# Patient Record
Sex: FEMALE | Race: WHITE | NOT HISPANIC OR LATINO | Employment: OTHER | URBAN - NONMETROPOLITAN AREA
[De-identification: names, ages, dates, MRNs, and addresses within clinical notes are randomized per-mention and may not be internally consistent; named-entity substitution may affect disease eponyms.]

---

## 2017-01-01 ENCOUNTER — APPOINTMENT (EMERGENCY)
Dept: CT IMAGING | Facility: HOSPITAL | Age: 82
DRG: 690 | End: 2017-01-01
Payer: MEDICARE

## 2017-01-01 ENCOUNTER — ALLSCRIPTS OFFICE VISIT (OUTPATIENT)
Dept: OTHER | Facility: OTHER | Age: 82
End: 2017-01-01

## 2017-01-01 ENCOUNTER — GENERIC CONVERSION - ENCOUNTER (OUTPATIENT)
Dept: OTHER | Facility: OTHER | Age: 82
End: 2017-01-01

## 2017-01-01 ENCOUNTER — APPOINTMENT (INPATIENT)
Dept: PHYSICAL THERAPY | Facility: HOSPITAL | Age: 82
DRG: 543 | End: 2017-01-01
Payer: MEDICARE

## 2017-01-01 ENCOUNTER — HOSPITAL ENCOUNTER (EMERGENCY)
Facility: HOSPITAL | Age: 82
Discharge: HOME | End: 2017-08-31
Admitting: EMERGENCY MEDICINE
Payer: MEDICARE

## 2017-01-01 ENCOUNTER — GENERIC CONVERSION - ENCOUNTER (OUTPATIENT)
Dept: INTERNAL MEDICINE CLINIC | Facility: CLINIC | Age: 82
End: 2017-01-01

## 2017-01-01 ENCOUNTER — HOSPITAL ENCOUNTER (INPATIENT)
Facility: HOSPITAL | Age: 82
LOS: 1 days | Discharge: HOME | DRG: 690 | End: 2017-03-24
Attending: EMERGENCY MEDICINE | Admitting: HOSPITALIST
Payer: MEDICARE

## 2017-01-01 ENCOUNTER — APPOINTMENT (INPATIENT)
Dept: PHYSICAL THERAPY | Facility: HOSPITAL | Age: 82
DRG: 690 | End: 2017-01-01
Payer: MEDICARE

## 2017-01-01 ENCOUNTER — HOSPITAL ENCOUNTER (INPATIENT)
Facility: HOSPITAL | Age: 82
LOS: 39 days | Discharge: NURSING, REHAB OR LONG-TERM CARE | DRG: 092 | End: 2017-10-17
Attending: INTERNAL MEDICINE | Admitting: INTERNAL MEDICINE
Payer: MEDICARE

## 2017-01-01 ENCOUNTER — APPOINTMENT (EMERGENCY)
Dept: RADIOLOGY | Facility: HOSPITAL | Age: 82
End: 2017-01-01
Payer: MEDICARE

## 2017-01-01 ENCOUNTER — APPOINTMENT (INPATIENT)
Dept: OCCUPATIONAL THERAPY | Facility: HOSPITAL | Age: 82
DRG: 543 | End: 2017-01-01
Payer: MEDICARE

## 2017-01-01 ENCOUNTER — APPOINTMENT (INPATIENT)
Dept: OCCUPATIONAL THERAPY | Facility: HOSPITAL | Age: 82
DRG: 690 | End: 2017-01-01
Payer: MEDICARE

## 2017-01-01 ENCOUNTER — APPOINTMENT (EMERGENCY)
Dept: ULTRASOUND IMAGING | Facility: HOSPITAL | Age: 82
End: 2017-01-01
Payer: MEDICARE

## 2017-01-01 ENCOUNTER — APPOINTMENT (EMERGENCY)
Dept: RADIOLOGY | Facility: HOSPITAL | Age: 82
DRG: 543 | End: 2017-01-01
Payer: MEDICARE

## 2017-01-01 ENCOUNTER — HOSPITAL ENCOUNTER (INPATIENT)
Facility: HOSPITAL | Age: 82
LOS: 3 days | Discharge: NURSING, REHAB OR LONG-TERM CARE | DRG: 543 | End: 2017-09-08
Attending: EMERGENCY MEDICINE | Admitting: INTERNAL MEDICINE
Payer: MEDICARE

## 2017-01-01 ENCOUNTER — HOSPITAL ENCOUNTER (EMERGENCY)
Facility: HOSPITAL | Age: 82
Discharge: HOME | End: 2017-06-14
Attending: EMERGENCY MEDICINE
Payer: MEDICARE

## 2017-01-01 VITALS
HEIGHT: 63 IN | OXYGEN SATURATION: 93 % | BODY MASS INDEX: 21.68 KG/M2 | SYSTOLIC BLOOD PRESSURE: 140 MMHG | RESPIRATION RATE: 16 BRPM | HEART RATE: 89 BPM | DIASTOLIC BLOOD PRESSURE: 63 MMHG | WEIGHT: 122.36 LBS | TEMPERATURE: 98.4 F

## 2017-01-01 VITALS
HEIGHT: 60 IN | BODY MASS INDEX: 23.98 KG/M2 | TEMPERATURE: 97.5 F | OXYGEN SATURATION: 96 % | SYSTOLIC BLOOD PRESSURE: 119 MMHG | RESPIRATION RATE: 18 BRPM | WEIGHT: 122.14 LBS | HEART RATE: 63 BPM | DIASTOLIC BLOOD PRESSURE: 67 MMHG

## 2017-01-01 VITALS
WEIGHT: 126.1 LBS | BODY MASS INDEX: 21.53 KG/M2 | HEIGHT: 64 IN | OXYGEN SATURATION: 97 % | TEMPERATURE: 98.3 F | SYSTOLIC BLOOD PRESSURE: 143 MMHG | RESPIRATION RATE: 19 BRPM | DIASTOLIC BLOOD PRESSURE: 65 MMHG | HEART RATE: 62 BPM

## 2017-01-01 VITALS
HEART RATE: 64 BPM | BODY MASS INDEX: 21.26 KG/M2 | WEIGHT: 120 LBS | SYSTOLIC BLOOD PRESSURE: 112 MMHG | DIASTOLIC BLOOD PRESSURE: 56 MMHG | RESPIRATION RATE: 18 BRPM | OXYGEN SATURATION: 94 % | TEMPERATURE: 98.2 F

## 2017-01-01 DIAGNOSIS — M54.16 RADICULOPATHY OF LUMBAR REGION: ICD-10-CM

## 2017-01-01 DIAGNOSIS — R29.6 FREQUENT FALLS: Primary | ICD-10-CM

## 2017-01-01 DIAGNOSIS — R53.1 WEAKNESS: ICD-10-CM

## 2017-01-01 DIAGNOSIS — N39.0 URINARY TRACT INFECTION: Primary | ICD-10-CM

## 2017-01-01 DIAGNOSIS — R53.1 GENERALIZED WEAKNESS: Primary | ICD-10-CM

## 2017-01-01 DIAGNOSIS — S32.000A LUMBAR COMPRESSION FRACTURE (HCC): Chronic | ICD-10-CM

## 2017-01-01 DIAGNOSIS — K80.20 CHOLELITHIASIS: ICD-10-CM

## 2017-01-01 DIAGNOSIS — M54.9 CHRONIC BACK PAIN: Primary | ICD-10-CM

## 2017-01-01 DIAGNOSIS — G89.29 CHRONIC BACK PAIN: Primary | ICD-10-CM

## 2017-01-01 DIAGNOSIS — M79.605 LOWER EXTREMITY PAIN, BILATERAL: Primary | ICD-10-CM

## 2017-01-01 DIAGNOSIS — Z91.81 HISTORY OF FALLING: ICD-10-CM

## 2017-01-01 DIAGNOSIS — M79.604 LOWER EXTREMITY PAIN, BILATERAL: Primary | ICD-10-CM

## 2017-01-01 DIAGNOSIS — R06.00 DYSPNEA: ICD-10-CM

## 2017-01-01 LAB
ALBUMIN SERPL BCP-MCNC: 3 G/DL (ref 3.5–5)
ALBUMIN SERPL BCP-MCNC: 3.4 G/DL (ref 3.5–5)
ALBUMIN SERPL BCP-MCNC: 3.7 G/DL (ref 3.5–5)
ALP SERPL-CCNC: 54 U/L (ref 46–116)
ALP SERPL-CCNC: 56 U/L (ref 46–116)
ALP SERPL-CCNC: 64 U/L (ref 46–116)
ALT SERPL W P-5'-P-CCNC: 11 U/L (ref 12–78)
ALT SERPL W P-5'-P-CCNC: 14 U/L (ref 12–78)
ALT SERPL W P-5'-P-CCNC: 14 U/L (ref 12–78)
ANION GAP SERPL CALCULATED.3IONS-SCNC: 10 MMOL/L (ref 4–13)
ANION GAP SERPL CALCULATED.3IONS-SCNC: 6 MMOL/L (ref 4–13)
ANION GAP SERPL CALCULATED.3IONS-SCNC: 7 MMOL/L (ref 4–13)
ANION GAP SERPL CALCULATED.3IONS-SCNC: 8 MMOL/L (ref 4–13)
ANION GAP SERPL CALCULATED.3IONS-SCNC: 9 MMOL/L (ref 4–13)
APTT PPP: 31 SECONDS (ref 24–36)
AST SERPL W P-5'-P-CCNC: 18 U/L (ref 5–45)
AST SERPL W P-5'-P-CCNC: 19 U/L (ref 5–45)
AST SERPL W P-5'-P-CCNC: 24 U/L (ref 5–45)
ATRIAL RATE: 68 BPM
ATRIAL RATE: 72 BPM
BACTERIA UR CULT: NORMAL
BACTERIA UR CULT: NORMAL
BACTERIA UR QL AUTO: ABNORMAL /HPF
BACTERIA UR QL AUTO: ABNORMAL /HPF
BASOPHILS # BLD AUTO: 0.03 THOUSANDS/ΜL (ref 0–0.1)
BASOPHILS # BLD AUTO: 0.05 THOUSANDS/ΜL (ref 0–0.1)
BASOPHILS # BLD MANUAL: 0 THOUSAND/UL (ref 0–0.1)
BASOPHILS NFR BLD AUTO: 0 % (ref 0–1)
BASOPHILS NFR BLD AUTO: 1 % (ref 0–1)
BASOPHILS NFR MAR MANUAL: 0 % (ref 0–1)
BILIRUB SERPL-MCNC: 0.6 MG/DL (ref 0.2–1)
BILIRUB SERPL-MCNC: 0.8 MG/DL (ref 0.2–1)
BILIRUB SERPL-MCNC: 0.8 MG/DL (ref 0.2–1)
BILIRUB UR QL STRIP: NEGATIVE
BILIRUB UR QL STRIP: NEGATIVE
BUN SERPL-MCNC: 12 MG/DL (ref 5–25)
BUN SERPL-MCNC: 16 MG/DL (ref 5–25)
BUN SERPL-MCNC: 22 MG/DL (ref 5–25)
BUN SERPL-MCNC: 23 MG/DL (ref 5–25)
BUN SERPL-MCNC: 24 MG/DL (ref 5–25)
BUN SERPL-MCNC: 26 MG/DL (ref 5–25)
BUN SERPL-MCNC: 29 MG/DL (ref 5–25)
CALCIUM SERPL-MCNC: 8.5 MG/DL (ref 8.3–10.1)
CALCIUM SERPL-MCNC: 8.8 MG/DL (ref 8.3–10.1)
CALCIUM SERPL-MCNC: 8.9 MG/DL (ref 8.3–10.1)
CALCIUM SERPL-MCNC: 9.1 MG/DL (ref 8.3–10.1)
CALCIUM SERPL-MCNC: 9.2 MG/DL (ref 8.3–10.1)
CALCIUM SERPL-MCNC: 9.2 MG/DL (ref 8.3–10.1)
CALCIUM SERPL-MCNC: 9.6 MG/DL (ref 8.3–10.1)
CHLORIDE SERPL-SCNC: 100 MMOL/L (ref 100–108)
CHLORIDE SERPL-SCNC: 104 MMOL/L (ref 100–108)
CHLORIDE SERPL-SCNC: 105 MMOL/L (ref 100–108)
CHLORIDE SERPL-SCNC: 106 MMOL/L (ref 100–108)
CHLORIDE SERPL-SCNC: 107 MMOL/L (ref 100–108)
CHLORIDE SERPL-SCNC: 107 MMOL/L (ref 100–108)
CHLORIDE SERPL-SCNC: 110 MMOL/L (ref 100–108)
CK SERPL-CCNC: 106 U/L (ref 26–192)
CK SERPL-CCNC: 38 U/L (ref 26–192)
CLARITY UR: ABNORMAL
CLARITY UR: ABNORMAL
CO2 SERPL-SCNC: 26 MMOL/L (ref 21–32)
CO2 SERPL-SCNC: 29 MMOL/L (ref 21–32)
CO2 SERPL-SCNC: 30 MMOL/L (ref 21–32)
CO2 SERPL-SCNC: 30 MMOL/L (ref 21–32)
CO2 SERPL-SCNC: 31 MMOL/L (ref 21–32)
COLOR UR: YELLOW
COLOR UR: YELLOW
CREAT SERPL-MCNC: 0.95 MG/DL (ref 0.6–1.3)
CREAT SERPL-MCNC: 1.09 MG/DL (ref 0.6–1.3)
CREAT SERPL-MCNC: 1.16 MG/DL (ref 0.6–1.3)
CREAT SERPL-MCNC: 1.19 MG/DL (ref 0.6–1.3)
CREAT SERPL-MCNC: 1.19 MG/DL (ref 0.6–1.3)
CREAT SERPL-MCNC: 1.2 MG/DL (ref 0.6–1.3)
CREAT SERPL-MCNC: 1.32 MG/DL (ref 0.6–1.3)
EOSINOPHIL # BLD AUTO: 0.19 THOUSAND/ΜL (ref 0–0.61)
EOSINOPHIL # BLD AUTO: 0.23 THOUSAND/ΜL (ref 0–0.61)
EOSINOPHIL # BLD AUTO: 0.31 THOUSAND/ΜL (ref 0–0.61)
EOSINOPHIL # BLD AUTO: 0.43 THOUSAND/ΜL (ref 0–0.61)
EOSINOPHIL # BLD MANUAL: 0.3 THOUSAND/UL (ref 0–0.4)
EOSINOPHIL NFR BLD AUTO: 3 % (ref 0–6)
EOSINOPHIL NFR BLD AUTO: 3 % (ref 0–6)
EOSINOPHIL NFR BLD AUTO: 5 % (ref 0–6)
EOSINOPHIL NFR BLD AUTO: 5 % (ref 0–6)
EOSINOPHIL NFR BLD MANUAL: 5 % (ref 0–6)
ERYTHROCYTE [DISTWIDTH] IN BLOOD BY AUTOMATED COUNT: 14.2 % (ref 11.6–15.1)
ERYTHROCYTE [DISTWIDTH] IN BLOOD BY AUTOMATED COUNT: 14.4 % (ref 11.6–15.1)
ERYTHROCYTE [DISTWIDTH] IN BLOOD BY AUTOMATED COUNT: 14.5 % (ref 11.6–15.1)
ERYTHROCYTE [DISTWIDTH] IN BLOOD BY AUTOMATED COUNT: 14.6 % (ref 11.6–15.1)
ERYTHROCYTE [DISTWIDTH] IN BLOOD BY AUTOMATED COUNT: 14.7 % (ref 11.6–15.1)
ERYTHROCYTE [DISTWIDTH] IN BLOOD BY AUTOMATED COUNT: 15 % (ref 11.6–15.1)
ERYTHROCYTE [DISTWIDTH] IN BLOOD BY AUTOMATED COUNT: 15.2 % (ref 11.6–15.1)
FOLATE SERPL-MCNC: 12.1 NG/ML (ref 3.1–17.5)
GFR SERPL CREATININE-BSD FRML MDRD: 35 ML/MIN/1.73SQ M
GFR SERPL CREATININE-BSD FRML MDRD: 39 ML/MIN/1.73SQ M
GFR SERPL CREATININE-BSD FRML MDRD: 39 ML/MIN/1.73SQ M
GFR SERPL CREATININE-BSD FRML MDRD: 42.3 ML/MIN/1.73SQ M
GFR SERPL CREATININE-BSD FRML MDRD: 43.6 ML/MIN/1.73SQ M
GFR SERPL CREATININE-BSD FRML MDRD: 46.8 ML/MIN/1.73SQ M
GFR SERPL CREATININE-BSD FRML MDRD: 52 ML/MIN/1.73SQ M
GLUCOSE SERPL-MCNC: 102 MG/DL (ref 65–140)
GLUCOSE SERPL-MCNC: 117 MG/DL (ref 65–140)
GLUCOSE SERPL-MCNC: 122 MG/DL (ref 65–140)
GLUCOSE SERPL-MCNC: 86 MG/DL (ref 65–140)
GLUCOSE SERPL-MCNC: 88 MG/DL (ref 65–140)
GLUCOSE SERPL-MCNC: 92 MG/DL (ref 65–140)
GLUCOSE SERPL-MCNC: 95 MG/DL (ref 65–140)
GLUCOSE UR STRIP-MCNC: NEGATIVE MG/DL
GLUCOSE UR STRIP-MCNC: NEGATIVE MG/DL
HCT VFR BLD AUTO: 32.1 % (ref 34.8–46.1)
HCT VFR BLD AUTO: 32.2 % (ref 34.8–46.1)
HCT VFR BLD AUTO: 33.1 % (ref 34.8–46.1)
HCT VFR BLD AUTO: 34.9 % (ref 34.8–46.1)
HCT VFR BLD AUTO: 35.4 % (ref 34.8–46.1)
HCT VFR BLD AUTO: 36.5 % (ref 34.8–46.1)
HCT VFR BLD AUTO: 38.7 % (ref 34.8–46.1)
HGB BLD-MCNC: 10.4 G/DL (ref 11.5–15.4)
HGB BLD-MCNC: 10.4 G/DL (ref 11.5–15.4)
HGB BLD-MCNC: 10.5 G/DL (ref 11.5–15.4)
HGB BLD-MCNC: 11.2 G/DL (ref 11.5–15.4)
HGB BLD-MCNC: 11.5 G/DL (ref 11.5–15.4)
HGB BLD-MCNC: 11.8 G/DL (ref 11.5–15.4)
HGB BLD-MCNC: 12.5 G/DL (ref 11.5–15.4)
HGB UR QL STRIP.AUTO: ABNORMAL
HGB UR QL STRIP.AUTO: NEGATIVE
HOLD SPECIMEN: YES
INR PPP: 1.08 (ref 0.86–1.16)
INR PPP: 1.1 (ref 0.86–1.16)
KETONES UR STRIP-MCNC: NEGATIVE MG/DL
KETONES UR STRIP-MCNC: NEGATIVE MG/DL
LACTATE SERPL-SCNC: 0.9 MMOL/L (ref 0.5–2)
LEUKOCYTE ESTERASE UR QL STRIP: ABNORMAL
LEUKOCYTE ESTERASE UR QL STRIP: ABNORMAL
LIPASE SERPL-CCNC: 82 U/L (ref 73–393)
LYMPHOCYTES # BLD AUTO: 1.51 THOUSANDS/ΜL (ref 0.6–4.47)
LYMPHOCYTES # BLD AUTO: 1.53 THOUSANDS/ΜL (ref 0.6–4.47)
LYMPHOCYTES # BLD AUTO: 1.63 THOUSANDS/ΜL (ref 0.6–4.47)
LYMPHOCYTES # BLD AUTO: 1.66 THOUSAND/UL (ref 0.6–4.47)
LYMPHOCYTES # BLD AUTO: 1.93 THOUSANDS/ΜL (ref 0.6–4.47)
LYMPHOCYTES # BLD AUTO: 28 % (ref 14–44)
LYMPHOCYTES NFR BLD AUTO: 19 % (ref 14–44)
LYMPHOCYTES NFR BLD AUTO: 22 % (ref 14–44)
LYMPHOCYTES NFR BLD AUTO: 23 % (ref 14–44)
LYMPHOCYTES NFR BLD AUTO: 26 % (ref 14–44)
MAGNESIUM SERPL-MCNC: 2.1 MG/DL (ref 1.6–2.6)
MAGNESIUM SERPL-MCNC: 2.2 MG/DL (ref 1.6–2.6)
MAGNESIUM SERPL-MCNC: 2.2 MG/DL (ref 1.6–2.6)
MCH RBC QN AUTO: 29.8 PG (ref 26.8–34.3)
MCH RBC QN AUTO: 30 PG (ref 26.8–34.3)
MCH RBC QN AUTO: 30.1 PG (ref 26.8–34.3)
MCH RBC QN AUTO: 30.1 PG (ref 26.8–34.3)
MCH RBC QN AUTO: 30.2 PG (ref 26.8–34.3)
MCH RBC QN AUTO: 30.4 PG (ref 26.8–34.3)
MCH RBC QN AUTO: 30.4 PG (ref 26.8–34.3)
MCHC RBC AUTO-ENTMCNC: 31.7 G/DL (ref 31.4–37.4)
MCHC RBC AUTO-ENTMCNC: 32.1 G/DL (ref 31.4–37.4)
MCHC RBC AUTO-ENTMCNC: 32.3 G/DL (ref 31.4–37.4)
MCHC RBC AUTO-ENTMCNC: 32.4 G/DL (ref 31.4–37.4)
MCHC RBC AUTO-ENTMCNC: 32.5 G/DL (ref 31.4–37.4)
MCV RBC AUTO: 92 FL (ref 82–98)
MCV RBC AUTO: 93 FL (ref 82–98)
MCV RBC AUTO: 94 FL (ref 82–98)
MCV RBC AUTO: 95 FL (ref 82–98)
MCV RBC AUTO: 95 FL (ref 82–98)
MONOCYTES # BLD AUTO: 0.41 THOUSAND/ΜL (ref 0.17–1.22)
MONOCYTES # BLD AUTO: 0.53 THOUSAND/UL (ref 0–1.22)
MONOCYTES # BLD AUTO: 0.55 THOUSAND/ΜL (ref 0.17–1.22)
MONOCYTES # BLD AUTO: 0.6 THOUSAND/ΜL (ref 0.17–1.22)
MONOCYTES # BLD AUTO: 0.66 THOUSAND/ΜL (ref 0.17–1.22)
MONOCYTES NFR BLD AUTO: 7 % (ref 4–12)
MONOCYTES NFR BLD AUTO: 7 % (ref 4–12)
MONOCYTES NFR BLD AUTO: 8 % (ref 4–12)
MONOCYTES NFR BLD AUTO: 8 % (ref 4–12)
MONOCYTES NFR BLD: 9 % (ref 4–12)
NEUTROPHILS # BLD AUTO: 3.62 THOUSANDS/ΜL (ref 1.85–7.62)
NEUTROPHILS # BLD AUTO: 4.34 THOUSANDS/ΜL (ref 1.85–7.62)
NEUTROPHILS # BLD AUTO: 5.77 THOUSANDS/ΜL (ref 1.85–7.62)
NEUTROPHILS # BLD AUTO: 5.91 THOUSANDS/ΜL (ref 1.85–7.62)
NEUTROPHILS # BLD MANUAL: 3.44 THOUSAND/UL (ref 1.85–7.62)
NEUTS SEG NFR BLD AUTO: 58 % (ref 43–75)
NEUTS SEG NFR BLD AUTO: 61 % (ref 43–75)
NEUTS SEG NFR BLD AUTO: 64 % (ref 43–75)
NEUTS SEG NFR BLD AUTO: 65 % (ref 43–75)
NEUTS SEG NFR BLD AUTO: 71 % (ref 43–75)
NITRITE UR QL STRIP: POSITIVE
NITRITE UR QL STRIP: POSITIVE
NON-SQ EPI CELLS URNS QL MICRO: ABNORMAL /HPF
NON-SQ EPI CELLS URNS QL MICRO: ABNORMAL /HPF
NT-PROBNP SERPL-MCNC: 1444 PG/ML
P AXIS: 51 DEGREES
P AXIS: 54 DEGREES
PH UR STRIP.AUTO: 5.5 [PH] (ref 4.5–8)
PH UR STRIP.AUTO: 6.5 [PH] (ref 4.5–8)
PHOSPHATE SERPL-MCNC: 3.8 MG/DL (ref 2.3–4.1)
PHOSPHATE SERPL-MCNC: 3.9 MG/DL (ref 2.3–4.1)
PLATELET # BLD AUTO: 198 THOUSANDS/UL (ref 149–390)
PLATELET # BLD AUTO: 218 THOUSANDS/UL (ref 149–390)
PLATELET # BLD AUTO: 223 THOUSANDS/UL (ref 149–390)
PLATELET # BLD AUTO: 226 THOUSANDS/UL (ref 149–390)
PLATELET # BLD AUTO: 227 THOUSANDS/UL (ref 149–390)
PLATELET # BLD AUTO: 241 THOUSANDS/UL (ref 149–390)
PLATELET # BLD AUTO: 262 THOUSANDS/UL (ref 149–390)
PLATELET BLD QL SMEAR: ADEQUATE
PMV BLD AUTO: 8.5 FL (ref 8.9–12.7)
PMV BLD AUTO: 8.7 FL (ref 8.9–12.7)
PMV BLD AUTO: 8.7 FL (ref 8.9–12.7)
PMV BLD AUTO: 8.9 FL (ref 8.9–12.7)
PMV BLD AUTO: 8.9 FL (ref 8.9–12.7)
PMV BLD AUTO: 9.1 FL (ref 8.9–12.7)
PMV BLD AUTO: 9.2 FL (ref 8.9–12.7)
POTASSIUM SERPL-SCNC: 3.7 MMOL/L (ref 3.5–5.3)
POTASSIUM SERPL-SCNC: 4 MMOL/L (ref 3.5–5.3)
POTASSIUM SERPL-SCNC: 4.1 MMOL/L (ref 3.5–5.3)
POTASSIUM SERPL-SCNC: 4.4 MMOL/L (ref 3.5–5.3)
PR INTERVAL: 172 MS
PR INTERVAL: 176 MS
PROT SERPL-MCNC: 6.7 G/DL (ref 6.4–8.2)
PROT SERPL-MCNC: 7.5 G/DL (ref 6.4–8.2)
PROT SERPL-MCNC: 7.7 G/DL (ref 6.4–8.2)
PROT UR STRIP-MCNC: NEGATIVE MG/DL
PROT UR STRIP-MCNC: NEGATIVE MG/DL
PROTHROMBIN TIME: 13.9 SECONDS (ref 12.1–14.4)
PROTHROMBIN TIME: 13.9 SECONDS (ref 12–14.3)
QRS AXIS: -26 DEGREES
QRS AXIS: -27 DEGREES
QRSD INTERVAL: 112 MS
QRSD INTERVAL: 120 MS
QT INTERVAL: 398 MS
QT INTERVAL: 418 MS
QTC INTERVAL: 435 MS
QTC INTERVAL: 444 MS
RBC # BLD AUTO: 3.46 MILLION/UL (ref 3.81–5.12)
RBC # BLD AUTO: 3.46 MILLION/UL (ref 3.81–5.12)
RBC # BLD AUTO: 3.48 MILLION/UL (ref 3.81–5.12)
RBC # BLD AUTO: 3.69 MILLION/UL (ref 3.81–5.12)
RBC # BLD AUTO: 3.86 MILLION/UL (ref 3.81–5.12)
RBC # BLD AUTO: 3.88 MILLION/UL (ref 3.81–5.12)
RBC # BLD AUTO: 4.17 MILLION/UL (ref 3.81–5.12)
RBC #/AREA URNS AUTO: ABNORMAL /HPF
RBC #/AREA URNS AUTO: ABNORMAL /HPF
SODIUM SERPL-SCNC: 139 MMOL/L (ref 136–145)
SODIUM SERPL-SCNC: 140 MMOL/L (ref 136–145)
SODIUM SERPL-SCNC: 142 MMOL/L (ref 136–145)
SODIUM SERPL-SCNC: 143 MMOL/L (ref 136–145)
SODIUM SERPL-SCNC: 145 MMOL/L (ref 136–145)
SP GR UR STRIP.AUTO: 1.01 (ref 1–1.03)
SP GR UR STRIP.AUTO: 1.01 (ref 1–1.03)
T WAVE AXIS: 133 DEGREES
T WAVE AXIS: 138 DEGREES
TOTAL CELLS COUNTED SPEC: 100
TROPONIN I SERPL-MCNC: <0.02 NG/ML
TSH SERPL DL<=0.05 MIU/L-ACNC: 0.82 UIU/ML (ref 0.36–3.74)
TSH SERPL DL<=0.05 MIU/L-ACNC: 1.8 UIU/ML (ref 0.36–3.74)
UROBILINOGEN UR QL STRIP.AUTO: 0.2 E.U./DL
UROBILINOGEN UR QL STRIP.AUTO: 0.2 E.U./DL
VENTRICULAR RATE: 68 BPM
VENTRICULAR RATE: 72 BPM
VIT B12 SERPL-MCNC: 300 PG/ML (ref 100–900)
WBC # BLD AUTO: 5.16 THOUSAND/UL (ref 4.31–10.16)
WBC # BLD AUTO: 5.9 THOUSAND/UL (ref 4.31–10.16)
WBC # BLD AUTO: 5.93 THOUSAND/UL (ref 4.31–10.16)
WBC # BLD AUTO: 6.15 THOUSAND/UL (ref 4.31–10.16)
WBC # BLD AUTO: 6.62 THOUSAND/UL (ref 4.31–10.16)
WBC # BLD AUTO: 8.4 THOUSAND/UL (ref 4.31–10.16)
WBC # BLD AUTO: 8.84 THOUSAND/UL (ref 4.31–10.16)
WBC #/AREA URNS AUTO: ABNORMAL /HPF
WBC #/AREA URNS AUTO: ABNORMAL /HPF

## 2017-01-01 PROCEDURE — 97116 GAIT TRAINING THERAPY: CPT | Performed by: PHYSICAL THERAPIST

## 2017-01-01 PROCEDURE — 96360 HYDRATION IV INFUSION INIT: CPT

## 2017-01-01 PROCEDURE — 97530 THERAPEUTIC ACTIVITIES: CPT

## 2017-01-01 PROCEDURE — 97110 THERAPEUTIC EXERCISES: CPT

## 2017-01-01 PROCEDURE — 99285 EMERGENCY DEPT VISIT HI MDM: CPT

## 2017-01-01 PROCEDURE — G8979 MOBILITY GOAL STATUS: HCPCS | Performed by: PHYSICAL THERAPIST

## 2017-01-01 PROCEDURE — 84443 ASSAY THYROID STIM HORMONE: CPT | Performed by: INTERNAL MEDICINE

## 2017-01-01 PROCEDURE — 97116 GAIT TRAINING THERAPY: CPT

## 2017-01-01 PROCEDURE — 97535 SELF CARE MNGMENT TRAINING: CPT

## 2017-01-01 PROCEDURE — 87186 SC STD MICRODIL/AGAR DIL: CPT | Performed by: PHYSICIAN ASSISTANT

## 2017-01-01 PROCEDURE — 99284 EMERGENCY DEPT VISIT MOD MDM: CPT

## 2017-01-01 PROCEDURE — 97112 NEUROMUSCULAR REEDUCATION: CPT

## 2017-01-01 PROCEDURE — 81001 URINALYSIS AUTO W/SCOPE: CPT | Performed by: EMERGENCY MEDICINE

## 2017-01-01 PROCEDURE — 97542 WHEELCHAIR MNGMENT TRAINING: CPT

## 2017-01-01 PROCEDURE — 82746 ASSAY OF FOLIC ACID SERUM: CPT | Performed by: INTERNAL MEDICINE

## 2017-01-01 PROCEDURE — 84443 ASSAY THYROID STIM HORMONE: CPT | Performed by: EMERGENCY MEDICINE

## 2017-01-01 PROCEDURE — 85730 THROMBOPLASTIN TIME PARTIAL: CPT | Performed by: EMERGENCY MEDICINE

## 2017-01-01 PROCEDURE — 83735 ASSAY OF MAGNESIUM: CPT | Performed by: EMERGENCY MEDICINE

## 2017-01-01 PROCEDURE — G8988 SELF CARE GOAL STATUS: HCPCS

## 2017-01-01 PROCEDURE — 87077 CULTURE AEROBIC IDENTIFY: CPT | Performed by: PHYSICIAN ASSISTANT

## 2017-01-01 PROCEDURE — 09C4XZZ EXTIRPATION OF MATTER FROM LEFT EXTERNAL AUDITORY CANAL, EXTERNAL APPROACH: ICD-10-PCS | Performed by: INTERNAL MEDICINE

## 2017-01-01 PROCEDURE — 85027 COMPLETE CBC AUTOMATED: CPT | Performed by: INTERNAL MEDICINE

## 2017-01-01 PROCEDURE — 93005 ELECTROCARDIOGRAM TRACING: CPT | Performed by: EMERGENCY MEDICINE

## 2017-01-01 PROCEDURE — 97162 PT EVAL MOD COMPLEX 30 MIN: CPT | Performed by: PHYSICAL THERAPIST

## 2017-01-01 PROCEDURE — 97166 OT EVAL MOD COMPLEX 45 MIN: CPT

## 2017-01-01 PROCEDURE — 85025 COMPLETE CBC W/AUTO DIFF WBC: CPT | Performed by: EMERGENCY MEDICINE

## 2017-01-01 PROCEDURE — 80048 BASIC METABOLIC PNL TOTAL CA: CPT | Performed by: EMERGENCY MEDICINE

## 2017-01-01 PROCEDURE — 82607 VITAMIN B-12: CPT | Performed by: INTERNAL MEDICINE

## 2017-01-01 PROCEDURE — 85610 PROTHROMBIN TIME: CPT | Performed by: EMERGENCY MEDICINE

## 2017-01-01 PROCEDURE — 09C3XZZ EXTIRPATION OF MATTER FROM RIGHT EXTERNAL AUDITORY CANAL, EXTERNAL APPROACH: ICD-10-PCS | Performed by: INTERNAL MEDICINE

## 2017-01-01 PROCEDURE — 36415 COLL VENOUS BLD VENIPUNCTURE: CPT | Performed by: EMERGENCY MEDICINE

## 2017-01-01 PROCEDURE — 83735 ASSAY OF MAGNESIUM: CPT | Performed by: INTERNAL MEDICINE

## 2017-01-01 PROCEDURE — 72170 X-RAY EXAM OF PELVIS: CPT

## 2017-01-01 PROCEDURE — 74177 CT ABD & PELVIS W/CONTRAST: CPT

## 2017-01-01 PROCEDURE — 87086 URINE CULTURE/COLONY COUNT: CPT | Performed by: EMERGENCY MEDICINE

## 2017-01-01 PROCEDURE — 83605 ASSAY OF LACTIC ACID: CPT | Performed by: EMERGENCY MEDICINE

## 2017-01-01 PROCEDURE — 93970 EXTREMITY STUDY: CPT

## 2017-01-01 PROCEDURE — 94760 N-INVAS EAR/PLS OXIMETRY 1: CPT

## 2017-01-01 PROCEDURE — G8987 SELF CARE CURRENT STATUS: HCPCS

## 2017-01-01 PROCEDURE — 85007 BL SMEAR W/DIFF WBC COUNT: CPT | Performed by: PHYSICIAN ASSISTANT

## 2017-01-01 PROCEDURE — G8978 MOBILITY CURRENT STATUS: HCPCS | Performed by: PHYSICAL THERAPIST

## 2017-01-01 PROCEDURE — 80048 BASIC METABOLIC PNL TOTAL CA: CPT | Performed by: PHYSICIAN ASSISTANT

## 2017-01-01 PROCEDURE — 97167 OT EVAL HIGH COMPLEX 60 MIN: CPT

## 2017-01-01 PROCEDURE — 85027 COMPLETE CBC AUTOMATED: CPT | Performed by: PHYSICIAN ASSISTANT

## 2017-01-01 PROCEDURE — 80053 COMPREHEN METABOLIC PANEL: CPT | Performed by: EMERGENCY MEDICINE

## 2017-01-01 PROCEDURE — 83690 ASSAY OF LIPASE: CPT | Performed by: EMERGENCY MEDICINE

## 2017-01-01 PROCEDURE — 82550 ASSAY OF CK (CPK): CPT | Performed by: EMERGENCY MEDICINE

## 2017-01-01 PROCEDURE — 99283 EMERGENCY DEPT VISIT LOW MDM: CPT

## 2017-01-01 PROCEDURE — 71275 CT ANGIOGRAPHY CHEST: CPT

## 2017-01-01 PROCEDURE — 80053 COMPREHEN METABOLIC PANEL: CPT | Performed by: INTERNAL MEDICINE

## 2017-01-01 PROCEDURE — 87086 URINE CULTURE/COLONY COUNT: CPT | Performed by: PHYSICIAN ASSISTANT

## 2017-01-01 PROCEDURE — 72100 X-RAY EXAM L-S SPINE 2/3 VWS: CPT

## 2017-01-01 PROCEDURE — 87077 CULTURE AEROBIC IDENTIFY: CPT | Performed by: EMERGENCY MEDICINE

## 2017-01-01 PROCEDURE — 82550 ASSAY OF CK (CPK): CPT | Performed by: INTERNAL MEDICINE

## 2017-01-01 PROCEDURE — 84100 ASSAY OF PHOSPHORUS: CPT | Performed by: INTERNAL MEDICINE

## 2017-01-01 PROCEDURE — 87186 SC STD MICRODIL/AGAR DIL: CPT | Performed by: EMERGENCY MEDICINE

## 2017-01-01 PROCEDURE — 84100 ASSAY OF PHOSPHORUS: CPT | Performed by: EMERGENCY MEDICINE

## 2017-01-01 PROCEDURE — 83880 ASSAY OF NATRIURETIC PEPTIDE: CPT | Performed by: EMERGENCY MEDICINE

## 2017-01-01 PROCEDURE — 94664 DEMO&/EVAL PT USE INHALER: CPT

## 2017-01-01 PROCEDURE — 85610 PROTHROMBIN TIME: CPT | Performed by: INTERNAL MEDICINE

## 2017-01-01 PROCEDURE — 84484 ASSAY OF TROPONIN QUANT: CPT | Performed by: EMERGENCY MEDICINE

## 2017-01-01 PROCEDURE — 97162 PT EVAL MOD COMPLEX 30 MIN: CPT

## 2017-01-01 PROCEDURE — 85025 COMPLETE CBC W/AUTO DIFF WBC: CPT | Performed by: PHYSICIAN ASSISTANT

## 2017-01-01 RX ORDER — ONDANSETRON 4 MG/1
4 TABLET, ORALLY DISINTEGRATING ORAL ONCE
Status: COMPLETED | OUTPATIENT
Start: 2017-01-01 | End: 2017-01-01

## 2017-01-01 RX ORDER — FENTANYL CITRATE 50 UG/ML
50 INJECTION, SOLUTION INTRAMUSCULAR; INTRAVENOUS ONCE
Status: COMPLETED | OUTPATIENT
Start: 2017-01-01 | End: 2017-01-01

## 2017-01-01 RX ORDER — PANTOPRAZOLE SODIUM 40 MG/1
40 TABLET, DELAYED RELEASE ORAL DAILY
Status: DISCONTINUED | OUTPATIENT
Start: 2017-01-01 | End: 2017-01-01 | Stop reason: HOSPADM

## 2017-01-01 RX ORDER — ASPIRIN 81 MG/1
81 TABLET, CHEWABLE ORAL DAILY
Status: DISCONTINUED | OUTPATIENT
Start: 2017-01-01 | End: 2017-01-01 | Stop reason: HOSPADM

## 2017-01-01 RX ORDER — CARVEDILOL 3.12 MG/1
3.12 TABLET ORAL 2 TIMES DAILY WITH MEALS
Status: DISCONTINUED | OUTPATIENT
Start: 2017-01-01 | End: 2017-01-01 | Stop reason: HOSPADM

## 2017-01-01 RX ORDER — GLUC/MSM/COLGN2/HYAL/ANTIARTH3 375-375-20
2 TABLET ORAL DAILY
COMMUNITY
End: 2018-01-01 | Stop reason: SDUPTHER

## 2017-01-01 RX ORDER — ACETAMINOPHEN 325 MG/1
650 TABLET ORAL ONCE
Status: COMPLETED | OUTPATIENT
Start: 2017-01-01 | End: 2017-01-01

## 2017-01-01 RX ORDER — LIDOCAINE 50 MG/G
1 PATCH TOPICAL ONCE
Status: COMPLETED | OUTPATIENT
Start: 2017-01-01 | End: 2017-01-01

## 2017-01-01 RX ORDER — DOCUSATE SODIUM 100 MG/1
100 CAPSULE, LIQUID FILLED ORAL 2 TIMES DAILY
Qty: 10 CAPSULE | Refills: 0 | Status: SHIPPED | OUTPATIENT
Start: 2017-01-01 | End: 2018-01-01

## 2017-01-01 RX ORDER — LEVALBUTEROL 1.25 MG/.5ML
1.25 SOLUTION, CONCENTRATE RESPIRATORY (INHALATION) EVERY 6 HOURS PRN
Status: DISCONTINUED | OUTPATIENT
Start: 2017-01-01 | End: 2017-01-01 | Stop reason: HOSPADM

## 2017-01-01 RX ORDER — ACETAMINOPHEN 325 MG/1
650 TABLET ORAL EVERY 6 HOURS PRN
Status: DISCONTINUED | OUTPATIENT
Start: 2017-01-01 | End: 2017-01-01 | Stop reason: HOSPADM

## 2017-01-01 RX ORDER — TRAMADOL HYDROCHLORIDE 50 MG/1
50 TABLET ORAL 2 TIMES DAILY
Status: DISCONTINUED | OUTPATIENT
Start: 2017-01-01 | End: 2017-01-01 | Stop reason: HOSPADM

## 2017-01-01 RX ORDER — CEPHALEXIN 250 MG/1
250 CAPSULE ORAL EVERY 12 HOURS SCHEDULED
Qty: 8 CAPSULE | Refills: 0 | Status: SHIPPED | OUTPATIENT
Start: 2017-01-01 | End: 2017-01-01

## 2017-01-01 RX ORDER — HEPARIN SODIUM 5000 [USP'U]/ML
5000 INJECTION, SOLUTION INTRAVENOUS; SUBCUTANEOUS EVERY 8 HOURS SCHEDULED
Status: DISCONTINUED | OUTPATIENT
Start: 2017-01-01 | End: 2017-01-01 | Stop reason: HOSPADM

## 2017-01-01 RX ORDER — ONDANSETRON 2 MG/ML
4 INJECTION INTRAMUSCULAR; INTRAVENOUS EVERY 6 HOURS PRN
Status: DISCONTINUED | OUTPATIENT
Start: 2017-01-01 | End: 2017-01-01 | Stop reason: HOSPADM

## 2017-01-01 RX ORDER — LEVALBUTEROL 1.25 MG/.5ML
1.25 SOLUTION, CONCENTRATE RESPIRATORY (INHALATION)
Status: DISCONTINUED | OUTPATIENT
Start: 2017-01-01 | End: 2017-01-01

## 2017-01-01 RX ORDER — ACETAMINOPHEN 325 MG/1
325 TABLET ORAL EVERY 6 HOURS PRN
Status: DISCONTINUED | OUTPATIENT
Start: 2017-01-01 | End: 2017-01-01 | Stop reason: HOSPADM

## 2017-01-01 RX ORDER — TRAMADOL HYDROCHLORIDE 50 MG/1
50 TABLET ORAL 2 TIMES DAILY
COMMUNITY
End: 2018-01-01 | Stop reason: HOSPADM

## 2017-01-01 RX ORDER — PRAVASTATIN SODIUM 20 MG
20 TABLET ORAL
Status: DISCONTINUED | OUTPATIENT
Start: 2017-01-01 | End: 2017-01-01 | Stop reason: HOSPADM

## 2017-01-01 RX ORDER — DOCUSATE SODIUM 100 MG/1
100 CAPSULE, LIQUID FILLED ORAL 2 TIMES DAILY
Status: DISCONTINUED | OUTPATIENT
Start: 2017-01-01 | End: 2017-01-01 | Stop reason: HOSPADM

## 2017-01-01 RX ORDER — SODIUM CHLORIDE FOR INHALATION 0.9 %
3 VIAL, NEBULIZER (ML) INHALATION
Status: DISCONTINUED | OUTPATIENT
Start: 2017-01-01 | End: 2017-01-01

## 2017-01-01 RX ORDER — CLOPIDOGREL BISULFATE 75 MG/1
75 TABLET ORAL DAILY
Status: DISCONTINUED | OUTPATIENT
Start: 2017-01-01 | End: 2017-01-01 | Stop reason: HOSPADM

## 2017-01-01 RX ORDER — DILTIAZEM HYDROCHLORIDE 180 MG/1
180 CAPSULE, COATED, EXTENDED RELEASE ORAL DAILY
Status: DISCONTINUED | OUTPATIENT
Start: 2017-01-01 | End: 2017-01-01 | Stop reason: HOSPADM

## 2017-01-01 RX ORDER — SODIUM CHLORIDE 9 MG/ML
125 INJECTION, SOLUTION INTRAVENOUS ONCE
Status: COMPLETED | OUTPATIENT
Start: 2017-01-01 | End: 2017-01-01

## 2017-01-01 RX ORDER — CEPHALEXIN 500 MG/1
500 CAPSULE ORAL 2 TIMES DAILY
Qty: 8 CAPSULE | Refills: 0 | Status: SHIPPED | OUTPATIENT
Start: 2017-01-01 | End: 2017-01-01

## 2017-01-01 RX ORDER — ACETAMINOPHEN 325 MG/1
650 TABLET ORAL ONCE
Status: DISCONTINUED | OUTPATIENT
Start: 2017-01-01 | End: 2017-01-01 | Stop reason: HOSPADM

## 2017-01-01 RX ORDER — SODIUM CHLORIDE 9 MG/ML
50 INJECTION, SOLUTION INTRAVENOUS CONTINUOUS
Status: DISCONTINUED | OUTPATIENT
Start: 2017-01-01 | End: 2017-01-01

## 2017-01-01 RX ORDER — ACETAMINOPHEN 500 MG
325 TABLET ORAL EVERY 6 HOURS PRN
Status: ON HOLD | COMMUNITY
End: 2018-01-01 | Stop reason: SDUPTHER

## 2017-01-01 RX ORDER — ACETAMINOPHEN 325 MG/1
500 TABLET ORAL EVERY 6 HOURS PRN
Status: DISCONTINUED | OUTPATIENT
Start: 2017-01-01 | End: 2017-01-01 | Stop reason: HOSPADM

## 2017-01-01 RX ORDER — SODIUM CHLORIDE 9 MG/ML
50 INJECTION, SOLUTION INTRAVENOUS CONTINUOUS
Status: DISCONTINUED | OUTPATIENT
Start: 2017-01-01 | End: 2017-01-01 | Stop reason: HOSPADM

## 2017-01-01 RX ADMIN — DILTIAZEM HYDROCHLORIDE 180 MG: 180 CAPSULE, EXTENDED RELEASE ORAL at 08:52

## 2017-01-01 RX ADMIN — ACETAMINOPHEN 488 MG: 325 TABLET, FILM COATED ORAL at 23:18

## 2017-01-01 RX ADMIN — PRAVASTATIN SODIUM 20 MG: 20 TABLET ORAL at 15:48

## 2017-01-01 RX ADMIN — PRAVASTATIN SODIUM 20 MG: 20 TABLET ORAL at 16:30

## 2017-01-01 RX ADMIN — CARVEDILOL 3.12 MG: 3.12 TABLET, FILM COATED ORAL at 08:52

## 2017-01-01 RX ADMIN — CEFTRIAXONE 1000 MG: 1 INJECTION, POWDER, FOR SOLUTION INTRAMUSCULAR; INTRAVENOUS at 15:32

## 2017-01-01 RX ADMIN — IODIXANOL 100 ML: 320 INJECTION, SOLUTION INTRAVASCULAR at 15:26

## 2017-01-01 RX ADMIN — CARVEDILOL 3.12 MG: 3.12 TABLET, FILM COATED ORAL at 09:56

## 2017-01-01 RX ADMIN — CARVEDILOL 3.12 MG: 3.12 TABLET, FILM COATED ORAL at 10:24

## 2017-01-01 RX ADMIN — PRAVASTATIN SODIUM 20 MG: 20 TABLET ORAL at 18:29

## 2017-01-01 RX ADMIN — DOCUSATE SODIUM 100 MG: 100 CAPSULE, LIQUID FILLED ORAL at 19:13

## 2017-01-01 RX ADMIN — ACETAMINOPHEN 325 MG: 325 TABLET, FILM COATED ORAL at 18:05

## 2017-01-01 RX ADMIN — ACETAMINOPHEN 488 MG: 325 TABLET, FILM COATED ORAL at 08:12

## 2017-01-01 RX ADMIN — DILTIAZEM HYDROCHLORIDE 180 MG: 180 CAPSULE, EXTENDED RELEASE ORAL at 09:57

## 2017-01-01 RX ADMIN — CARVEDILOL 3.12 MG: 3.12 TABLET, FILM COATED ORAL at 18:28

## 2017-01-01 RX ADMIN — DOCUSATE SODIUM 100 MG: 100 CAPSULE, LIQUID FILLED ORAL at 18:31

## 2017-01-01 RX ADMIN — ENOXAPARIN SODIUM 30 MG: 30 INJECTION SUBCUTANEOUS at 09:54

## 2017-01-01 RX ADMIN — ASPIRIN 81 MG: 81 TABLET, CHEWABLE ORAL at 10:24

## 2017-01-01 RX ADMIN — HEPARIN SODIUM 5000 UNITS: 5000 INJECTION, SOLUTION INTRAVENOUS; SUBCUTANEOUS at 21:47

## 2017-01-01 RX ADMIN — CEFAZOLIN SODIUM 1000 MG: 1 SOLUTION INTRAVENOUS at 19:21

## 2017-01-01 RX ADMIN — PANTOPRAZOLE SODIUM 40 MG: 40 TABLET, DELAYED RELEASE ORAL at 10:24

## 2017-01-01 RX ADMIN — PANTOPRAZOLE SODIUM 40 MG: 40 TABLET, DELAYED RELEASE ORAL at 08:17

## 2017-01-01 RX ADMIN — CARVEDILOL 3.12 MG: 3.12 TABLET, FILM COATED ORAL at 16:10

## 2017-01-01 RX ADMIN — SODIUM CHLORIDE 125 ML/HR: 0.9 INJECTION, SOLUTION INTRAVENOUS at 01:59

## 2017-01-01 RX ADMIN — ASPIRIN 81 MG 81 MG: 81 TABLET ORAL at 08:53

## 2017-01-01 RX ADMIN — CARVEDILOL 3.12 MG: 3.12 TABLET, FILM COATED ORAL at 15:48

## 2017-01-01 RX ADMIN — ASPIRIN 81 MG 81 MG: 81 TABLET ORAL at 08:19

## 2017-01-01 RX ADMIN — PANTOPRAZOLE SODIUM 40 MG: 40 TABLET, DELAYED RELEASE ORAL at 09:56

## 2017-01-01 RX ADMIN — ENOXAPARIN SODIUM 30 MG: 30 INJECTION SUBCUTANEOUS at 08:49

## 2017-01-01 RX ADMIN — ACETAMINOPHEN 650 MG: 325 TABLET, FILM COATED ORAL at 22:36

## 2017-01-01 RX ADMIN — PRAVASTATIN SODIUM 20 MG: 20 TABLET ORAL at 16:10

## 2017-01-01 RX ADMIN — SODIUM CHLORIDE 50 ML/HR: 0.9 INJECTION, SOLUTION INTRAVENOUS at 15:51

## 2017-01-01 RX ADMIN — SODIUM CHLORIDE 50 ML/HR: 0.9 INJECTION, SOLUTION INTRAVENOUS at 10:45

## 2017-01-01 RX ADMIN — HEPARIN SODIUM 5000 UNITS: 5000 INJECTION, SOLUTION INTRAVENOUS; SUBCUTANEOUS at 06:13

## 2017-01-01 RX ADMIN — CARVEDILOL 3.12 MG: 3.12 TABLET, FILM COATED ORAL at 16:31

## 2017-01-01 RX ADMIN — DOCUSATE SODIUM 100 MG: 100 CAPSULE, LIQUID FILLED ORAL at 09:56

## 2017-01-01 RX ADMIN — FENTANYL CITRATE 50 MCG: 50 INJECTION INTRAMUSCULAR; INTRAVENOUS at 22:35

## 2017-01-01 RX ADMIN — PANTOPRAZOLE SODIUM 40 MG: 40 TABLET, DELAYED RELEASE ORAL at 08:53

## 2017-01-01 RX ADMIN — LIDOCAINE 1 PATCH: 50 PATCH CUTANEOUS at 22:35

## 2017-01-01 RX ADMIN — DILTIAZEM HYDROCHLORIDE 180 MG: 180 CAPSULE, EXTENDED RELEASE ORAL at 08:19

## 2017-01-01 RX ADMIN — CARVEDILOL 3.12 MG: 3.12 TABLET, FILM COATED ORAL at 08:17

## 2017-01-01 RX ADMIN — ACETAMINOPHEN 488 MG: 325 TABLET, FILM COATED ORAL at 16:30

## 2017-01-01 RX ADMIN — ASPIRIN 81 MG 81 MG: 81 TABLET ORAL at 09:56

## 2017-01-01 RX ADMIN — CEFTRIAXONE 1000 MG: 1 INJECTION, POWDER, FOR SOLUTION INTRAMUSCULAR; INTRAVENOUS at 16:30

## 2017-01-01 RX ADMIN — HEPARIN SODIUM 5000 UNITS: 5000 INJECTION, SOLUTION INTRAVENOUS; SUBCUTANEOUS at 15:25

## 2017-01-01 RX ADMIN — ONDANSETRON 4 MG: 4 TABLET, ORALLY DISINTEGRATING ORAL at 21:15

## 2017-01-01 RX ADMIN — SODIUM CHLORIDE 1000 ML: 0.9 INJECTION, SOLUTION INTRAVENOUS at 13:40

## 2017-01-01 RX ADMIN — CEFAZOLIN 500 MG: 1 INJECTION, POWDER, FOR SOLUTION INTRAVENOUS at 10:28

## 2017-01-01 RX ADMIN — DOCUSATE SODIUM 100 MG: 100 CAPSULE, LIQUID FILLED ORAL at 08:51

## 2017-01-01 RX ADMIN — ACETAMINOPHEN 488 MG: 325 TABLET, FILM COATED ORAL at 08:50

## 2017-01-01 RX ADMIN — CLOPIDOGREL BISULFATE 75 MG: 75 TABLET ORAL at 10:24

## 2017-01-01 RX ADMIN — ENOXAPARIN SODIUM 30 MG: 30 INJECTION SUBCUTANEOUS at 08:12

## 2017-01-01 RX ADMIN — ACETAMINOPHEN 650 MG: 325 TABLET, FILM COATED ORAL at 12:22

## 2017-01-01 RX ADMIN — DILTIAZEM HYDROCHLORIDE 180 MG: 180 CAPSULE, EXTENDED RELEASE ORAL at 10:24

## 2017-01-01 RX ADMIN — SODIUM CHLORIDE 50 ML/HR: 0.9 INJECTION, SOLUTION INTRAVENOUS at 18:30

## 2017-03-23 PROBLEM — R53.1 GENERALIZED WEAKNESS: Status: ACTIVE | Noted: 2017-01-01

## 2017-03-23 PROBLEM — I10 ESSENTIAL HYPERTENSION: Status: ACTIVE | Noted: 2017-01-01

## 2017-03-23 PROBLEM — J84.10 PULMONARY FIBROSIS (HCC): Status: ACTIVE | Noted: 2017-01-01

## 2017-03-23 PROBLEM — N39.0 UTI (URINARY TRACT INFECTION): Status: ACTIVE | Noted: 2017-01-01

## 2017-03-23 PROBLEM — E78.5 HYPERLIPIDEMIA: Status: ACTIVE | Noted: 2017-01-01

## 2017-03-23 PROBLEM — K21.9 GASTROESOPHAGEAL REFLUX DISEASE WITHOUT ESOPHAGITIS: Status: ACTIVE | Noted: 2017-01-01

## 2017-03-23 PROBLEM — I25.10 CORONARY ARTERY DISEASE INVOLVING NATIVE CORONARY ARTERY OF NATIVE HEART: Status: ACTIVE | Noted: 2017-01-01

## 2017-03-23 PROBLEM — Z95.2 H/O AORTIC VALVE REPLACEMENT: Status: ACTIVE | Noted: 2017-01-01

## 2017-03-23 PROBLEM — R06.00 DYSPNEA ON EXERTION: Status: ACTIVE | Noted: 2017-01-01

## 2017-09-06 PROBLEM — J84.10 PULMONARY FIBROSIS (HCC): Status: RESOLVED | Noted: 2017-01-01 | Resolved: 2017-01-01

## 2017-09-06 PROBLEM — N30.00 ACUTE CYSTITIS WITHOUT HEMATURIA: Status: ACTIVE | Noted: 2017-01-01

## 2017-09-06 PROBLEM — N39.0 UTI (URINARY TRACT INFECTION): Status: RESOLVED | Noted: 2017-01-01 | Resolved: 2017-01-01

## 2017-09-06 PROBLEM — R06.00 DYSPNEA ON EXERTION: Status: RESOLVED | Noted: 2017-01-01 | Resolved: 2017-01-01

## 2017-09-06 PROBLEM — N17.9 AKI (ACUTE KIDNEY INJURY) (HCC): Status: ACTIVE | Noted: 2017-01-01

## 2017-09-06 PROBLEM — R29.6 FREQUENT FALLS: Status: ACTIVE | Noted: 2017-01-01

## 2017-09-06 PROBLEM — S32.000A CLOSED COMPRESSION FRACTURE OF LUMBAR VERTEBRA (HCC): Chronic | Status: ACTIVE | Noted: 2017-01-01

## 2017-09-06 PROBLEM — M54.9 BACK PAIN: Status: ACTIVE | Noted: 2017-01-01

## 2017-12-09 NOTE — CONSULTS
Plan  Chronic lumbar radiculopathy    · Gabapentin 100 MG Oral Capsule   Rx By: Fatimah Triplett; Dispense: 30 Days ; #:90 Capsule; Refill: 2;Chronic lumbar radiculopathy; CHRIS = N; Sent To: [facility]  Chronic lumbar radiculopathy, Lumbar radiculopathy, chronic    · Gabapentin 100 MG Oral Capsule; TAKE 1 CAPSULE BEDTIME MAY INCREASE TO3 CAPSULES AT BEDTIME AS TOLERATED   Rx By: Fatimah Triplett; Dispense: 30 Days ; #:90 Capsule; Refill: 2;Chronic lumbar radiculopathy, Lumbar radiculopathy, chronic; CHRIS = N; Print Rx  Low back pain    · Procedure Flowsheet; Status:Complete - Retrospective Authorization;   Done:57Zaa2255 09:41AM   Performed: In Office; [phone]; Last Updated By:Barbra Carlson Mt; 12/8/2017 9:41:48 AM;Ordered; For:Low back pain; Ordered By:Fatimah Michel;  Lumbar radiculopathy, chronic    · * MRI LUMBAR SPINE WO CONTRAST; Status:Active; Requested for:74Xrn0317;    Perform:[facility]; [phone]; Last Updated By:Barbra Carlson Mt; 12/8/2017 10:21:39 AM;Ordered;radiculopathy, chronic; Ordered By:Fatimah Michel;   Complete risks and benefits of the procedure were reviewed including bleeding, infection, tissue reaction, allergic reaction and nerve injury with verbal and written consent being obtained  Discussion/Summary    Patient is a 75-year-old female complaining of low back pain and leg pain presents with lumbar radiculopathy with an L3-L4 dermatomal pattern bilateral lower extremities presenting today for initial consultation  we will order an MRI of lumbar spine to assess the pathology of her lumbar radiculopathy and low back painWe will trial gabapentin 100 mg on a titration schedule up to 3 tabs q p m  to treat lumbar radicular symptomswe will follow up patient in 8 weeks  The patient has the current Goals: Increase longevity of standing and increase comfort while lying down, decreases nerve pain   The patent has the current Barriers: Age, lumbar spine pathology  Patient is able to Self-Care  Patient is unable to Self-Care: Patient lives in an assisted living facility  Possible side effects of new medications were reviewed with the patient/guardian today  The treatment plan was reviewed with the patient/guardian  The patient/guardian understands and agrees with the treatment plan   The patient was counseled regarding diagnostic results,-- risk factor reductions,-- prognosis,-- patient and family education,-- impressions-- and-- risks and benefits of treatment options  total time of encounter was 30 minutes-- and-- 30 minutes was spent counseling  Chief Complaint  Chief Complaints    1  Back Pain  Low back and leg pain      History of Present Illness  patient is a 80-year-old female with a history of low back and leg pain presents today for initial consultation  Patient reports her pain is 6/10 constantly with no typical pain pattern  Patient describes her pain as sharp throbbing and pressure-like with radiation into the lower extremities and notable weakness in lower extremities  Pain is increased with lying down, standing, bending, walking and exercise  Pain does not change with prior position, sitting, relaxation, coughing /sneezing, bowel movements patient noted moderate relief with physical therapy, exercise, heat/ ice treatment  Patient denies any urinary or bowel incontinence  Patient notes on episodes of fallen and is currently an assisted living facility  Patient uses a walker with wheels  Patient currently takes tramadol and notes moderate relief however the pain shooting down into her legs is not alleviated  Referring physician is  Rommel Loja  Primary Care physician is  Rommel Loja presents with complaints of constant episodes of moderate bilateral lower back pain, described as sharp and throbbing, radiating to the right buttock, right lower leg and right foot   On a scale of 1 to 10, the patient rates the pain as 6  Associated symptoms include spasm,-- stiffness,-- leg numbness-- and-- leg weakness, but-- no fever,-- no night sweats,-- no abdominal pain,-- no general malaise,-- no weight loss,-- no arm numbness,-- no arm weakness,-- no urinary incontinence,-- no fecal incontinence,-- no urinary retention-- and-- no rash localized to the area of pain  Review of Systems   Constitutional: no fever,-- no recent weight gain-- and-- no recent weight loss  Eyes: no double vision-- and-- no blurry vision  Cardiovascular: no chest pain,-- no palpitations-- and-- no lower extremity edema  Respiratory: shortness of breath, but-- no wheezing  Musculoskeletal: difficulty walking,-- muscle weakness,-- joint stiffness,-- pain in extremity -- and-- decreased range of motion, but-- no joint swelling-- and-- no limb swelling  Neurological: no dizziness,-- no difficulty swallowing,-- no memory loss,-- no loss of consciousness-- and-- no seizures  Gastrointestinal: no nausea,-- no vomiting,-- no constipation-- and-- no diarrhea  Genitourinary: no difficulty initiating urine stream,-- no genital pain-- and-- no frequent urination  Integumentary: no complaints of skin rash  Psychiatric: depression  Endocrine: no excessive thirst,-- no adrenal disease,-- no hypothyroidism-- and-- no hyperthyroidism  Hematologic/Lymphatic: no tendency for easy bruising-- and-- no tendency for easy bleeding  ROS reviewed  Active Problems  Problems    1  Advance directive discussed with patient (V65 49) (Z71 89)   2  Angina pectoris (413 9) (I20 9)   3  Aortic sclerosis   4  Arteriosclerosis of coronary artery (414 00) (I25 10)   5  Esophageal reflux (530 81) (K21 9)   6  H/O aortic valve replacement (V43 3) (Z95 2)   7  Hyperlipidemia (272 4) (E78 5)   8  Hypertension (401 9) (I10)   9  Low back pain (724 2) (M54 5)   10  Need for influenza vaccination (V04 81) (Z23)   11  Osteoarthritis (715 90) (M19 90)   12   Post-menopausal osteoporosis (733 01) (M81 0)   13  Screening for depression (V79 0) (Z13 89)    Past Medical History    The active problems and past medical history were reviewed and updated today  Surgical History  Problems    1  History of Aortic Valve Replacement   2  History of Appendectomy   3  History of Cataract Surgery    The surgical history was reviewed and updated today  Family History  Sister    1  Family history of Colon Cancer (V16 0)  Family History    2  Denied: Family history of Drug abuse    The family history was reviewed and updated today  Social History  Problems    · Assistive Devices: Walker   · Dental care, regularly   · Good dental hygiene   · Never a smoker   · Never Drank Alcohol   · No caffeine use   · Occupation: Retired   · Patient has living will (V49 89) (Z78 9)   · Uses Safety Equipment - Seatbelts   ·   The social history was reviewed and updated today  Current Meds   1  Aspirin 81 MG Oral Tablet Chewable; CHEW AND SWALLOW 1 TABLET DAILY; Therapy: 05OIG7415 to (Evaluate:2018)  Requested for: 2017; Last MU:32CHV5334 Ordered   2  Bisacodyl 10 MG Rectal Suppository; USE ONE SUPPOSITORY RECTALLY ON DAY 4 OF NO BOWEL MOVEMENT; Therapy: 48ZTJ9227 to (Last [id number])  Requested for: 2017 Ordered   3  Calcium 600 MG Oral Tablet; Therapy: (Recorded:01Boo9498) to Recorded   4  Cartia  MG Oral Capsule Extended Release 24 Hour; take 1 capsule by mouth once daily; Therapy: 76IJC5019 to (Last DS:33HAB3185)  Requested for: 2017 Ordered   5  Carvedilol 6 25 MG Oral Tablet; TAKE 1 TABLET DAILY IN THE MORNING; Therapy: 82GYJ3317 to (Last W87IQC8464)  Requested for: 2017 Ordered   6  DocQLace 100 MG Oral Capsule; TAKE 1 CAPSULE TWICE DAILY AS NEEDED; Therapy: 56MYS8276 to (Evaluate:2018)  Requested for: 2017; Last Rx:2017 Ordered   7  Ensure Clear Oral Liquid; use 1 can daily;  Therapy: 63FYP1992 to (Last TQ:50VXR3730)  Requested for: 23Oct2017 Ordered   8  Mapap 325 MG Oral Tablet; take 1 tablet every 6 hours as needed; Therapy: 73NRV3125 to (Last Rx:71Qjj9379)  Requested for: 83PRV3310 Ordered   9  Multi Vitamin Daily Oral Tablet; TAKE 1 TABLET DAILY; Therapy: 83VCX1094 to (Evaluate:21Apr2018)  Requested for: 23Oct2017; Last QE:01JZG5073 Ordered   10  Pantoprazole Sodium 40 MG Oral Tablet Delayed Release; Take 1 tablet daily; Therapy: 50IQP5199 to (Evaluate:06Sep2018)  Requested for: 15Bsw7423; Last  Rx:01Plj5130 Ordered   11  Refresh Tears SOLN;  Therapy: (Recorded:82Ttl8625) to Recorded   12  Risamine 0 44-20 625 % External Ointment; APPLY TO AFFECTED AREA(S) ON RIGHT  BUTTOCKS TWICE DAILY (SKIN PROTECTANT); Therapy: 93XUU4846 to (Last 784 9974)  Requested for: [phone] Ordered   13  Simvastatin 10 MG Oral Tablet; Take 1 daily; Therapy: 94MLE2726 to (Evaluate:21Apr2018)  Requested for: 23Oct2017; Last  Rx:23Oct2017 Ordered   15  Sorbitol 70 % Solution; TAKE 2 TABLESPOONFULS BY MOUTH DAILY AS NEEDED, GIVE  ON DAY 3 OF NO BOWEL MOVEMENT; Therapy: 84OGI0617 to (Last BZ:41XBM8229)  Requested for: 23Oct2017 Ordered   15  TraMADol HCl - 50 MG Oral Tablet; TAAKE 1 TABLET TWICE DAILY; Therapy: 66Exo1422 to (Last Rx:23Oct2017) Ordered   16  Vitamin D (Ergocalciferol) 95049 UNIT Oral Capsule; TAKE 1 CAPSULE BY MOUTH ON  THE 13TH; Therapy: 21KLT9848 to (Last Vero Moll)  Requested for: 23Oct2017 Ordered    The medication list was reviewed and updated today  Allergies  Medication    1  Penicillins    Vitals  Vital Signs    Recorded: 34ZTR3383 09:35AM   Temperature 61 2 F   Systolic 96   Diastolic 54   Pain Scale 6       Physical Exam   Constitutional  General appearance: Well developed, well nourished, alert, in no distress, non-toxic and no overt pain behavior  Eyes  Sclera: anicteric  HEENT  Hearing grossly intact  Neck  Neck: Supple, symmetric, trachea midline, no masses  Pulmonary  Respiratory effort: Even and unlabored  Cardiovascular  Examination of extremities: No edema or pitting edema present  Skin  Skin and subcutaneous tissue: Normal without rashes or lesions, well hydrated  Psychiatric  Mood and affect: Mood and affect appropriate  Neurologic  Cranial nerves: Cranial nerves II-XII grossly intact  Musculoskeletal  Gait and station: Abnormal  -- Patient has antalgic walk and uses a 4 wheel walker for assistance  Lumbar/Sacral Spine examination demonstrates Lumbosacral Spine:  Appearance: Normal   Tenderness: None at lumbar spine,-- right paraspinal-- and-- left paraspinal  Palpatory Findings include bilateral muscle spasms  Lumbosacral Spine Sensory: intact to light touch and pinprick in the lower extremities  ROM Lumbosacral Spine: Full  ROM Hips: Full  Foot and ankle strength was normal bilaterally  Knee strength was normal bilaterally  Hip strength was normal bilaterally  Evaluation of Muscle Stretch Reflexes on the right side demonstrates muscle stretch reflexes equal and symmetric right lower limbs-- and-- negative right ankle clonus  Evaluation of Muscle Stretch Reflexes on the left side demonstrates negative left ankle clonus, but-- muscle stretch reflexes equal and symmetric right lower limbs  Special Tests: Negative except as noted positive Straight Leg Raise on right-- and-- positive Straight Leg Raise on left  Results/Data  Procedure Flowsheet 41AMQ1490 09:41AM Fatimah Michel     Test Name Result Flag Reference   Oswestry Score 46           Results    I personally reviewed the films/images in the office today  Radiology:  dated 9/5/17 impression: no new fracture or subluxation, stable mild compression deformities of the L1, L2 and L4 vertebral bodies, mild grade 1 anterolisthesis of L3 on L4 and L4 on L5, stable  Future Appointments    Date/Time Provider Specialty Site   02/02/2018 03:00 PM Fatimah Michel DO Pain Management [facility] SPINE       Signatures   Fatimah Michel  MD [facility] [email or web address] [email or web address]  Electronically signed by : Froilan Hyde DO; Dec  8 2017 10:49AM EST                       (Author)

## 2018-01-01 ENCOUNTER — TRANSITIONAL CARE MANAGEMENT (OUTPATIENT)
Dept: INTERNAL MEDICINE CLINIC | Facility: CLINIC | Age: 83
End: 2018-01-01

## 2018-01-01 ENCOUNTER — APPOINTMENT (INPATIENT)
Dept: ULTRASOUND IMAGING | Facility: HOSPITAL | Age: 83
DRG: 291 | End: 2018-01-01
Payer: MEDICARE

## 2018-01-01 ENCOUNTER — APPOINTMENT (INPATIENT)
Dept: CT IMAGING | Facility: HOSPITAL | Age: 83
DRG: 640 | End: 2018-01-01
Payer: MEDICARE

## 2018-01-01 ENCOUNTER — APPOINTMENT (EMERGENCY)
Dept: RADIOLOGY | Facility: HOSPITAL | Age: 83
DRG: 189 | End: 2018-01-01
Payer: MEDICARE

## 2018-01-01 ENCOUNTER — APPOINTMENT (INPATIENT)
Dept: NON INVASIVE DIAGNOSTICS | Facility: HOSPITAL | Age: 83
DRG: 291 | End: 2018-01-01
Payer: MEDICARE

## 2018-01-01 ENCOUNTER — TELEPHONE (OUTPATIENT)
Dept: INTERNAL MEDICINE CLINIC | Facility: CLINIC | Age: 83
End: 2018-01-01

## 2018-01-01 ENCOUNTER — ALLSCRIPTS OFFICE VISIT (OUTPATIENT)
Dept: OTHER | Facility: OTHER | Age: 83
End: 2018-01-01

## 2018-01-01 ENCOUNTER — APPOINTMENT (OUTPATIENT)
Dept: RADIOLOGY | Facility: HOSPITAL | Age: 83
End: 2018-01-01
Payer: MEDICARE

## 2018-01-01 ENCOUNTER — HOSPITAL ENCOUNTER (INPATIENT)
Facility: HOSPITAL | Age: 83
LOS: 17 days | Discharge: NURSING, REHAB OR LONG-TERM CARE | DRG: 640 | End: 2018-03-08
Attending: EMERGENCY MEDICINE | Admitting: INTERNAL MEDICINE
Payer: MEDICARE

## 2018-01-01 ENCOUNTER — APPOINTMENT (INPATIENT)
Dept: CT IMAGING | Facility: HOSPITAL | Age: 83
DRG: 189 | End: 2018-01-01
Payer: MEDICARE

## 2018-01-01 ENCOUNTER — APPOINTMENT (INPATIENT)
Dept: RADIOLOGY | Facility: HOSPITAL | Age: 83
DRG: 640 | End: 2018-01-01
Payer: MEDICARE

## 2018-01-01 ENCOUNTER — APPOINTMENT (INPATIENT)
Dept: ULTRASOUND IMAGING | Facility: HOSPITAL | Age: 83
DRG: 640 | End: 2018-01-01
Payer: MEDICARE

## 2018-01-01 ENCOUNTER — GENERIC CONVERSION - ENCOUNTER (OUTPATIENT)
Dept: OTHER | Facility: OTHER | Age: 83
End: 2018-01-01

## 2018-01-01 ENCOUNTER — APPOINTMENT (INPATIENT)
Dept: RADIOLOGY | Facility: HOSPITAL | Age: 83
DRG: 291 | End: 2018-01-01
Payer: MEDICARE

## 2018-01-01 ENCOUNTER — APPOINTMENT (EMERGENCY)
Dept: CT IMAGING | Facility: HOSPITAL | Age: 83
DRG: 640 | End: 2018-01-01
Payer: MEDICARE

## 2018-01-01 ENCOUNTER — APPOINTMENT (EMERGENCY)
Dept: RADIOLOGY | Facility: HOSPITAL | Age: 83
DRG: 291 | End: 2018-01-01
Payer: MEDICARE

## 2018-01-01 ENCOUNTER — GENERIC CONVERSION - ENCOUNTER (OUTPATIENT)
Dept: INTERNAL MEDICINE CLINIC | Facility: CLINIC | Age: 83
End: 2018-01-01

## 2018-01-01 ENCOUNTER — HOSPITAL ENCOUNTER (INPATIENT)
Facility: HOSPITAL | Age: 83
LOS: 7 days | DRG: 091 | End: 2018-03-15
Attending: INTERNAL MEDICINE | Admitting: INTERNAL MEDICINE
Payer: MEDICARE

## 2018-01-01 ENCOUNTER — APPOINTMENT (EMERGENCY)
Dept: RADIOLOGY | Facility: HOSPITAL | Age: 83
DRG: 640 | End: 2018-01-01
Payer: MEDICARE

## 2018-01-01 ENCOUNTER — HOSPITAL ENCOUNTER (INPATIENT)
Facility: HOSPITAL | Age: 83
LOS: 4 days | Discharge: HOME HEALTH | DRG: 291 | End: 2018-01-08
Attending: EMERGENCY MEDICINE | Admitting: FAMILY MEDICINE
Payer: MEDICARE

## 2018-01-01 ENCOUNTER — HOSPITAL ENCOUNTER (OUTPATIENT)
Dept: MRI IMAGING | Facility: HOSPITAL | Age: 83
Discharge: HOME | End: 2018-01-29
Attending: ANESTHESIOLOGY

## 2018-01-01 ENCOUNTER — HOSPITAL ENCOUNTER (INPATIENT)
Facility: HOSPITAL | Age: 83
LOS: 3 days | Discharge: HOME HEALTH | DRG: 189 | End: 2018-02-14
Attending: EMERGENCY MEDICINE | Admitting: FAMILY MEDICINE
Payer: MEDICARE

## 2018-01-01 VITALS
HEART RATE: 48 BPM | TEMPERATURE: 98 F | RESPIRATION RATE: 18 BRPM | HEIGHT: 63 IN | DIASTOLIC BLOOD PRESSURE: 59 MMHG | SYSTOLIC BLOOD PRESSURE: 130 MMHG | WEIGHT: 108.91 LBS | BODY MASS INDEX: 19.3 KG/M2 | OXYGEN SATURATION: 94 %

## 2018-01-01 VITALS — TEMPERATURE: 98.1 F | SYSTOLIC BLOOD PRESSURE: 96 MMHG | DIASTOLIC BLOOD PRESSURE: 54 MMHG

## 2018-01-01 VITALS
HEIGHT: 62 IN | SYSTOLIC BLOOD PRESSURE: 116 MMHG | RESPIRATION RATE: 32 BRPM | DIASTOLIC BLOOD PRESSURE: 68 MMHG | TEMPERATURE: 97.7 F | WEIGHT: 98.77 LBS | HEART RATE: 101 BPM | OXYGEN SATURATION: 98 % | BODY MASS INDEX: 18.18 KG/M2

## 2018-01-01 VITALS
BODY MASS INDEX: 21.51 KG/M2 | RESPIRATION RATE: 18 BRPM | SYSTOLIC BLOOD PRESSURE: 125 MMHG | OXYGEN SATURATION: 97 % | HEART RATE: 68 BPM | WEIGHT: 109.57 LBS | HEIGHT: 60 IN | TEMPERATURE: 98.2 F | DIASTOLIC BLOOD PRESSURE: 59 MMHG

## 2018-01-01 VITALS
DIASTOLIC BLOOD PRESSURE: 78 MMHG | BODY MASS INDEX: 25.48 KG/M2 | SYSTOLIC BLOOD PRESSURE: 118 MMHG | TEMPERATURE: 97.2 F | HEART RATE: 115 BPM | OXYGEN SATURATION: 88 % | HEIGHT: 58 IN | WEIGHT: 121.38 LBS

## 2018-01-01 VITALS
HEIGHT: 58 IN | OXYGEN SATURATION: 92 % | DIASTOLIC BLOOD PRESSURE: 76 MMHG | HEART RATE: 100 BPM | TEMPERATURE: 97 F | SYSTOLIC BLOOD PRESSURE: 124 MMHG | WEIGHT: 121.13 LBS | BODY MASS INDEX: 25.42 KG/M2

## 2018-01-01 VITALS
HEART RATE: 87 BPM | WEIGHT: 111.06 LBS | OXYGEN SATURATION: 92 % | TEMPERATURE: 96.8 F | BODY MASS INDEX: 23.31 KG/M2 | HEIGHT: 58 IN

## 2018-01-01 VITALS — SYSTOLIC BLOOD PRESSURE: 126 MMHG | DIASTOLIC BLOOD PRESSURE: 72 MMHG

## 2018-01-01 DIAGNOSIS — S32.010A CLOSED COMPRESSION FRACTURE OF L1 LUMBAR VERTEBRA: ICD-10-CM

## 2018-01-01 DIAGNOSIS — R50.9 TEMPERATURE INCREASE: ICD-10-CM

## 2018-01-01 DIAGNOSIS — E87.6 LOW BLOOD POTASSIUM: Primary | ICD-10-CM

## 2018-01-01 DIAGNOSIS — R06.00 DYSPNEA ON EXERTION: Primary | ICD-10-CM

## 2018-01-01 DIAGNOSIS — R09.02 HYPOXIA: ICD-10-CM

## 2018-01-01 DIAGNOSIS — R60.9 EDEMA, UNSPECIFIED TYPE: Primary | ICD-10-CM

## 2018-01-01 DIAGNOSIS — I50.9 CONGESTIVE HEART FAILURE, UNSPECIFIED CONGESTIVE HEART FAILURE CHRONICITY, UNSPECIFIED CONGESTIVE HEART FAILURE TYPE: Primary | ICD-10-CM

## 2018-01-01 DIAGNOSIS — R53.1 GENERALIZED WEAKNESS: ICD-10-CM

## 2018-01-01 DIAGNOSIS — I10 ESSENTIAL (PRIMARY) HYPERTENSION: ICD-10-CM

## 2018-01-01 DIAGNOSIS — I50.33 ACUTE ON CHRONIC DIASTOLIC CONGESTIVE HEART FAILURE (HCC): ICD-10-CM

## 2018-01-01 DIAGNOSIS — I50.9 NEW ONSET OF CONGESTIVE HEART FAILURE (HCC): ICD-10-CM

## 2018-01-01 DIAGNOSIS — R05.3 COUGH, PERSISTENT: Primary | ICD-10-CM

## 2018-01-01 DIAGNOSIS — S32.020A: ICD-10-CM

## 2018-01-01 DIAGNOSIS — E86.0 DEHYDRATION: ICD-10-CM

## 2018-01-01 DIAGNOSIS — R11.2 NAUSEA AND VOMITING, INTRACTABILITY OF VOMITING NOT SPECIFIED, UNSPECIFIED VOMITING TYPE: ICD-10-CM

## 2018-01-01 DIAGNOSIS — E87.0 ACUTE HYPERNATREMIA: Primary | ICD-10-CM

## 2018-01-01 DIAGNOSIS — N18.30 STAGE 3 CHRONIC KIDNEY DISEASE (HCC): ICD-10-CM

## 2018-01-01 DIAGNOSIS — L98.429 SACRAL ULCER (HCC): ICD-10-CM

## 2018-01-01 DIAGNOSIS — I50.33 ACUTE ON CHRONIC DIASTOLIC CHF (CONGESTIVE HEART FAILURE) (HCC): Primary | ICD-10-CM

## 2018-01-01 DIAGNOSIS — D50.8 OTHER IRON DEFICIENCY ANEMIA: ICD-10-CM

## 2018-01-01 DIAGNOSIS — R09.89 BIBASILAR CRACKLES: ICD-10-CM

## 2018-01-01 DIAGNOSIS — I25.10 CORONARY ARTERY DISEASE INVOLVING NATIVE CORONARY ARTERY OF NATIVE HEART: ICD-10-CM

## 2018-01-01 DIAGNOSIS — M54.16 RADICULOPATHY OF LUMBAR REGION: ICD-10-CM

## 2018-01-01 DIAGNOSIS — J96.01 ACUTE RESPIRATORY FAILURE WITH HYPOXIA (HCC): ICD-10-CM

## 2018-01-01 DIAGNOSIS — K12.30 MUCOSITIS ORAL: ICD-10-CM

## 2018-01-01 DIAGNOSIS — E87.0 HYPERNATREMIA: ICD-10-CM

## 2018-01-01 DIAGNOSIS — Z95.2 H/O AORTIC VALVE REPLACEMENT: ICD-10-CM

## 2018-01-01 DIAGNOSIS — E43 SEVERE PROTEIN-CALORIE MALNUTRITION (HCC): ICD-10-CM

## 2018-01-01 DIAGNOSIS — E83.39 HYPOPHOSPHATEMIA: ICD-10-CM

## 2018-01-01 DIAGNOSIS — R06.02 SHORTNESS OF BREATH: ICD-10-CM

## 2018-01-01 DIAGNOSIS — E87.2 LACTIC ACIDOSIS: ICD-10-CM

## 2018-01-01 DIAGNOSIS — E83.42 HYPOMAGNESEMIA: Primary | ICD-10-CM

## 2018-01-01 DIAGNOSIS — I50.32 CHRONIC DIASTOLIC HEART FAILURE (HCC): ICD-10-CM

## 2018-01-01 DIAGNOSIS — E88.09 HYPOALBUMINEMIA: ICD-10-CM

## 2018-01-01 DIAGNOSIS — I50.32 CHRONIC DIASTOLIC CHF (CONGESTIVE HEART FAILURE) (HCC): ICD-10-CM

## 2018-01-01 DIAGNOSIS — R82.90 ABNORMAL FINDING IN URINE: ICD-10-CM

## 2018-01-01 DIAGNOSIS — I25.10 ATHEROSCLEROTIC HEART DISEASE OF NATIVE CORONARY ARTERY WITHOUT ANGINA PECTORIS: ICD-10-CM

## 2018-01-01 LAB
25(OH)D3 SERPL-MCNC: 35.8 NG/ML (ref 30–100)
ALBUMIN SERPL BCP-MCNC: 1.8 G/DL (ref 3.5–5)
ALBUMIN SERPL BCP-MCNC: 1.8 G/DL (ref 3.5–5)
ALBUMIN SERPL BCP-MCNC: 2 G/DL (ref 3.5–5)
ALBUMIN SERPL BCP-MCNC: 2.1 G/DL (ref 3.5–5)
ALBUMIN SERPL BCP-MCNC: 2.2 G/DL (ref 3.5–5)
ALBUMIN SERPL BCP-MCNC: 2.3 G/DL (ref 3.5–5)
ALBUMIN SERPL BCP-MCNC: 2.3 G/DL (ref 3.5–5)
ALBUMIN SERPL BCP-MCNC: 2.4 G/DL (ref 3.5–5)
ALBUMIN SERPL BCP-MCNC: 2.5 G/DL (ref 3.5–5)
ALBUMIN SERPL BCP-MCNC: 2.5 G/DL (ref 3.5–5)
ALBUMIN SERPL BCP-MCNC: 2.8 G/DL (ref 3.5–5)
ALBUMIN SERPL BCP-MCNC: 2.8 G/DL (ref 3.5–5)
ALP SERPL-CCNC: 34 U/L (ref 46–116)
ALP SERPL-CCNC: 35 U/L (ref 46–116)
ALP SERPL-CCNC: 36 U/L (ref 46–116)
ALP SERPL-CCNC: 37 U/L (ref 46–116)
ALP SERPL-CCNC: 37 U/L (ref 46–116)
ALP SERPL-CCNC: 40 U/L (ref 46–116)
ALP SERPL-CCNC: 42 U/L (ref 46–116)
ALP SERPL-CCNC: 44 U/L (ref 46–116)
ALP SERPL-CCNC: 48 U/L (ref 46–116)
ALP SERPL-CCNC: 50 U/L (ref 46–116)
ALP SERPL-CCNC: 56 U/L (ref 46–116)
ALP SERPL-CCNC: 62 U/L (ref 46–116)
ALP SERPL-CCNC: 64 U/L (ref 46–116)
ALP SERPL-CCNC: 65 U/L (ref 46–116)
ALP SERPL-CCNC: 67 U/L (ref 46–116)
ALT SERPL W P-5'-P-CCNC: 11 U/L (ref 12–78)
ALT SERPL W P-5'-P-CCNC: 12 U/L (ref 12–78)
ALT SERPL W P-5'-P-CCNC: 13 U/L (ref 12–78)
ALT SERPL W P-5'-P-CCNC: 15 U/L (ref 12–78)
ALT SERPL W P-5'-P-CCNC: 16 U/L (ref 12–78)
ALT SERPL W P-5'-P-CCNC: 16 U/L (ref 12–78)
ALT SERPL W P-5'-P-CCNC: 17 U/L (ref 12–78)
ALT SERPL W P-5'-P-CCNC: 9 U/L (ref 12–78)
AMMONIA PLAS-SCNC: 10 UMOL/L (ref 11–35)
ANION GAP SERPL CALCULATED.3IONS-SCNC: 10 MMOL/L (ref 4–13)
ANION GAP SERPL CALCULATED.3IONS-SCNC: 10 MMOL/L (ref 4–13)
ANION GAP SERPL CALCULATED.3IONS-SCNC: 12 MMOL/L (ref 4–13)
ANION GAP SERPL CALCULATED.3IONS-SCNC: 3 MMOL/L (ref 4–13)
ANION GAP SERPL CALCULATED.3IONS-SCNC: 5 MMOL/L (ref 4–13)
ANION GAP SERPL CALCULATED.3IONS-SCNC: 5 MMOL/L (ref 4–13)
ANION GAP SERPL CALCULATED.3IONS-SCNC: 6 MMOL/L (ref 4–13)
ANION GAP SERPL CALCULATED.3IONS-SCNC: 7 MMOL/L (ref 4–13)
ANION GAP SERPL CALCULATED.3IONS-SCNC: 8 MMOL/L (ref 4–13)
ANION GAP SERPL CALCULATED.3IONS-SCNC: 9 MMOL/L (ref 4–13)
ANION GAP SERPL CALCULATED.3IONS-SCNC: 9 MMOL/L (ref 4–13)
ANISOCYTOSIS BLD QL SMEAR: PRESENT
APTT PPP: 31 SECONDS (ref 23–35)
AST SERPL W P-5'-P-CCNC: 21 U/L (ref 5–45)
AST SERPL W P-5'-P-CCNC: 22 U/L (ref 5–45)
AST SERPL W P-5'-P-CCNC: 23 U/L (ref 5–45)
AST SERPL W P-5'-P-CCNC: 24 U/L (ref 5–45)
AST SERPL W P-5'-P-CCNC: 24 U/L (ref 5–45)
AST SERPL W P-5'-P-CCNC: 27 U/L (ref 5–45)
AST SERPL W P-5'-P-CCNC: 31 U/L (ref 5–45)
AST SERPL W P-5'-P-CCNC: 31 U/L (ref 5–45)
AST SERPL W P-5'-P-CCNC: 32 U/L (ref 5–45)
AST SERPL W P-5'-P-CCNC: 32 U/L (ref 5–45)
AST SERPL W P-5'-P-CCNC: 35 U/L (ref 5–45)
AST SERPL W P-5'-P-CCNC: 35 U/L (ref 5–45)
AST SERPL W P-5'-P-CCNC: 51 U/L (ref 5–45)
ATRIAL RATE: 85 BPM
ATRIAL RATE: 88 BPM
ATRIAL RATE: 99 BPM
BACTERIA BLD CULT: NORMAL
BACTERIA UR CULT: NORMAL
BACTERIA UR QL AUTO: ABNORMAL /HPF
BASO STIPL BLD QL SMEAR: PRESENT
BASOPHILS # BLD AUTO: 0.02 THOUSANDS/ΜL (ref 0–0.1)
BASOPHILS # BLD AUTO: 0.03 THOUSANDS/ΜL (ref 0–0.1)
BASOPHILS # BLD AUTO: 0.04 THOUSANDS/ΜL (ref 0–0.1)
BASOPHILS # BLD AUTO: 0.07 THOUSANDS/ΜL (ref 0–0.1)
BASOPHILS # BLD MANUAL: 0 THOUSAND/UL (ref 0–0.1)
BASOPHILS NFR BLD AUTO: 0 % (ref 0–1)
BASOPHILS NFR BLD AUTO: 1 % (ref 0–1)
BASOPHILS NFR MAR MANUAL: 0 % (ref 0–1)
BILIRUB SERPL-MCNC: 0.3 MG/DL (ref 0.2–1)
BILIRUB SERPL-MCNC: 0.4 MG/DL (ref 0.2–1)
BILIRUB SERPL-MCNC: 0.5 MG/DL (ref 0.2–1)
BILIRUB SERPL-MCNC: 0.6 MG/DL (ref 0.2–1)
BILIRUB SERPL-MCNC: 0.7 MG/DL (ref 0.2–1)
BILIRUB SERPL-MCNC: 0.7 MG/DL (ref 0.2–1)
BILIRUB SERPL-MCNC: 0.8 MG/DL (ref 0.2–1)
BILIRUB UR QL STRIP: ABNORMAL
BUN SERPL-MCNC: 10 MG/DL (ref 5–25)
BUN SERPL-MCNC: 11 MG/DL (ref 5–25)
BUN SERPL-MCNC: 14 MG/DL (ref 5–25)
BUN SERPL-MCNC: 16 MG/DL (ref 5–25)
BUN SERPL-MCNC: 16 MG/DL (ref 5–25)
BUN SERPL-MCNC: 17 MG/DL (ref 5–25)
BUN SERPL-MCNC: 18 MG/DL (ref 5–25)
BUN SERPL-MCNC: 18 MG/DL (ref 5–25)
BUN SERPL-MCNC: 19 MG/DL (ref 5–25)
BUN SERPL-MCNC: 23 MG/DL (ref 5–25)
BUN SERPL-MCNC: 23 MG/DL (ref 5–25)
BUN SERPL-MCNC: 24 MG/DL (ref 5–25)
BUN SERPL-MCNC: 25 MG/DL (ref 5–25)
BUN SERPL-MCNC: 26 MG/DL (ref 5–25)
BUN SERPL-MCNC: 30 MG/DL (ref 5–25)
BUN SERPL-MCNC: 31 MG/DL (ref 5–25)
BUN SERPL-MCNC: 31 MG/DL (ref 5–25)
BUN SERPL-MCNC: 38 MG/DL (ref 5–25)
BUN SERPL-MCNC: 8 MG/DL (ref 5–25)
BUN SERPL-MCNC: 9 MG/DL (ref 5–25)
BUN SERPL-MCNC: 9 MG/DL (ref 5–25)
CA-I BLD-SCNC: 1.19 MMOL/L (ref 1.12–1.32)
CA-I BLD-SCNC: 1.21 MMOL/L (ref 1.12–1.32)
CALCIUM SERPL-MCNC: 7.8 MG/DL (ref 8.3–10.1)
CALCIUM SERPL-MCNC: 8.2 MG/DL (ref 8.3–10.1)
CALCIUM SERPL-MCNC: 8.2 MG/DL (ref 8.3–10.1)
CALCIUM SERPL-MCNC: 8.3 MG/DL (ref 8.3–10.1)
CALCIUM SERPL-MCNC: 8.4 MG/DL (ref 8.3–10.1)
CALCIUM SERPL-MCNC: 8.5 MG/DL (ref 8.3–10.1)
CALCIUM SERPL-MCNC: 8.6 MG/DL (ref 8.3–10.1)
CALCIUM SERPL-MCNC: 8.7 MG/DL (ref 8.3–10.1)
CALCIUM SERPL-MCNC: 8.7 MG/DL (ref 8.3–10.1)
CALCIUM SERPL-MCNC: 8.8 MG/DL (ref 8.3–10.1)
CALCIUM SERPL-MCNC: 8.8 MG/DL (ref 8.3–10.1)
CALCIUM SERPL-MCNC: 8.9 MG/DL (ref 8.3–10.1)
CALCIUM SERPL-MCNC: 8.9 MG/DL (ref 8.3–10.1)
CALCIUM SERPL-MCNC: 9 MG/DL (ref 8.3–10.1)
CALCIUM SERPL-MCNC: 9 MG/DL (ref 8.3–10.1)
CALCIUM SERPL-MCNC: 9.1 MG/DL (ref 8.3–10.1)
CALCIUM SERPL-MCNC: 9.2 MG/DL (ref 8.3–10.1)
CHLORIDE SERPL-SCNC: 100 MMOL/L (ref 100–108)
CHLORIDE SERPL-SCNC: 101 MMOL/L (ref 100–108)
CHLORIDE SERPL-SCNC: 101 MMOL/L (ref 100–108)
CHLORIDE SERPL-SCNC: 102 MMOL/L (ref 100–108)
CHLORIDE SERPL-SCNC: 103 MMOL/L (ref 100–108)
CHLORIDE SERPL-SCNC: 103 MMOL/L (ref 100–108)
CHLORIDE SERPL-SCNC: 104 MMOL/L (ref 100–108)
CHLORIDE SERPL-SCNC: 105 MMOL/L (ref 100–108)
CHLORIDE SERPL-SCNC: 106 MMOL/L (ref 100–108)
CHLORIDE SERPL-SCNC: 107 MMOL/L (ref 100–108)
CHLORIDE SERPL-SCNC: 107 MMOL/L (ref 100–108)
CHLORIDE SERPL-SCNC: 108 MMOL/L (ref 100–108)
CHLORIDE SERPL-SCNC: 108 MMOL/L (ref 100–108)
CHLORIDE SERPL-SCNC: 109 MMOL/L (ref 100–108)
CHLORIDE SERPL-SCNC: 109 MMOL/L (ref 100–108)
CHLORIDE SERPL-SCNC: 110 MMOL/L (ref 100–108)
CHLORIDE SERPL-SCNC: 111 MMOL/L (ref 100–108)
CHLORIDE SERPL-SCNC: 112 MMOL/L (ref 100–108)
CHLORIDE SERPL-SCNC: 114 MMOL/L (ref 100–108)
CHLORIDE SERPL-SCNC: 116 MMOL/L (ref 100–108)
CHLORIDE SERPL-SCNC: 99 MMOL/L (ref 100–108)
CK SERPL-CCNC: 66 U/L (ref 26–192)
CLARITY UR: ABNORMAL
CO2 SERPL-SCNC: 27 MMOL/L (ref 21–32)
CO2 SERPL-SCNC: 28 MMOL/L (ref 21–32)
CO2 SERPL-SCNC: 29 MMOL/L (ref 21–32)
CO2 SERPL-SCNC: 29 MMOL/L (ref 21–32)
CO2 SERPL-SCNC: 30 MMOL/L (ref 21–32)
CO2 SERPL-SCNC: 31 MMOL/L (ref 21–32)
CO2 SERPL-SCNC: 34 MMOL/L (ref 21–32)
COARSE GRAN CASTS URNS QL MICRO: ABNORMAL /LPF
COLOR UR: YELLOW
CREAT SERPL-MCNC: 0.67 MG/DL (ref 0.6–1.3)
CREAT SERPL-MCNC: 0.68 MG/DL (ref 0.6–1.3)
CREAT SERPL-MCNC: 0.69 MG/DL (ref 0.6–1.3)
CREAT SERPL-MCNC: 0.7 MG/DL (ref 0.6–1.3)
CREAT SERPL-MCNC: 0.7 MG/DL (ref 0.6–1.3)
CREAT SERPL-MCNC: 0.71 MG/DL (ref 0.6–1.3)
CREAT SERPL-MCNC: 0.72 MG/DL (ref 0.6–1.3)
CREAT SERPL-MCNC: 0.75 MG/DL (ref 0.6–1.3)
CREAT SERPL-MCNC: 0.76 MG/DL (ref 0.6–1.3)
CREAT SERPL-MCNC: 0.79 MG/DL (ref 0.6–1.3)
CREAT SERPL-MCNC: 0.8 MG/DL (ref 0.6–1.3)
CREAT SERPL-MCNC: 0.81 MG/DL (ref 0.6–1.3)
CREAT SERPL-MCNC: 0.81 MG/DL (ref 0.6–1.3)
CREAT SERPL-MCNC: 0.83 MG/DL (ref 0.6–1.3)
CREAT SERPL-MCNC: 0.84 MG/DL (ref 0.6–1.3)
CREAT SERPL-MCNC: 1.05 MG/DL (ref 0.6–1.3)
CREAT SERPL-MCNC: 1.07 MG/DL (ref 0.6–1.3)
CREAT SERPL-MCNC: 1.09 MG/DL (ref 0.6–1.3)
CREAT SERPL-MCNC: 1.1 MG/DL (ref 0.6–1.3)
CREAT SERPL-MCNC: 1.15 MG/DL (ref 0.6–1.3)
CREAT SERPL-MCNC: 1.17 MG/DL (ref 0.6–1.3)
EOSINOPHIL # BLD AUTO: 0.28 THOUSAND/ΜL (ref 0–0.61)
EOSINOPHIL # BLD AUTO: 0.3 THOUSAND/ΜL (ref 0–0.61)
EOSINOPHIL # BLD AUTO: 0.31 THOUSAND/ΜL (ref 0–0.61)
EOSINOPHIL # BLD AUTO: 0.35 THOUSAND/ΜL (ref 0–0.61)
EOSINOPHIL # BLD AUTO: 0.35 THOUSAND/ΜL (ref 0–0.61)
EOSINOPHIL # BLD AUTO: 0.4 THOUSAND/ΜL (ref 0–0.61)
EOSINOPHIL # BLD AUTO: 0.42 THOUSAND/ΜL (ref 0–0.61)
EOSINOPHIL # BLD AUTO: 0.44 THOUSAND/ΜL (ref 0–0.61)
EOSINOPHIL # BLD AUTO: 0.44 THOUSAND/ΜL (ref 0–0.61)
EOSINOPHIL # BLD AUTO: 0.65 THOUSAND/ΜL (ref 0–0.61)
EOSINOPHIL # BLD AUTO: 0.72 THOUSAND/ΜL (ref 0–0.61)
EOSINOPHIL # BLD AUTO: 0.76 THOUSAND/ΜL (ref 0–0.61)
EOSINOPHIL # BLD AUTO: 0.78 THOUSAND/ΜL (ref 0–0.61)
EOSINOPHIL # BLD MANUAL: 0 THOUSAND/UL (ref 0–0.4)
EOSINOPHIL # BLD MANUAL: 0.06 THOUSAND/UL (ref 0–0.4)
EOSINOPHIL # BLD MANUAL: 0.07 THOUSAND/UL (ref 0–0.4)
EOSINOPHIL # BLD MANUAL: 0.09 THOUSAND/UL (ref 0–0.4)
EOSINOPHIL # BLD MANUAL: 0.26 THOUSAND/UL (ref 0–0.4)
EOSINOPHIL # BLD MANUAL: 0.34 THOUSAND/UL (ref 0–0.4)
EOSINOPHIL # BLD MANUAL: 0.34 THOUSAND/UL (ref 0–0.4)
EOSINOPHIL # BLD MANUAL: 0.36 THOUSAND/UL (ref 0–0.4)
EOSINOPHIL NFR BLD AUTO: 10 % (ref 0–6)
EOSINOPHIL NFR BLD AUTO: 10 % (ref 0–6)
EOSINOPHIL NFR BLD AUTO: 3 % (ref 0–6)
EOSINOPHIL NFR BLD AUTO: 4 % (ref 0–6)
EOSINOPHIL NFR BLD AUTO: 5 % (ref 0–6)
EOSINOPHIL NFR BLD AUTO: 5 % (ref 0–6)
EOSINOPHIL NFR BLD AUTO: 6 % (ref 0–6)
EOSINOPHIL NFR BLD AUTO: 7 % (ref 0–6)
EOSINOPHIL NFR BLD AUTO: 9 % (ref 0–6)
EOSINOPHIL NFR BLD AUTO: 9 % (ref 0–6)
EOSINOPHIL NFR BLD MANUAL: 0 % (ref 0–6)
EOSINOPHIL NFR BLD MANUAL: 1 % (ref 0–6)
EOSINOPHIL NFR BLD MANUAL: 3 % (ref 0–6)
EOSINOPHIL NFR BLD MANUAL: 4 % (ref 0–6)
EOSINOPHIL NFR BLD MANUAL: 5 % (ref 0–6)
EOSINOPHIL NFR BLD MANUAL: 5 % (ref 0–6)
ERYTHROCYTE [DISTWIDTH] IN BLOOD BY AUTOMATED COUNT: 13.4 % (ref 11.6–15.1)
ERYTHROCYTE [DISTWIDTH] IN BLOOD BY AUTOMATED COUNT: 13.5 % (ref 11.6–15.1)
ERYTHROCYTE [DISTWIDTH] IN BLOOD BY AUTOMATED COUNT: 13.6 % (ref 11.6–15.1)
ERYTHROCYTE [DISTWIDTH] IN BLOOD BY AUTOMATED COUNT: 14 % (ref 11.6–15.1)
ERYTHROCYTE [DISTWIDTH] IN BLOOD BY AUTOMATED COUNT: 14.2 % (ref 11.6–15.1)
ERYTHROCYTE [DISTWIDTH] IN BLOOD BY AUTOMATED COUNT: 14.2 % (ref 11.6–15.1)
ERYTHROCYTE [DISTWIDTH] IN BLOOD BY AUTOMATED COUNT: 14.3 % (ref 11.6–15.1)
ERYTHROCYTE [DISTWIDTH] IN BLOOD BY AUTOMATED COUNT: 14.5 % (ref 11.6–15.1)
ERYTHROCYTE [DISTWIDTH] IN BLOOD BY AUTOMATED COUNT: 14.5 % (ref 11.6–15.1)
ERYTHROCYTE [DISTWIDTH] IN BLOOD BY AUTOMATED COUNT: 14.6 % (ref 11.6–15.1)
ERYTHROCYTE [DISTWIDTH] IN BLOOD BY AUTOMATED COUNT: 14.7 % (ref 11.6–15.1)
ERYTHROCYTE [DISTWIDTH] IN BLOOD BY AUTOMATED COUNT: 14.9 % (ref 11.6–15.1)
ERYTHROCYTE [DISTWIDTH] IN BLOOD BY AUTOMATED COUNT: 14.9 % (ref 11.6–15.1)
ERYTHROCYTE [DISTWIDTH] IN BLOOD BY AUTOMATED COUNT: 15 % (ref 11.6–15.1)
ERYTHROCYTE [DISTWIDTH] IN BLOOD BY AUTOMATED COUNT: 15 % (ref 11.6–15.1)
ERYTHROCYTE [DISTWIDTH] IN BLOOD BY AUTOMATED COUNT: 15.2 % (ref 11.6–15.1)
ERYTHROCYTE [DISTWIDTH] IN BLOOD BY AUTOMATED COUNT: 15.4 % (ref 11.6–15.1)
EST. AVERAGE GLUCOSE BLD GHB EST-MCNC: 114 MG/DL
FERRITIN SERPL-MCNC: 279 NG/ML (ref 8–388)
FLUAV AG SPEC QL: ABNORMAL
FLUAV AG SPEC QL: NORMAL
FLUBV AG SPEC QL: DETECTED
FLUBV AG SPEC QL: NORMAL
FOLATE SERPL-MCNC: 19.6 NG/ML (ref 3.1–17.5)
GFR SERPL CREATININE-BSD FRML MDRD: 40 ML/MIN/1.73SQ M
GFR SERPL CREATININE-BSD FRML MDRD: 41 ML/MIN/1.73SQ M
GFR SERPL CREATININE-BSD FRML MDRD: 43 ML/MIN/1.73SQ M
GFR SERPL CREATININE-BSD FRML MDRD: 44 ML/MIN/1.73SQ M
GFR SERPL CREATININE-BSD FRML MDRD: 45 ML/MIN/1.73SQ M
GFR SERPL CREATININE-BSD FRML MDRD: 46 ML/MIN/1.73SQ M
GFR SERPL CREATININE-BSD FRML MDRD: 60 ML/MIN/1.73SQ M
GFR SERPL CREATININE-BSD FRML MDRD: 61 ML/MIN/1.73SQ M
GFR SERPL CREATININE-BSD FRML MDRD: 62 ML/MIN/1.73SQ M
GFR SERPL CREATININE-BSD FRML MDRD: 62 ML/MIN/1.73SQ M
GFR SERPL CREATININE-BSD FRML MDRD: 63 ML/MIN/1.73SQ M
GFR SERPL CREATININE-BSD FRML MDRD: 64 ML/MIN/1.73SQ M
GFR SERPL CREATININE-BSD FRML MDRD: 67 ML/MIN/1.73SQ M
GFR SERPL CREATININE-BSD FRML MDRD: 68 ML/MIN/1.73SQ M
GFR SERPL CREATININE-BSD FRML MDRD: 72 ML/MIN/1.73SQ M
GFR SERPL CREATININE-BSD FRML MDRD: 73 ML/MIN/1.73SQ M
GFR SERPL CREATININE-BSD FRML MDRD: 74 ML/MIN/1.73SQ M
GFR SERPL CREATININE-BSD FRML MDRD: 74 ML/MIN/1.73SQ M
GFR SERPL CREATININE-BSD FRML MDRD: 75 ML/MIN/1.73SQ M
GLUCOSE SERPL-MCNC: 100 MG/DL (ref 65–140)
GLUCOSE SERPL-MCNC: 101 MG/DL (ref 65–140)
GLUCOSE SERPL-MCNC: 102 MG/DL (ref 65–140)
GLUCOSE SERPL-MCNC: 102 MG/DL (ref 65–140)
GLUCOSE SERPL-MCNC: 104 MG/DL (ref 65–140)
GLUCOSE SERPL-MCNC: 104 MG/DL (ref 65–140)
GLUCOSE SERPL-MCNC: 106 MG/DL (ref 65–140)
GLUCOSE SERPL-MCNC: 107 MG/DL (ref 65–140)
GLUCOSE SERPL-MCNC: 108 MG/DL (ref 65–140)
GLUCOSE SERPL-MCNC: 109 MG/DL (ref 65–140)
GLUCOSE SERPL-MCNC: 110 MG/DL (ref 65–140)
GLUCOSE SERPL-MCNC: 111 MG/DL (ref 65–140)
GLUCOSE SERPL-MCNC: 112 MG/DL (ref 65–140)
GLUCOSE SERPL-MCNC: 112 MG/DL (ref 65–140)
GLUCOSE SERPL-MCNC: 113 MG/DL (ref 65–140)
GLUCOSE SERPL-MCNC: 114 MG/DL (ref 65–140)
GLUCOSE SERPL-MCNC: 115 MG/DL (ref 65–140)
GLUCOSE SERPL-MCNC: 115 MG/DL (ref 65–140)
GLUCOSE SERPL-MCNC: 116 MG/DL (ref 65–140)
GLUCOSE SERPL-MCNC: 117 MG/DL (ref 65–140)
GLUCOSE SERPL-MCNC: 118 MG/DL (ref 65–140)
GLUCOSE SERPL-MCNC: 119 MG/DL (ref 65–140)
GLUCOSE SERPL-MCNC: 120 MG/DL (ref 65–140)
GLUCOSE SERPL-MCNC: 121 MG/DL (ref 65–140)
GLUCOSE SERPL-MCNC: 122 MG/DL (ref 65–140)
GLUCOSE SERPL-MCNC: 122 MG/DL (ref 65–140)
GLUCOSE SERPL-MCNC: 123 MG/DL (ref 65–140)
GLUCOSE SERPL-MCNC: 123 MG/DL (ref 65–140)
GLUCOSE SERPL-MCNC: 125 MG/DL (ref 65–140)
GLUCOSE SERPL-MCNC: 126 MG/DL (ref 65–140)
GLUCOSE SERPL-MCNC: 127 MG/DL (ref 65–140)
GLUCOSE SERPL-MCNC: 127 MG/DL (ref 65–140)
GLUCOSE SERPL-MCNC: 128 MG/DL (ref 65–140)
GLUCOSE SERPL-MCNC: 129 MG/DL (ref 65–140)
GLUCOSE SERPL-MCNC: 130 MG/DL (ref 65–140)
GLUCOSE SERPL-MCNC: 131 MG/DL (ref 65–140)
GLUCOSE SERPL-MCNC: 131 MG/DL (ref 65–140)
GLUCOSE SERPL-MCNC: 132 MG/DL (ref 65–140)
GLUCOSE SERPL-MCNC: 136 MG/DL (ref 65–140)
GLUCOSE SERPL-MCNC: 136 MG/DL (ref 65–140)
GLUCOSE SERPL-MCNC: 138 MG/DL (ref 65–140)
GLUCOSE SERPL-MCNC: 142 MG/DL (ref 65–140)
GLUCOSE SERPL-MCNC: 144 MG/DL (ref 65–140)
GLUCOSE SERPL-MCNC: 147 MG/DL (ref 65–140)
GLUCOSE SERPL-MCNC: 163 MG/DL (ref 65–140)
GLUCOSE SERPL-MCNC: 175 MG/DL (ref 65–140)
GLUCOSE SERPL-MCNC: 178 MG/DL (ref 65–140)
GLUCOSE SERPL-MCNC: 191 MG/DL (ref 65–140)
GLUCOSE SERPL-MCNC: 198 MG/DL (ref 65–140)
GLUCOSE SERPL-MCNC: 213 MG/DL (ref 65–140)
GLUCOSE SERPL-MCNC: 232 MG/DL (ref 65–140)
GLUCOSE SERPL-MCNC: 300 MG/DL (ref 65–140)
GLUCOSE SERPL-MCNC: 62 MG/DL (ref 65–140)
GLUCOSE SERPL-MCNC: 63 MG/DL (ref 65–140)
GLUCOSE SERPL-MCNC: 69 MG/DL (ref 65–140)
GLUCOSE SERPL-MCNC: 69 MG/DL (ref 65–140)
GLUCOSE SERPL-MCNC: 70 MG/DL (ref 65–140)
GLUCOSE SERPL-MCNC: 70 MG/DL (ref 65–140)
GLUCOSE SERPL-MCNC: 75 MG/DL (ref 65–140)
GLUCOSE SERPL-MCNC: 76 MG/DL (ref 65–140)
GLUCOSE SERPL-MCNC: 77 MG/DL (ref 65–140)
GLUCOSE SERPL-MCNC: 79 MG/DL (ref 65–140)
GLUCOSE SERPL-MCNC: 79 MG/DL (ref 65–140)
GLUCOSE SERPL-MCNC: 81 MG/DL (ref 65–140)
GLUCOSE SERPL-MCNC: 82 MG/DL (ref 65–140)
GLUCOSE SERPL-MCNC: 85 MG/DL (ref 65–140)
GLUCOSE SERPL-MCNC: 85 MG/DL (ref 65–140)
GLUCOSE SERPL-MCNC: 86 MG/DL (ref 65–140)
GLUCOSE SERPL-MCNC: 87 MG/DL (ref 65–140)
GLUCOSE SERPL-MCNC: 89 MG/DL (ref 65–140)
GLUCOSE SERPL-MCNC: 90 MG/DL (ref 65–140)
GLUCOSE SERPL-MCNC: 91 MG/DL (ref 65–140)
GLUCOSE SERPL-MCNC: 91 MG/DL (ref 65–140)
GLUCOSE SERPL-MCNC: 92 MG/DL (ref 65–140)
GLUCOSE SERPL-MCNC: 94 MG/DL (ref 65–140)
GLUCOSE SERPL-MCNC: 94 MG/DL (ref 65–140)
GLUCOSE SERPL-MCNC: 95 MG/DL (ref 65–140)
GLUCOSE SERPL-MCNC: 96 MG/DL (ref 65–140)
GLUCOSE SERPL-MCNC: 99 MG/DL (ref 65–140)
GLUCOSE UR STRIP-MCNC: NEGATIVE MG/DL
HAV IGM SER QL: NORMAL
HBA1C MFR BLD: 5.6 % (ref 4.2–6.3)
HBV CORE IGM SER QL: NORMAL
HBV SURFACE AG SER QL: NORMAL
HCT VFR BLD AUTO: 27.4 % (ref 34.8–46.1)
HCT VFR BLD AUTO: 29.2 % (ref 34.8–46.1)
HCT VFR BLD AUTO: 29.7 % (ref 34.8–46.1)
HCT VFR BLD AUTO: 30.8 % (ref 34.8–46.1)
HCT VFR BLD AUTO: 30.9 % (ref 34.8–46.1)
HCT VFR BLD AUTO: 31.2 % (ref 34.8–46.1)
HCT VFR BLD AUTO: 31.4 % (ref 34.8–46.1)
HCT VFR BLD AUTO: 31.5 % (ref 34.8–46.1)
HCT VFR BLD AUTO: 31.7 % (ref 34.8–46.1)
HCT VFR BLD AUTO: 32.2 % (ref 34.8–46.1)
HCT VFR BLD AUTO: 32.3 % (ref 34.8–46.1)
HCT VFR BLD AUTO: 32.4 % (ref 34.8–46.1)
HCT VFR BLD AUTO: 32.9 % (ref 34.8–46.1)
HCT VFR BLD AUTO: 33 % (ref 34.8–46.1)
HCT VFR BLD AUTO: 33.3 % (ref 34.8–46.1)
HCT VFR BLD AUTO: 33.4 % (ref 34.8–46.1)
HCT VFR BLD AUTO: 33.5 % (ref 34.8–46.1)
HCT VFR BLD AUTO: 33.6 % (ref 34.8–46.1)
HCT VFR BLD AUTO: 33.8 % (ref 34.8–46.1)
HCT VFR BLD AUTO: 34 % (ref 34.8–46.1)
HCT VFR BLD AUTO: 34.1 % (ref 34.8–46.1)
HCT VFR BLD AUTO: 34.4 % (ref 34.8–46.1)
HCT VFR BLD AUTO: 34.8 % (ref 34.8–46.1)
HCT VFR BLD AUTO: 35.5 % (ref 34.8–46.1)
HCV AB SER QL: NORMAL
HGB BLD-MCNC: 10 G/DL (ref 11.5–15.4)
HGB BLD-MCNC: 10.1 G/DL (ref 11.5–15.4)
HGB BLD-MCNC: 10.4 G/DL (ref 11.5–15.4)
HGB BLD-MCNC: 10.4 G/DL (ref 11.5–15.4)
HGB BLD-MCNC: 10.6 G/DL (ref 11.5–15.4)
HGB BLD-MCNC: 10.7 G/DL (ref 11.5–15.4)
HGB BLD-MCNC: 10.8 G/DL (ref 11.5–15.4)
HGB BLD-MCNC: 10.9 G/DL (ref 11.5–15.4)
HGB BLD-MCNC: 11.2 G/DL (ref 11.5–15.4)
HGB BLD-MCNC: 11.2 G/DL (ref 11.5–15.4)
HGB BLD-MCNC: 8.4 G/DL (ref 11.5–15.4)
HGB BLD-MCNC: 9.5 G/DL (ref 11.5–15.4)
HGB BLD-MCNC: 9.6 G/DL (ref 11.5–15.4)
HGB BLD-MCNC: 9.8 G/DL (ref 11.5–15.4)
HGB BLD-MCNC: 9.8 G/DL (ref 11.5–15.4)
HGB BLD-MCNC: 9.9 G/DL (ref 11.5–15.4)
HGB BLD-MCNC: 9.9 G/DL (ref 11.5–15.4)
HGB UR QL STRIP.AUTO: ABNORMAL
HYALINE CASTS #/AREA URNS LPF: ABNORMAL /LPF
INR PPP: 1 (ref 0.86–1.16)
INR PPP: 1.12 (ref 0.86–1.16)
INR PPP: 1.28 (ref 0.86–1.16)
INR PPP: 1.32 (ref 0.86–1.16)
INR PPP: 1.38 (ref 0.86–1.16)
IRON SATN MFR SERPL: 34 %
IRON SERPL-MCNC: 40 UG/DL (ref 50–170)
KETONES UR STRIP-MCNC: ABNORMAL MG/DL
L PNEUMO1 AG UR QL IA.RAPID: NEGATIVE
LACTATE SERPL-SCNC: 1.1 MMOL/L (ref 0.5–2)
LACTATE SERPL-SCNC: 1.5 MMOL/L (ref 0.5–2)
LACTATE SERPL-SCNC: 1.5 MMOL/L (ref 0.5–2)
LACTATE SERPL-SCNC: 2.3 MMOL/L (ref 0.5–2)
LEUKOCYTE ESTERASE UR QL STRIP: NEGATIVE
LG PLATELETS BLD QL SMEAR: PRESENT
LIPASE SERPL-CCNC: 75 U/L (ref 73–393)
LYMPHOCYTES # BLD AUTO: 0.88 THOUSAND/UL (ref 0.6–4.47)
LYMPHOCYTES # BLD AUTO: 0.94 THOUSAND/UL (ref 0.6–4.47)
LYMPHOCYTES # BLD AUTO: 1.01 THOUSAND/UL (ref 0.6–4.47)
LYMPHOCYTES # BLD AUTO: 1.14 THOUSAND/UL (ref 0.6–4.47)
LYMPHOCYTES # BLD AUTO: 1.34 THOUSANDS/ΜL (ref 0.6–4.47)
LYMPHOCYTES # BLD AUTO: 1.37 THOUSAND/UL (ref 0.6–4.47)
LYMPHOCYTES # BLD AUTO: 1.39 THOUSAND/UL (ref 0.6–4.47)
LYMPHOCYTES # BLD AUTO: 1.42 THOUSAND/UL (ref 0.6–4.47)
LYMPHOCYTES # BLD AUTO: 1.53 THOUSANDS/ΜL (ref 0.6–4.47)
LYMPHOCYTES # BLD AUTO: 1.54 THOUSAND/UL (ref 0.6–4.47)
LYMPHOCYTES # BLD AUTO: 1.56 THOUSANDS/ΜL (ref 0.6–4.47)
LYMPHOCYTES # BLD AUTO: 1.6 THOUSANDS/ΜL (ref 0.6–4.47)
LYMPHOCYTES # BLD AUTO: 1.7 THOUSANDS/ΜL (ref 0.6–4.47)
LYMPHOCYTES # BLD AUTO: 1.73 THOUSAND/UL (ref 0.6–4.47)
LYMPHOCYTES # BLD AUTO: 1.73 THOUSANDS/ΜL (ref 0.6–4.47)
LYMPHOCYTES # BLD AUTO: 1.79 THOUSANDS/ΜL (ref 0.6–4.47)
LYMPHOCYTES # BLD AUTO: 1.84 THOUSANDS/ΜL (ref 0.6–4.47)
LYMPHOCYTES # BLD AUTO: 1.85 THOUSANDS/ΜL (ref 0.6–4.47)
LYMPHOCYTES # BLD AUTO: 1.95 THOUSANDS/ΜL (ref 0.6–4.47)
LYMPHOCYTES # BLD AUTO: 12 % (ref 14–44)
LYMPHOCYTES # BLD AUTO: 13 % (ref 14–44)
LYMPHOCYTES # BLD AUTO: 14 % (ref 14–44)
LYMPHOCYTES # BLD AUTO: 16 % (ref 14–44)
LYMPHOCYTES # BLD AUTO: 17 % (ref 14–44)
LYMPHOCYTES # BLD AUTO: 2.35 THOUSANDS/ΜL (ref 0.6–4.47)
LYMPHOCYTES # BLD AUTO: 2.38 THOUSAND/UL (ref 0.6–4.47)
LYMPHOCYTES # BLD AUTO: 2.5 THOUSANDS/ΜL (ref 0.6–4.47)
LYMPHOCYTES # BLD AUTO: 2.51 THOUSANDS/ΜL (ref 0.6–4.47)
LYMPHOCYTES # BLD AUTO: 20 % (ref 14–44)
LYMPHOCYTES # BLD AUTO: 20 % (ref 14–44)
LYMPHOCYTES # BLD AUTO: 25 % (ref 14–44)
LYMPHOCYTES # BLD AUTO: 27 % (ref 14–44)
LYMPHOCYTES # BLD AUTO: 27 % (ref 14–44)
LYMPHOCYTES NFR BLD AUTO: 18 % (ref 14–44)
LYMPHOCYTES NFR BLD AUTO: 19 % (ref 14–44)
LYMPHOCYTES NFR BLD AUTO: 19 % (ref 14–44)
LYMPHOCYTES NFR BLD AUTO: 21 % (ref 14–44)
LYMPHOCYTES NFR BLD AUTO: 22 % (ref 14–44)
LYMPHOCYTES NFR BLD AUTO: 22 % (ref 14–44)
LYMPHOCYTES NFR BLD AUTO: 28 % (ref 14–44)
LYMPHOCYTES NFR BLD AUTO: 28 % (ref 14–44)
LYMPHOCYTES NFR BLD AUTO: 30 % (ref 14–44)
LYMPHOCYTES NFR BLD AUTO: 30 % (ref 14–44)
LYMPHOCYTES NFR BLD AUTO: 32 % (ref 14–44)
LYMPHOCYTES NFR BLD AUTO: 33 % (ref 14–44)
LYMPHOCYTES NFR BLD AUTO: 33 % (ref 14–44)
MACROCYTES BLD QL AUTO: PRESENT
MAGNESIUM SERPL-MCNC: 1.7 MG/DL (ref 1.6–2.6)
MAGNESIUM SERPL-MCNC: 1.7 MG/DL (ref 1.6–2.6)
MAGNESIUM SERPL-MCNC: 1.8 MG/DL (ref 1.6–2.6)
MAGNESIUM SERPL-MCNC: 1.9 MG/DL (ref 1.6–2.6)
MAGNESIUM SERPL-MCNC: 2 MG/DL (ref 1.6–2.6)
MAGNESIUM SERPL-MCNC: 2.1 MG/DL (ref 1.6–2.6)
MAGNESIUM SERPL-MCNC: 2.2 MG/DL (ref 1.6–2.6)
MAGNESIUM SERPL-MCNC: 2.3 MG/DL (ref 1.6–2.6)
MAGNESIUM SERPL-MCNC: 2.4 MG/DL (ref 1.6–2.6)
MAGNESIUM SERPL-MCNC: 2.4 MG/DL (ref 1.6–2.6)
MAGNESIUM SERPL-MCNC: 2.5 MG/DL (ref 1.6–2.6)
MCH RBC QN AUTO: 29.7 PG (ref 26.8–34.3)
MCH RBC QN AUTO: 29.8 PG (ref 26.8–34.3)
MCH RBC QN AUTO: 29.9 PG (ref 26.8–34.3)
MCH RBC QN AUTO: 30 PG (ref 26.8–34.3)
MCH RBC QN AUTO: 30.1 PG (ref 26.8–34.3)
MCH RBC QN AUTO: 30.2 PG (ref 26.8–34.3)
MCH RBC QN AUTO: 30.2 PG (ref 26.8–34.3)
MCH RBC QN AUTO: 30.4 PG (ref 26.8–34.3)
MCH RBC QN AUTO: 30.5 PG (ref 26.8–34.3)
MCH RBC QN AUTO: 30.6 PG (ref 26.8–34.3)
MCH RBC QN AUTO: 30.6 PG (ref 26.8–34.3)
MCH RBC QN AUTO: 30.7 PG (ref 26.8–34.3)
MCH RBC QN AUTO: 30.8 PG (ref 26.8–34.3)
MCH RBC QN AUTO: 30.9 PG (ref 26.8–34.3)
MCH RBC QN AUTO: 31 PG (ref 26.8–34.3)
MCHC RBC AUTO-ENTMCNC: 30.7 G/DL (ref 31.4–37.4)
MCHC RBC AUTO-ENTMCNC: 30.7 G/DL (ref 31.4–37.4)
MCHC RBC AUTO-ENTMCNC: 31 G/DL (ref 31.4–37.4)
MCHC RBC AUTO-ENTMCNC: 31.1 G/DL (ref 31.4–37.4)
MCHC RBC AUTO-ENTMCNC: 31.2 G/DL (ref 31.4–37.4)
MCHC RBC AUTO-ENTMCNC: 31.2 G/DL (ref 31.4–37.4)
MCHC RBC AUTO-ENTMCNC: 31.4 G/DL (ref 31.4–37.4)
MCHC RBC AUTO-ENTMCNC: 31.5 G/DL (ref 31.4–37.4)
MCHC RBC AUTO-ENTMCNC: 31.5 G/DL (ref 31.4–37.4)
MCHC RBC AUTO-ENTMCNC: 31.6 G/DL (ref 31.4–37.4)
MCHC RBC AUTO-ENTMCNC: 31.6 G/DL (ref 31.4–37.4)
MCHC RBC AUTO-ENTMCNC: 31.7 G/DL (ref 31.4–37.4)
MCHC RBC AUTO-ENTMCNC: 31.8 G/DL (ref 31.4–37.4)
MCHC RBC AUTO-ENTMCNC: 32.1 G/DL (ref 31.4–37.4)
MCHC RBC AUTO-ENTMCNC: 32.3 G/DL (ref 31.4–37.4)
MCHC RBC AUTO-ENTMCNC: 32.4 G/DL (ref 31.4–37.4)
MCHC RBC AUTO-ENTMCNC: 32.4 G/DL (ref 31.4–37.4)
MCHC RBC AUTO-ENTMCNC: 32.5 G/DL (ref 31.4–37.4)
MCHC RBC AUTO-ENTMCNC: 32.6 G/DL (ref 31.4–37.4)
MCHC RBC AUTO-ENTMCNC: 32.6 G/DL (ref 31.4–37.4)
MCV RBC AUTO: 93 FL (ref 82–98)
MCV RBC AUTO: 93 FL (ref 82–98)
MCV RBC AUTO: 94 FL (ref 82–98)
MCV RBC AUTO: 95 FL (ref 82–98)
MCV RBC AUTO: 96 FL (ref 82–98)
MCV RBC AUTO: 97 FL (ref 82–98)
MCV RBC AUTO: 98 FL (ref 82–98)
METAMYELOCYTES NFR BLD MANUAL: 2 % (ref 0–1)
MONOCYTES # BLD AUTO: 0.14 THOUSAND/UL (ref 0–1.22)
MONOCYTES # BLD AUTO: 0.16 THOUSAND/UL (ref 0–1.22)
MONOCYTES # BLD AUTO: 0.16 THOUSAND/UL (ref 0–1.22)
MONOCYTES # BLD AUTO: 0.18 THOUSAND/UL (ref 0–1.22)
MONOCYTES # BLD AUTO: 0.18 THOUSAND/UL (ref 0–1.22)
MONOCYTES # BLD AUTO: 0.26 THOUSAND/UL (ref 0–1.22)
MONOCYTES # BLD AUTO: 0.34 THOUSAND/UL (ref 0–1.22)
MONOCYTES # BLD AUTO: 0.43 THOUSAND/ΜL (ref 0.17–1.22)
MONOCYTES # BLD AUTO: 0.44 THOUSAND/UL (ref 0–1.22)
MONOCYTES # BLD AUTO: 0.51 THOUSAND/UL (ref 0–1.22)
MONOCYTES # BLD AUTO: 0.55 THOUSAND/ΜL (ref 0.17–1.22)
MONOCYTES # BLD AUTO: 0.58 THOUSAND/ΜL (ref 0.17–1.22)
MONOCYTES # BLD AUTO: 0.61 THOUSAND/ΜL (ref 0.17–1.22)
MONOCYTES # BLD AUTO: 0.62 THOUSAND/ΜL (ref 0.17–1.22)
MONOCYTES # BLD AUTO: 0.64 THOUSAND/ΜL (ref 0.17–1.22)
MONOCYTES # BLD AUTO: 0.65 THOUSAND/ΜL (ref 0.17–1.22)
MONOCYTES # BLD AUTO: 0.65 THOUSAND/ΜL (ref 0.17–1.22)
MONOCYTES # BLD AUTO: 0.66 THOUSAND/ΜL (ref 0.17–1.22)
MONOCYTES # BLD AUTO: 0.69 THOUSAND/ΜL (ref 0.17–1.22)
MONOCYTES # BLD AUTO: 0.72 THOUSAND/ΜL (ref 0.17–1.22)
MONOCYTES # BLD AUTO: 0.76 THOUSAND/UL (ref 0–1.22)
MONOCYTES # BLD AUTO: 0.77 THOUSAND/ΜL (ref 0.17–1.22)
MONOCYTES # BLD AUTO: 0.81 THOUSAND/ΜL (ref 0.17–1.22)
MONOCYTES NFR BLD AUTO: 10 % (ref 4–12)
MONOCYTES NFR BLD AUTO: 10 % (ref 4–12)
MONOCYTES NFR BLD AUTO: 11 % (ref 4–12)
MONOCYTES NFR BLD AUTO: 12 % (ref 4–12)
MONOCYTES NFR BLD AUTO: 12 % (ref 4–12)
MONOCYTES NFR BLD AUTO: 6 % (ref 4–12)
MONOCYTES NFR BLD AUTO: 7 % (ref 4–12)
MONOCYTES NFR BLD AUTO: 8 % (ref 4–12)
MONOCYTES NFR BLD AUTO: 9 % (ref 4–12)
MONOCYTES NFR BLD AUTO: 9 % (ref 4–12)
MONOCYTES NFR BLD: 2 % (ref 4–12)
MONOCYTES NFR BLD: 3 % (ref 4–12)
MONOCYTES NFR BLD: 3 % (ref 4–12)
MONOCYTES NFR BLD: 4 % (ref 4–12)
MONOCYTES NFR BLD: 5 % (ref 4–12)
MONOCYTES NFR BLD: 5 % (ref 4–12)
MONOCYTES NFR BLD: 7 % (ref 4–12)
MONOCYTES NFR BLD: 9 % (ref 4–12)
MRSA NOSE QL CULT: NORMAL
MUCOUS THREADS UR QL AUTO: ABNORMAL
NEUTROPHILS # BLD AUTO: 2.87 THOUSANDS/ΜL (ref 1.85–7.62)
NEUTROPHILS # BLD AUTO: 2.93 THOUSANDS/ΜL (ref 1.85–7.62)
NEUTROPHILS # BLD AUTO: 3.42 THOUSANDS/ΜL (ref 1.85–7.62)
NEUTROPHILS # BLD AUTO: 3.46 THOUSANDS/ΜL (ref 1.85–7.62)
NEUTROPHILS # BLD AUTO: 3.68 THOUSANDS/ΜL (ref 1.85–7.62)
NEUTROPHILS # BLD AUTO: 3.94 THOUSANDS/ΜL (ref 1.85–7.62)
NEUTROPHILS # BLD AUTO: 4.43 THOUSANDS/ΜL (ref 1.85–7.62)
NEUTROPHILS # BLD AUTO: 4.91 THOUSANDS/ΜL (ref 1.85–7.62)
NEUTROPHILS # BLD AUTO: 5.08 THOUSANDS/ΜL (ref 1.85–7.62)
NEUTROPHILS # BLD AUTO: 5.09 THOUSANDS/ΜL (ref 1.85–7.62)
NEUTROPHILS # BLD AUTO: 5.11 THOUSANDS/ΜL (ref 1.85–7.62)
NEUTROPHILS # BLD AUTO: 5.57 THOUSANDS/ΜL (ref 1.85–7.62)
NEUTROPHILS # BLD AUTO: 6.26 THOUSANDS/ΜL (ref 1.85–7.62)
NEUTROPHILS # BLD MANUAL: 3.84 THOUSAND/UL (ref 1.85–7.62)
NEUTROPHILS # BLD MANUAL: 3.96 THOUSAND/UL (ref 1.85–7.62)
NEUTROPHILS # BLD MANUAL: 4.06 THOUSAND/UL (ref 1.85–7.62)
NEUTROPHILS # BLD MANUAL: 4.51 THOUSAND/UL (ref 1.85–7.62)
NEUTROPHILS # BLD MANUAL: 5.05 THOUSAND/UL (ref 1.85–7.62)
NEUTROPHILS # BLD MANUAL: 5.29 THOUSAND/UL (ref 1.85–7.62)
NEUTROPHILS # BLD MANUAL: 5.64 THOUSAND/UL (ref 1.85–7.62)
NEUTROPHILS # BLD MANUAL: 6 THOUSAND/UL (ref 1.85–7.62)
NEUTROPHILS # BLD MANUAL: 6.59 THOUSAND/UL (ref 1.85–7.62)
NEUTROPHILS # BLD MANUAL: 7.18 THOUSAND/UL (ref 1.85–7.62)
NEUTS BAND NFR BLD MANUAL: 1 % (ref 0–8)
NEUTS BAND NFR BLD MANUAL: 10 % (ref 0–8)
NEUTS BAND NFR BLD MANUAL: 2 % (ref 0–8)
NEUTS BAND NFR BLD MANUAL: 2 % (ref 0–8)
NEUTS BAND NFR BLD MANUAL: 7 % (ref 0–8)
NEUTS SEG NFR BLD AUTO: 47 % (ref 43–75)
NEUTS SEG NFR BLD AUTO: 48 % (ref 43–75)
NEUTS SEG NFR BLD AUTO: 49 % (ref 43–75)
NEUTS SEG NFR BLD AUTO: 49 % (ref 43–75)
NEUTS SEG NFR BLD AUTO: 54 % (ref 43–75)
NEUTS SEG NFR BLD AUTO: 54 % (ref 43–75)
NEUTS SEG NFR BLD AUTO: 55 % (ref 43–75)
NEUTS SEG NFR BLD AUTO: 61 % (ref 43–75)
NEUTS SEG NFR BLD AUTO: 62 % (ref 43–75)
NEUTS SEG NFR BLD AUTO: 63 % (ref 43–75)
NEUTS SEG NFR BLD AUTO: 66 % (ref 43–75)
NEUTS SEG NFR BLD AUTO: 68 % (ref 43–75)
NEUTS SEG NFR BLD AUTO: 69 % (ref 43–75)
NEUTS SEG NFR BLD AUTO: 70 % (ref 43–75)
NEUTS SEG NFR BLD AUTO: 72 % (ref 43–75)
NEUTS SEG NFR BLD AUTO: 79 % (ref 43–75)
NEUTS SEG NFR BLD AUTO: 80 % (ref 43–75)
NITRITE UR QL STRIP: NEGATIVE
NON-SQ EPI CELLS URNS QL MICRO: ABNORMAL /HPF
NT-PROBNP SERPL-MCNC: 1560 PG/ML
NT-PROBNP SERPL-MCNC: 2818 PG/ML
P AXIS: 31 DEGREES
P AXIS: 39 DEGREES
P AXIS: 39 DEGREES
PH UR STRIP.AUTO: 5.5 [PH] (ref 4.5–8)
PHOSPHATE SERPL-MCNC: 2.3 MG/DL (ref 2.3–4.1)
PHOSPHATE SERPL-MCNC: 2.5 MG/DL (ref 2.3–4.1)
PHOSPHATE SERPL-MCNC: 2.5 MG/DL (ref 2.3–4.1)
PHOSPHATE SERPL-MCNC: 2.7 MG/DL (ref 2.3–4.1)
PHOSPHATE SERPL-MCNC: 2.8 MG/DL (ref 2.3–4.1)
PHOSPHATE SERPL-MCNC: 3.2 MG/DL (ref 2.3–4.1)
PHOSPHATE SERPL-MCNC: 3.3 MG/DL (ref 2.3–4.1)
PHOSPHATE SERPL-MCNC: 3.7 MG/DL (ref 2.3–4.1)
PHOSPHATE SERPL-MCNC: 3.7 MG/DL (ref 2.3–4.1)
PLATELET # BLD AUTO: 165 THOUSANDS/UL (ref 149–390)
PLATELET # BLD AUTO: 175 THOUSANDS/UL (ref 149–390)
PLATELET # BLD AUTO: 190 THOUSANDS/UL (ref 149–390)
PLATELET # BLD AUTO: 197 THOUSANDS/UL (ref 149–390)
PLATELET # BLD AUTO: 220 THOUSANDS/UL (ref 149–390)
PLATELET # BLD AUTO: 246 THOUSANDS/UL (ref 149–390)
PLATELET # BLD AUTO: 272 THOUSANDS/UL (ref 149–390)
PLATELET # BLD AUTO: 273 THOUSANDS/UL (ref 149–390)
PLATELET # BLD AUTO: 281 THOUSANDS/UL (ref 149–390)
PLATELET # BLD AUTO: 296 THOUSANDS/UL (ref 149–390)
PLATELET # BLD AUTO: 297 THOUSANDS/UL (ref 149–390)
PLATELET # BLD AUTO: 311 THOUSANDS/UL (ref 149–390)
PLATELET # BLD AUTO: 312 THOUSANDS/UL (ref 149–390)
PLATELET # BLD AUTO: 314 THOUSANDS/UL (ref 149–390)
PLATELET # BLD AUTO: 317 THOUSANDS/UL (ref 149–390)
PLATELET # BLD AUTO: 322 THOUSANDS/UL (ref 149–390)
PLATELET # BLD AUTO: 328 THOUSANDS/UL (ref 149–390)
PLATELET # BLD AUTO: 329 THOUSANDS/UL (ref 149–390)
PLATELET # BLD AUTO: 332 THOUSANDS/UL (ref 149–390)
PLATELET # BLD AUTO: 344 THOUSANDS/UL (ref 149–390)
PLATELET # BLD AUTO: 389 THOUSANDS/UL (ref 149–390)
PLATELET # BLD AUTO: 392 THOUSANDS/UL (ref 149–390)
PLATELET # BLD AUTO: 436 THOUSANDS/UL (ref 149–390)
PLATELET # BLD AUTO: 459 THOUSANDS/UL (ref 149–390)
PLATELET # BLD AUTO: 477 THOUSANDS/UL (ref 149–390)
PLATELET # BLD AUTO: 500 THOUSANDS/UL (ref 149–390)
PLATELET BLD QL SMEAR: ABNORMAL
PLATELET BLD QL SMEAR: ABNORMAL
PLATELET BLD QL SMEAR: ADEQUATE
PMV BLD AUTO: 10 FL (ref 8.9–12.7)
PMV BLD AUTO: 10.1 FL (ref 8.9–12.7)
PMV BLD AUTO: 10.2 FL (ref 8.9–12.7)
PMV BLD AUTO: 10.5 FL (ref 8.9–12.7)
PMV BLD AUTO: 8.2 FL (ref 8.9–12.7)
PMV BLD AUTO: 8.6 FL (ref 8.9–12.7)
PMV BLD AUTO: 8.9 FL (ref 8.9–12.7)
PMV BLD AUTO: 8.9 FL (ref 8.9–12.7)
PMV BLD AUTO: 9 FL (ref 8.9–12.7)
PMV BLD AUTO: 9.1 FL (ref 8.9–12.7)
PMV BLD AUTO: 9.2 FL (ref 8.9–12.7)
PMV BLD AUTO: 9.3 FL (ref 8.9–12.7)
PMV BLD AUTO: 9.4 FL (ref 8.9–12.7)
PMV BLD AUTO: 9.4 FL (ref 8.9–12.7)
PMV BLD AUTO: 9.5 FL (ref 8.9–12.7)
PMV BLD AUTO: 9.7 FL (ref 8.9–12.7)
PMV BLD AUTO: 9.8 FL (ref 8.9–12.7)
PMV BLD AUTO: 9.9 FL (ref 8.9–12.7)
POIKILOCYTOSIS BLD QL SMEAR: PRESENT
POLYCHROMASIA BLD QL SMEAR: PRESENT
POTASSIUM SERPL-SCNC: 3.1 MMOL/L (ref 3.5–5.3)
POTASSIUM SERPL-SCNC: 3.3 MMOL/L (ref 3.5–5.3)
POTASSIUM SERPL-SCNC: 3.3 MMOL/L (ref 3.5–5.3)
POTASSIUM SERPL-SCNC: 3.4 MMOL/L (ref 3.5–5.3)
POTASSIUM SERPL-SCNC: 3.5 MMOL/L (ref 3.5–5.3)
POTASSIUM SERPL-SCNC: 3.5 MMOL/L (ref 3.5–5.3)
POTASSIUM SERPL-SCNC: 3.6 MMOL/L (ref 3.5–5.3)
POTASSIUM SERPL-SCNC: 3.8 MMOL/L (ref 3.5–5.3)
POTASSIUM SERPL-SCNC: 3.8 MMOL/L (ref 3.5–5.3)
POTASSIUM SERPL-SCNC: 3.9 MMOL/L (ref 3.5–5.3)
POTASSIUM SERPL-SCNC: 4.1 MMOL/L (ref 3.5–5.3)
POTASSIUM SERPL-SCNC: 4.3 MMOL/L (ref 3.5–5.3)
POTASSIUM SERPL-SCNC: 4.3 MMOL/L (ref 3.5–5.3)
POTASSIUM SERPL-SCNC: 4.4 MMOL/L (ref 3.5–5.3)
POTASSIUM SERPL-SCNC: 4.5 MMOL/L (ref 3.5–5.3)
POTASSIUM SERPL-SCNC: 4.6 MMOL/L (ref 3.5–5.3)
PR INTERVAL: 146 MS
PR INTERVAL: 154 MS
PR INTERVAL: 164 MS
PREALB SERPL-MCNC: 10.6 MG/DL (ref 18–40)
PREALB SERPL-MCNC: 12.6 MG/DL (ref 18–40)
PREALB SERPL-MCNC: 13.1 MG/DL (ref 18–40)
PROT SERPL-MCNC: 6.2 G/DL (ref 6.4–8.2)
PROT SERPL-MCNC: 6.3 G/DL (ref 6.4–8.2)
PROT SERPL-MCNC: 6.4 G/DL (ref 6.4–8.2)
PROT SERPL-MCNC: 6.5 G/DL (ref 6.4–8.2)
PROT SERPL-MCNC: 6.5 G/DL (ref 6.4–8.2)
PROT SERPL-MCNC: 6.6 G/DL (ref 6.4–8.2)
PROT SERPL-MCNC: 6.6 G/DL (ref 6.4–8.2)
PROT SERPL-MCNC: 6.7 G/DL (ref 6.4–8.2)
PROT SERPL-MCNC: 6.8 G/DL (ref 6.4–8.2)
PROT SERPL-MCNC: 7.2 G/DL (ref 6.4–8.2)
PROT SERPL-MCNC: 7.2 G/DL (ref 6.4–8.2)
PROT SERPL-MCNC: 7.3 G/DL (ref 6.4–8.2)
PROT SERPL-MCNC: 7.4 G/DL (ref 6.4–8.2)
PROT SERPL-MCNC: 7.5 G/DL (ref 6.4–8.2)
PROT SERPL-MCNC: 7.5 G/DL (ref 6.4–8.2)
PROT SERPL-MCNC: 7.6 G/DL (ref 6.4–8.2)
PROT SERPL-MCNC: 7.8 G/DL (ref 6.4–8.2)
PROT UR STRIP-MCNC: ABNORMAL MG/DL
PROTHROMBIN TIME: 13.1 SECONDS (ref 12.1–14.4)
PROTHROMBIN TIME: 14.3 SECONDS (ref 12.1–14.4)
PROTHROMBIN TIME: 15.9 SECONDS (ref 12.1–14.4)
PROTHROMBIN TIME: 16.3 SECONDS (ref 12.1–14.4)
PROTHROMBIN TIME: 16.9 SECONDS (ref 12.1–14.4)
QRS AXIS: -30 DEGREES
QRS AXIS: -33 DEGREES
QRS AXIS: -33 DEGREES
QRSD INTERVAL: 102 MS
QRSD INTERVAL: 108 MS
QRSD INTERVAL: 112 MS
QT INTERVAL: 364 MS
QT INTERVAL: 380 MS
QT INTERVAL: 382 MS
QTC INTERVAL: 454 MS
QTC INTERVAL: 459 MS
QTC INTERVAL: 467 MS
RBC # BLD AUTO: 2.82 MILLION/UL (ref 3.81–5.12)
RBC # BLD AUTO: 3.12 MILLION/UL (ref 3.81–5.12)
RBC # BLD AUTO: 3.19 MILLION/UL (ref 3.81–5.12)
RBC # BLD AUTO: 3.26 MILLION/UL (ref 3.81–5.12)
RBC # BLD AUTO: 3.27 MILLION/UL (ref 3.81–5.12)
RBC # BLD AUTO: 3.28 MILLION/UL (ref 3.81–5.12)
RBC # BLD AUTO: 3.28 MILLION/UL (ref 3.81–5.12)
RBC # BLD AUTO: 3.31 MILLION/UL (ref 3.81–5.12)
RBC # BLD AUTO: 3.35 MILLION/UL (ref 3.81–5.12)
RBC # BLD AUTO: 3.35 MILLION/UL (ref 3.81–5.12)
RBC # BLD AUTO: 3.37 MILLION/UL (ref 3.81–5.12)
RBC # BLD AUTO: 3.37 MILLION/UL (ref 3.81–5.12)
RBC # BLD AUTO: 3.44 MILLION/UL (ref 3.81–5.12)
RBC # BLD AUTO: 3.45 MILLION/UL (ref 3.81–5.12)
RBC # BLD AUTO: 3.48 MILLION/UL (ref 3.81–5.12)
RBC # BLD AUTO: 3.51 MILLION/UL (ref 3.81–5.12)
RBC # BLD AUTO: 3.52 MILLION/UL (ref 3.81–5.12)
RBC # BLD AUTO: 3.52 MILLION/UL (ref 3.81–5.12)
RBC # BLD AUTO: 3.53 MILLION/UL (ref 3.81–5.12)
RBC # BLD AUTO: 3.55 MILLION/UL (ref 3.81–5.12)
RBC # BLD AUTO: 3.56 MILLION/UL (ref 3.81–5.12)
RBC # BLD AUTO: 3.58 MILLION/UL (ref 3.81–5.12)
RBC # BLD AUTO: 3.61 MILLION/UL (ref 3.81–5.12)
RBC # BLD AUTO: 3.74 MILLION/UL (ref 3.81–5.12)
RBC #/AREA URNS AUTO: ABNORMAL /HPF
RSV B RNA SPEC QL NAA+PROBE: ABNORMAL
RSV B RNA SPEC QL NAA+PROBE: NORMAL
S PNEUM AG UR QL: NEGATIVE
SODIUM SERPL-SCNC: 136 MMOL/L (ref 136–145)
SODIUM SERPL-SCNC: 137 MMOL/L (ref 136–145)
SODIUM SERPL-SCNC: 138 MMOL/L (ref 136–145)
SODIUM SERPL-SCNC: 139 MMOL/L (ref 136–145)
SODIUM SERPL-SCNC: 140 MMOL/L (ref 136–145)
SODIUM SERPL-SCNC: 141 MMOL/L (ref 136–145)
SODIUM SERPL-SCNC: 142 MMOL/L (ref 136–145)
SODIUM SERPL-SCNC: 143 MMOL/L (ref 136–145)
SODIUM SERPL-SCNC: 143 MMOL/L (ref 136–145)
SODIUM SERPL-SCNC: 144 MMOL/L (ref 136–145)
SODIUM SERPL-SCNC: 145 MMOL/L (ref 136–145)
SODIUM SERPL-SCNC: 146 MMOL/L (ref 136–145)
SODIUM SERPL-SCNC: 153 MMOL/L (ref 136–145)
SODIUM SERPL-SCNC: 154 MMOL/L (ref 136–145)
SP GR UR STRIP.AUTO: >=1.03 (ref 1–1.03)
STOMATOCYTES BLD QL SMEAR: PRESENT
T WAVE AXIS: 128 DEGREES
T WAVE AXIS: 166 DEGREES
T WAVE AXIS: 96 DEGREES
TARGETS BLD QL SMEAR: PRESENT
TARGETS BLD QL SMEAR: PRESENT
TIBC SERPL-MCNC: 119 UG/DL (ref 250–450)
TOTAL CELLS COUNTED SPEC: 100
TROPONIN I SERPL-MCNC: <0.02 NG/ML
TSH SERPL DL<=0.05 MIU/L-ACNC: 0.64 UIU/ML (ref 0.36–3.74)
TSH SERPL DL<=0.05 MIU/L-ACNC: 0.74 UIU/ML (ref 0.36–3.74)
TSH SERPL DL<=0.05 MIU/L-ACNC: 0.86 UIU/ML (ref 0.36–3.74)
UROBILINOGEN UR QL STRIP.AUTO: 0.2 E.U./DL
VARIANT LYMPHS # BLD AUTO: 1 %
VARIANT LYMPHS # BLD AUTO: 1 %
VARIANT LYMPHS # BLD AUTO: 2 %
VARIANT LYMPHS # BLD AUTO: 3 %
VARIANT LYMPHS # BLD AUTO: 4 %
VARIANT LYMPHS # BLD AUTO: 5 %
VARIANT LYMPHS # BLD AUTO: 6 %
VENTRICULAR RATE: 85 BPM
VENTRICULAR RATE: 88 BPM
VENTRICULAR RATE: 99 BPM
VIT B12 SERPL-MCNC: 454 PG/ML (ref 100–900)
VIT B12 SERPL-MCNC: 587 PG/ML (ref 100–900)
WBC # BLD AUTO: 5.19 THOUSAND/UL (ref 4.31–10.16)
WBC # BLD AUTO: 5.76 THOUSAND/UL (ref 4.31–10.16)
WBC # BLD AUTO: 5.98 THOUSAND/UL (ref 4.31–10.16)
WBC # BLD AUTO: 6.15 THOUSAND/UL (ref 4.31–10.16)
WBC # BLD AUTO: 6.24 THOUSAND/UL (ref 4.31–10.16)
WBC # BLD AUTO: 6.39 THOUSAND/UL (ref 4.31–10.16)
WBC # BLD AUTO: 6.39 THOUSAND/UL (ref 4.31–10.16)
WBC # BLD AUTO: 6.83 THOUSAND/UL (ref 4.31–10.16)
WBC # BLD AUTO: 6.96 THOUSAND/UL (ref 4.31–10.16)
WBC # BLD AUTO: 7.03 THOUSAND/UL (ref 4.31–10.16)
WBC # BLD AUTO: 7.06 THOUSAND/UL (ref 4.31–10.16)
WBC # BLD AUTO: 7.22 THOUSAND/UL (ref 4.31–10.16)
WBC # BLD AUTO: 7.43 THOUSAND/UL (ref 4.31–10.16)
WBC # BLD AUTO: 7.59 THOUSAND/UL (ref 4.31–10.16)
WBC # BLD AUTO: 7.63 THOUSAND/UL (ref 4.31–10.16)
WBC # BLD AUTO: 7.7 THOUSAND/UL (ref 4.31–10.16)
WBC # BLD AUTO: 7.9 THOUSAND/UL (ref 4.31–10.16)
WBC # BLD AUTO: 8.13 THOUSAND/UL (ref 4.31–10.16)
WBC # BLD AUTO: 8.17 THOUSAND/UL (ref 4.31–10.16)
WBC # BLD AUTO: 8.45 THOUSAND/UL (ref 4.31–10.16)
WBC # BLD AUTO: 8.81 THOUSAND/UL (ref 4.31–10.16)
WBC # BLD AUTO: 8.86 THOUSAND/UL (ref 4.31–10.16)
WBC # BLD AUTO: 9.06 THOUSAND/UL (ref 4.31–10.16)
WBC # BLD AUTO: 9.12 THOUSAND/UL (ref 4.31–10.16)
WBC #/AREA URNS AUTO: ABNORMAL /HPF

## 2018-01-01 PROCEDURE — 85007 BL SMEAR W/DIFF WBC COUNT: CPT | Performed by: FAMILY MEDICINE

## 2018-01-01 PROCEDURE — 94664 DEMO&/EVAL PT USE INHALER: CPT

## 2018-01-01 PROCEDURE — 76705 ECHO EXAM OF ABDOMEN: CPT

## 2018-01-01 PROCEDURE — 85025 COMPLETE CBC W/AUTO DIFF WBC: CPT | Performed by: INTERNAL MEDICINE

## 2018-01-01 PROCEDURE — 82330 ASSAY OF CALCIUM: CPT | Performed by: INTERNAL MEDICINE

## 2018-01-01 PROCEDURE — 85025 COMPLETE CBC W/AUTO DIFF WBC: CPT | Performed by: PHYSICIAN ASSISTANT

## 2018-01-01 PROCEDURE — 92610 EVALUATE SWALLOWING FUNCTION: CPT | Performed by: SPEECH-LANGUAGE PATHOLOGIST

## 2018-01-01 PROCEDURE — G8987 SELF CARE CURRENT STATUS: HCPCS

## 2018-01-01 PROCEDURE — 92507 TX SP LANG VOICE COMM INDIV: CPT

## 2018-01-01 PROCEDURE — 96374 THER/PROPH/DIAG INJ IV PUSH: CPT

## 2018-01-01 PROCEDURE — 82948 REAGENT STRIP/BLOOD GLUCOSE: CPT

## 2018-01-01 PROCEDURE — G8998 SWALLOW D/C STATUS: HCPCS | Performed by: SPEECH-LANGUAGE PATHOLOGIST

## 2018-01-01 PROCEDURE — 83735 ASSAY OF MAGNESIUM: CPT | Performed by: INTERNAL MEDICINE

## 2018-01-01 PROCEDURE — 97116 GAIT TRAINING THERAPY: CPT

## 2018-01-01 PROCEDURE — 99231 SBSQ HOSP IP/OBS SF/LOW 25: CPT | Performed by: INTERNAL MEDICINE

## 2018-01-01 PROCEDURE — 94760 N-INVAS EAR/PLS OXIMETRY 1: CPT

## 2018-01-01 PROCEDURE — 80053 COMPREHEN METABOLIC PANEL: CPT | Performed by: INTERNAL MEDICINE

## 2018-01-01 PROCEDURE — 87798 DETECT AGENT NOS DNA AMP: CPT | Performed by: FAMILY MEDICINE

## 2018-01-01 PROCEDURE — G8996 SWALLOW CURRENT STATUS: HCPCS | Performed by: SPEECH-LANGUAGE PATHOLOGIST

## 2018-01-01 PROCEDURE — 85610 PROTHROMBIN TIME: CPT | Performed by: EMERGENCY MEDICINE

## 2018-01-01 PROCEDURE — 84134 ASSAY OF PREALBUMIN: CPT | Performed by: INTERNAL MEDICINE

## 2018-01-01 PROCEDURE — 85610 PROTHROMBIN TIME: CPT | Performed by: INTERNAL MEDICINE

## 2018-01-01 PROCEDURE — 84484 ASSAY OF TROPONIN QUANT: CPT | Performed by: INTERNAL MEDICINE

## 2018-01-01 PROCEDURE — 97530 THERAPEUTIC ACTIVITIES: CPT

## 2018-01-01 PROCEDURE — 87081 CULTURE SCREEN ONLY: CPT | Performed by: FAMILY MEDICINE

## 2018-01-01 PROCEDURE — 83605 ASSAY OF LACTIC ACID: CPT | Performed by: INTERNAL MEDICINE

## 2018-01-01 PROCEDURE — 97110 THERAPEUTIC EXERCISES: CPT

## 2018-01-01 PROCEDURE — 92526 ORAL FUNCTION THERAPY: CPT

## 2018-01-01 PROCEDURE — 93010 ELECTROCARDIOGRAM REPORT: CPT | Performed by: INTERNAL MEDICINE

## 2018-01-01 PROCEDURE — 99232 SBSQ HOSP IP/OBS MODERATE 35: CPT | Performed by: INTERNAL MEDICINE

## 2018-01-01 PROCEDURE — 97535 SELF CARE MNGMENT TRAINING: CPT

## 2018-01-01 PROCEDURE — 99232 SBSQ HOSP IP/OBS MODERATE 35: CPT | Performed by: FAMILY MEDICINE

## 2018-01-01 PROCEDURE — 93970 EXTREMITY STUDY: CPT

## 2018-01-01 PROCEDURE — 92526 ORAL FUNCTION THERAPY: CPT | Performed by: NURSE PRACTITIONER

## 2018-01-01 PROCEDURE — 71046 X-RAY EXAM CHEST 2 VIEWS: CPT

## 2018-01-01 PROCEDURE — G8997 SWALLOW GOAL STATUS: HCPCS | Performed by: SPEECH-LANGUAGE PATHOLOGIST

## 2018-01-01 PROCEDURE — 80048 BASIC METABOLIC PNL TOTAL CA: CPT | Performed by: FAMILY MEDICINE

## 2018-01-01 PROCEDURE — 82746 ASSAY OF FOLIC ACID SERUM: CPT | Performed by: INTERNAL MEDICINE

## 2018-01-01 PROCEDURE — G8979 MOBILITY GOAL STATUS: HCPCS | Performed by: PHYSICAL THERAPIST

## 2018-01-01 PROCEDURE — 87449 NOS EACH ORGANISM AG IA: CPT | Performed by: FAMILY MEDICINE

## 2018-01-01 PROCEDURE — 85007 BL SMEAR W/DIFF WBC COUNT: CPT | Performed by: INTERNAL MEDICINE

## 2018-01-01 PROCEDURE — 99285 EMERGENCY DEPT VISIT HI MDM: CPT

## 2018-01-01 PROCEDURE — 80048 BASIC METABOLIC PNL TOTAL CA: CPT | Performed by: INTERNAL MEDICINE

## 2018-01-01 PROCEDURE — 84100 ASSAY OF PHOSPHORUS: CPT | Performed by: INTERNAL MEDICINE

## 2018-01-01 PROCEDURE — 83880 ASSAY OF NATRIURETIC PEPTIDE: CPT | Performed by: EMERGENCY MEDICINE

## 2018-01-01 PROCEDURE — 71250 CT THORAX DX C-: CPT

## 2018-01-01 PROCEDURE — 83036 HEMOGLOBIN GLYCOSYLATED A1C: CPT | Performed by: INTERNAL MEDICINE

## 2018-01-01 PROCEDURE — 36415 COLL VENOUS BLD VENIPUNCTURE: CPT | Performed by: EMERGENCY MEDICINE

## 2018-01-01 PROCEDURE — 85027 COMPLETE CBC AUTOMATED: CPT | Performed by: FAMILY MEDICINE

## 2018-01-01 PROCEDURE — 97530 THERAPEUTIC ACTIVITIES: CPT | Performed by: PHYSICAL THERAPIST

## 2018-01-01 PROCEDURE — 85027 COMPLETE CBC AUTOMATED: CPT | Performed by: INTERNAL MEDICINE

## 2018-01-01 PROCEDURE — 92526 ORAL FUNCTION THERAPY: CPT | Performed by: SPEECH-LANGUAGE PATHOLOGIST

## 2018-01-01 PROCEDURE — 80074 ACUTE HEPATITIS PANEL: CPT | Performed by: INTERNAL MEDICINE

## 2018-01-01 PROCEDURE — 81001 URINALYSIS AUTO W/SCOPE: CPT | Performed by: EMERGENCY MEDICINE

## 2018-01-01 PROCEDURE — 85730 THROMBOPLASTIN TIME PARTIAL: CPT | Performed by: EMERGENCY MEDICINE

## 2018-01-01 PROCEDURE — G8978 MOBILITY CURRENT STATUS: HCPCS | Performed by: PHYSICAL THERAPIST

## 2018-01-01 PROCEDURE — 82306 VITAMIN D 25 HYDROXY: CPT | Performed by: INTERNAL MEDICINE

## 2018-01-01 PROCEDURE — 87081 CULTURE SCREEN ONLY: CPT | Performed by: PHYSICIAN ASSISTANT

## 2018-01-01 PROCEDURE — 84484 ASSAY OF TROPONIN QUANT: CPT | Performed by: EMERGENCY MEDICINE

## 2018-01-01 PROCEDURE — 71045 X-RAY EXAM CHEST 1 VIEW: CPT

## 2018-01-01 PROCEDURE — G8988 SELF CARE GOAL STATUS: HCPCS

## 2018-01-01 PROCEDURE — 97167 OT EVAL HIGH COMPLEX 60 MIN: CPT

## 2018-01-01 PROCEDURE — 80048 BASIC METABOLIC PNL TOTAL CA: CPT | Performed by: PHYSICIAN ASSISTANT

## 2018-01-01 PROCEDURE — 99223 1ST HOSP IP/OBS HIGH 75: CPT | Performed by: INTERNAL MEDICINE

## 2018-01-01 PROCEDURE — 94761 N-INVAS EAR/PLS OXIMETRY MLT: CPT

## 2018-01-01 PROCEDURE — 99239 HOSP IP/OBS DSCHRG MGMT >30: CPT | Performed by: FAMILY MEDICINE

## 2018-01-01 PROCEDURE — 97116 GAIT TRAINING THERAPY: CPT | Performed by: PHYSICAL THERAPIST

## 2018-01-01 PROCEDURE — 85007 BL SMEAR W/DIFF WBC COUNT: CPT | Performed by: EMERGENCY MEDICINE

## 2018-01-01 PROCEDURE — 94640 AIRWAY INHALATION TREATMENT: CPT

## 2018-01-01 PROCEDURE — 84484 ASSAY OF TROPONIN QUANT: CPT | Performed by: PHYSICIAN ASSISTANT

## 2018-01-01 PROCEDURE — 83690 ASSAY OF LIPASE: CPT | Performed by: EMERGENCY MEDICINE

## 2018-01-01 PROCEDURE — 87081 CULTURE SCREEN ONLY: CPT | Performed by: INTERNAL MEDICINE

## 2018-01-01 PROCEDURE — 83735 ASSAY OF MAGNESIUM: CPT | Performed by: PHYSICIAN ASSISTANT

## 2018-01-01 PROCEDURE — 82728 ASSAY OF FERRITIN: CPT | Performed by: INTERNAL MEDICINE

## 2018-01-01 PROCEDURE — 93306 TTE W/DOPPLER COMPLETE: CPT

## 2018-01-01 PROCEDURE — 97163 PT EVAL HIGH COMPLEX 45 MIN: CPT | Performed by: PHYSICAL THERAPIST

## 2018-01-01 PROCEDURE — 82607 VITAMIN B-12: CPT | Performed by: INTERNAL MEDICINE

## 2018-01-01 PROCEDURE — 94762 N-INVAS EAR/PLS OXIMTRY CONT: CPT

## 2018-01-01 PROCEDURE — 80053 COMPREHEN METABOLIC PANEL: CPT | Performed by: EMERGENCY MEDICINE

## 2018-01-01 PROCEDURE — 80053 COMPREHEN METABOLIC PANEL: CPT | Performed by: PHYSICIAN ASSISTANT

## 2018-01-01 PROCEDURE — 87086 URINE CULTURE/COLONY COUNT: CPT | Performed by: INTERNAL MEDICINE

## 2018-01-01 PROCEDURE — 96361 HYDRATE IV INFUSION ADD-ON: CPT

## 2018-01-01 PROCEDURE — 87798 DETECT AGENT NOS DNA AMP: CPT | Performed by: EMERGENCY MEDICINE

## 2018-01-01 PROCEDURE — 74176 CT ABD & PELVIS W/O CONTRAST: CPT

## 2018-01-01 PROCEDURE — 97162 PT EVAL MOD COMPLEX 30 MIN: CPT

## 2018-01-01 PROCEDURE — G8979 MOBILITY GOAL STATUS: HCPCS

## 2018-01-01 PROCEDURE — 36415 COLL VENOUS BLD VENIPUNCTURE: CPT | Performed by: FAMILY MEDICINE

## 2018-01-01 PROCEDURE — 92523 SPEECH SOUND LANG COMPREHEN: CPT

## 2018-01-01 PROCEDURE — 74018 RADEX ABDOMEN 1 VIEW: CPT

## 2018-01-01 PROCEDURE — G8978 MOBILITY CURRENT STATUS: HCPCS

## 2018-01-01 PROCEDURE — 87040 BLOOD CULTURE FOR BACTERIA: CPT | Performed by: FAMILY MEDICINE

## 2018-01-01 PROCEDURE — 92610 EVALUATE SWALLOWING FUNCTION: CPT

## 2018-01-01 PROCEDURE — 87040 BLOOD CULTURE FOR BACTERIA: CPT | Performed by: EMERGENCY MEDICINE

## 2018-01-01 PROCEDURE — 84443 ASSAY THYROID STIM HORMONE: CPT | Performed by: FAMILY MEDICINE

## 2018-01-01 PROCEDURE — 85049 AUTOMATED PLATELET COUNT: CPT | Performed by: INTERNAL MEDICINE

## 2018-01-01 PROCEDURE — 93005 ELECTROCARDIOGRAM TRACING: CPT | Performed by: EMERGENCY MEDICINE

## 2018-01-01 PROCEDURE — 85027 COMPLETE CBC AUTOMATED: CPT | Performed by: EMERGENCY MEDICINE

## 2018-01-01 PROCEDURE — 70450 CT HEAD/BRAIN W/O DYE: CPT

## 2018-01-01 PROCEDURE — 99239 HOSP IP/OBS DSCHRG MGMT >30: CPT | Performed by: INTERNAL MEDICINE

## 2018-01-01 PROCEDURE — 84443 ASSAY THYROID STIM HORMONE: CPT | Performed by: EMERGENCY MEDICINE

## 2018-01-01 PROCEDURE — 97166 OT EVAL MOD COMPLEX 45 MIN: CPT

## 2018-01-01 PROCEDURE — 82140 ASSAY OF AMMONIA: CPT | Performed by: FAMILY MEDICINE

## 2018-01-01 PROCEDURE — 83735 ASSAY OF MAGNESIUM: CPT | Performed by: EMERGENCY MEDICINE

## 2018-01-01 PROCEDURE — 82550 ASSAY OF CK (CPK): CPT | Performed by: INTERNAL MEDICINE

## 2018-01-01 PROCEDURE — 83550 IRON BINDING TEST: CPT | Performed by: INTERNAL MEDICINE

## 2018-01-01 PROCEDURE — 99223 1ST HOSP IP/OBS HIGH 75: CPT | Performed by: FAMILY MEDICINE

## 2018-01-01 PROCEDURE — 83540 ASSAY OF IRON: CPT | Performed by: INTERNAL MEDICINE

## 2018-01-01 PROCEDURE — 82607 VITAMIN B-12: CPT | Performed by: FAMILY MEDICINE

## 2018-01-01 PROCEDURE — 99233 SBSQ HOSP IP/OBS HIGH 50: CPT | Performed by: INTERNAL MEDICINE

## 2018-01-01 PROCEDURE — 97163 PT EVAL HIGH COMPLEX 45 MIN: CPT

## 2018-01-01 PROCEDURE — 85025 COMPLETE CBC W/AUTO DIFF WBC: CPT | Performed by: EMERGENCY MEDICINE

## 2018-01-01 PROCEDURE — 93005 ELECTROCARDIOGRAM TRACING: CPT

## 2018-01-01 PROCEDURE — 85049 AUTOMATED PLATELET COUNT: CPT | Performed by: FAMILY MEDICINE

## 2018-01-01 PROCEDURE — 80048 BASIC METABOLIC PNL TOTAL CA: CPT | Performed by: EMERGENCY MEDICINE

## 2018-01-01 PROCEDURE — 84443 ASSAY THYROID STIM HORMONE: CPT | Performed by: INTERNAL MEDICINE

## 2018-01-01 RX ORDER — DRONABINOL 2.5 MG/1
2.5 CAPSULE ORAL
Status: DISCONTINUED | OUTPATIENT
Start: 2018-01-01 | End: 2018-01-01

## 2018-01-01 RX ORDER — DILTIAZEM HYDROCHLORIDE 120 MG/1
120 CAPSULE, COATED, EXTENDED RELEASE ORAL DAILY
Status: DISCONTINUED | OUTPATIENT
Start: 2018-01-01 | End: 2018-01-01

## 2018-01-01 RX ORDER — FUROSEMIDE 10 MG/ML
20 INJECTION INTRAMUSCULAR; INTRAVENOUS
Status: DISCONTINUED | OUTPATIENT
Start: 2018-01-01 | End: 2018-01-01

## 2018-01-01 RX ORDER — GABAPENTIN 100 MG/1
300 CAPSULE ORAL
Refills: 0
Start: 2018-01-01

## 2018-01-01 RX ORDER — PANTOPRAZOLE SODIUM 40 MG/1
40 TABLET, DELAYED RELEASE ORAL
Status: DISCONTINUED | OUTPATIENT
Start: 2018-01-01 | End: 2018-01-01 | Stop reason: HOSPADM

## 2018-01-01 RX ORDER — ACETAMINOPHEN 325 MG/1
650 TABLET ORAL EVERY 6 HOURS SCHEDULED
Status: DISCONTINUED | OUTPATIENT
Start: 2018-01-01 | End: 2018-01-01 | Stop reason: ALTCHOICE

## 2018-01-01 RX ORDER — MENTHOL AND ZINC OXIDE .44; 20.625 G/100G; G/100G
1 OINTMENT TOPICAL 2 TIMES DAILY
COMMUNITY
End: 2018-01-01 | Stop reason: HOSPADM

## 2018-01-01 RX ORDER — OSELTAMIVIR PHOSPHATE 30 MG/1
30 CAPSULE ORAL EVERY 24 HOURS
Qty: 3 CAPSULE | Refills: 0 | Status: SHIPPED | OUTPATIENT
Start: 2018-01-01 | End: 2018-01-01

## 2018-01-01 RX ORDER — FUROSEMIDE 10 MG/ML
20 INJECTION INTRAMUSCULAR; INTRAVENOUS ONCE
Status: COMPLETED | OUTPATIENT
Start: 2018-01-01 | End: 2018-01-01

## 2018-01-01 RX ORDER — ALBUTEROL SULFATE 2.5 MG/3ML
2.5 SOLUTION RESPIRATORY (INHALATION) EVERY 6 HOURS PRN
Status: DISCONTINUED | OUTPATIENT
Start: 2018-01-01 | End: 2018-01-01

## 2018-01-01 RX ORDER — POTASSIUM CHLORIDE 14.9 MG/ML
20 INJECTION INTRAVENOUS
Status: COMPLETED | OUTPATIENT
Start: 2018-01-01 | End: 2018-01-01

## 2018-01-01 RX ORDER — ASPIRIN 81 MG/1
81 TABLET, CHEWABLE ORAL DAILY
Status: DISCONTINUED | OUTPATIENT
Start: 2018-01-01 | End: 2018-01-01

## 2018-01-01 RX ORDER — ATORVASTATIN CALCIUM 10 MG/1
10 TABLET, FILM COATED ORAL
Status: DISCONTINUED | OUTPATIENT
Start: 2018-01-01 | End: 2018-01-01

## 2018-01-01 RX ORDER — MIRTAZAPINE 15 MG/1
15 TABLET, FILM COATED ORAL
Status: DISCONTINUED | OUTPATIENT
Start: 2018-01-01 | End: 2018-01-01 | Stop reason: HOSPADM

## 2018-01-01 RX ORDER — TRAMADOL HYDROCHLORIDE 50 MG/1
TABLET ORAL
COMMUNITY
Start: 2017-01-01 | End: 2018-01-01 | Stop reason: SDUPTHER

## 2018-01-01 RX ORDER — POTASSIUM CHLORIDE 20 MEQ/1
20 TABLET, EXTENDED RELEASE ORAL DAILY
Status: DISCONTINUED | OUTPATIENT
Start: 2018-01-01 | End: 2018-01-01 | Stop reason: HOSPADM

## 2018-01-01 RX ORDER — POLYVINYL ALCOHOL 14 MG/ML
1 SOLUTION/ DROPS OPHTHALMIC
Status: DISCONTINUED | OUTPATIENT
Start: 2018-01-01 | End: 2018-01-01 | Stop reason: HOSPADM

## 2018-01-01 RX ORDER — DEXTROSE AND SODIUM CHLORIDE 5; .9 G/100ML; G/100ML
75 INJECTION, SOLUTION INTRAVENOUS ONCE
Status: COMPLETED | OUTPATIENT
Start: 2018-01-01 | End: 2018-01-01

## 2018-01-01 RX ORDER — ASPIRIN 81 MG/1
81 TABLET, CHEWABLE ORAL DAILY
Status: DISCONTINUED | OUTPATIENT
Start: 2018-01-01 | End: 2018-01-01 | Stop reason: HOSPADM

## 2018-01-01 RX ORDER — ERGOCALCIFEROL 1.25 MG/1
50000 CAPSULE ORAL
COMMUNITY
Start: 2017-01-01 | End: 2018-01-01 | Stop reason: HOSPADM

## 2018-01-01 RX ORDER — DOCUSATE SODIUM 100 MG/1
100 CAPSULE, LIQUID FILLED ORAL DAILY
Status: DISCONTINUED | OUTPATIENT
Start: 2018-01-01 | End: 2018-01-01 | Stop reason: HOSPADM

## 2018-01-01 RX ORDER — DOCUSATE SODIUM 100 MG/1
100 CAPSULE, LIQUID FILLED ORAL 2 TIMES DAILY PRN
Qty: 10 CAPSULE | Refills: 0
Start: 2018-01-01

## 2018-01-01 RX ORDER — GABAPENTIN 100 MG/1
1 CAPSULE ORAL
COMMUNITY
Start: 2017-01-01 | End: 2018-01-01 | Stop reason: SDUPTHER

## 2018-01-01 RX ORDER — MULTIVITAMIN
1 CAPSULE ORAL DAILY
COMMUNITY
End: 2018-01-01 | Stop reason: SDUPTHER

## 2018-01-01 RX ORDER — ACETAMINOPHEN 325 MG/1
650 TABLET ORAL EVERY 6 HOURS PRN
Status: DISCONTINUED | OUTPATIENT
Start: 2018-01-01 | End: 2018-01-01

## 2018-01-01 RX ORDER — LACTOSE-REDUCED FOOD
1 LIQUID (ML) ORAL 3 TIMES DAILY
Status: DISCONTINUED | OUTPATIENT
Start: 2018-01-01 | End: 2018-01-01

## 2018-01-01 RX ORDER — DEXTROSE MONOHYDRATE 50 MG/ML
50 INJECTION, SOLUTION INTRAVENOUS CONTINUOUS
Status: DISCONTINUED | OUTPATIENT
Start: 2018-01-01 | End: 2018-01-01

## 2018-01-01 RX ORDER — CALCIUM CARBONATE 500(1250)
1 TABLET ORAL 2 TIMES DAILY WITH MEALS
Status: DISCONTINUED | OUTPATIENT
Start: 2018-01-01 | End: 2018-01-01

## 2018-01-01 RX ORDER — SODIUM CHLORIDE 9 MG/ML
100 INJECTION, SOLUTION INTRAVENOUS CONTINUOUS
Status: DISCONTINUED | OUTPATIENT
Start: 2018-01-01 | End: 2018-01-01

## 2018-01-01 RX ORDER — DOCUSATE SODIUM 100 MG/1
100 CAPSULE, LIQUID FILLED ORAL 2 TIMES DAILY PRN
Status: DISCONTINUED | OUTPATIENT
Start: 2018-01-01 | End: 2018-01-01

## 2018-01-01 RX ORDER — TRAMADOL HYDROCHLORIDE 50 MG/1
50 TABLET ORAL 2 TIMES DAILY
Status: DISCONTINUED | OUTPATIENT
Start: 2018-01-01 | End: 2018-01-01 | Stop reason: HOSPADM

## 2018-01-01 RX ORDER — BENZONATATE 100 MG/1
100 CAPSULE ORAL 3 TIMES DAILY PRN
COMMUNITY
End: 2018-01-01 | Stop reason: HOSPADM

## 2018-01-01 RX ORDER — B-COMPLEX WITH VITAMIN C
1 TABLET ORAL 2 TIMES DAILY WITH MEALS
Status: DISCONTINUED | OUTPATIENT
Start: 2018-01-01 | End: 2018-01-01

## 2018-01-01 RX ORDER — FUROSEMIDE 20 MG/1
20 TABLET ORAL EVERY OTHER DAY
Status: DISCONTINUED | OUTPATIENT
Start: 2018-01-01 | End: 2018-01-01 | Stop reason: HOSPADM

## 2018-01-01 RX ORDER — LEVALBUTEROL 1.25 MG/.5ML
1.25 SOLUTION, CONCENTRATE RESPIRATORY (INHALATION)
Status: DISCONTINUED | OUTPATIENT
Start: 2018-01-01 | End: 2018-01-01 | Stop reason: HOSPADM

## 2018-01-01 RX ORDER — CLOPIDOGREL BISULFATE 75 MG/1
1 TABLET ORAL DAILY
Status: ON HOLD | COMMUNITY
Start: 2016-01-25 | End: 2018-01-01 | Stop reason: CLARIF

## 2018-01-01 RX ORDER — B-COMPLEX WITH VITAMIN C
1 TABLET ORAL
Status: DISCONTINUED | OUTPATIENT
Start: 2018-01-01 | End: 2018-01-01 | Stop reason: HOSPADM

## 2018-01-01 RX ORDER — SODIUM CHLORIDE 9 MG/ML
250 INJECTION, SOLUTION INTRAVENOUS CONTINUOUS
Status: DISCONTINUED | OUTPATIENT
Start: 2018-01-01 | End: 2018-01-01

## 2018-01-01 RX ORDER — PRAVASTATIN SODIUM 20 MG
20 TABLET ORAL
Status: DISCONTINUED | OUTPATIENT
Start: 2018-01-01 | End: 2018-01-01 | Stop reason: HOSPADM

## 2018-01-01 RX ORDER — CEFUROXIME AXETIL 500 MG/1
500 TABLET ORAL EVERY 12 HOURS SCHEDULED
Qty: 8 TABLET | Refills: 0 | Status: SHIPPED | OUTPATIENT
Start: 2018-01-01 | End: 2018-01-01

## 2018-01-01 RX ORDER — POTASSIUM CHLORIDE 20 MEQ/1
20 TABLET, EXTENDED RELEASE ORAL
Status: ACTIVE | OUTPATIENT
Start: 2018-01-01 | End: 2018-01-01

## 2018-01-01 RX ORDER — GABAPENTIN 300 MG/1
300 CAPSULE ORAL
Status: DISCONTINUED | OUTPATIENT
Start: 2018-01-01 | End: 2018-01-01 | Stop reason: HOSPADM

## 2018-01-01 RX ORDER — OSELTAMIVIR PHOSPHATE 30 MG/1
30 CAPSULE ORAL EVERY 12 HOURS SCHEDULED
Status: DISCONTINUED | OUTPATIENT
Start: 2018-01-01 | End: 2018-01-01

## 2018-01-01 RX ORDER — CARVEDILOL 6.25 MG/1
1 TABLET ORAL DAILY
Status: ON HOLD | COMMUNITY
Start: 2017-01-01 | End: 2018-01-01 | Stop reason: CLARIF

## 2018-01-01 RX ORDER — SORBITOL SOLUTION 70 %
SOLUTION, ORAL MISCELLANEOUS
COMMUNITY
Start: 2017-01-01 | End: 2018-01-01 | Stop reason: SDUPTHER

## 2018-01-01 RX ORDER — DILTIAZEM HYDROCHLORIDE 120 MG/1
120 CAPSULE, COATED, EXTENDED RELEASE ORAL DAILY
Qty: 90 CAPSULE | Refills: 3 | Status: SHIPPED | OUTPATIENT
Start: 2018-01-01 | End: 2018-01-01 | Stop reason: HOSPADM

## 2018-01-01 RX ORDER — POTASSIUM CHLORIDE 14.9 MG/ML
20 INJECTION INTRAVENOUS ONCE
Status: COMPLETED | OUTPATIENT
Start: 2018-01-01 | End: 2018-01-01

## 2018-01-01 RX ORDER — POTASSIUM CHLORIDE 14.9 MG/ML
20 INJECTION INTRAVENOUS ONCE
Status: DISCONTINUED | OUTPATIENT
Start: 2018-01-01 | End: 2018-01-01

## 2018-01-01 RX ORDER — LACTULOSE 20 G/30ML
10 SOLUTION ORAL DAILY PRN
Status: DISCONTINUED | OUTPATIENT
Start: 2018-01-01 | End: 2018-01-01 | Stop reason: HOSPADM

## 2018-01-01 RX ORDER — BISACODYL 10 MG
SUPPOSITORY, RECTAL RECTAL
Status: ON HOLD | COMMUNITY
Start: 2017-01-01 | End: 2018-01-01 | Stop reason: CLARIF

## 2018-01-01 RX ORDER — DILTIAZEM HYDROCHLORIDE 180 MG/1
180 CAPSULE, COATED, EXTENDED RELEASE ORAL DAILY
Status: DISCONTINUED | OUTPATIENT
Start: 2018-01-01 | End: 2018-01-01

## 2018-01-01 RX ORDER — PANTOPRAZOLE SODIUM 40 MG/1
40 TABLET, DELAYED RELEASE ORAL DAILY
Status: DISCONTINUED | OUTPATIENT
Start: 2018-01-01 | End: 2018-01-01 | Stop reason: HOSPADM

## 2018-01-01 RX ORDER — OSELTAMIVIR PHOSPHATE 30 MG/1
30 CAPSULE ORAL EVERY 24 HOURS
Status: DISCONTINUED | OUTPATIENT
Start: 2018-01-01 | End: 2018-01-01 | Stop reason: HOSPADM

## 2018-01-01 RX ORDER — MAGNESIUM SULFATE HEPTAHYDRATE 40 MG/ML
2 INJECTION, SOLUTION INTRAVENOUS ONCE
Status: COMPLETED | OUTPATIENT
Start: 2018-01-01 | End: 2018-01-01

## 2018-01-01 RX ORDER — SIMVASTATIN 10 MG
TABLET ORAL DAILY
COMMUNITY
Start: 2016-06-14 | End: 2018-01-01 | Stop reason: HOSPADM

## 2018-01-01 RX ORDER — HEPARIN SODIUM 5000 [USP'U]/ML
5000 INJECTION, SOLUTION INTRAVENOUS; SUBCUTANEOUS EVERY 12 HOURS SCHEDULED
Status: DISCONTINUED | OUTPATIENT
Start: 2018-01-01 | End: 2018-01-01 | Stop reason: HOSPADM

## 2018-01-01 RX ORDER — DEXTROSE MONOHYDRATE 25 G/50ML
25 INJECTION, SOLUTION INTRAVENOUS AS NEEDED
Status: DISCONTINUED | OUTPATIENT
Start: 2018-01-01 | End: 2018-01-01 | Stop reason: HOSPADM

## 2018-01-01 RX ORDER — SODIUM CHLORIDE FOR INHALATION 0.9 %
3 VIAL, NEBULIZER (ML) INHALATION EVERY 6 HOURS PRN
Status: DISCONTINUED | OUTPATIENT
Start: 2018-01-01 | End: 2018-01-01 | Stop reason: HOSPADM

## 2018-01-01 RX ORDER — CARVEDILOL 3.12 MG/1
3.12 TABLET ORAL 2 TIMES DAILY WITH MEALS
Status: DISCONTINUED | OUTPATIENT
Start: 2018-01-01 | End: 2018-01-01

## 2018-01-01 RX ORDER — PANTOPRAZOLE SODIUM 40 MG/1
1 TABLET, DELAYED RELEASE ORAL DAILY
COMMUNITY
Start: 2016-01-29 | End: 2018-01-01 | Stop reason: HOSPADM

## 2018-01-01 RX ORDER — DOCUSATE SODIUM 100 MG/1
100 CAPSULE, LIQUID FILLED ORAL 2 TIMES DAILY
Status: DISCONTINUED | OUTPATIENT
Start: 2018-01-01 | End: 2018-01-01 | Stop reason: HOSPADM

## 2018-01-01 RX ORDER — FUROSEMIDE 20 MG/1
20 TABLET ORAL EVERY OTHER DAY
Qty: 15 TABLET | Refills: 5 | Status: SHIPPED | OUTPATIENT
Start: 2018-01-01 | End: 2018-01-01 | Stop reason: HOSPADM

## 2018-01-01 RX ORDER — ONDANSETRON 2 MG/ML
4 INJECTION INTRAMUSCULAR; INTRAVENOUS ONCE
Status: COMPLETED | OUTPATIENT
Start: 2018-01-01 | End: 2018-01-01

## 2018-01-01 RX ORDER — POTASSIUM CHLORIDE 20 MEQ/1
20 TABLET, EXTENDED RELEASE ORAL DAILY
Qty: 30 TABLET | Refills: 5 | Status: SHIPPED | OUTPATIENT
Start: 2018-01-01 | End: 2018-01-01 | Stop reason: HOSPADM

## 2018-01-01 RX ORDER — ACYCLOVIR 200 MG/1
400 CAPSULE ORAL ONCE
Status: DISCONTINUED | OUTPATIENT
Start: 2018-01-01 | End: 2018-01-01

## 2018-01-01 RX ORDER — ONDANSETRON 2 MG/ML
4 INJECTION INTRAMUSCULAR; INTRAVENOUS EVERY 6 HOURS PRN
Status: DISCONTINUED | OUTPATIENT
Start: 2018-01-01 | End: 2018-01-01 | Stop reason: HOSPADM

## 2018-01-01 RX ORDER — FUROSEMIDE 20 MG/1
20 TABLET ORAL EVERY OTHER DAY
Qty: 15 TABLET | Refills: 0 | Status: SHIPPED | OUTPATIENT
Start: 2018-01-01 | End: 2018-01-01 | Stop reason: SDUPTHER

## 2018-01-01 RX ORDER — ATORVASTATIN CALCIUM 20 MG/1
10 TABLET, FILM COATED ORAL
Refills: 0
Start: 2018-01-01

## 2018-01-01 RX ORDER — PHENOL 1.4 %
2 AEROSOL, SPRAY (ML) MUCOUS MEMBRANE DAILY
Status: ON HOLD | COMMUNITY
End: 2018-01-01

## 2018-01-01 RX ORDER — POTASSIUM CHLORIDE 20MEQ/15ML
40 LIQUID (ML) ORAL ONCE
Status: DISCONTINUED | OUTPATIENT
Start: 2018-01-01 | End: 2018-01-01

## 2018-01-01 RX ORDER — PANTOPRAZOLE SODIUM 40 MG/1
40 TABLET, DELAYED RELEASE ORAL DAILY
Status: DISCONTINUED | OUTPATIENT
Start: 2018-01-01 | End: 2018-01-01

## 2018-01-01 RX ORDER — PHENOL 1.4 %
600 AEROSOL, SPRAY (ML) MUCOUS MEMBRANE 2 TIMES DAILY WITH MEALS
Refills: 0
Start: 2018-01-01

## 2018-01-01 RX ORDER — DILTIAZEM HYDROCHLORIDE 120 MG/1
120 CAPSULE, COATED, EXTENDED RELEASE ORAL DAILY
Status: DISCONTINUED | OUTPATIENT
Start: 2018-01-01 | End: 2018-01-01 | Stop reason: HOSPADM

## 2018-01-01 RX ORDER — DILTIAZEM HYDROCHLORIDE 120 MG/1
120 CAPSULE, COATED, EXTENDED RELEASE ORAL DAILY
Qty: 30 CAPSULE | Refills: 0 | Status: SHIPPED | OUTPATIENT
Start: 2018-01-01 | End: 2018-01-01 | Stop reason: SDUPTHER

## 2018-01-01 RX ORDER — LEVALBUTEROL 1.25 MG/.5ML
1.25 SOLUTION, CONCENTRATE RESPIRATORY (INHALATION) EVERY 6 HOURS PRN
Status: DISCONTINUED | OUTPATIENT
Start: 2018-01-01 | End: 2018-01-01 | Stop reason: HOSPADM

## 2018-01-01 RX ORDER — SODIUM CHLORIDE FOR INHALATION 0.9 %
3 VIAL, NEBULIZER (ML) INHALATION
Status: DISCONTINUED | OUTPATIENT
Start: 2018-01-01 | End: 2018-01-01 | Stop reason: HOSPADM

## 2018-01-01 RX ORDER — ACETAMINOPHEN 325 MG/1
325 TABLET ORAL EVERY 6 HOURS PRN
Status: DISCONTINUED | OUTPATIENT
Start: 2018-01-01 | End: 2018-01-01 | Stop reason: HOSPADM

## 2018-01-01 RX ORDER — SODIUM CHLORIDE 450 MG/100ML
100 INJECTION, SOLUTION INTRAVENOUS CONTINUOUS
Status: DISCONTINUED | OUTPATIENT
Start: 2018-01-01 | End: 2018-01-01

## 2018-01-01 RX ORDER — ACETAMINOPHEN 325 MG/1
650 TABLET ORAL EVERY 6 HOURS PRN
Refills: 0
Start: 2018-01-01

## 2018-01-01 RX ORDER — DRONABINOL 2.5 MG/1
5 CAPSULE ORAL
Status: DISCONTINUED | OUTPATIENT
Start: 2018-01-01 | End: 2018-01-01

## 2018-01-01 RX ORDER — CARBOXYMETHYLCELLULOSE SODIUM 5 MG/ML
1 SOLUTION/ DROPS OPHTHALMIC DAILY
COMMUNITY

## 2018-01-01 RX ORDER — GABAPENTIN 100 MG/1
300 CAPSULE ORAL
Status: ON HOLD | COMMUNITY
End: 2018-01-01

## 2018-01-01 RX ORDER — CARVEDILOL 3.12 MG/1
6.25 TABLET ORAL DAILY
Status: DISCONTINUED | OUTPATIENT
Start: 2018-01-01 | End: 2018-01-01 | Stop reason: HOSPADM

## 2018-01-01 RX ORDER — CLOPIDOGREL BISULFATE 75 MG/1
75 TABLET ORAL DAILY
Status: DISCONTINUED | OUTPATIENT
Start: 2018-01-01 | End: 2018-01-01 | Stop reason: HOSPADM

## 2018-01-01 RX ORDER — LACTOSE-REDUCED FOOD
LIQUID (ML) ORAL
COMMUNITY
Start: 2017-01-01

## 2018-01-01 RX ORDER — BENZONATATE 100 MG/1
100 CAPSULE ORAL 3 TIMES DAILY PRN
Status: DISCONTINUED | OUTPATIENT
Start: 2018-01-01 | End: 2018-01-01

## 2018-01-01 RX ORDER — POLYVINYL ALCOHOL 14 MG/ML
1 SOLUTION/ DROPS OPHTHALMIC AS NEEDED
Status: DISCONTINUED | OUTPATIENT
Start: 2018-01-01 | End: 2018-01-01 | Stop reason: HOSPADM

## 2018-01-01 RX ORDER — DILTIAZEM HYDROCHLORIDE 120 MG/1
1 CAPSULE, COATED, EXTENDED RELEASE ORAL DAILY
COMMUNITY
Start: 2012-01-21 | End: 2018-01-01 | Stop reason: SDUPTHER

## 2018-01-01 RX ORDER — ACETAMINOPHEN 325 MG/1
1 TABLET ORAL EVERY 6 HOURS PRN
Status: ON HOLD | COMMUNITY
Start: 2017-01-01 | End: 2018-01-01

## 2018-01-01 RX ORDER — SORBITOL SOLUTION 70 %
30 SOLUTION, ORAL MISCELLANEOUS DAILY PRN
COMMUNITY
End: 2018-01-01 | Stop reason: HOSPADM

## 2018-01-01 RX ORDER — DEXTROSE AND SODIUM CHLORIDE 5; .2 G/100ML; G/100ML
75 INJECTION, SOLUTION INTRAVENOUS CONTINUOUS
Status: DISCONTINUED | OUTPATIENT
Start: 2018-01-01 | End: 2018-01-01

## 2018-01-01 RX ORDER — MENTHOL AND ZINC OXIDE .44; 20.625 G/100G; G/100G
OINTMENT TOPICAL
COMMUNITY
Start: 2017-01-01 | End: 2018-01-01 | Stop reason: SDUPTHER

## 2018-01-01 RX ORDER — POTASSIUM CHLORIDE 20 MEQ/1
20 TABLET, EXTENDED RELEASE ORAL DAILY
Qty: 30 TABLET | Refills: 0 | Status: SHIPPED | OUTPATIENT
Start: 2018-01-01 | End: 2018-01-01 | Stop reason: SDUPTHER

## 2018-01-01 RX ORDER — DEXTROSE, SODIUM CHLORIDE, AND POTASSIUM CHLORIDE 5; .45; .15 G/100ML; G/100ML; G/100ML
75 INJECTION INTRAVENOUS CONTINUOUS
Status: DISCONTINUED | OUTPATIENT
Start: 2018-01-01 | End: 2018-01-01

## 2018-01-01 RX ORDER — ACETAMINOPHEN 160 MG/5ML
650 SUSPENSION, ORAL (FINAL DOSE FORM) ORAL EVERY 6 HOURS
Status: DISCONTINUED | OUTPATIENT
Start: 2018-01-01 | End: 2018-01-01 | Stop reason: HOSPADM

## 2018-01-01 RX ORDER — DILTIAZEM HYDROCHLORIDE 180 MG/1
180 CAPSULE, COATED, EXTENDED RELEASE ORAL DAILY
COMMUNITY
End: 2018-01-01 | Stop reason: HOSPADM

## 2018-01-01 RX ORDER — BISACODYL 10 MG
10 SUPPOSITORY, RECTAL RECTAL DAILY
Status: DISCONTINUED | OUTPATIENT
Start: 2018-01-01 | End: 2018-01-01 | Stop reason: HOSPADM

## 2018-01-01 RX ADMIN — GABAPENTIN 300 MG: 300 CAPSULE ORAL at 22:07

## 2018-01-01 RX ADMIN — LIDOCAINE HYDROCHLORIDE 10 ML: 20 SOLUTION ORAL; TOPICAL at 16:51

## 2018-01-01 RX ADMIN — ANORECTAL OINTMENT: 15.7; .44; 24; 20.6 OINTMENT TOPICAL at 17:04

## 2018-01-01 RX ADMIN — ACETAMINOPHEN 650 MG: 160 SUSPENSION ORAL at 17:06

## 2018-01-01 RX ADMIN — LEVALBUTEROL 1.25 MG: 1.25 SOLUTION, CONCENTRATE RESPIRATORY (INHALATION) at 20:27

## 2018-01-01 RX ADMIN — ACETAMINOPHEN 650 MG: 325 TABLET, FILM COATED ORAL at 15:07

## 2018-01-01 RX ADMIN — HEPARIN SODIUM 5000 UNITS: 5000 INJECTION, SOLUTION INTRAVENOUS; SUBCUTANEOUS at 21:44

## 2018-01-01 RX ADMIN — ACETAMINOPHEN 650 MG: 160 SUSPENSION ORAL at 23:36

## 2018-01-01 RX ADMIN — DRONABINOL 2.5 MG: 2.5 CAPSULE ORAL at 15:17

## 2018-01-01 RX ADMIN — LEVALBUTEROL 1.25 MG: 1.25 SOLUTION, CONCENTRATE RESPIRATORY (INHALATION) at 07:52

## 2018-01-01 RX ADMIN — ANORECTAL OINTMENT 1 APPLICATION: 15.7; .44; 24; 20.6 OINTMENT TOPICAL at 17:07

## 2018-01-01 RX ADMIN — CEFEPIME HYDROCHLORIDE 1000 MG: 1 INJECTION, SOLUTION INTRAVENOUS at 21:42

## 2018-01-01 RX ADMIN — DEXTROSE AND SODIUM CHLORIDE 75 ML/HR: 5; .9 INJECTION, SOLUTION INTRAVENOUS at 16:48

## 2018-01-01 RX ADMIN — ENOXAPARIN SODIUM 30 MG: 30 INJECTION SUBCUTANEOUS at 16:34

## 2018-01-01 RX ADMIN — Medication 1 BOTTLE: at 09:14

## 2018-01-01 RX ADMIN — ANORECTAL OINTMENT: 15.7; .44; 24; 20.6 OINTMENT TOPICAL at 19:00

## 2018-01-01 RX ADMIN — HEPARIN SODIUM 5000 UNITS: 5000 INJECTION, SOLUTION INTRAVENOUS; SUBCUTANEOUS at 08:57

## 2018-01-01 RX ADMIN — ANORECTAL OINTMENT: 15.7; .44; 24; 20.6 OINTMENT TOPICAL at 17:29

## 2018-01-01 RX ADMIN — MIRTAZAPINE 15 MG: 15 TABLET, FILM COATED ORAL at 21:06

## 2018-01-01 RX ADMIN — HEPARIN SODIUM 5000 UNITS: 5000 INJECTION, SOLUTION INTRAVENOUS; SUBCUTANEOUS at 22:05

## 2018-01-01 RX ADMIN — ASPIRIN 81 MG CHEWABLE TABLET 81 MG: 81 TABLET CHEWABLE at 16:34

## 2018-01-01 RX ADMIN — ASPIRIN 81 MG CHEWABLE TABLET 81 MG: 81 TABLET CHEWABLE at 08:11

## 2018-01-01 RX ADMIN — HEPARIN SODIUM 5000 UNITS: 5000 INJECTION, SOLUTION INTRAVENOUS; SUBCUTANEOUS at 21:46

## 2018-01-01 RX ADMIN — SODIUM CHLORIDE 100 ML/HR: 0.9 INJECTION, SOLUTION INTRAVENOUS at 09:16

## 2018-01-01 RX ADMIN — HEPARIN SODIUM 5000 UNITS: 5000 INJECTION, SOLUTION INTRAVENOUS; SUBCUTANEOUS at 08:56

## 2018-01-01 RX ADMIN — ANORECTAL OINTMENT: 15.7; .44; 24; 20.6 OINTMENT TOPICAL at 08:00

## 2018-01-01 RX ADMIN — DILTIAZEM HYDROCHLORIDE 120 MG: 120 CAPSULE, COATED, EXTENDED RELEASE ORAL at 19:57

## 2018-01-01 RX ADMIN — ENOXAPARIN SODIUM 30 MG: 30 INJECTION SUBCUTANEOUS at 09:34

## 2018-01-01 RX ADMIN — LIDOCAINE HYDROCHLORIDE 10 ML: 20 SOLUTION ORAL; TOPICAL at 12:23

## 2018-01-01 RX ADMIN — ACETAMINOPHEN 650 MG: 160 SUSPENSION ORAL at 11:14

## 2018-01-01 RX ADMIN — HEPARIN SODIUM 5000 UNITS: 5000 INJECTION, SOLUTION INTRAVENOUS; SUBCUTANEOUS at 10:25

## 2018-01-01 RX ADMIN — DEXTROSE AND SODIUM CHLORIDE 75 ML/HR: 5; .2 INJECTION, SOLUTION INTRAVENOUS at 14:30

## 2018-01-01 RX ADMIN — DOCUSATE SODIUM 100 MG: 100 CAPSULE, LIQUID FILLED ORAL at 19:58

## 2018-01-01 RX ADMIN — LIDOCAINE HYDROCHLORIDE 10 ML: 20 SOLUTION ORAL; TOPICAL at 18:20

## 2018-01-01 RX ADMIN — LEVALBUTEROL 1.25 MG: 1.25 SOLUTION, CONCENTRATE RESPIRATORY (INHALATION) at 20:04

## 2018-01-01 RX ADMIN — ENOXAPARIN SODIUM 30 MG: 30 INJECTION SUBCUTANEOUS at 09:02

## 2018-01-01 RX ADMIN — CALCIUM CARBONATE-VITAMIN D TAB 500 MG-200 UNIT 1 TABLET: 500-200 TAB at 08:45

## 2018-01-01 RX ADMIN — PANTOPRAZOLE SODIUM 40 MG: 40 TABLET, DELAYED RELEASE ORAL at 12:22

## 2018-01-01 RX ADMIN — HEPARIN SODIUM 5000 UNITS: 5000 INJECTION, SOLUTION INTRAVENOUS; SUBCUTANEOUS at 08:54

## 2018-01-01 RX ADMIN — ANORECTAL OINTMENT: 15.7; .44; 24; 20.6 OINTMENT TOPICAL at 18:21

## 2018-01-01 RX ADMIN — HEPARIN SODIUM 5000 UNITS: 5000 INJECTION, SOLUTION INTRAVENOUS; SUBCUTANEOUS at 20:58

## 2018-01-01 RX ADMIN — DILTIAZEM HYDROCHLORIDE 180 MG: 180 CAPSULE, EXTENDED RELEASE ORAL at 08:39

## 2018-01-01 RX ADMIN — ACETAMINOPHEN 650 MG: 160 SUSPENSION ORAL at 23:40

## 2018-01-01 RX ADMIN — TRAMADOL HYDROCHLORIDE 50 MG: 50 TABLET, FILM COATED ORAL at 18:23

## 2018-01-01 RX ADMIN — ANORECTAL OINTMENT: 15.7; .44; 24; 20.6 OINTMENT TOPICAL at 09:55

## 2018-01-01 RX ADMIN — PRAVASTATIN SODIUM 20 MG: 20 TABLET ORAL at 19:58

## 2018-01-01 RX ADMIN — ANORECTAL OINTMENT: 15.7; .44; 24; 20.6 OINTMENT TOPICAL at 08:53

## 2018-01-01 RX ADMIN — PRAVASTATIN SODIUM 20 MG: 20 TABLET ORAL at 16:40

## 2018-01-01 RX ADMIN — ANORECTAL OINTMENT: 15.7; .44; 24; 20.6 OINTMENT TOPICAL at 18:40

## 2018-01-01 RX ADMIN — POTASSIUM CHLORIDE 20 MEQ: 1500 TABLET, EXTENDED RELEASE ORAL at 16:34

## 2018-01-01 RX ADMIN — PANTOPRAZOLE SODIUM 40 MG: 40 TABLET, DELAYED RELEASE ORAL at 05:07

## 2018-01-01 RX ADMIN — CARVEDILOL 6.25 MG: 3.12 TABLET, FILM COATED ORAL at 09:11

## 2018-01-01 RX ADMIN — CALCIUM CARBONATE-VITAMIN D TAB 500 MG-200 UNIT 1 TABLET: 500-200 TAB at 08:51

## 2018-01-01 RX ADMIN — TRAMADOL HYDROCHLORIDE 50 MG: 50 TABLET, FILM COATED ORAL at 08:38

## 2018-01-01 RX ADMIN — HEPARIN SODIUM 5000 UNITS: 5000 INJECTION, SOLUTION INTRAVENOUS; SUBCUTANEOUS at 21:02

## 2018-01-01 RX ADMIN — PANTOPRAZOLE SODIUM 40 MG: 40 TABLET, DELAYED RELEASE ORAL at 05:27

## 2018-01-01 RX ADMIN — ISODIUM CHLORIDE 3 ML: 0.03 SOLUTION RESPIRATORY (INHALATION) at 07:35

## 2018-01-01 RX ADMIN — ACETAMINOPHEN 650 MG: 160 SUSPENSION ORAL at 23:48

## 2018-01-01 RX ADMIN — ANORECTAL OINTMENT 1 APPLICATION: 15.7; .44; 24; 20.6 OINTMENT TOPICAL at 09:01

## 2018-01-01 RX ADMIN — DEXTROSE 50 ML/HR: 5 SOLUTION INTRAVENOUS at 09:17

## 2018-01-01 RX ADMIN — ANORECTAL OINTMENT: 15.7; .44; 24; 20.6 OINTMENT TOPICAL at 19:16

## 2018-01-01 RX ADMIN — HEPARIN SODIUM 5000 UNITS: 5000 INJECTION, SOLUTION INTRAVENOUS; SUBCUTANEOUS at 08:00

## 2018-01-01 RX ADMIN — HEPARIN SODIUM 5000 UNITS: 5000 INJECTION, SOLUTION INTRAVENOUS; SUBCUTANEOUS at 09:16

## 2018-01-01 RX ADMIN — ANORECTAL OINTMENT: 15.7; .44; 24; 20.6 OINTMENT TOPICAL at 17:28

## 2018-01-01 RX ADMIN — CEFEPIME HYDROCHLORIDE 1000 MG: 1 INJECTION, SOLUTION INTRAVENOUS at 22:18

## 2018-01-01 RX ADMIN — ACETAMINOPHEN 650 MG: 160 SUSPENSION ORAL at 01:08

## 2018-01-01 RX ADMIN — HEPARIN SODIUM 5000 UNITS: 5000 INJECTION, SOLUTION INTRAVENOUS; SUBCUTANEOUS at 08:31

## 2018-01-01 RX ADMIN — PRAVASTATIN SODIUM 20 MG: 20 TABLET ORAL at 16:34

## 2018-01-01 RX ADMIN — ACETAMINOPHEN 650 MG: 160 SUSPENSION ORAL at 23:09

## 2018-01-01 RX ADMIN — BISACODYL 10 MG: 10 SUPPOSITORY RECTAL at 09:00

## 2018-01-01 RX ADMIN — ACETAMINOPHEN 650 MG: 160 SUSPENSION ORAL at 18:19

## 2018-01-01 RX ADMIN — ANORECTAL OINTMENT: 15.7; .44; 24; 20.6 OINTMENT TOPICAL at 08:59

## 2018-01-01 RX ADMIN — Medication 1 BOTTLE: at 20:15

## 2018-01-01 RX ADMIN — TRAMADOL HYDROCHLORIDE 50 MG: 50 TABLET, FILM COATED ORAL at 08:12

## 2018-01-01 RX ADMIN — ASPIRIN 81 MG CHEWABLE TABLET 81 MG: 81 TABLET CHEWABLE at 09:11

## 2018-01-01 RX ADMIN — POTASSIUM CHLORIDE 20 MEQ: 20 TABLET, EXTENDED RELEASE ORAL at 09:33

## 2018-01-01 RX ADMIN — LEVALBUTEROL 1.25 MG: 1.25 SOLUTION, CONCENTRATE RESPIRATORY (INHALATION) at 20:19

## 2018-01-01 RX ADMIN — POTASSIUM CHLORIDE 20 MEQ: 200 INJECTION, SOLUTION INTRAVENOUS at 11:43

## 2018-01-01 RX ADMIN — ASPIRIN 81 MG CHEWABLE TABLET 81 MG: 81 TABLET CHEWABLE at 08:38

## 2018-01-01 RX ADMIN — FUROSEMIDE 20 MG: 10 INJECTION, SOLUTION INTRAMUSCULAR; INTRAVENOUS at 13:44

## 2018-01-01 RX ADMIN — METRONIDAZOLE 500 MG: 500 INJECTION, SOLUTION INTRAVENOUS at 20:14

## 2018-01-01 RX ADMIN — CARVEDILOL 3.12 MG: 3.12 TABLET, FILM COATED ORAL at 16:34

## 2018-01-01 RX ADMIN — CALCIUM CARBONATE-VITAMIN D TAB 500 MG-200 UNIT 1 TABLET: 500-200 TAB at 09:25

## 2018-01-01 RX ADMIN — DOCUSATE SODIUM 100 MG: 100 CAPSULE, LIQUID FILLED ORAL at 09:13

## 2018-01-01 RX ADMIN — POTASSIUM CHLORIDE 20 MEQ: 200 INJECTION, SOLUTION INTRAVENOUS at 14:30

## 2018-01-01 RX ADMIN — CARVEDILOL 6.25 MG: 3.12 TABLET, FILM COATED ORAL at 08:59

## 2018-01-01 RX ADMIN — ENOXAPARIN SODIUM 30 MG: 30 INJECTION SUBCUTANEOUS at 08:12

## 2018-01-01 RX ADMIN — LIDOCAINE HYDROCHLORIDE 10 ML: 20 SOLUTION ORAL; TOPICAL at 17:43

## 2018-01-01 RX ADMIN — TRAMADOL HYDROCHLORIDE 50 MG: 50 TABLET, FILM COATED ORAL at 09:25

## 2018-01-01 RX ADMIN — FUROSEMIDE 20 MG: 10 INJECTION, SOLUTION INTRAMUSCULAR; INTRAVENOUS at 13:07

## 2018-01-01 RX ADMIN — ENOXAPARIN SODIUM 30 MG: 30 INJECTION SUBCUTANEOUS at 09:25

## 2018-01-01 RX ADMIN — PANTOPRAZOLE SODIUM 40 MG: 40 TABLET, DELAYED RELEASE ORAL at 19:57

## 2018-01-01 RX ADMIN — HEPARIN SODIUM 5000 UNITS: 5000 INJECTION, SOLUTION INTRAVENOUS; SUBCUTANEOUS at 20:19

## 2018-01-01 RX ADMIN — POTASSIUM CHLORIDE 20 MEQ: 200 INJECTION, SOLUTION INTRAVENOUS at 14:17

## 2018-01-01 RX ADMIN — PRAVASTATIN SODIUM 20 MG: 20 TABLET ORAL at 16:50

## 2018-01-01 RX ADMIN — DOCUSATE SODIUM 100 MG: 100 CAPSULE, LIQUID FILLED ORAL at 08:12

## 2018-01-01 RX ADMIN — HEPARIN SODIUM 5000 UNITS: 5000 INJECTION, SOLUTION INTRAVENOUS; SUBCUTANEOUS at 09:54

## 2018-01-01 RX ADMIN — CLOPIDOGREL BISULFATE 75 MG: 75 TABLET ORAL at 09:33

## 2018-01-01 RX ADMIN — HEPARIN SODIUM 5000 UNITS: 5000 INJECTION, SOLUTION INTRAVENOUS; SUBCUTANEOUS at 08:07

## 2018-01-01 RX ADMIN — ANORECTAL OINTMENT: 15.7; .44; 24; 20.6 OINTMENT TOPICAL at 09:04

## 2018-01-01 RX ADMIN — LIDOCAINE HYDROCHLORIDE 10 ML: 20 SOLUTION ORAL; TOPICAL at 16:57

## 2018-01-01 RX ADMIN — GABAPENTIN 300 MG: 300 CAPSULE ORAL at 23:36

## 2018-01-01 RX ADMIN — PRAVASTATIN SODIUM 20 MG: 20 TABLET ORAL at 16:54

## 2018-01-01 RX ADMIN — ACETAMINOPHEN 650 MG: 325 TABLET, FILM COATED ORAL at 05:52

## 2018-01-01 RX ADMIN — TRAMADOL HYDROCHLORIDE 50 MG: 50 TABLET, FILM COATED ORAL at 17:20

## 2018-01-01 RX ADMIN — HEPARIN SODIUM 5000 UNITS: 5000 INJECTION, SOLUTION INTRAVENOUS; SUBCUTANEOUS at 21:24

## 2018-01-01 RX ADMIN — SODIUM CHLORIDE 100 ML/HR: 0.9 INJECTION, SOLUTION INTRAVENOUS at 19:39

## 2018-01-01 RX ADMIN — ACETAMINOPHEN 650 MG: 160 SUSPENSION ORAL at 23:59

## 2018-01-01 RX ADMIN — HEPARIN SODIUM 5000 UNITS: 5000 INJECTION, SOLUTION INTRAVENOUS; SUBCUTANEOUS at 20:20

## 2018-01-01 RX ADMIN — ENOXAPARIN SODIUM 30 MG: 30 INJECTION SUBCUTANEOUS at 09:13

## 2018-01-01 RX ADMIN — ACETAMINOPHEN 650 MG: 160 SUSPENSION ORAL at 17:10

## 2018-01-01 RX ADMIN — CLOPIDOGREL BISULFATE 75 MG: 75 TABLET ORAL at 09:13

## 2018-01-01 RX ADMIN — ACETAMINOPHEN 650 MG: 325 TABLET, FILM COATED ORAL at 11:56

## 2018-01-01 RX ADMIN — HEPARIN SODIUM 5000 UNITS: 5000 INJECTION, SOLUTION INTRAVENOUS; SUBCUTANEOUS at 21:52

## 2018-01-01 RX ADMIN — ASPIRIN 81 MG CHEWABLE TABLET 81 MG: 81 TABLET CHEWABLE at 08:59

## 2018-01-01 RX ADMIN — ACETAMINOPHEN 650 MG: 160 SUSPENSION ORAL at 23:39

## 2018-01-01 RX ADMIN — MIRTAZAPINE 15 MG: 15 TABLET, FILM COATED ORAL at 21:00

## 2018-01-01 RX ADMIN — ACETAMINOPHEN 650 MG: 160 SUSPENSION ORAL at 17:05

## 2018-01-01 RX ADMIN — PANTOPRAZOLE SODIUM 40 MG: 40 TABLET, DELAYED RELEASE ORAL at 09:39

## 2018-01-01 RX ADMIN — HEPARIN SODIUM 5000 UNITS: 5000 INJECTION, SOLUTION INTRAVENOUS; SUBCUTANEOUS at 21:38

## 2018-01-01 RX ADMIN — LIDOCAINE HYDROCHLORIDE 10 ML: 20 SOLUTION ORAL; TOPICAL at 12:48

## 2018-01-01 RX ADMIN — ANORECTAL OINTMENT: 15.7; .44; 24; 20.6 OINTMENT TOPICAL at 17:35

## 2018-01-01 RX ADMIN — HEPARIN SODIUM 5000 UNITS: 5000 INJECTION, SOLUTION INTRAVENOUS; SUBCUTANEOUS at 21:06

## 2018-01-01 RX ADMIN — METRONIDAZOLE 500 MG: 500 INJECTION, SOLUTION INTRAVENOUS at 04:08

## 2018-01-01 RX ADMIN — POTASSIUM CHLORIDE 20 MEQ: 1500 TABLET, EXTENDED RELEASE ORAL at 08:12

## 2018-01-01 RX ADMIN — GABAPENTIN 300 MG: 300 CAPSULE ORAL at 21:07

## 2018-01-01 RX ADMIN — DOCUSATE SODIUM 100 MG: 100 CAPSULE, LIQUID FILLED ORAL at 17:21

## 2018-01-01 RX ADMIN — HEPARIN SODIUM 5000 UNITS: 5000 INJECTION, SOLUTION INTRAVENOUS; SUBCUTANEOUS at 08:34

## 2018-01-01 RX ADMIN — LIDOCAINE HYDROCHLORIDE 10 ML: 20 SOLUTION ORAL; TOPICAL at 10:00

## 2018-01-01 RX ADMIN — ANORECTAL OINTMENT: 15.7; .44; 24; 20.6 OINTMENT TOPICAL at 17:50

## 2018-01-01 RX ADMIN — TRAMADOL HYDROCHLORIDE 50 MG: 50 TABLET, FILM COATED ORAL at 09:01

## 2018-01-01 RX ADMIN — HEPARIN SODIUM 5000 UNITS: 5000 INJECTION, SOLUTION INTRAVENOUS; SUBCUTANEOUS at 08:49

## 2018-01-01 RX ADMIN — ANORECTAL OINTMENT 1 APPLICATION: 15.7; .44; 24; 20.6 OINTMENT TOPICAL at 08:34

## 2018-01-01 RX ADMIN — DEXTROSE MONOHYDRATE 25 ML: 500 INJECTION PARENTERAL at 06:07

## 2018-01-01 RX ADMIN — ONDANSETRON 4 MG: 2 INJECTION INTRAMUSCULAR; INTRAVENOUS at 14:03

## 2018-01-01 RX ADMIN — ACETAMINOPHEN 650 MG: 160 SUSPENSION ORAL at 12:10

## 2018-01-01 RX ADMIN — CLOPIDOGREL BISULFATE 75 MG: 75 TABLET ORAL at 09:00

## 2018-01-01 RX ADMIN — METRONIDAZOLE 500 MG: 500 INJECTION, SOLUTION INTRAVENOUS at 11:50

## 2018-01-01 RX ADMIN — ANORECTAL OINTMENT 1 APPLICATION: 15.7; .44; 24; 20.6 OINTMENT TOPICAL at 19:05

## 2018-01-01 RX ADMIN — CALCIUM CARBONATE-VITAMIN D TAB 500 MG-200 UNIT 1 TABLET: 500-200 TAB at 17:18

## 2018-01-01 RX ADMIN — LIDOCAINE HYDROCHLORIDE 10 ML: 20 SOLUTION ORAL; TOPICAL at 12:51

## 2018-01-01 RX ADMIN — DEXTROSE 50 ML/HR: 5 SOLUTION INTRAVENOUS at 04:52

## 2018-01-01 RX ADMIN — MAGNESIUM SULFATE HEPTAHYDRATE 2 G: 40 INJECTION, SOLUTION INTRAVENOUS at 09:58

## 2018-01-01 RX ADMIN — HEPARIN SODIUM 5000 UNITS: 5000 INJECTION, SOLUTION INTRAVENOUS; SUBCUTANEOUS at 08:06

## 2018-01-01 RX ADMIN — METRONIDAZOLE 500 MG: 500 INJECTION, SOLUTION INTRAVENOUS at 12:15

## 2018-01-01 RX ADMIN — MIRTAZAPINE 15 MG: 15 TABLET, FILM COATED ORAL at 22:11

## 2018-01-01 RX ADMIN — ISODIUM CHLORIDE 3 ML: 0.03 SOLUTION RESPIRATORY (INHALATION) at 20:29

## 2018-01-01 RX ADMIN — ANORECTAL OINTMENT: 15.7; .44; 24; 20.6 OINTMENT TOPICAL at 18:22

## 2018-01-01 RX ADMIN — CARVEDILOL 6.25 MG: 3.12 TABLET, FILM COATED ORAL at 19:57

## 2018-01-01 RX ADMIN — POTASSIUM CHLORIDE 20 MEQ: 200 INJECTION, SOLUTION INTRAVENOUS at 08:56

## 2018-01-01 RX ADMIN — DILTIAZEM HYDROCHLORIDE 120 MG: 120 CAPSULE, COATED, EXTENDED RELEASE ORAL at 09:34

## 2018-01-01 RX ADMIN — PANTOPRAZOLE SODIUM 40 MG: 40 TABLET, DELAYED RELEASE ORAL at 09:14

## 2018-01-01 RX ADMIN — FUROSEMIDE 20 MG: 20 TABLET ORAL at 08:12

## 2018-01-01 RX ADMIN — DOCUSATE SODIUM 100 MG: 100 CAPSULE, LIQUID FILLED ORAL at 18:23

## 2018-01-01 RX ADMIN — ACETAMINOPHEN 650 MG: 160 SUSPENSION ORAL at 06:01

## 2018-01-01 RX ADMIN — POTASSIUM CHLORIDE 20 MEQ: 1500 TABLET, EXTENDED RELEASE ORAL at 09:04

## 2018-01-01 RX ADMIN — DOCUSATE SODIUM 100 MG: 100 CAPSULE, LIQUID FILLED ORAL at 08:39

## 2018-01-01 RX ADMIN — LIDOCAINE HYDROCHLORIDE 10 ML: 20 SOLUTION ORAL; TOPICAL at 08:06

## 2018-01-01 RX ADMIN — ACETAMINOPHEN 650 MG: 160 SUSPENSION ORAL at 12:48

## 2018-01-01 RX ADMIN — FUROSEMIDE 20 MG: 10 INJECTION, SOLUTION INTRAMUSCULAR; INTRAVENOUS at 16:35

## 2018-01-01 RX ADMIN — LEVALBUTEROL 1.25 MG: 1.25 SOLUTION, CONCENTRATE RESPIRATORY (INHALATION) at 21:22

## 2018-01-01 RX ADMIN — CALCIUM CARBONATE-VITAMIN D TAB 500 MG-200 UNIT 1 TABLET: 500-200 TAB at 15:17

## 2018-01-01 RX ADMIN — ISODIUM CHLORIDE 3 ML: 0.03 SOLUTION RESPIRATORY (INHALATION) at 10:25

## 2018-01-01 RX ADMIN — METRONIDAZOLE 500 MG: 500 INJECTION, SOLUTION INTRAVENOUS at 20:06

## 2018-01-01 RX ADMIN — HEPARIN SODIUM 5000 UNITS: 5000 INJECTION, SOLUTION INTRAVENOUS; SUBCUTANEOUS at 20:42

## 2018-01-01 RX ADMIN — ASPIRIN 81 MG CHEWABLE TABLET 81 MG: 81 TABLET CHEWABLE at 09:34

## 2018-01-01 RX ADMIN — PRAVASTATIN SODIUM 20 MG: 20 TABLET ORAL at 17:06

## 2018-01-01 RX ADMIN — ISODIUM CHLORIDE 3 ML: 0.03 SOLUTION RESPIRATORY (INHALATION) at 07:51

## 2018-01-01 RX ADMIN — MIRTAZAPINE 15 MG: 15 TABLET, FILM COATED ORAL at 21:57

## 2018-01-01 RX ADMIN — FUROSEMIDE 20 MG: 20 TABLET ORAL at 08:38

## 2018-01-01 RX ADMIN — HEPARIN SODIUM 5000 UNITS: 5000 INJECTION, SOLUTION INTRAVENOUS; SUBCUTANEOUS at 08:46

## 2018-01-01 RX ADMIN — DOCUSATE SODIUM 100 MG: 100 CAPSULE, LIQUID FILLED ORAL at 17:33

## 2018-01-01 RX ADMIN — ANORECTAL OINTMENT: 15.7; .44; 24; 20.6 OINTMENT TOPICAL at 08:32

## 2018-01-01 RX ADMIN — TRAMADOL HYDROCHLORIDE 50 MG: 50 TABLET, FILM COATED ORAL at 17:00

## 2018-01-01 RX ADMIN — ASPIRIN 81 MG CHEWABLE TABLET 81 MG: 81 TABLET CHEWABLE at 09:25

## 2018-01-01 RX ADMIN — DEXTROSE AND SODIUM CHLORIDE 75 ML/HR: 5; .2 INJECTION, SOLUTION INTRAVENOUS at 04:22

## 2018-01-01 RX ADMIN — LIDOCAINE HYDROCHLORIDE 10 ML: 20 SOLUTION ORAL; TOPICAL at 08:04

## 2018-01-01 RX ADMIN — LIDOCAINE HYDROCHLORIDE 10 ML: 20 SOLUTION ORAL; TOPICAL at 17:05

## 2018-01-01 RX ADMIN — ANORECTAL OINTMENT: 15.7; .44; 24; 20.6 OINTMENT TOPICAL at 19:37

## 2018-01-01 RX ADMIN — CEFEPIME HYDROCHLORIDE 1000 MG: 1 INJECTION, SOLUTION INTRAVENOUS at 21:30

## 2018-01-01 RX ADMIN — MIRTAZAPINE 15 MG: 15 TABLET, FILM COATED ORAL at 21:52

## 2018-01-01 RX ADMIN — CARVEDILOL 3.12 MG: 3.12 TABLET, FILM COATED ORAL at 16:38

## 2018-01-01 RX ADMIN — HEPARIN SODIUM 5000 UNITS: 5000 INJECTION, SOLUTION INTRAVENOUS; SUBCUTANEOUS at 10:44

## 2018-01-01 RX ADMIN — ANORECTAL OINTMENT: 15.7; .44; 24; 20.6 OINTMENT TOPICAL at 08:02

## 2018-01-01 RX ADMIN — ANORECTAL OINTMENT: 15.7; .44; 24; 20.6 OINTMENT TOPICAL at 10:25

## 2018-01-01 RX ADMIN — SODIUM CHLORIDE 100 ML/HR: 0.45 INJECTION, SOLUTION INTRAVENOUS at 08:59

## 2018-01-01 RX ADMIN — ACETAMINOPHEN 650 MG: 160 SUSPENSION ORAL at 05:17

## 2018-01-01 RX ADMIN — LIDOCAINE HYDROCHLORIDE 10 ML: 20 SOLUTION ORAL; TOPICAL at 11:56

## 2018-01-01 RX ADMIN — DILTIAZEM HYDROCHLORIDE 120 MG: 120 CAPSULE, COATED, EXTENDED RELEASE ORAL at 08:59

## 2018-01-01 RX ADMIN — DRONABINOL 5 MG: 2.5 CAPSULE ORAL at 12:22

## 2018-01-01 RX ADMIN — ACETAMINOPHEN 650 MG: 160 SUSPENSION ORAL at 17:07

## 2018-01-01 RX ADMIN — ANORECTAL OINTMENT: 15.7; .44; 24; 20.6 OINTMENT TOPICAL at 08:06

## 2018-01-01 RX ADMIN — DOCUSATE SODIUM 100 MG: 100 CAPSULE, LIQUID FILLED ORAL at 17:00

## 2018-01-01 RX ADMIN — SODIUM CHLORIDE 100 ML/HR: 0.45 INJECTION, SOLUTION INTRAVENOUS at 22:16

## 2018-01-01 RX ADMIN — DEXTROSE MONOHYDRATE 25 ML: 500 INJECTION PARENTERAL at 23:50

## 2018-01-01 RX ADMIN — ANORECTAL OINTMENT: 15.7; .44; 24; 20.6 OINTMENT TOPICAL at 17:11

## 2018-01-01 RX ADMIN — ANORECTAL OINTMENT: 15.7; .44; 24; 20.6 OINTMENT TOPICAL at 11:59

## 2018-01-01 RX ADMIN — ACETAMINOPHEN 650 MG: 160 SUSPENSION ORAL at 12:06

## 2018-01-01 RX ADMIN — METRONIDAZOLE 500 MG: 500 INJECTION, SOLUTION INTRAVENOUS at 12:14

## 2018-01-01 RX ADMIN — BISACODYL 10 MG: 10 SUPPOSITORY RECTAL at 19:57

## 2018-01-01 RX ADMIN — DEXTROSE, SODIUM CHLORIDE, AND POTASSIUM CHLORIDE 75 ML/HR: 5; .45; .15 INJECTION INTRAVENOUS at 11:36

## 2018-01-01 RX ADMIN — ASPIRIN 81 MG CHEWABLE TABLET 81 MG: 81 TABLET CHEWABLE at 09:01

## 2018-01-01 RX ADMIN — ISODIUM CHLORIDE 3 ML: 0.03 SOLUTION RESPIRATORY (INHALATION) at 20:04

## 2018-01-01 RX ADMIN — LEVALBUTEROL 1.25 MG: 1.25 SOLUTION, CONCENTRATE RESPIRATORY (INHALATION) at 10:25

## 2018-01-01 RX ADMIN — OSELTAMIVIR PHOSPHATE 30 MG: 30 CAPSULE ORAL at 16:54

## 2018-01-01 RX ADMIN — ISODIUM CHLORIDE 3 ML: 0.03 SOLUTION RESPIRATORY (INHALATION) at 20:19

## 2018-01-01 RX ADMIN — DOCUSATE SODIUM 100 MG: 100 CAPSULE, LIQUID FILLED ORAL at 09:25

## 2018-01-01 RX ADMIN — DEXTROSE AND SODIUM CHLORIDE 75 ML/HR: 5; .9 INJECTION, SOLUTION INTRAVENOUS at 23:26

## 2018-01-01 RX ADMIN — PANTOPRAZOLE SODIUM 40 MG: 40 TABLET, DELAYED RELEASE ORAL at 09:00

## 2018-01-01 RX ADMIN — LEVALBUTEROL 1.25 MG: 1.25 SOLUTION, CONCENTRATE RESPIRATORY (INHALATION) at 07:35

## 2018-01-01 RX ADMIN — LEVALBUTEROL 1.25 MG: 1.25 SOLUTION, CONCENTRATE RESPIRATORY (INHALATION) at 07:32

## 2018-01-01 RX ADMIN — SODIUM CHLORIDE 250 ML/HR: 0.9 INJECTION, SOLUTION INTRAVENOUS at 14:03

## 2018-01-01 RX ADMIN — GABAPENTIN 300 MG: 300 CAPSULE ORAL at 22:48

## 2018-01-01 RX ADMIN — ACETAMINOPHEN 650 MG: 160 SUSPENSION ORAL at 05:58

## 2018-01-01 RX ADMIN — ANORECTAL OINTMENT: 15.7; .44; 24; 20.6 OINTMENT TOPICAL at 08:44

## 2018-01-01 RX ADMIN — CARVEDILOL 3.12 MG: 3.12 TABLET, FILM COATED ORAL at 08:38

## 2018-01-01 RX ADMIN — DOCUSATE SODIUM 100 MG: 100 CAPSULE, LIQUID FILLED ORAL at 09:34

## 2018-01-01 RX ADMIN — ANORECTAL OINTMENT: 15.7; .44; 24; 20.6 OINTMENT TOPICAL at 10:49

## 2018-01-01 RX ADMIN — ASPIRIN 81 MG CHEWABLE TABLET 81 MG: 81 TABLET CHEWABLE at 19:57

## 2018-01-01 RX ADMIN — MAGNESIUM SULFATE HEPTAHYDRATE 2 G: 40 INJECTION, SOLUTION INTRAVENOUS at 15:22

## 2018-01-01 RX ADMIN — HEPARIN SODIUM 5000 UNITS: 5000 INJECTION, SOLUTION INTRAVENOUS; SUBCUTANEOUS at 23:01

## 2018-01-01 RX ADMIN — ENOXAPARIN SODIUM 30 MG: 30 INJECTION SUBCUTANEOUS at 19:57

## 2018-01-01 RX ADMIN — CALCIUM CARBONATE-VITAMIN D TAB 500 MG-200 UNIT 1 TABLET: 500-200 TAB at 09:03

## 2018-01-01 RX ADMIN — ACETAMINOPHEN 650 MG: 160 SUSPENSION ORAL at 05:34

## 2018-01-01 RX ADMIN — MIRTAZAPINE 15 MG: 15 TABLET, FILM COATED ORAL at 21:44

## 2018-01-01 RX ADMIN — ENOXAPARIN SODIUM 30 MG: 30 INJECTION SUBCUTANEOUS at 09:00

## 2018-01-01 RX ADMIN — ONDANSETRON 4 MG: 2 INJECTION INTRAMUSCULAR; INTRAVENOUS at 12:47

## 2018-01-01 RX ADMIN — ACETAMINOPHEN 650 MG: 160 SUSPENSION ORAL at 12:23

## 2018-01-01 RX ADMIN — BISACODYL 10 MG: 10 SUPPOSITORY RECTAL at 09:34

## 2018-01-01 RX ADMIN — ONDANSETRON 4 MG: 2 INJECTION INTRAMUSCULAR; INTRAVENOUS at 11:53

## 2018-01-01 RX ADMIN — ACETAMINOPHEN 650 MG: 160 SUSPENSION ORAL at 17:28

## 2018-01-01 RX ADMIN — METRONIDAZOLE 500 MG: 500 INJECTION, SOLUTION INTRAVENOUS at 04:16

## 2018-01-01 RX ADMIN — ISODIUM CHLORIDE 3 ML: 0.03 SOLUTION RESPIRATORY (INHALATION) at 21:21

## 2018-01-01 RX ADMIN — MIRTAZAPINE 15 MG: 15 TABLET, FILM COATED ORAL at 21:38

## 2018-01-01 RX ADMIN — POTASSIUM CHLORIDE 20 MEQ: 20 TABLET, EXTENDED RELEASE ORAL at 08:59

## 2018-01-01 RX ADMIN — DEXTROSE AND SODIUM CHLORIDE 75 ML/HR: 5; .9 INJECTION, SOLUTION INTRAVENOUS at 06:49

## 2018-01-01 RX ADMIN — PANTOPRAZOLE SODIUM 40 MG: 40 TABLET, DELAYED RELEASE ORAL at 05:45

## 2018-01-01 RX ADMIN — CALCIUM CARBONATE-VITAMIN D TAB 500 MG-200 UNIT 1 TABLET: 500-200 TAB at 08:12

## 2018-01-01 RX ADMIN — CARVEDILOL 6.25 MG: 3.12 TABLET, FILM COATED ORAL at 09:33

## 2018-01-01 RX ADMIN — ANORECTAL OINTMENT: 15.7; .44; 24; 20.6 OINTMENT TOPICAL at 08:56

## 2018-01-01 RX ADMIN — PRAVASTATIN SODIUM 20 MG: 20 TABLET ORAL at 17:20

## 2018-01-01 RX ADMIN — HEPARIN SODIUM 5000 UNITS: 5000 INJECTION, SOLUTION INTRAVENOUS; SUBCUTANEOUS at 10:24

## 2018-01-01 RX ADMIN — POTASSIUM CHLORIDE 20 MEQ: 200 INJECTION, SOLUTION INTRAVENOUS at 06:27

## 2018-01-01 RX ADMIN — ACETAMINOPHEN 650 MG: 160 SUSPENSION ORAL at 05:23

## 2018-01-01 RX ADMIN — PANTOPRAZOLE SODIUM 40 MG: 40 TABLET, DELAYED RELEASE ORAL at 05:19

## 2018-01-01 RX ADMIN — MIRTAZAPINE 15 MG: 15 TABLET, FILM COATED ORAL at 22:01

## 2018-01-01 RX ADMIN — POTASSIUM CHLORIDE 20 MEQ: 20 TABLET, EXTENDED RELEASE ORAL at 09:14

## 2018-01-01 RX ADMIN — HEPARIN SODIUM 5000 UNITS: 5000 INJECTION, SOLUTION INTRAVENOUS; SUBCUTANEOUS at 21:57

## 2018-01-01 RX ADMIN — HEPARIN SODIUM 5000 UNITS: 5000 INJECTION, SOLUTION INTRAVENOUS; SUBCUTANEOUS at 08:53

## 2018-01-01 RX ADMIN — VANCOMYCIN HYDROCHLORIDE 750 MG: 750 INJECTION, POWDER, LYOPHILIZED, FOR SOLUTION INTRAVENOUS at 20:19

## 2018-01-01 RX ADMIN — ONDANSETRON 4 MG: 2 INJECTION INTRAMUSCULAR; INTRAVENOUS at 10:44

## 2018-01-01 RX ADMIN — ACETAMINOPHEN 650 MG: 160 SUSPENSION ORAL at 06:59

## 2018-01-01 RX ADMIN — ACETAMINOPHEN 650 MG: 325 TABLET, FILM COATED ORAL at 17:04

## 2018-01-01 RX ADMIN — Medication 1 BOTTLE: at 20:16

## 2018-01-01 RX ADMIN — ANORECTAL OINTMENT 1 APPLICATION: 15.7; .44; 24; 20.6 OINTMENT TOPICAL at 09:27

## 2018-01-01 RX ADMIN — LIDOCAINE HYDROCHLORIDE 10 ML: 20 SOLUTION ORAL; TOPICAL at 17:06

## 2018-01-01 RX ADMIN — CALCIUM CARBONATE-VITAMIN D TAB 500 MG-200 UNIT 1 TABLET: 500-200 TAB at 08:38

## 2018-01-01 RX ADMIN — OSELTAMIVIR PHOSPHATE 30 MG: 30 CAPSULE ORAL at 17:06

## 2018-01-01 RX ADMIN — ACETAMINOPHEN 650 MG: 160 SUSPENSION ORAL at 05:29

## 2018-01-01 RX ADMIN — METRONIDAZOLE 500 MG: 500 INJECTION, SOLUTION INTRAVENOUS at 19:46

## 2018-01-01 RX ADMIN — TRAMADOL HYDROCHLORIDE 50 MG: 50 TABLET, FILM COATED ORAL at 17:33

## 2018-01-01 RX ADMIN — LIDOCAINE HYDROCHLORIDE 10 ML: 20 SOLUTION ORAL; TOPICAL at 12:06

## 2018-01-01 RX ADMIN — ANORECTAL OINTMENT: 15.7; .44; 24; 20.6 OINTMENT TOPICAL at 08:55

## 2018-01-01 RX ADMIN — ENOXAPARIN SODIUM 30 MG: 30 INJECTION SUBCUTANEOUS at 08:38

## 2018-01-01 RX ADMIN — HEPARIN SODIUM 5000 UNITS: 5000 INJECTION, SOLUTION INTRAVENOUS; SUBCUTANEOUS at 21:25

## 2018-01-01 RX ADMIN — DRONABINOL 2.5 MG: 2.5 CAPSULE ORAL at 16:37

## 2018-01-01 RX ADMIN — DEXTROSE MONOHYDRATE 25 ML: 500 INJECTION PARENTERAL at 21:51

## 2018-01-01 RX ADMIN — LIDOCAINE HYDROCHLORIDE 10 ML: 20 SOLUTION ORAL; TOPICAL at 12:36

## 2018-01-01 RX ADMIN — ACETAMINOPHEN 650 MG: 160 SUSPENSION ORAL at 17:04

## 2018-01-01 RX ADMIN — ANORECTAL OINTMENT 1 APPLICATION: 15.7; .44; 24; 20.6 OINTMENT TOPICAL at 17:24

## 2018-01-01 RX ADMIN — POTASSIUM CHLORIDE 20 MEQ: 20 TABLET, EXTENDED RELEASE ORAL at 19:58

## 2018-01-01 RX ADMIN — DRONABINOL 5 MG: 2.5 CAPSULE ORAL at 17:17

## 2018-01-01 RX ADMIN — DEXTROSE 50 ML/HR: 5 SOLUTION INTRAVENOUS at 11:00

## 2018-01-01 RX ADMIN — ANORECTAL OINTMENT: 15.7; .44; 24; 20.6 OINTMENT TOPICAL at 17:03

## 2018-01-01 RX ADMIN — POTASSIUM CHLORIDE 20 MEQ: 1500 TABLET, EXTENDED RELEASE ORAL at 08:39

## 2018-01-01 RX ADMIN — POTASSIUM CHLORIDE 20 MEQ: 1500 TABLET, EXTENDED RELEASE ORAL at 09:25

## 2018-01-01 RX ADMIN — SODIUM CHLORIDE 1000 ML: 0.9 INJECTION, SOLUTION INTRAVENOUS at 08:32

## 2018-01-01 RX ADMIN — DILTIAZEM HYDROCHLORIDE 180 MG: 180 CAPSULE, EXTENDED RELEASE ORAL at 16:34

## 2018-01-01 RX ADMIN — SODIUM CHLORIDE 100 ML/HR: 0.45 INJECTION, SOLUTION INTRAVENOUS at 17:59

## 2018-01-01 RX ADMIN — DOCUSATE SODIUM 100 MG: 100 CAPSULE, LIQUID FILLED ORAL at 09:03

## 2018-01-01 RX ADMIN — PANTOPRAZOLE SODIUM 40 MG: 40 TABLET, DELAYED RELEASE ORAL at 05:11

## 2018-01-01 RX ADMIN — DOCUSATE SODIUM 100 MG: 100 CAPSULE, LIQUID FILLED ORAL at 09:00

## 2018-01-01 RX ADMIN — ACETAMINOPHEN 650 MG: 325 TABLET, FILM COATED ORAL at 23:53

## 2018-01-01 RX ADMIN — CLOPIDOGREL BISULFATE 75 MG: 75 TABLET ORAL at 19:57

## 2018-01-01 RX ADMIN — HEPARIN SODIUM 5000 UNITS: 5000 INJECTION, SOLUTION INTRAVENOUS; SUBCUTANEOUS at 22:00

## 2018-01-01 RX ADMIN — ANORECTAL OINTMENT: 15.7; .44; 24; 20.6 OINTMENT TOPICAL at 10:27

## 2018-01-01 RX ADMIN — SODIUM CHLORIDE 1000 ML: 0.9 INJECTION, SOLUTION INTRAVENOUS at 13:00

## 2018-01-01 RX ADMIN — LIDOCAINE HYDROCHLORIDE 10 ML: 20 SOLUTION ORAL; TOPICAL at 12:10

## 2018-01-01 RX ADMIN — LIDOCAINE HYDROCHLORIDE 10 ML: 20 SOLUTION ORAL; TOPICAL at 17:28

## 2018-01-01 RX ADMIN — DEXTROSE MONOHYDRATE 25 ML: 500 INJECTION PARENTERAL at 18:21

## 2018-01-01 RX ADMIN — ACETAMINOPHEN 650 MG: 160 SUSPENSION ORAL at 13:10

## 2018-01-01 RX ADMIN — Medication 1 BOTTLE: at 18:10

## 2018-01-01 RX ADMIN — ISODIUM CHLORIDE 3 ML: 0.03 SOLUTION RESPIRATORY (INHALATION) at 07:32

## 2018-01-01 RX ADMIN — POTASSIUM CHLORIDE 20 MEQ: 200 INJECTION, SOLUTION INTRAVENOUS at 09:16

## 2018-01-01 RX ADMIN — LIDOCAINE HYDROCHLORIDE 10 ML: 20 SOLUTION ORAL; TOPICAL at 11:14

## 2018-01-01 RX ADMIN — METRONIDAZOLE 500 MG: 500 INJECTION, SOLUTION INTRAVENOUS at 03:58

## 2018-01-04 PROBLEM — M54.9 BACK PAIN: Status: RESOLVED | Noted: 2017-01-01 | Resolved: 2018-01-01

## 2018-01-04 PROBLEM — S32.000A CLOSED COMPRESSION FRACTURE OF LUMBAR VERTEBRA (HCC): Chronic | Status: RESOLVED | Noted: 2017-01-01 | Resolved: 2018-01-01

## 2018-01-04 PROBLEM — I50.9 ACUTE ON CHRONIC CONGESTIVE HEART FAILURE (HCC): Status: ACTIVE | Noted: 2018-01-01

## 2018-01-04 PROBLEM — N30.00 ACUTE CYSTITIS WITHOUT HEMATURIA: Status: RESOLVED | Noted: 2017-01-01 | Resolved: 2018-01-01

## 2018-01-04 PROBLEM — N17.9 AKI (ACUTE KIDNEY INJURY) (HCC): Status: RESOLVED | Noted: 2017-01-01 | Resolved: 2018-01-01

## 2018-01-04 NOTE — RESPIRATORY THERAPY NOTE
RT Protocol Note  Richard Edmond 80 y o  female MRN: [id number]  Unit/Bed#: 423-01 Encounter: [id number]    Assessment    Principal Problem:    Acute on chronic congestive heart failure (HCC)  Active Problems:    Generalized weakness    Essential hypertension    Gastroesophageal reflux disease without esophagitis    Coronary artery disease involving native coronary artery of native heart    H/O aortic valve replacement    Hyperlipidemia    Frequent falls      Home Pulmonary Medications:  None    Past Medical History:   Diagnosis Date    Angina pectoris (HCC)     Arthritis     CAD (coronary artery disease)     GERD (gastroesophageal reflux disease)     Hypertension     Lyme disease     Osteoporosis     Vertigo      Social History     Social History    Marital status:      Spouse name: N/A    Number of children: N/A    Years of education: N/A     Social History Main Topics    Smoking status: Never Smoker    Smokeless tobacco: Never Used    Alcohol use No    Drug use: No    Sexual activity: No     Other Topics Concern    None     Social History Narrative    None       Subjective         Objective    Physical Exam:   Assessment Type: Assess only  General Appearance: Alert, Awake  Respiratory Pattern: Normal  Chest Assessment: Chest expansion symmetrical  Bilateral Breath Sounds: Rales (posterior bases)  Cough: None  O2 Device: nasal cannula 2 l/m    Vitals:  Blood pressure 119/68, pulse 55, temperature 99 1 °F (37 3 °C), temperature source Temporal, resp  rate 16, height 5' 3" (1 6 m), weight 49 9 kg (110 lb 0 2 oz), SpO2 97 %  Imaging and other studies: I have personally reviewed pertinent reports  O2 Device: nasal cannula 2 l/m     Plan    Respiratory Plan:  (Bronchial Hygiene/Pneumonia)      Xopenex 1 25 mg  With 0 9% Sodium Chloride BID  Xopenex 1 25 mg   With 0 9% Sodium Chloride Q6PRN for shortness of breath and wheeze

## 2018-01-04 NOTE — ED PROCEDURE NOTE
PROCEDURE  ECG 12 Lead Documentation  Date/Time: 1/4/2018 10:36 AM  Performed by: Latoya Owen  Authorized by: Latoya Owen     Indications / Diagnosis:  Dyspnea  ECG reviewed by me, the ED Provider: yes    Patient location:  ED  Interpretation:     Interpretation: non-specific    Quality:     Tracing quality:  Limited by artifact  Rate:     ECG rate:  85    ECG rate assessment: normal    Rhythm:     Rhythm: sinus rhythm    Ectopy:     Ectopy: PVCs      PVCs:  Frequent  Conduction:     Conduction: normal    ST segments:     ST segments:  Non-specific  T waves:     T waves: non-specific

## 2018-01-04 NOTE — H&P
History and Physical - [facility]    Patient Information: Elizabeth Ring 80 y o  female MRN: [id number]  Unit/Bed#: 423-01 Encounter: [id number]  Admitting Physician: Miryam Chávez PA-C  PCP: Rik Wilson DO  Date of Admission:  01/04/18    Assessment/Plan:  Hospital Problem List:   Principal Problem:    Acute on chronic congestive heart failure ([address] )  Active Problems:    Generalized weakness    Essential hypertension    Gastroesophageal reflux disease without esophagitis    Coronary artery disease involving native coronary artery of native heart    H/O aortic valve replacement    Hyperlipidemia    Frequent falls      Plan for the Primary Problem(s):  · Acute on chronic congestive heart failure, likely systolic  · Will obtain echocardiogram, consult cardiology, continue IV lasix at 20mg BID, monitor daily weights, intake and output  Continue respiratory protocol, supplemental oxygen as needed  Patient does not appear to have signs of infection  May have component of interstitial lung disease  Consider addition of steroids if no improvement on diuretics alone  Plan for Additional Problems:   · Generalized weakness / frequent falls - consult PT/OT   · CAD with history of aortic valve replacement - will continue beta blocker, aspirin  · Hyperlipidemia - continue statin therapy  · Hypertension - continue beta blocker and CCB  · R calf pain - order venous doppler of the BLE to r/o DVT  Avoid SCDs for now due to pain  · Code status: discussed with patient and she would like to be full code status at this time  VTE Prophylaxis: Enoxaparin (Lovenox)  / sequential compression device   Code Status: Full code    Anticipated Length of Stay:  Patient will be admitted on an Inpatient basis with an anticipated length of stay of  Greater than 2 midnights  Justification for Hospital Stay: Need for IV lasix, cardiology consultation, telemetry monitoring      Chief Complaint:   Shortness of breath    History of Present Illness:  Angelina Christianson is a 80 y o  female who presents with is a resident of [facility] [place] [facility] living [facility] who notes shortness of breath for the past several weeks  Today she was feeling short of breath and noted to be hypoxic on evaluation by staff, with a pulse oximetry of 85% on room air  She also has noticed an increasing dry cough over the past few weeks, without fever or chills  No congestion  Review of Systems:  Review of Systems   All other systems reviewed and are negative  Past Medical and Surgical History:   Past Medical History:   Diagnosis Date    Angina pectoris ([address] )     Arthritis     CAD (coronary artery disease)     GERD (gastroesophageal reflux disease)     Hypertension     Lyme disease     Osteoporosis     Vertigo        Past Surgical History:   Procedure Laterality Date    AORTIC VALVE REPLACEMENT      APPENDECTOMY      EYE SURGERY      cataract bilateral       Meds/Allergies:  Prior to Admission medications    Medication Sig Start Date End Date Taking? Authorizing Provider   acetaminophen (TYLENOL) 500 mg tablet Take 325 mg by mouth every 6 (six) hours as needed for mild pain     Yes Historical Provider, MD   aspirin 81 MG tablet Take 81 mg by mouth daily  Yes Historical Provider, MD   bisacodyl (bisacodyl) 5 mg EC tablet Take 10 mg by mouth daily as needed for constipation   Yes Historical Provider, MD   Calcium Carbonate-Vitamin D 600-200 MG-UNIT CAPS Take 2 tablets by mouth daily   Yes Historical Provider, MD   carvedilol (COREG) 3 125 mg tablet Take 6 25 mg by mouth every morning     Yes Historical Provider, MD   diltiazem (CARDIZEM CD) 180 mg 24 hr capsule Take 180 mg by mouth daily   Yes Historical Provider, MD   diltiazem (DILACOR XR) 180 MG 24 hr capsule Take 180 mg by mouth daily     Yes Historical Provider, MD   docusate sodium (COLACE) 100 mg capsule Take 1 capsule by mouth 2 (two) times a day 9/8/17  Yes Sunshine Chapman DO Naomi   Ergocalciferol (VITAMIN D2 PO) Take 50,000 Units by mouth every 30 (thirty) days   Yes Historical Provider, MD   gabapentin (NEURONTIN) 100 mg capsule Take 300 mg by mouth daily at bedtime   Yes Historical Provider, MD   menthol-zinc oxide (RISAMINE) 9 92-54 666 % Apply 1 application topically 2 (two) times a day   Yes Historical Provider, MD   Multiple Vitamin (MULTIVITAMIN) capsule Take 1 capsule by mouth daily   Yes Historical Provider, MD   Nutritional Supplements (ENSURE ACTIVE PO) Take by mouth   Yes Historical Provider, MD   pantoprazole (PROTONIX) 40 mg tablet Take 40 mg by mouth daily  Yes Historical Provider, MD   polyvinyl alcohol-povidone (REFRESH) 1 4-0 6 % ophthalmic solution 1 drop 3 (three) times a day   Yes Historical Provider, MD   simvastatin (ZOCOR) 10 mg tablet Take 10 mg by mouth daily  Yes Historical Provider, MD   sorbitol 70 % solution Take 30 mL by mouth daily as needed   Yes Historical Provider, MD   traMADol (ULTRAM) 50 mg tablet Take 50 mg by mouth 2 (two) times a day   Yes Historical Provider, MD     I have reviewed home medications with patient personally  Allergies: Allergies   Allergen Reactions    Penicillins        Social History:  Marital Status:    Substance Use History:   History   Alcohol Use No     History   Smoking Status    Never Smoker   Smokeless Tobacco    Never Used     History   Drug Use No       Family History:  non-contributory    Physical Exam:   Vitals:   Blood Pressure: 119/68 (01/04/18 1556)  Pulse: 80 (01/04/18 1556)  Temperature: 99 1 °F (37 3 °C) (01/04/18 1556)  Temp Source: Temporal (01/04/18 1556)  Respirations: 18 (01/04/18 1556)  Height: 5' 3" (160 cm) (01/04/18 1328)  Weight - Scale: 49 9 kg (110 lb 0 2 oz) (01/04/18 1328)  SpO2: 96 % (01/04/18 1556)    Physical Exam   Constitutional: She is oriented to person, place, and time  She appears well-developed and well-nourished  No distress     HENT:   Head: Normocephalic and atraumatic  Mouth/Throat: Oropharynx is clear and moist    Eyes: Pupils are equal, round, and reactive to light  No scleral icterus  Neck: Normal range of motion  Neck supple  No JVD present  Cardiovascular: Normal rate, regular rhythm and normal heart sounds  No murmur heard  Pulmonary/Chest: Effort normal  No respiratory distress  She has wheezes (diffuse expiratory wheezing with inspiratory fine rales)  She has rales  Abdominal: Soft  Bowel sounds are normal  She exhibits no distension  Musculoskeletal: She exhibits tenderness (tenderness to palpation of right calf  )  Neurological: She is alert and oriented to person, place, and time  Skin: Skin is warm and dry  She is not diaphoretic  Psychiatric: She has a normal mood and affect  Her behavior is normal    Nursing note and vitals reviewed  Additional Data:   Lab Results: I have personally reviewed pertinent reports  Results from last 7 days  Lab Units 01/04/18  1052   WBC Thousand/uL 7 63   HEMOGLOBIN g/dL 11 2*   HEMATOCRIT % 34 4*   PLATELETS Thousands/uL 344   NEUTROS PCT % 63   LYMPHS PCT % 21   MONOS PCT % 6   EOS PCT % 9*       Results from last 7 days  Lab Units 01/04/18  1052   SODIUM mmol/L 143   POTASSIUM mmol/L 3 9   CHLORIDE mmol/L 106   CO2 mmol/L 29   BUN mg/dL 18   CREATININE mg/dL 1 17   CALCIUM mg/dL 8 8   GLUCOSE RANDOM mg/dL 198*           Imaging: I have personally reviewed pertinent reports  Xr Chest 2 Views    Result Date: 1/4/2018  Narrative: CHEST INDICATION:  Shortness of breath COMPARISON:  CT chest abdomen pelvis March 22, 2017 and chest radiographs December 23, 2005  VIEWS:  Frontal and lateral projections IMAGES:  2 FINDINGS:     The heart is enlarged  Atherosclerotic changes in the aorta  Lung volumes diminished  Elevation of right hemidiaphragm  Chronic interstitial changes throughout the lungs bilaterally     Diffuse interstitial alveolar infiltrates throughout the lungs bilaterally with a more focal area of consolidation in the retrocardiac region of the left lower lobe  These appear to have progressed from the prior study  Visualized osseous structures appear within normal limits for the patient's age  Impression: 1  Chronic interstitial lung disease  2   Diffuse interstitial alveolar infiltrates within the lungs bilaterally with more focal area of consolidation in the left lower lobe  Although the findings may be cardiogenic in nature, superimposed pneumonia not excluded  Follow-up recommended  A verbal report will be called by the reading room liaison following this dictation  Workstation performed: [id number]         [facility] Records Reviewed: Yes     ** Please Note: This note has been constructed using a voice recognition system   **

## 2018-01-05 NOTE — OCCUPATIONAL THERAPY NOTE
Occupational Therapy Evaluation     Patient Name: Mini MONTEJO Date: 1/5/2018  Problem List  Patient Active Problem List   Diagnosis    Generalized weakness    Essential hypertension    Gastroesophageal reflux disease without esophagitis    Coronary artery disease involving native coronary artery of native heart    H/O aortic valve replacement    Hyperlipidemia    Frequent falls    Acute on chronic congestive heart failure ([facility] [address] )     Past Medical History  Past Medical History:   Diagnosis Date    Angina pectoris ([facility] [address] )     Arthritis     CAD (coronary artery disease)     GERD (gastroesophageal reflux disease)     Hypertension     Lyme disease     Osteoporosis     Vertigo      Past Surgical History  Past Surgical History:   Procedure Laterality Date    AORTIC VALVE REPLACEMENT      APPENDECTOMY      EYE SURGERY      cataract bilateral             01/05/18 1041   Note Type   Note type Eval/Treat   Restrictions/Precautions   Weight Bearing Precautions Per Order No   Other Precautions Chair Alarm;Multiple lines;Telemetry;O2;Fall Risk   Home Living   Type of Home Assisted living  [facility])   Prior Function   Comments see PT eval for details    Psychosocial   Psychosocial (WDL) WDL   ADL   Where Assessed (bathroom)   Grooming Assistance 5  Supervision/Setup   LB Dressing Assistance 2  Maximal Assistance   LB Dressing Deficit Thread RLE into underwear; Thread LLE into underwear;Pull up over hips; Fasteners   Toileting Assistance  3  Moderate Assistance   Toileting Deficit Verbal cueing;Supervison/safety; Increased time to complete;Grab bar use;Clothing management up;Clothing management down   Additional Comments pt required verbal cues throughout toileting tasks and completed perineal hygeiene at (I) level    Bed Mobility   Additional Comments see PT note    Transfers   Sit to Stand 5  Supervision   Additional items Verbal cues  (RW)   Stand to Sit 5  Supervision   Additional items Verbal cues  (RW)   Toilet transfer 5  Supervision   Additional items Standard toilet   Additional Comments pt utilized grab bar in bathroom   Functional Mobility   Functional Mobility 5  Supervision   Additional Comments pt performed FM in hallway with RW and required w/c to be brought behind and wheeled back to room due to incontence of urine while performing FM   Additional items Rolling walker   Balance   Static Sitting Good   Dynamic Sitting Good   Static Standing Fair   Dynamic Standing Fair   Ambulatory Fair   Activity Tolerance   Activity Tolerance Patient limited by fatigue   RUE Assessment   RUE Assessment X  (3+/5 grossly WFL AROM )   LUE Assessment   LUE Assessment X  (3+/5 grossly; WFL AROM )   Hand Function   Gross Motor Coordination Functional   Fine Motor Coordination Functional   Sensation   Light Touch No apparent deficits   Sharp/Dull No apparent deficits   Cognition   Overall Cognitive Status WFL   Arousal/Participation Alert   Attention Within functional limits   Orientation Level Oriented X4   Memory Within functional limits   Following Commands Follows all commands and directions without difficulty   Assessment   Limitation Decreased ADL status; Decreased UE strength;Decreased endurance;Decreased self-care trans;Decreased high-level ADLs   Assessment pt performed at an overall (S) level with use of RW during FM; pt presents with decreased ability to perform ADLs and limited endurance deficit at this time; pt will benefit from continued OT intervention and recommend return to [facility]   Goals   Short Term Goal  pt will perform UE strengthening exercises    Long Term Goal #1 pt will perform UB/LB bathing and grooming tasks at (S) level    Long Term Goal #2 pt will perform FM at mod (I) level with no rest breaks    Long Term Goal pt will perform toileting transfers and tasks at (S) level    Plan   Treatment Interventions ADL retraining;Functional transfer training;UE strengthening/ROM; Endurance training;Patient/family training; Activityengagement   Goal Expiration Date 01/19/18   OT Frequency 3-5x/wk   Recommendation   OT Discharge Recommendation ((A) living facility)   OT - OK to Discharge No   Barthel Index   Feeding 5   Bathing 0   Grooming Score 0   Dressing Score 5   Bladder Score 10   Bowels Score 10   Toilet Use Score 5   Transfers (Bed/Chair) Score 10   Mobility (Level Surface) Score 10   Stairs Score 0   Barthel Index Score 55     Pt will benefit from continued OT services in order to maximize (I) c ADL performance, FM c RW, and improve overall endurance/strength required to complete functional tasks in preparation for d/c  Pt left seated in chair at end of session; all needs within reach; all lines intact; scds connected and turned on

## 2018-01-05 NOTE — PHYSICAL THERAPY NOTE
PT Evaluation     01/05/18 1107   Note Type   Note type Eval/Treat   Pain Assessment   Pain Score 7   Pain Location Leg   Pain Orientation Left   Home Living   Type of Home Assisted living  ([place])   Home Equipment Walker  (4 wheeled RW)   Prior Function   Level of [place] Needs assistance with ADLs and functional mobility  ((I) with ambulation with RW)   Receives Help From Personal care attendant  (staff at [facility])   ADL Assistance Needs assistance  (assist for bathing and dressing)   IADLs ((I) with feeding)   General   Family/Caregiver Present No   Cognition   Arousal/Participation Alert   Orientation Level Oriented X4   Following Commands Follows all commands and directions without difficulty   RLE Assessment   RLE Assessment WFL  (4+/5)   LLE Assessment   LLE Assessment WFL  (4+/5)   Coordination   Sensation WFL   Light Touch   RLE Light Touch Grossly intact   LLE Light Touch Grossly intact   Bed Mobility   Supine to Sit 5  Supervision   Additional items Verbal cues; Bedrails   Sit to Supine (seated in chair at bedside, bell in reach, alarm on)   Additional Comments no difficulty with bed mobility  pt is on 2L's O2 at rest with SpO2 96% HR 83  attempted ambulation on room air with drop in spO2 86-87% therefore O2 reapplied at 2L's  Transfers   Sit to Stand 5  Supervision  (with RW)   Stand to Sit 5  Supervision  (with RW)   Stand pivot 5  Supervision  (with RW)   Additional Comments Pt able to perform transfers and ambulation with RW at a Supervision level however after 60ft became SOB with drop in Spo2 to 86%  Pt transported back to room via w/c also requiring use of restroom  See OT note for toileting  Ambulation/Elevation   Gait pattern Forward Flexion   Gait Assistance 5  Supervision   Assistive Device Rolling walker   Distance 60ft with RW Supervision with SOB and drop in SpO2 limiting ambulation  No LOB or unsteadiness prior to episode of SOB   Pt is requiring O2 for exertion at this time    Balance   Static Sitting Good   Dynamic Sitting Good   Static Standing Fair +  (with RW)   Dynamic Standing Fair  (with RW)   Ambulatory Fair  (with RW)   Endurance Deficit   Endurance Deficit Yes   Endurance Deficit Description limited ambulation tolerance due to SOB fatigue    Activity Tolerance   Activity Tolerance Patient limited by fatigue   Assessment   Prognosis Good   Problem List Decreased strength;Decreased endurance; Impaired balance;Decreased mobility;Pain   Assessment Pt is a 80year old female presenting with fair to good strength balance safety awareness and functional mobility however demonstrates decreased endurance and drop in spO2 on room air with ambulation with SOB  Pt is in need of continued activity in PT to improve impairments and functional deficits  pt will be safe to return to [facility] when medically stable for discharge and would benefit from home health care services  Goals   Patient Goals To feel better and [facility] Expiration Date 01/19/18   Long Term Goal #1 (I) with all bed mobility and Independent with all transfers with RW   Long Term Goal #2 Pt will ambulate 200ft with RW Supervision no LOB or drop in spO2 below 90%   Plan   Treatment/Interventions Functional transfer training;LE strengthening/ROM; Therapeutic exercise; Endurance training;Patient/family training;Bed mobility;Gait training   PT Frequency (3-5x/wk)   Recommendation   Recommendation Home PT  (return to [facility] with Home PT)   PT - OK to Discharge No  (when medically stable for discharge)   Pt with SCD on R leg when PT entered  SCD's reapplied to both legs and turned on with pt seated in chair at bedside with call bell in reach and alarm on

## 2018-01-05 NOTE — PLAN OF CARE
Problem: DISCHARGE PLANNING - CARE MANAGEMENT  Goal: Discharge to post-acute care or home with appropriate resources  INTERVENTIONS:  - Conduct assessment to determine patient/family and health care team treatment goals, and need for post-acute services based on payer coverage, community resources, and patient preferences, and barriers to discharge  - Address psychosocial, clinical, and financial barriers to discharge as identified in assessment in conjunction with the patient/family and health care team  - Arrange appropriate level of post-acute services according to patient's   needs and preference and payer coverage in collaboration with the physician and health care team  - Communicate with and update the patient/family, physician, and health care team regarding progress on the discharge plan  - Arrange appropriate transportation to post-acute venues  Outcome: Progressing  -return to [facility] assisted living  -will need wc transport back to [place]

## 2018-01-05 NOTE — SOCIAL WORK
Cm met with the patient and spoke with Josh Cee at [facility] assisted living to evaluate the patients prior function and living situation and any barriers to d/c and form a safe d/c plan  Cm also evaluated the patient for any services in the home or needs for services  Pt was doing well at [facility] and only required minimal assistance with her adls  Re; DME used her walker, no other DME  PCP is Dr [address] and pharmacy is [facility]  Pt uses [facility] for appointments  Will need wc transport arranged on dc back to [facility] as family lives out of the area  CM will continue to follow

## 2018-01-05 NOTE — PROGRESS NOTES
[address] Internal Medicine Progress Note  Patient: Kimberley Parson 80 y o  female   MRN: [id number]  PCP: Pervis Primrose, DO  Unit/Bed#: 901-33 Encounter: [id number]  Date Of Visit: 18    Assessment:    Principal Problem:    Acute on chronic congestive heart failure ([facility] [address] )  Active Problems:    Generalized weakness    Essential hypertension    Gastroesophageal reflux disease without esophagitis    Coronary artery disease involving native coronary artery of native heart    H/O aortic valve replacement    Hyperlipidemia    Frequent falls      Plan:    · Acute on chronic congestive heart failure, likely systolic: echocardiogram pending  Cardiology consulted and changed Lasix to PO  Daily weights  I&O's  Likely discharge tomorrow  · Generalized weakness/Frequent falls: continue PT/OT  · CAD with history of aortic valve replacement: continue beta blocker and aspirin  · Hyperplidemia: continue statin therapy  · Hypertension: continue Coreg and Cardizem CD  · Left leg pain: Venous doppler bilateral lower extremities are negative for DVT  VTE Pharmacologic Prophylaxis:   Pharmacologic: Enoxaparin (Lovenox)  Mechanical VTE Prophylaxis in Place: Yes      Discussions with Specialists or Other Care Team Provider: Nursing, CM, and attending      Time Spent for Care: 30 minutes  More than 50% of total time spent on counseling and coordination of care as described above  Current Length of Stay: 1 day(s)    Current Patient Status: Inpatient   Certification Statement: The patient will continue to require additional inpatient hospital stay due to continued workup and treatment of CHF    Discharge Plan / Estimated Discharge Date: likely in the next 24-48 hours    Code Status: Level 1 - Full Code      Subjective: The patient was seen and examined  The patient feels her breathing has improved  She denies any pain  No acute events overnight      Objective:     Vitals:   Temp (24hrs), Av 6 °F (37 °C), Min:98 3 °F (36 8 °C), Max:99 1 °F (37 3 °C)    HR:  [55-80] 75  Resp:  [16-18] 18  BP: (100-178)/(55-73) 100/55  SpO2:  [95 %-98 %] 98 %  Body mass index is 20 78 kg/m²  Input and Output Summary (last 24 hours): Intake/Output Summary (Last 24 hours) at 01/05/18 1228  Last data filed at 01/05/18 1100   Gross per 24 hour   Intake              240 ml   Output              900 ml   Net             -660 ml       Physical Exam:     Physical Exam   Constitutional: She is oriented to person, place, and time  Vital signs are normal  She appears well-developed and well-nourished  She is cooperative  Cardiovascular: Normal rate and regular rhythm  Murmur heard  Pulmonary/Chest: Effort normal  She has no wheezes  She has no rhonchi  She has rales in the right lower field and the left lower field  Abdominal: Soft  Normal appearance and bowel sounds are normal    Neurological: She is alert and oriented to person, place, and time  No cranial nerve deficit  Skin: Skin is warm, dry and intact  Nursing note and vitals reviewed  Additional Data:     Labs:      Results from last 7 days  Lab Units 01/04/18  1052   WBC Thousand/uL 7 63   HEMOGLOBIN g/dL 11 2*   HEMATOCRIT % 34 4*   PLATELETS Thousands/uL 344   NEUTROS PCT % 63   LYMPHS PCT % 21   MONOS PCT % 6   EOS PCT % 9*       Results from last 7 days  Lab Units 01/04/18  1052   SODIUM mmol/L 143   POTASSIUM mmol/L 3 9   CHLORIDE mmol/L 106   CO2 mmol/L 29   BUN mg/dL 18   CREATININE mg/dL 1 17   CALCIUM mg/dL 8 8   GLUCOSE RANDOM mg/dL 198*           * I Have Reviewed All Lab Data Listed Above  * Additional Pertinent Lab Tests Reviewed:  All Labs Within Last 24 Hours Reviewed    Imaging:    Imaging Reports Reviewed Today Include: Venous duplex B/L lower extremities  Imaging Personally Reviewed by Myself Includes:  none    Recent Cultures (last 7 days):           Last 24 Hours Medication List:     aspirin 81 mg Oral Daily   calcium carbonate-vitamin D 1 tablet Oral Daily With Breakfast   carvedilol 3 125 mg Oral BID With Meals   diltiazem 180 mg Oral Daily   docusate sodium 100 mg Oral BID   enoxaparin 30 mg Subcutaneous Daily   [START ON 1/6/2018] furosemide 20 mg Oral Every Other Day   gabapentin 300 mg Oral HS   levalbuterol 1 25 mg Nebulization BID   pantoprazole 40 mg Oral Early Morning   potassium chloride 20 mEq Oral Daily   pravastatin 20 mg Oral Daily With Dinner   sodium chloride 3 mL Nebulization BID   traMADol 50 mg Oral BID        Today, Patient Was Seen By: Jesus Garcia PA-C    ** Please Note: This note has been constructed using a voice recognition system   **

## 2018-01-05 NOTE — PHYSICIAN ADVISOR
Current patient class: Inpatient  The patient is currently on Hospital Day: 2      The patient was admitted to the hospital at 01 [phone] [id number] on 1/4/18 for the following diagnosis:  Dyspnea on exertion [R06 09]  Hypoxia [R09 02]  Shortness of breath [R06 02]  New onset of congestive heart failure (HCC) [I50 9]       There is documentation in the medical record of an expected length of stay of at least 2 midnights  The patient is therefore expected to satisfy the 2 midnight benchmark and given the 2 midnight presumption is appropriate for INPATIENT ADMISSION  Given this expectation of a satisfying stay, CMS instructs us that the patient is most often appropriate for inpatient admission under part A provided medical necessity is documented in the chart  After review of the relevant documentation, labs, vital signs and test results, the patient is appropriate for INPATIENT ADMISSION  Admission to the hospital as an inpatient is a complex decision making process which requires the practitioner to consider the patients presenting complaint, history and physical examination and all relevant testing  With this in mind, in this case, the patient was deemed appropriate for INPATIENT ADMISSION  After review of the documentation and testing available at the time of the admission I concur with this clinical determination of medical necessity  Rationale is as follows: The patient is a 80 yrs old Female who presented to the ED at 1/4/2018 10:19 AM with a chief complaint of Shortness of Breath (Pt from [facility] assisted living, states she has been SOB x3 weeks  Staff states today pulse ox dropped to 81% on room air  )     Patient admitted with a diagnosis of dyspnea on exertion and hypoxia  She was noted to have very low oxygen saturations  It was determined that her hypoxia could be due to her chronic interstitial lungs disease and now pulmonary edema  She did have an elevated BNP at 2,818    The patient required IV Lasix and a consult for cardiology was entered  She did have an echocardiagram today and the results are not as yet known  This is an elderly with significant shortness of breath  And a two night admission status would be considered appropriate for her  The patients vitals on arrival were ED Triage Vitals   Temperature Pulse Respirations Blood Pressure SpO2   01/04/18 1024 01/04/18 1024 01/04/18 1024 01/04/18 1024 01/04/18 1023   98 1 °F (36 7 °C) 85 20 138/63 (!) 85 %      Temp Source Heart Rate Source Patient Position - Orthostatic VS BP Location FiO2 (%)   01/04/18 1024 01/04/18 1024 01/04/18 1024 01/04/18 1024 --   Temporal Monitor Lying Left arm       Pain Score       01/04/18 1024       No Pain           Past Medical History:   Diagnosis Date    Angina pectoris ([address] )     Arthritis     CAD (coronary artery disease)     GERD (gastroesophageal reflux disease)     Hypertension     Lyme disease     Osteoporosis     Vertigo      Past Surgical History:   Procedure Laterality Date    AORTIC VALVE REPLACEMENT      APPENDECTOMY      EYE SURGERY      cataract bilateral           Consults have been placed to:   IP CONSULT TO CARDIOLOGY    Vitals:    01/04/18 2356 01/05/18 0546 01/05/18 0736 01/05/18 0752   BP: 104/59  100/55    Pulse: 67  75    Resp: 18  18    Temp: 98 5 °F (36 9 °C)  98 4 °F (36 9 °C)    TempSrc: Temporal  Temporal    SpO2: 95%  98% 98%   Weight:  53 2 kg (117 lb 4 6 oz)     Height:           Most recent labs:    Recent Labs      01/04/18   1052   01/04/18   2208   WBC  7 63   --    --    HGB  11 2*   --    --    HCT  34 4*   --    --    PLT  344   --    --    K  3 9   --    --    NA  143   --    --    CALCIUM  8 8   --    --    BUN  18   --    --    CREATININE  1 17   --    --    TROPONINI  <0 02   < >  <0 02    < > = values in this interval not displayed         Scheduled Meds:  aspirin 81 mg Oral Daily   calcium carbonate-vitamin D 1 tablet Oral Daily With Breakfast   carvedilol 3 125 mg Oral BID With Meals   diltiazem 180 mg Oral Daily   docusate sodium 100 mg Oral BID   enoxaparin 30 mg Subcutaneous Daily   [START ON 1/6/2018] furosemide 20 mg Oral Every Other Day   gabapentin 300 mg Oral HS   levalbuterol 1 25 mg Nebulization BID   pantoprazole 40 mg Oral Early Morning   potassium chloride 20 mEq Oral Daily   pravastatin 20 mg Oral Daily With Dinner   sodium chloride 3 mL Nebulization BID   traMADol 50 mg Oral BID     Continuous Infusions:   PRN Meds:   acetaminophen    bisacodyl    levalbuterol    polyvinyl alcohol    sodium chloride    Surgical procedures (if appropriate):

## 2018-01-05 NOTE — CASE MANAGEMENT
Initial Clinical Review    Admission: Date/Time/Statement: 1/4/18 @ 1151     Orders Placed This Encounter   Procedures    Inpatient Admission (expected length of stay for this patient is greater than two midnights)     Standing Status:   Standing     Number of Occurrences:   1     Order Specific Question:   Admitting Physician     Answer:   Alex Luis     Order Specific Question:   Level of Care     Answer:   Med Surg [16]     Order Specific Question:   Estimated length of stay     Answer:   More than 2 Midnights     Order Specific Question:   Certification     Answer:   I certify that inpatient services are medically necessary for this patient for a duration of greater than two midnights  See H&P and MD Progress Notes for additional information about the patient's course of treatment  ED: Date/Time/Mode of Arrival:   ED Arrival Information     Expected Arrival Acuity Means of Arrival Escorted By Service Admission Type    - 1/4/2018 10:19 [facility] Medicine Emergency    Arrival Complaint    SOB      Chief Complaint:   Chief Complaint   Patient presents with    Shortness of Breath     Pt from [facility] assisted living, states she has been SOB x3 weeks  Staff states today pulse ox dropped to 81% on room air  History of Illness:   Patient presents via EMS from [facility] personal care facility for complaint of dyspnea on exertion over the past 1-2 weeks  She does not use home oxygen and has no prior diagnosis of COPD or Congestive heart failure  She denies any recent illnesses  She did fall about a week ago after losing her balance while turning and struck her left shoulder and head  She was medically evaluated that time without injury  Amie Jaime noted a pulse ox of 85% but did not have oxygen to give the patient  EMS confirmed similar pulse oxygenation and applied 4 liters/minute nasal cannula oxygen with resolution of dyspnea and improved oxygenation  Recheck on room air upon ED arrival demonstrated 81% pulse ox with again improvement into the mid 90s on low-flow oxygen  She denies concomitant chest pain, lightheadedness/dizziness, nausea/vomiting or diaphoresis  She is a lifelong nonsmoker  No recent travel or sick contacts  Denies f/c, HA, abdominal pain, diarrhea or dysuria  12 system ROS o/w negative  During my examination the patient was able to speak full sentences for prolonged period without difficulty or change in pulse oxygenation while on supplemental oxygen  ED Vital Signs:   ED Triage Vitals   Temperature Pulse Respirations Blood Pressure SpO2   01/04/18 1024 01/04/18 1024 01/04/18 1024 01/04/18 1024 01/04/18 1023   98 1 °F (36 7 °C) 85 20 138/63 (!) 85 %      Temp Source Heart Rate Source Patient Position - Orthostatic VS BP Location FiO2 (%)   01/04/18 1024 01/04/18 1024 01/04/18 1024 01/04/18 1024 --   Temporal Monitor Lying Left arm       Pain Score       01/04/18 1024       No Pain        Wt Readings from Last 1 Encounters:   01/05/18 53 2 kg (117 lb 4 6 oz)   Vital Signs (abnormal):   T 99 1  Abnormal Labs/Diagnostic Test Results:   HGB 11 2 GLUC 198 GFR 40 BNP 2818  TROP NEG  CXR=1  Chronic interstitial lung disease  2   Diffuse interstitial alveolar infiltrates within the lungs bilaterally with more focal area of consolidation in the left lower lobe  Although the findings may be cardiogenic in nature, superimposed pneumonia not excluded  Follow-up recommended  BLE VENOUS DUPLEX= RIGHT LOWER LIMB:  No evidence of acute or chronic deep vein thrombosis  No evidence of superficial thrombophlebitis noted  Doppler evaluation shows a normal response to augmentation maneuvers  Popliteal, posterior tibial and anterior tibial arterial Doppler waveforms are  biphasic  LEFT LOWER LIMB:  No evidence of acute or chronic deep vein thrombosis  No evidence of superficial thrombophlebitis noted    Doppler evaluation shows a normal response to augmentation maneuvers  Popliteal, posterior tibial and anterior tibial arterial Doppler waveforms are  Biphasic  EKG=Interpretation: non-specific    Quality:     Tracing quality:  Limited by artifact  Rate:     ECG rate:  85    ECG rate assessment: normal    Rhythm:     Rhythm: sinus rhythm    Ectopy:     Ectopy: PVCs      PVCs:  Frequent  Conduction:     Conduction: normal    ST segments:     ST segments:  Non-specific  T waves:     T waves: non-specific    ED Treatment:   Medication Administration from 01/04/2018 0950 to 01/04/2018 1322       Date/Time Order Dose Route Action Action by Comments     01/04/2018 1307 furosemide (LASIX) injection 20 mg 20 mg Intravenous Given Satny Reynolds RN       Past Medical/Surgical History:    Active Ambulatory Problems     Diagnosis Date Noted    Generalized weakness 03/23/2017    Essential hypertension 03/23/2017    Gastroesophageal reflux disease without esophagitis 03/23/2017    Coronary artery disease involving native coronary artery of native heart 03/23/2017    H/O aortic valve replacement 03/23/2017    Hyperlipidemia 03/23/2017    Frequent falls 09/06/2017     Resolved Ambulatory Problems     Diagnosis Date Noted    UTI (urinary tract infection) 03/23/2017    Pulmonary fibrosis ([address] ) 03/23/2017    Dyspnea on exertion 03/23/2017    MENDOZA (acute kidney injury) ([address] ) 09/06/2017    Back pain 09/06/2017    Closed compression fracture of lumbar vertebra ([address] ) 09/06/2017    Acute cystitis without hematuria 09/06/2017     Past Medical History:   Diagnosis Date    Angina pectoris (HCC)     Arthritis     CAD (coronary artery disease)     GERD (gastroesophageal reflux disease)     Hypertension     Lyme disease     Osteoporosis     Vertigo    Admitting Diagnosis: Dyspnea on exertion [R06 09]  Hypoxia [R09 02]  Shortness of breath [R06 02]  New onset of congestive heart failure ([address] ) [I50 9]  Age/Sex: 80 y o  female  Assessment/Plan:   Principal Problem: Acute on chronic congestive heart failure (HCC)  Active Problems:    Generalized weakness    Essential hypertension    Gastroesophageal reflux disease without esophagitis    Coronary artery disease involving native coronary artery of native heart    H/O aortic valve replacement    Hyperlipidemia    Frequent falls  Plan for the Primary Problem(s):  · Acute on chronic congestive heart failure, likely systolic  ? Will obtain echocardiogram, consult cardiology, continue IV lasix at 20mg BID, monitor daily weights, intake and output  Continue respiratory protocol, supplemental oxygen as needed  Patient does not appear to have signs of infection  May have component of interstitial lung disease  Consider addition of steroids if no improvement on diuretics alone  Plan for Additional Problems:   · Generalized weakness / frequent falls - consult PT/OT   · CAD with history of aortic valve replacement - will continue beta blocker, aspirin  · Hyperlipidemia - continue statin therapy  · Hypertension - continue beta blocker and CCB  · R calf pain - order venous doppler of the BLE to r/o DVT  Avoid SCDs for now due to pain  · Code status: discussed with patient and she would like to be full code status at this time     Anticipated Length of Stay:  Patient will be admitted on an Inpatient basis with an anticipated length of stay of  Greater than 2 midnights  Justification for Hospital Stay: Need for IV lasix, cardiology consultation, telemetry monitoring      Admission Orders:  TELEMETRY  O2 TO KEEP SATS>92%  PT/OT EVAL & TX  CONSULT CARDIO  CHF EDUCATION  Scheduled Meds:   aspirin 81 mg Oral Daily   calcium carbonate-vitamin D 1 tablet Oral Daily With Breakfast   carvedilol 3 125 mg Oral BID With Meals   diltiazem 180 mg Oral Daily   docusate sodium 100 mg Oral BID   enoxaparin 30 mg Subcutaneous Daily   furosemide 20 mg Intravenous BID (diuretic)   gabapentin 300 mg Oral HS   levalbuterol 1 25 mg Nebulization BID pantoprazole 40 mg Oral Early Morning   potassium chloride 20 mEq Oral Daily   pravastatin 20 mg Oral Daily With Dinner   sodium chloride 3 mL Nebulization BID   traMADol 50 mg Oral BID     Continuous Infusions:    PRN Meds:   acetaminophen    bisacodyl    levalbuterol    polyvinyl alcohol    sodium chloride

## 2018-01-05 NOTE — CONSULTS
Consultation - Cardiology   Sam Stovall 80 y o  female MRN: [id number]  Unit/Bed#: 423-01 Encounter: [id number]  01/05/18  10:22 AM      Assessment:  Principal Problem:    Acute on chronic congestive heart failure ([address] )  Active Problems:    Generalized weakness    Essential hypertension    Gastroesophageal reflux disease without esophagitis    Coronary artery disease involving native coronary artery of native heart    H/O aortic valve replacement    Hyperlipidemia    Frequent falls    Chief Complaint   Patient presents with    Shortness of Breath     Pt from [facility] assisted living, states she has been SOB x3 weeks  Staff states today pulse ox dropped to 81% on room air  Admitting diagnosis:  Dyspnea on exertion [R06 09]  Hypoxia [R09 02]  Shortness of breath [R06 02]  New onset of congestive heart failure (HCC) [I50 9]      Impression/plan:    Cough:  Does have a history of chronic congestive heart failure  Considering that her symptoms improved with Lasix, we could consider this to be an acute exacerbation of the patient feels back to baseline at the time of my exam   Also I do not see any evidence of volume overload on exam, she has dry crackles from interstitial lung disease  She could probably go home on Lasix 20 mg every other day  She has undergone an echocardiogram which I will review  Reported history of aortic valve replacement:  No sternal incision, no sternal wires on x-ray, possibly TAVR, will review gradients on echo    Hypoxia:  Multifactorial from chronic interstitial lung disease as well as mild pulmonary edema   She does have some bilateral leg tenderness, no edema, consider ruling out DVT with venous Dopplers    History of Present Illness   Physician Requesting Consult: Russell Hernandez MD   Reason for Consult / Principal Problem:  Shortness of breath, cough  HPI: Sam Stovall is a 80y o  year old female who is a resident of [facility], With a history of coronary artery disease,'aortic valve replacement'although patient is not sure of the details  She does not have any sternal wires the sternal incision  She might have a TAVR based on chest x-ray  She was admitted with cough, mild shortness of breath and mild hypoxia with saturations in the high 80s  She does have chronic interstitial lung disease and I am not sure if she is on oxygen at baseline  She denies any lower extremity edema or abdominal distention  She is not a very good historian  Presuming CHF, she has received a couple doses of IV Lasix  She does feel that her cough has improved  X-ray does have chronic changes although these are new compared to her prior chest x-ray from 2015  I do not see any changes suggestive of classic pulmonary edema  ProBNP was mildly elevated at 3000    Consults    Review of Systems:  fatigued and generally weak  Review of Systems   Constitutional: Negative  HENT: Negative  Eyes: Negative  Respiratory: Positive for cough and shortness of breath  Negative for apnea, choking, chest tightness, wheezing and stridor  Cardiovascular: Negative  Gastrointestinal: Negative  Endocrine: Negative  Genitourinary: Negative  Musculoskeletal: Negative  Allergic/Immunologic: Negative  Neurological: Negative  Hematological: Negative  Psychiatric/Behavioral: Negative  Historical Information   Past Medical History:   Diagnosis Date    Angina pectoris ([address] )     Arthritis     CAD (coronary artery disease)     GERD (gastroesophageal reflux disease)     Hypertension     Lyme disease     Osteoporosis     Vertigo      Past Surgical History:   Procedure Laterality Date    AORTIC VALVE REPLACEMENT      APPENDECTOMY      EYE SURGERY      cataract bilateral     History   Alcohol Use No     History   Drug Use No     History   Smoking Status    Never Smoker   Smokeless Tobacco    Never Used     Family History: History reviewed   No pertinent family history    No family history of premature CAD or Sudden Cardiac Death    Meds/Allergies     Current Facility-Administered Medications:     acetaminophen (TYLENOL) tablet 325 mg, 325 mg, Oral, Q6H PRN, Alejandrina Marie PA-C    aspirin chewable tablet 81 mg, 81 mg, Oral, Daily, Yue Billy PA-C, 81 mg at 01/05/18 0901    bisacodyl (DULCOLAX) EC tablet 10 mg, 10 mg, Oral, Daily PRN, Alejandrina Marie PA-C    calcium carbonate-vitamin D (OSCAL-D) 500 mg-200 units per tablet 1 tablet, 1 tablet, Oral, Daily With Breakfast, Alejandrina Marie PA-C, 1 tablet at 01/05/18 0903    carvedilol (COREG) tablet 3 125 mg, 3 125 mg, Oral, BID With Meals, Alejandrina Marie PA-C, 3 125 mg at 01/04/18 1634    diltiazem (CARDIZEM CD) 24 hr capsule 180 mg, 180 mg, Oral, Daily, Yue Billy PA-C, 180 mg at 01/04/18 1634    docusate sodium (COLACE) capsule 100 mg, 100 mg, Oral, BID, Yue Billy PA-C, 100 mg at 01/05/18 0903    enoxaparin (LOVENOX) subcutaneous injection 30 mg, 30 mg, Subcutaneous, Daily, Yue Billy PA-C, 30 mg at 01/05/18 0902    furosemide (LASIX) injection 20 mg, 20 mg, Intravenous, BID (diuretic), Yue Billy PA-C, 20 mg at 01/04/18 1635    gabapentin (NEURONTIN) capsule 300 mg, 300 mg, Oral, HS, Yue Billy PA-C, 300 mg at 01/04/18 2248    levalbuterol (XOPENEX) inhalation solution 1 25 mg, 1 25 mg, Nebulization, BID, Guzman Mcwilliams MD, 1 25 mg at 01/05/18 0752    levalbuterol (XOPENEX) inhalation solution 1 25 mg, 1 25 mg, Nebulization, Q6H PRN, Claudio Vasquez MD    pantoprazole (PROTONIX) EC tablet 40 mg, 40 mg, Oral, Early Morning, Yue Billy PA-C, 40 mg at 01/05/18 0519    polyvinyl alcohol (LIQUIFILM TEARS) 1 4 % ophthalmic solution 1 drop, 1 drop, Both Eyes, Q3H PRN, Alejandrina Marie PA-C    potassium chloride (K-DUR,KLOR-CON) CR tablet 20 mEq, 20 mEq, Oral, Daily, Yue Billy PA-C, 20 mEq at 01/05/18 0904    pravastatin (PRAVACHOL) tablet 20 mg, 20 mg, Oral, Daily With Dinner, Alejandrina Marie, PA-C, 20 mg at 01/04/18 1634    sodium chloride 0 9 % inhalation solution 3 mL, 3 mL, Nebulization, BID, Guzman Mcwilliams MD, 3 mL at 01/05/18 0751    sodium chloride 0 9 % inhalation solution 3 mL, 3 mL, Nebulization, Q6H PRN, Joseph Fernandez MD    traMADol (ULTRAM) tablet 50 mg, 50 mg, Oral, BID, Yue Billy, PA-C, 50 mg at 01/05/18 0901  Allergies   Allergen Reactions    Penicillins        Objective   Vitals: Blood pressure 100/55, pulse 75, temperature 98 4 °F (36 9 °C), temperature source Temporal, resp  rate 18, height 5' 3" (1 6 m), weight 53 2 kg (117 lb 4 6 oz), SpO2 98 %  , Body mass index is 20 78 kg/m² , Orthostatic Blood Pressures    Flowsheet Row Most Recent Value   Blood Pressure  100/55 filed at 01/05/2018 0736   Patient Position - Orthostatic VS  Lying filed at 01/05/2018 0736            Intake/Output Summary (Last 24 hours) at 01/05/18 1022  Last data filed at 01/05/18 0847   Gross per 24 hour   Intake              240 ml   Output              600 ml   Net             -360 ml       Weight (last 2 days)     Date/Time   Weight    01/05/18 0546  53 2 (117 29)    01/04/18 1328  49 9 (110 01)    01/04/18 1024  53 3 (117 6)              Invasive Devices     Peripheral Intravenous Line            Peripheral IV 01/04/18 Right Antecubital 1 day    Peripheral IV 01/04/18 Left Antecubital less than 1 day                    Physical Exam:  GEN: Karron Doctor appears well, alert and oriented x 3, pleasant and cooperative , frail, cachectic  HEENT: pupils equal, round, and reactive to light; extraocular muscles intact  NECK: supple, no carotid bruits or JVD  HEART/Cardiovascular: regular rhythm, normal S1 and S2, no murmurs, clicks, gallops or rubs   LUNGS/Respiratory: clear to auscultation bilaterally; no wheezes, or rhonchi , dry crackles from interstitial lung disease  ABDOMEN/GI: normal bowel sounds, soft, no tenderness, no distention  EXTREMITIES/Musculoskeletal: peripheral pulses normal; no clubbing, cyanosis, or edema  NEURO: no focal findings   SKIN: normal without suspicious lesions on exposed skin    Lab Results:   Admission on 01/04/2018   Component Date Value    WBC 01/04/2018 7 63     RBC 01/04/2018 3 61*    Hemoglobin 01/04/2018 11 2*    Hematocrit 01/04/2018 34 4*    MCV 01/04/2018 95     MCH 01/04/2018 31 0     MCHC 01/04/2018 32 6     RDW 01/04/2018 14 9     MPV 01/04/2018 8 2*    Platelets 11/06/3353 344     Neutrophils Relative 01/04/2018 63     Lymphocytes Relative 01/04/2018 21     Monocytes Relative 01/04/2018 6     Eosinophils Relative 01/04/2018 9*    Basophils Relative 01/04/2018 1     Neutrophils Absolute 01/04/2018 4 91     Lymphocytes Absolute 01/04/2018 1 60     Monocytes Absolute 01/04/2018 0 43     Eosinophils Absolute 01/04/2018 0 65*    Basophils Absolute 01/04/2018 0 04     Sodium 01/04/2018 143     Potassium 01/04/2018 3 9     Chloride 01/04/2018 106     CO2 01/04/2018 29     Anion Gap 01/04/2018 8     BUN 01/04/2018 18     Creatinine 01/04/2018 1 17     Glucose 01/04/2018 198*    Calcium 01/04/2018 8 8     eGFR 01/04/2018 40     NT-proBNP 01/04/2018 2818*    Troponin I 01/04/2018 <0 02     Ventricular Rate 01/04/2018 85     Atrial Rate 01/04/2018 85     IA Interval 01/04/2018 164     QRSD Interval 01/04/2018 108     QT Interval 01/04/2018 382     QTC Interval 01/04/2018 454     P Axis 01/04/2018 39     QRS Axis 01/04/2018 -30     T Wave [place] 01/04/2018 128     Troponin I 01/04/2018 <0 02     Troponin I 01/04/2018 <0 02          Imaging: I have personally reviewed pertinent reports  EKG:  Normal sinus rhythm, nonspecific interventricular conduction delay    Counseling / Coordination of Care  Total floor / unit time spent today 45 minutes  Greater than 50% of total time was spent with the patient and / or family counseling and / or coordination of care  We will continue to follow with the patient throughout their hospitalization  Thank you for allowing us to participate in their care     Fernanda Panda MD

## 2018-01-05 NOTE — PLAN OF CARE
Problem: Potential for Falls  Goal: Patient will remain free of falls  INTERVENTIONS:  - Assess patient frequently for physical needs  -  Identify cognitive and physical deficits and behaviors that affect risk of falls    -  [place] fall precautions as indicated by assessment   - Educate patient/family on patient safety including physical limitations  - Instruct patient to call for assistance with activity based on assessment  - Modify environment to reduce risk of injury  - Consider OT/PT consult to assist with strengthening/mobility   Outcome: Progressing      Problem: Prexisting or High Potential for Compromised Skin Integrity  Goal: Skin integrity is maintained or improved  INTERVENTIONS:  - Identify patients at risk for skin breakdown  - Assess and monitor skin integrity  - Assess and monitor nutrition and hydration status  - Monitor labs (i e  albumin)  - Assess for incontinence   - Turn and reposition patient  - Assist with mobility/ambulation  - Relieve pressure over bony prominences  - Avoid friction and shearing  - Provide appropriate hygiene as needed including keeping skin clean and dry  - Evaluate need for skin moisturizer/barrier cream  - Collaborate with interdisciplinary team (i e  Nutrition, Rehabilitation, etc )   - Patient/family teaching   Outcome: Progressing      Problem: PAIN - ADULT  Goal: Verbalizes/displays adequate comfort level or baseline comfort level  Interventions:  - Encourage patient to monitor pain and request assistance  - Assess pain using appropriate pain scale  - Administer analgesics based on type and severity of pain and evaluate response  - Implement non-pharmacological measures as appropriate and evaluate response  - Consider cultural and social influences on pain and pain management  - Notify physician/advanced practitioner if interventions unsuccessful or patient reports new pain   Outcome: Progressing      Problem: INFECTION - ADULT  Goal: Absence or prevention of progression during hospitalization  INTERVENTIONS:  - Assess and monitor for signs and symptoms of infection  - Monitor lab/diagnostic results  - Monitor all insertion sites, i e  indwelling lines, tubes, and drains  - Monitor endotracheal (as able) and nasal secretions for changes in amount and color  - [place] appropriate cooling/warming therapies per order  - Administer medications as ordered  - Instruct and encourage patient and family to use good hand hygiene technique  - Identify and instruct in appropriate isolation precautions for identified infection/condition   Outcome: Progressing    Goal: Absence of fever/infection during neutropenic period  INTERVENTIONS:  - Monitor WBC  - Implement neutropenic guidelines   Outcome: Progressing      Problem: SAFETY ADULT  Goal: Maintain or return to baseline ADL function  INTERVENTIONS:  -  Assess patient's ability to carry out ADLs; assess patient's baseline for ADL function and identify physical deficits which impact ability to perform ADLs (bathing, care of mouth/teeth, toileting, grooming, dressing, etc )  - Assess/evaluate cause of self-care deficits   - Assess range of motion  - Assess patient's mobility; develop plan if impaired  - Assess patient's need for assistive devices and provide as appropriate  - Encourage maximum independence but intervene and supervise when necessary  ¯ Involve family in performance of ADLs  ¯ Assess for home care needs following discharge   ¯ Request OT consult to assist with ADL evaluation and planning for discharge  ¯ Provide patient education as appropriate   Outcome: Progressing    Goal: Maintain or return mobility status to optimal level  INTERVENTIONS:  - Assess patient's baseline mobility status (ambulation, transfers, stairs, etc )    - Identify cognitive and physical deficits and behaviors that affect mobility  - Identify mobility aids required to assist with transfers and/or ambulation (gait belt, sit-to-stand, lift, walker, cane, etc )  - [place] fall precautions as indicated by assessment  - Record patient progress and toleration of activity level on Mobility SBAR; progress patient to next Phase/Stage  - Instruct patient to call for assistance with activity based on assessment  - Request Rehabilitation consult to assist with strengthening/weightbearing, etc    Outcome: Progressing      Problem: DISCHARGE PLANNING  Goal: Discharge to home or other facility with appropriate resources  INTERVENTIONS:  - Identify barriers to discharge w/patient and caregiver  - Arrange for needed discharge resources and transportation as appropriate  - Identify discharge learning needs (meds, wound care, etc )  - Arrange for interpretive services to assist at discharge as needed  - Refer to Case Management Department for coordinating discharge planning if the patient needs post-hospital services based on physician/advanced practitioner order or complex needs related to functional status, cognitive ability, or social support system   Outcome: Progressing      Problem: Knowledge Deficit  Goal: Patient/family/caregiver demonstrates understanding of disease process, treatment plan, medications, and discharge instructions  Complete learning assessment and assess knowledge base    Interventions:  - Provide teaching at level of understanding  - Provide teaching via preferred learning methods   Outcome: Progressing

## 2018-01-06 PROBLEM — I50.33 ACUTE ON CHRONIC DIASTOLIC CONGESTIVE HEART FAILURE (HCC): Status: ACTIVE | Noted: 2018-01-01

## 2018-01-06 PROBLEM — N18.30 STAGE 3 CHRONIC KIDNEY DISEASE (HCC): Status: ACTIVE | Noted: 2018-01-01

## 2018-01-06 NOTE — PROGRESS NOTES
Patient reports dry non-productive cough; Dr Felisa Harper aware:cough drops given  Patient denies pain and SOB  Patient OOB to chair; Call bell in reach

## 2018-01-06 NOTE — RESPIRATORY THERAPY NOTE
Home Oxygen Qualifying    Patient name: Juan Armendariz        : 1923   Date of Test:  2018  Diagnosis:      Home Oxygen Test:    **Medicare Guidelines require item(s) 1-5 on all ambulatory patients or 1 and 2 on on-ambulatory patients  1   Baseline SPO2 on Room Air at rest 89 %  If = or < 88% on room air add O2 via NC and titrate patient  Patient must be ambulated with O2 and titrated to > 88% with exertion  2   SPO2 on Oxygen at rest 95 %  3   SPO2 during exercise on Room Air 83 %  4   SPO2 during exercise on Oxygen  89% at a liter flow of 2 lpm     5   Exercise performed:    [] Walking     [] Stairs     [x] Duration 10 (min )     [x] Distance n/a (ft )       [x]  Supplemental Home Oxygen is indicated  []  Client does not qualify for home oxygen        Aline Frey, RT

## 2018-01-06 NOTE — PROGRESS NOTES
[facility] Internal Medicine Progress Note  Patient: Elroy Chowdhury 80 y o  female   MRN: [id number]  PCP: Carly Mccabe DO  Unit/Bed#: 382-08 Encounter: [id number]  Date Of Visit: 18    Assessment:    Principal Problem:    Acute on chronic diastolic congestive heart failure (HCC)  Active Problems:    Generalized weakness    Essential hypertension    Gastroesophageal reflux disease without esophagitis    Coronary artery disease involving native coronary artery of native heart    H/O aortic valve replacement    Hyperlipidemia    Frequent falls    Stage 3 chronic kidney disease      Plan:    · Acute on chronic diastolic CHF: Continue PO lasix  Repeat chest x-ray  Check ambulatory pulse oximetry  · Generalized weakness/Frequent falls: continue PT/OT  · CAD with history of aortic valve replacement: continue beta blocker and aspirin  · Hyperlipidemia: continue statin therapy  · Hypertension: Continue Coreg and Cardizem CD  · Left leg pain: improving  Venous duplex negative for DVT  VTE Pharmacologic Prophylaxis:   Pharmacologic: Enoxaparin (Lovenox)  Mechanical VTE Prophylaxis in Place: Yes      Discussions with Specialists or Other Care Team Provider: Nursing, CM, and attending  Time Spent for Care: 30 minutes  More than 50% of total time spent on counseling and coordination of care as described above  Current Length of Stay: 2 day(s)    Current Patient Status: Inpatient     Discharge Plan / Estimated Discharge Date: likely tomorrow 18    Code Status: Level 1 - Full Code      Subjective: The patient was seen and examained  The patient states her breathing has not improved  No acute events overnight  She states her left leg pain is improving  Objective:     Vitals:   Temp (24hrs), Av 2 °F (36 8 °C), Min:98 1 °F (36 7 °C), Max:98 2 °F (36 8 °C)    HR:  [83-94] 83  Resp:  [18] 18  BP: (104-121)/(60-66) 121/66  SpO2:  [92 %-98 %] 96 %  Body mass index is 20 66 kg/m²  Input and Output Summary (last 24 hours): Intake/Output Summary (Last 24 hours) at 01/06/18 1537  Last data filed at 01/06/18 0942   Gross per 24 hour   Intake              480 ml   Output              500 ml   Net              -20 ml       Physical Exam:     Physical Exam   Constitutional: She is oriented to person, place, and time  Vital signs are normal  She appears well-developed and well-nourished  She is active and cooperative  Cardiovascular: Normal rate, regular rhythm and normal heart sounds  Pulmonary/Chest: Effort normal and breath sounds normal  She has no wheezes  She has no rhonchi  She has no rales  Abdominal: Soft  Normal appearance and bowel sounds are normal  There is no tenderness  Neurological: She is alert and oriented to person, place, and time  No cranial nerve deficit  Skin: Skin is warm, dry and intact  Nursing note and vitals reviewed  Additional Data:     Labs:      Results from last 7 days  Lab Units 01/06/18  0535   WBC Thousand/uL 9 12   HEMOGLOBIN g/dL 10 6*   HEMATOCRIT % 33 3*   PLATELETS Thousands/uL 329   NEUTROS PCT % 55   LYMPHS PCT % 28   MONOS PCT % 8   EOS PCT % 9*       Results from last 7 days  Lab Units 01/06/18  0535   SODIUM mmol/L 137   POTASSIUM mmol/L 4 4   CHLORIDE mmol/L 101   CO2 mmol/L 30   BUN mg/dL 23   CREATININE mg/dL 1 09   CALCIUM mg/dL 8 7   TOTAL PROTEIN g/dL 7 6   BILIRUBIN TOTAL mg/dL 0 60   ALK PHOS U/L 65   ALT U/L 16   AST U/L 21   GLUCOSE RANDOM mg/dL 96           * I Have Reviewed All Lab Data Listed Above  * Additional Pertinent Lab Tests Reviewed:  All Labs Within Last 24 Hours Reviewed    Imaging:    Imaging Reports Reviewed Today Include: Chest x-ray  Imaging Personally Reviewed by Myself Includes:  none    Recent Cultures (last 7 days):           Last 24 Hours Medication List:     aspirin 81 mg Oral Daily   calcium carbonate-vitamin D 1 tablet Oral Daily With Breakfast   carvedilol 3 125 mg Oral BID With Meals diltiazem 180 mg Oral Daily   docusate sodium 100 mg Oral BID   enoxaparin 30 mg Subcutaneous Daily   furosemide 20 mg Oral Every Other Day   gabapentin 300 mg Oral HS   levalbuterol 1 25 mg Nebulization BID   pantoprazole 40 mg Oral Early Morning   potassium chloride 20 mEq Oral Daily   pravastatin 20 mg Oral Daily With Dinner   sodium chloride 3 mL Nebulization BID   traMADol 50 mg Oral BID        Today, Patient Was Seen By: Rossana Rausch PA-C    ** Please Note: This note has been constructed using a voice recognition system   **

## 2018-01-07 NOTE — PLAN OF CARE
Problem: Potential for Falls  Goal: Patient will remain free of falls  INTERVENTIONS:  - Assess patient frequently for physical needs  -  Identify cognitive and physical deficits and behaviors that affect risk of falls    -  [place] fall precautions as indicated by assessment   - Educate patient/family on patient safety including physical limitations  - Instruct patient to call for assistance with activity based on assessment  - Modify environment to reduce risk of injury  - Consider OT/PT consult to assist with strengthening/mobility   Outcome: Progressing      Problem: Prexisting or High Potential for Compromised Skin Integrity  Goal: Skin integrity is maintained or improved  INTERVENTIONS:  - Identify patients at risk for skin breakdown  - Assess and monitor skin integrity  - Assess and monitor nutrition and hydration status  - Monitor labs (i e  albumin)  - Assess for incontinence   - Turn and reposition patient  - Assist with mobility/ambulation  - Relieve pressure over bony prominences  - Avoid friction and shearing  - Provide appropriate hygiene as needed including keeping skin clean and dry  - Evaluate need for skin moisturizer/barrier cream  - Collaborate with interdisciplinary team (i e  Nutrition, Rehabilitation, etc )   - Patient/family teaching   Outcome: Progressing      Problem: PAIN - ADULT  Goal: Verbalizes/displays adequate comfort level or baseline comfort level  Interventions:  - Encourage patient to monitor pain and request assistance  - Assess pain using appropriate pain scale  - Administer analgesics based on type and severity of pain and evaluate response  - Implement non-pharmacological measures as appropriate and evaluate response  - Consider cultural and social influences on pain and pain management  - Notify physician/advanced practitioner if interventions unsuccessful or patient reports new pain   Outcome: Progressing      Problem: INFECTION - ADULT  Goal: Absence or prevention of progression during hospitalization  INTERVENTIONS:  - Assess and monitor for signs and symptoms of infection  - Monitor lab/diagnostic results  - Monitor all insertion sites, i e  indwelling lines, tubes, and drains  - Monitor endotracheal (as able) and nasal secretions for changes in amount and color  - [place] appropriate cooling/warming therapies per order  - Administer medications as ordered  - Instruct and encourage patient and family to use good hand hygiene technique  - Identify and instruct in appropriate isolation precautions for identified infection/condition   Outcome: Progressing    Goal: Absence of fever/infection during neutropenic period  INTERVENTIONS:  - Monitor WBC  - Implement neutropenic guidelines   Outcome: Progressing      Problem: SAFETY ADULT  Goal: Maintain or return to baseline ADL function  INTERVENTIONS:  -  Assess patient's ability to carry out ADLs; assess patient's baseline for ADL function and identify physical deficits which impact ability to perform ADLs (bathing, care of mouth/teeth, toileting, grooming, dressing, etc )  - Assess/evaluate cause of self-care deficits   - Assess range of motion  - Assess patient's mobility; develop plan if impaired  - Assess patient's need for assistive devices and provide as appropriate  - Encourage maximum independence but intervene and supervise when necessary  ¯ Involve family in performance of ADLs  ¯ Assess for home care needs following discharge   ¯ Request OT consult to assist with ADL evaluation and planning for discharge  ¯ Provide patient education as appropriate   Outcome: Progressing    Goal: Maintain or return mobility status to optimal level  INTERVENTIONS:  - Assess patient's baseline mobility status (ambulation, transfers, stairs, etc )    - Identify cognitive and physical deficits and behaviors that affect mobility  - Identify mobility aids required to assist with transfers and/or ambulation (gait belt, sit-to-stand, lift, walker, cane, etc )  - [place] fall precautions as indicated by assessment  - Record patient progress and toleration of activity level on Mobility SBAR; progress patient to next Phase/Stage  - Instruct patient to call for assistance with activity based on assessment  - Request Rehabilitation consult to assist with strengthening/weightbearing, etc    Outcome: Progressing      Problem: DISCHARGE PLANNING  Goal: Discharge to home or other facility with appropriate resources  INTERVENTIONS:  - Identify barriers to discharge w/patient and caregiver  - Arrange for needed discharge resources and transportation as appropriate  - Identify discharge learning needs (meds, wound care, etc )  - Arrange for interpretive services to assist at discharge as needed  - Refer to Case Management Department for coordinating discharge planning if the patient needs post-hospital services based on physician/advanced practitioner order or complex needs related to functional status, cognitive ability, or social support system   Outcome: Progressing      Problem: Knowledge Deficit  Goal: Patient/family/caregiver demonstrates understanding of disease process, treatment plan, medications, and discharge instructions  Complete learning assessment and assess knowledge base    Interventions:  - Provide teaching at level of understanding  - Provide teaching via preferred learning methods   Outcome: Progressing      Problem: DISCHARGE PLANNING - CARE MANAGEMENT  Goal: Discharge to post-acute care or home with appropriate resources  INTERVENTIONS:  - Conduct assessment to determine patient/family and health care team treatment goals, and need for post-acute services based on payer coverage, community resources, and patient preferences, and barriers to discharge  - Address psychosocial, clinical, and financial barriers to discharge as identified in assessment in conjunction with the patient/family and health care team  - Arrange appropriate level of post-acute services according to patient's   needs and preference and payer coverage in collaboration with the physician and health care team  - Communicate with and update the patient/family, physician, and health care team regarding progress on the discharge plan  - Arrange appropriate transportation to post-acute venues   Outcome: Progressing

## 2018-01-07 NOTE — PROGRESS NOTES
[address] Internal Medicine Progress Note  Patient: Diane Rashid 80 y o  female   MRN: [id number]  PCP: Esther Singleton DO  Unit/Bed#: 383-52 Encounter: [id number]  Date Of Visit: 01/07/18    Assessment:    Principal Problem:    Acute on chronic diastolic congestive heart failure (HCC)  Active Problems:    Generalized weakness    Essential hypertension    Gastroesophageal reflux disease without esophagitis    Coronary artery disease involving native coronary artery of native heart    H/O aortic valve replacement    Hyperlipidemia    Frequent falls    Stage 3 chronic kidney disease      Plan:    · Acute on chronic diastolic CHF: the patient appears euvolemic at this time  Continue Lasix 20 mg PO every other day  Will need follow up with her Cardiologist at discharge  · Generalized weakness/Frequrent falls: continue PT/OT  · CAD with history of aortic valve replacement: continue aspirin  Beta blocker stopped due to hypotension  · Hypertension: patient noted to have low blood pressures  Coreg discontinued  Cardizem CD decreased from 180 mg to 120 mg PO daily  Continue to monitor blood pressure  · Hyperlipidemia: continue statin therapy  · Left leg pain: improving  Venous duplex negative for DVT  · CKD stage 3: given GFR ranging from 35-42 in the last 6 months  Creatinine has been stable  Continue to monitor  VTE Pharmacologic Prophylaxis:   Pharmacologic: Enoxaparin (Lovenox)  Mechanical VTE Prophylaxis in Place: Yes      Discussions with Specialists or Other Care Team Provider: Nursing, CM, and attending  Time Spent for Care: 30 minutes  More than 50% of total time spent on counseling and coordination of care as described above      Current Length of Stay: 3 day(s)    Current Patient Status: Inpatient   Certification Statement: The patient will continue to require additional inpatient hospital stay due to continued monitoring of blood pressure with adjustments to medications    Discharge Plan / Estimated Discharge Date: likely in the next 24-48 hours    Code Status: Level 1 - Full Code      Subjective: The patient was seen and examined  The patient denies any complaints  No acute events overnight  Objective:     Vitals:   Temp (24hrs), Av 7 °F (37 1 °C), Min:98 1 °F (36 7 °C), Max:99 3 °F (37 4 °C)    HR:  [75-82] 82  Resp:  [18] 18  BP: (104-108)/(57-64) 108/64  SpO2:  [96 %-99 %] 98 %  Body mass index is 19 45 kg/m²  Input and Output Summary (last 24 hours): Intake/Output Summary (Last 24 hours) at 18 1034  Last data filed at 18 0004   Gross per 24 hour   Intake              580 ml   Output              450 ml   Net              130 ml       Physical Exam:     Physical Exam   Constitutional: She is oriented to person, place, and time  She appears well-developed and well-nourished  She is active and cooperative  Cardiovascular: Normal rate, regular rhythm and normal heart sounds  Pulmonary/Chest: Effort normal  She has rales in the right lower field and the left lower field  Abdominal: Soft  Normal appearance and bowel sounds are normal  There is no tenderness  Neurological: She is alert and oriented to person, place, and time  No cranial nerve deficit  Skin: Skin is warm, dry and intact  Nursing note and vitals reviewed      Additional Data:     Labs:      Results from last 7 days  Lab Units 18  0532   WBC Thousand/uL 7 59   HEMOGLOBIN g/dL 10 4*   HEMATOCRIT % 32 4*   PLATELETS Thousands/uL 314   NEUTROS PCT % 47   LYMPHS PCT % 33   MONOS PCT % 9   EOS PCT % 10*       Results from last 7 days  Lab Units 18  0532 18  0535   SODIUM mmol/L 137 137   POTASSIUM mmol/L 4 3 4 4   CHLORIDE mmol/L 102 101   CO2 mmol/L 30 30   BUN mg/dL 23 23   CREATININE mg/dL 1 15 1 09   CALCIUM mg/dL 8 6 8 7   TOTAL PROTEIN g/dL  --  7 6   BILIRUBIN TOTAL mg/dL  --  0 60   ALK PHOS U/L  --  65   ALT U/L  --  16   AST U/L  --  21   GLUCOSE RANDOM mg/dL 91 96 * I Have Reviewed All Lab Data Listed Above  * Additional Pertinent Lab Tests Reviewed: All Labs Within Last 24 Hours Reviewed    Imaging:    Imaging Reports Reviewed Today Include: none  Imaging Personally Reviewed by Myself Includes:  none    Recent Cultures (last 7 days):           Last 24 Hours Medication List:     aspirin 81 mg Oral Daily   calcium carbonate-vitamin D 1 tablet Oral Daily With Breakfast   [START ON 1/8/2018] diltiazem 120 mg Oral Daily   docusate sodium 100 mg Oral BID   enoxaparin 30 mg Subcutaneous Daily   furosemide 20 mg Oral Every Other Day   gabapentin 300 mg Oral HS   levalbuterol 1 25 mg Nebulization BID   pantoprazole 40 mg Oral Early Morning   potassium chloride 20 mEq Oral Daily   pravastatin 20 mg Oral Daily With Dinner   sodium chloride 3 mL Nebulization BID   traMADol 50 mg Oral BID        Today, Patient Was Seen By: Columba Olivares PA-C    ** Please Note: This note has been constructed using a voice recognition system   **

## 2018-01-07 NOTE — SOCIAL WORK
I called Amie zhang and The son Katerin Floor and they are aware that the patient will not return back to the personal care today

## 2018-01-08 PROBLEM — R53.1 GENERALIZED WEAKNESS: Status: RESOLVED | Noted: 2017-01-01 | Resolved: 2018-01-01

## 2018-01-08 NOTE — PLAN OF CARE
Problem: DISCHARGE PLANNING - CARE MANAGEMENT  Goal: Discharge to post-acute care or home with appropriate resources  INTERVENTIONS:  - Conduct assessment to determine patient/family and health care team treatment goals, and need for post-acute services based on payer coverage, community resources, and patient preferences, and barriers to discharge  - Address psychosocial, clinical, and financial barriers to discharge as identified in assessment in conjunction with the patient/family and health care team  - Arrange appropriate level of post-acute services according to patient's   needs and preference and payer coverage in collaboration with the physician and health care team  - Communicate with and update the patient/family, physician, and health care team regarding progress on the discharge plan  - Arrange appropriate transportation to post-acute venues   Outcome: Completed Date Met: 01/08/18  -dc back to [address] ambulance () to transport pt

## 2018-01-08 NOTE — PHYSICAL THERAPY NOTE
PT treatment note      01/08/18 1128   Pain Assessment   Pain Score No Pain   Restrictions/Precautions   Other Precautions (Chair Alarm;Multiple lines;Telemetry;O2;Fall Risk)   Subjective   Subjective Agreeable to therapy   Bed Mobility   Supine to Sit 5  Supervision   Additional items HOB elevated; Bedrails;Verbal cues   Transfers   Sit to Stand 5  Supervision   Additional items Verbal cues   Stand to Sit 5  Supervision   Additional items Armrests; Verbal cues   Stand pivot 5  Supervision   Ambulation/Elevation   Gait pattern Forward Flexion   Gait Assistance 5  Supervision   Assistive Device Rolling walker   Distance 80'   Balance   Static Sitting Good   Dynamic Sitting Good   Static Standing Fair +   Dynamic Standing [address]  (with RW)   Endurance Deficit   Endurance Deficit Yes   Activity Tolerance   Activity Tolerance Patient limited by fatigue   Assessment   Prognosis Good   Problem List Decreased strength;Decreased endurance; Impaired balance;Decreased mobility   Assessment performing functional mobility at (S) level with cues for technique and safety  No LOB  Pt is in need of continued activity in PT to improve impairments and functional deficits  pt will be safe to return to [facility] when medically stable for discharge and would benefit from home health care services  Plan   Treatment/Interventions Functional transfer training;LE strengthening/ROM; Therapeutic exercise; Endurance training;Bed mobility;Gait training   Progress Progressing toward goals   Recommendation   Recommendation (Home PT (return to [facility] with Home PT))   Pt  OOB with call bell within reach, scd's connected and turned on and alarm on at end of PT session

## 2018-01-08 NOTE — SOCIAL WORK
Pt is being dc'd back to [facility] on this date  [facility] ([facility] to transport pt at 1pm back to [facility]  Pt's son Pbcarolinavickie cam as is Caitlin Rajan at [facility]

## 2018-01-08 NOTE — DISCHARGE SUMMARY
Discharge Summary - [facility] Internal Medicine    Patient Information: Angelina Christianson 80 y o  female MRN: [id number]  Unit/Bed#: 423-01 Encounter: [id number]    Discharging Physician / Practitioner: Tri Booker PA-C  PCP: Molina Leon DO  Admission Date: 1/4/2018  Discharge Date: 01/08/18    Reason for Admission: acute on chronic CHF, generalized weakness    Discharge Diagnoses:     Principal Problem:    Acute on chronic diastolic congestive heart failure ([address] )  Active Problems:    Essential hypertension    Gastroesophageal reflux disease without esophagitis    Coronary artery disease involving native coronary artery of native heart    H/O aortic valve replacement    Hyperlipidemia    Frequent falls    Stage 3 chronic kidney disease  Resolved Problems:    Generalized weakness      Consultations During Hospital Stay:  · Cardiology    Procedures Performed:     · Chest x-ray(1/4/18): Chronic interstitial lung disease  Diffuse interstitial alveolar infiltrates within the lungs bilaterally with more focal area of consolidation in the left lower lobe   Although the findings may be cardiogenic in nature, superimposed pneumonia not excluded   Follow-up recommended  · Chest x-ray(1/6/18):  Chronic interstitial changes   No confluent infiltrates  Significant Findings / Test Results:     · BNP 2,818,     Incidental Findings:   · none     Test Results Pending at Discharge (will require follow up):   · none     Outpatient Tests Requested:  · none    Complications:  none    Hospital Course:     Angelina Christianson is a 80 y o  female patient who originally presented to the hospital on 1/4/2018 due to shortness of breath for the past several weeks  Today she was feeling short of breath and noted to be hypoxic on evaluation by staff, with a pulse oximetry of 85% on room air  She also has noticed an increasing dry cough over the past few weeks, without fever or chills   No congestion       In the ER a chest x-ray noted findings suggestive of CHF  The patient was started on IV lasix  Echocardiogram ordered which showed left systolic function was normal with EF of 45%  Septal dyskinesis  Grade 1 diastolic dysfunction (please see report for full details)  Cardiology was consulted and saw the patient on day 2 of her hospitalization and felt the patient was back to her baseline with no evidence of volume overload  He recommended decreasing lasix to 20 mg PO every other day  She was discharge on this dose  She was then noted to be hypotensive with blood pressures running in the low 807'O systolic  Due to this her blood pressure medications were held two days in a row  Her Coreg was stopped and her Cardizem CD was decreased from 180 to 120 PO daily  The patient's blood pressure improved and she was discharged back to [facility] with instructions to stop taking the Coreg and continue the decreased dose of Cardizem CD until seen by her Cardiologist Dr Gualberto Self  She will need to follow up with Dr Gualberto Self regarding medication changes and blood pressure check  She was also instructed to follow up with her PCP within 1 week  Condition at Discharge: good     Discharge Day Visit / Exam:     * Please refer to separate progress note for these details *        Discharge instructions/Information to patient and family:   See after visit summary for information provided to patient and family  Provisions for Follow-Up Care:  See after visit summary for information related to follow-up care and any pertinent home health orders  Disposition:     Other: [facility]        Planned Readmission: no    Discharge Statement:  I spent 25 minutes discharging the patient  This time was spent on the day of discharge  I had direct contact with the patient on the day of discharge   Greater than 50% of the total time was spent examining patient, answering all patient questions, arranging and discussing plan of care with patient as well as directly providing post-discharge instructions  Additional time then spent on discharge activities  Discharge Medications:  See after visit summary for reconciled discharge medications provided to patient and family  ** Please Note: This note has been constructed using a voice recognition system   **

## 2018-01-08 NOTE — SOCIAL WORK
Scheduled pt's follow up with her cardiologist Dr Asya Kaufman  Attempted to schedule follow up with Dr Andrea Valdes but the office was closed and got the answering service  Amie Jaime (Damon Redding) aware of need to schedule pt a follow up appointment with Dr Andrea Valdes

## 2018-01-10 NOTE — MISCELLANEOUS
History of Present Illness  TCM Communication [facility]: The patient is being contacted for follow-up after hospitalization  She was hospitalized at and [facility]  The date of admission: 09/05/2017, date of discharge: 09/08/2017  Diagnosis: back pain  She was discharged to a rehabilitation center  Medications were not reviewed today  She did not schedule a follow up appointment  The patient is currently asymptomatic  Communication performed and completed by BK      Active Problems    1  Advance directive discussed with patient (V65 49) (Z71 89)   2  Angina pectoris (413 9) (I20 9)   3  Aortic sclerosis   4  Arteriosclerosis of coronary artery (414 00) (I25 10)   5  Esophageal reflux (530 81) (K21 9)   6  H/O aortic valve replacement (V43 3) (Z95 2)   7  Hyperlipidemia (272 4) (E78 5)   8  Hypertension (401 9) (I10)   9  Low back pain (724 2) (M54 5)   10  Need for influenza vaccination (V04 81) (Z23)   11  Osteoarthritis (715 90) (M19 90)   12  Post-menopausal osteoporosis (733 01) (M81 0)   13  Screening for depression (V79 0) (Z13 89)    Past Medical History    1  History of Abnormal laboratory test (796 4) (R89 9)   2  History of Encounter for routine pelvic examination (V72 31) (Z01 419)   3  History of Encounter for screening mammogram for malignant neoplasm of breast   (V76 12) (Z12 31)   4  History of Need for prophylactic vaccination and inoculation against influenza (V04 81)   (Z23)   5  History of Sinusitis (473 9) (J32 9)    Surgical History    1  History of Aortic Valve Replacement   2  History of Appendectomy   3  History of Cataract Surgery    Family History  Sister    1  Family history of Colon Cancer (V16 0)  Family History    2   Denied: Family history of Drug abuse    Social History    · Assistive Devices: Walker   · Dental care, regularly   · Good dental hygiene   · Never a smoker   · Never Drank Alcohol   · No caffeine use   · Occupation: Retired   · Patient has living will (V49 89) (Z78 9) · Uses Safety Equipment - Seatbelts   ·     Current Meds   1  Aspirin 81 MG TABS; Take 1 tablet daily Recorded   2  Carvedilol 3 125 MG Oral Tablet; TAKE 1 TABLET TWICE DAILY WITH MEALS; Therapy: 89KEC3693 to (Evaluate:39Ujy2140)  Requested for: 00Ger1335; Last   Rx:73Xdd8089 Ordered   3  Clopidogrel Bisulfate 75 MG Oral Tablet; TAKE 1 TABLET DAILY; Therapy: 87MBC6871 to (Evaluate:66Xux0454)  Requested for: 95OVB6315; Last   Rx:85Lxd9477 Ordered   4  DilTIAZem HCl ER Coated Beads 180 MG Oral Capsule Extended Release 24 Hour;   take 1 capsule daily; Therapy: 21Jan2012 to (Evaluate:89Wcp4559)  Requested for: 20Amc8936; Last   Rx:71Xsz4704 Ordered   5  Pantoprazole Sodium 40 MG Oral Tablet Delayed Release; Take 1 tablet daily; Therapy: 86KUP0961 to (Evaluate:81Afn3998)  Requested for: 46Cmc1911; Last   Rx:29Jli5457 Ordered   6  Simvastatin 10 MG Oral Tablet; Therapy: 77CXL9297 to Recorded   7  TraMADol HCl - 50 MG Oral Tablet; TAKE 1 TABLET EVERY 12 HOURS AS NEEDED; Therapy: 23Icc3510 to (Evaluate:95Rbw4127); Last Rx:53Yey6880 Ordered    Allergies    1  Penicillins    Health Management  Health Maintenance   Medicare Annual Wellness Visit; every 1 year; Last 42IAG9694; Next Due: 70Har7959; Active    Message   Recorded as Task  Date: 09/08/2017 01:10 PM, Created By: System  Task Name: Hospital PATRICE  Assigned To: Magalie Cutler  Regarding Patient: Genevieve Glez, Status:  In Progress  Comment:   System - 08 Sep 2017 1:10 PM    Patient discharged from hospital   Patient Name: Robert Nagel  Patient YOB: 1923  Discharge Date: 9/8/2017  Facility: [facility] 11 Sep 2017 11:01 AM    TASK IN PROGRESS     Future Appointments    Date/Time Provider Specialty Site   10/25/2017 09:00 AM Magalie Cutler, DO Internal Medicine [address]     Signatures   Electronically signed by : Chang Lowe OM; Sep 11 2017  1:21PM EST                       (Author) Electronically signed by : Mandeep Galarza DO; Sep 11 2017  4:54PM EST                       (Author)

## 2018-01-10 NOTE — MISCELLANEOUS
Assessment   1  UTI (urinary tract infection) (599 0) (N39 0)  2  H/O aortic valve replacement (V43 3) (Z95 2)  3  Dehydration (276 51) (E86 0)  4  Osteoarthritis (715 90) (M19 90)    Discussion/Summary  Discussion Summary:   Increase fluids and eat regularly  Agree pt to contact office on aging for assistance at home  Fall precautions  Continue present rx  Rto 3 months  Medication SE Review and Pt Understands Tx: The treatment plan was reviewed with the patient/guardian  The patient/guardian understands and agrees with the treatment plan   Self Referrals:   Self Referrals: No      Chief Complaint  Chief Complaint Free Text Note Form: Pt presents for hosp f/up  Pt states she is feeling better today  States she finished her antibiotics and has noticed an improvement in her symptoms  Appetite is normal for her she states  No falls and she is using her cane and she states she did buy a walker for at home also  History of Present Illness  TCM Communication [facility]: The patient is being contacted for follow-up after hospitalization  [facility]  The date of admission: 03/23/2017, date of discharge: 03/24/2017  Diagnosis: Cholelithiasis  She was discharged to home  Medications reviewed and updated today  She scheduled a follow up appointment  The patient is currently asymptomatic  Counseling was provided to the patient  Communication performed and completed by LISA   HPI: Pt feels better  Finished antibx  No abdominal pain  No n/v  No fever/chills  She is doing ok at home but thinks she will call office on aging for help soon  Appetite good  Bowels nl  Review of Systems  Complete-Female:   Constitutional: No fever, no chills, feels well, no tiredness, no recent weight gain or weight loss, no fever and no chills  Eyes: No complaints of eye pain, no red eyes, no eyesight problems, no discharge, no dry eyes, no itching of eyes     ENT: no complaints of earache, no loss of hearing, no nose bleeds, no nasal discharge, no sore throat, no hoarseness  Cardiovascular: No complaints of slow heart rate, no fast heart rate, no chest pain, no palpitations, no leg claudication, no lower extremity edema  Respiratory: No complaints of shortness of breath, no wheezing, no cough, no SOB on exertion, no orthopnea, no PND  Gastrointestinal: No complaints of abdominal pain, no constipation, no nausea or vomiting, no diarrhea, no bloody stools  Genitourinary: No complaints of dysuria, no incontinence, no pelvic pain, no dysmenorrhea, no vaginal discharge or bleeding  Musculoskeletal: arthralgias and myalgias  Integumentary: No complaints of skin rash or lesions, no itching, no skin wounds, no breast pain or lump  Neurological: difficulty walking  Psychiatric: Not suicidal, no sleep disturbance, no anxiety or depression, no change in personality, no emotional problems  Endocrine: No complaints of proptosis, no hot flashes, no muscle weakness, no deepening of the voice, no feelings of weakness  Hematologic/Lymphatic: No complaints of swollen glands, no swollen glands in the neck, does not bleed easily, does not bruise easily  Active Problems   1  Advance directive discussed with patient (V65 49) (Z71 89)  2  Angina pectoris (413 9) (I20 9)  3  Aortic sclerosis (440 0) (I70 0)  4  Arteriosclerosis of coronary artery (414 00) (I25 10)  5  Dehydration (276 51) (E86 0)  6  Esophageal reflux (530 81) (K21 9)  7  Fatigue (780 79) (R53 83)  8  Gastroenteritis (558 9) (K52 9)  9  H/O aortic valve replacement (V43 3) (Z95 2)  10  Hyperlipidemia (272 4) (E78 5)  11  Hypertension (401 9) (I10)  12  Lyme disease (088 81) (A69 20)  13  Need for influenza vaccination (V04 81) (Z23)  14  Post-menopausal osteoporosis (733 01) (M81 0)  15  Syncope (780 2) (R55)  16  Vertigo (780 4) (R42)    Past Medical History   1  History of Abnormal laboratory test (796 4) (R89 9)  2   History of Encounter for routine pelvic examination (V72 31) (Z01 419)  3  History of Encounter for screening mammogram for malignant neoplasm of breast   (V76 12) (Z12 31)  4  History of Need for prophylactic vaccination and inoculation against influenza (V04 81)   (Z23)  5  History of Sinusitis (473 9) (J32 9)    Surgical History   1  History of Aortic Valve Replacement  2  History of Appendectomy  3  History of Cataract Surgery  Surgical History Reviewed: The surgical history was reviewed and updated today  Family History  Sister   1  Family history of Colon Cancer (V16 0)  Family History   2  Denied: Family history of Drug abuse  Family History Reviewed: The family history was reviewed and updated today  Social History    · Assistive Devices: Walker   · Dental care, regularly   · Good dental hygiene   · Never a smoker   · Never Drank Alcohol   · No caffeine use   · Occupation: Retired   · Patient has living will (V49 89) (Z78 9)   · Uses Safety Equipment - Seatbelts   ·   Social History Reviewed: The social history was reviewed and updated today  The social history was reviewed and is unchanged  Current Meds  1  Aspirin 81 MG TABS; Take 1 tablet daily Recorded  2  Carvedilol 3 125 MG Oral Tablet; TAKE 1 TABLET TWICE DAILY WITH MEALS; Therapy: 77TFL2666 to (Evaluate:29Mkp1494)  Requested for: 27PXZ8401; Last   Rx:70Nfy8246 Ordered  3  Clopidogrel Bisulfate 75 MG Oral Tablet; TAKE 1 TABLET DAILY; Therapy: 88KIK3658 to (Evaluate:39Tkh1771)  Requested for: 83UGU2212; Last   Rx:76Xio8232 Ordered  4  DiltiaZEM HCl ER Coated Beads 180 MG Oral Capsule Extended Release 24 Hour; take   1 capsule daily; Therapy: 21Jan2012 to (Evaluate:77Afs5896)  Requested for: 09RYB5467; Last   Rx:17Mik4255 Ordered  5  Pantoprazole Sodium 40 MG Oral Tablet Delayed Release; Take 1 tablet daily; Therapy: 81ITK9618 to (Evaluate:34Pqg6884)  Requested for: 02UCP8069; Last   Rx:99Pdo0663 Ordered  6  Simvastatin 10 MG Oral Tablet;    Therapy: 50OZW8725 to Recorded  Medication List Reviewed: The medication list was reviewed and updated today  Allergies   1  Penicillins    Vitals  Signs   Recorded: 82OKV6412 41:89TE   Systolic: 272  Diastolic: 76  Recorded: 31NVQ2670 09:12AM   Temperature: 97 F  Heart Rate: 100  Height: 4 ft 9 5 in  Weight: 121 lb 2 08 oz  BMI Calculated: 25 76  BSA Calculated: 1 47  O2 Saturation: 92    Physical Exam    Constitutional   General appearance: No acute distress, well appearing and well nourished  comfortable  Eyes   Conjunctiva and lids: No swelling, erythema or discharge  Pupils and irises: Equal, round and reactive to light  Ears, Nose, Mouth, and Throat   External inspection of ears and nose: Normal     Otoscopic examination: Tympanic membranes translucent with normal light reflex  Canals patent without erythema  Nasal mucosa, septum, and turbinates: Normal without edema or erythema  Oropharynx: Normal with no erythema, edema, exudate or lesions  Pulmonary   Respiratory effort: No increased work of breathing or signs of respiratory distress  Auscultation of lungs: Abnormal   decrease bs  Cardiovascular   Palpation of heart: Normal PMI, no thrills  Auscultation of heart: Normal rate and rhythm, normal S1 and S2, without murmurs  Examination of extremities for edema and/or varicosities: Normal     Carotid pulses: Normal     Abdomen   Abdomen: Abnormal   minimal ttp diffuse  Liver and spleen: No hepatomegaly or splenomegaly  Lymphatic   Palpation of lymph nodes in neck: No lymphadenopathy  Musculoskeletal   Gait and station: Abnormal   uses cane  Digits and nails: Normal without clubbing or cyanosis  Inspection/palpation of joints, bones, and muscles: Abnormal     Skin   Skin and subcutaneous tissue: Normal without rashes or lesions  Neurologic   Cranial nerves: Cranial nerves 2-12 intact  Reflexes: 2+ and symmetric  Sensation: No sensory loss      Psychiatric Orientation to person, place, and time: Normal     Mood and affect: Normal          Health Management  Health Maintenance   Medicare Annual Wellness Visit; every 1 year; Last 04OUK5117; Next Due: 39YDB4214;  Overdue    Signatures   Electronically signed by : Farzad Maria OM; Mar 27 2017 11:05AM EST                       (Author)    Electronically signed by : Yomaira Harris DO; Mar 30 2017  8:17PM EST                       (Author)    Electronically signed by : Yomaira Harris DO; Mar 30 2017  8:17PM EST                       (Author)

## 2018-01-23 NOTE — MISCELLANEOUS
Assessment   1  H/O aortic valve replacement (V43 3) (Z95 2)1   2  Arteriosclerosis of coronary artery (414 00) (I25 10)1   3  Hypertension (401 9) (I10)1      1 Amended By: Cosmo Jaquez; Jan 25 2016 10:35 AM EST    Plan  H/O aortic valve replacement    · Follow-up visit in 6 weeks Evaluation and Treatment  Follow-up  Status: Hold For -  Scheduling  Requested for: 72SQM21593   Ordered; For: H/O aortic valve replacement;  Ordered By: Cosmo Jaquez  Performed:     Due: 33TCG01616      1 Amended By: Cosmo Jaquez; Jan 25 2016 10:36 AM EST    Discussion/Summary  Discussion Summary:   Continue present meds,list reviewed/schedule Refill Rx  Increase activity as able  Followup in 6 weeks  Cardio followup 3 months  1    Medication SE Review and Pt Understands Tx: Possible side effects of new medications were reviewed with the patient/guardian today1  The treatment plan was reviewed with the patient/guardian  The patient/guardian understands and agrees with the treatment plan1        1 Amended By: Cosmo Jaquez; Jan 25 2016 5:08 PM EST    History of Present Illness  TCM Communication [facility]: The patient is being contacted for  follow-up after hospitalization1  and  valve replacement due to AS1  1   She was [facility]   The date of admission: 12/14/2015, date of discharge: 01/12/2016  Diagnosis: AORTIC STENOSIS  She was discharged to home  Medications reviewed and updated today  She scheduled a follow up appointment  The patient is currently asymptomatic  Counseling was provided to patient's family  Communication performed and completed by LISA   HPI: Pt home from [facility] following avr  She is doing well, homecare following  No sob with exertion,feels better  No dizziness or cp   No pain,no falls2        1 Amended By: Cosmo Jaquez; Jan 25 2016 10:27 AM EST   2 Amended By: Cosmo Jaquez; Jan 25 2016 5:05 PM EST    Review of Systems  Complete-Female: Constitutional:1  No fever, no chills, feels well, no tiredness, no recent weight gain or weight loss1 , no fever1 , not feeling poorly1 , no chills1  and not feeling tired1   Eyes:1  No complaints of eye pain, no red eyes, no eyesight problems, no discharge, no dry eyes, no itching of eyes1   ENT:1  no complaints of earache, no loss of hearing, no nose bleeds, no nasal discharge, no sore throat, no hoarseness1   Cardiovascular:1  No complaints of slow heart rate, no fast heart rate, no chest pain, no palpitations, no leg claudication, no lower extremity edema1   Respiratory:1  No complaints of shortness of breath, no wheezing, no cough, no SOB on exertion, no orthopnea, no PND1   Gastrointestinal:1  No complaints of abdominal pain, no constipation, no nausea or vomiting, no diarrhea, no bloody stools1   Genitourinary:1  No complaints of dysuria, no incontinence, no pelvic pain, no dysmenorrhea, no vaginal discharge or bleeding1   Musculoskeletal:1  No complaints of arthralgias, no myalgias, no joint swelling or stiffness, no limb pain or swelling1   Integumentary:1  No complaints of skin rash or lesions, no itching, no skin wounds, no breast pain or lump1   Neurological:1  No complaints of headache, no confusion, no convulsions, no numbness, no dizziness or fainting, no tingling, no limb weakness, no difficulty walking1   Psychiatric:1  Not suicidal, no sleep disturbance, no anxiety or depression, no change in personality, no emotional problems1   Endocrine:1  No complaints of proptosis, no hot flashes, no muscle weakness, no deepening of the voice, no feelings of weakness1   Hematologic/Lymphatic:1  No complaints of swollen glands, no swollen glands in the neck, does not bleed easily, does not bruise easily1         1 Amended By: Fco Saez; Jan 25 2016 5:05 PM EST    Active Problems   1  Abnormal laboratory test (796 4) (R89 9)  2  Angina pectoris (413 9) (I20 9)  3   Aortic sclerosis (440 0) (I70 0)  4  Arteriosclerosis of coronary artery (414 00) (I25 10)  5  Fatigue (780 79) (R53 83)  6  Hyperlipidemia (272 4) (E78 5)  7  Hypertension (401 9) (I10)  8  Lyme disease (088 81) (A69 20)  9  Post-menopausal osteoporosis (733 01) (M81 0)  10  Syncope (780 2) (R55)  11  Vertigo (780 4) (R42)    Past Medical History   1  History of Encounter for routine pelvic examination (V72 31) (Z01 419)  2  History of Encounter for screening mammogram for malignant neoplasm of breast   (V76 12) (Z12 31)  3  History of Need for influenza vaccination (V04 81) (Z23)  4  History of Need for prophylactic vaccination and inoculation against influenza (V04 81)   (Z23)  5  History of Sinusitis (473 9) (J32 9)    Surgical History   1  History of Appendectomy  2  History of Cataract Surgery  Surgical History Reviewed: The surgical history was reviewed and updated today  1        1 Amended By: Javier Lanier; Jan 25 2016 10:33 AM EST    Family History   1  Family history of Colon Cancer (V16 0)  Family History Reviewed: The family history was reviewed and updated today  1        1 Amended By: Javier Lanier; Jan 25 2016 10:33 AM EST    Social History    · Never A Smoker   · Never Drank Alcohol   · Occupation: Retired   · Uses Safety Equipment - [address] [employer]    Social History Reviewed: The social history was reviewed and updated today  1  The social history was reviewed and is unchanged  1        1 Amended By: Javier Lanier; Jan 25 2016 10:33 AM EST    Current Meds  1  Aspirin 81 MG Oral Tablet; Take 1 tablet daily Recorded  2  Benadryl Allergy 25 MG Oral Tablet Recorded  3  Diltiazem HCl ER Coated Beads 180 MG Oral Capsule Extended Release 24 Hour;   TAKE 1 CAPSULE DAILY; Therapy: 21Jan2012 to (Vicki Colón)  Requested for: 09PON2504; Last   Rx:31Aci0411 Ordered  4  Furosemide 20 MG Oral Tablet; Therapy: 49HGC2958 to (Evaluate:37Rke6827) Recorded  5   Potassium Chloride ER 10 MEQ Oral Tablet Extended Release; Therapy: 76FDJ5130 to (Evaluate:42Nng3214) Recorded  6  Simvastatin 20 MG Oral Tablet; TAKE 1 TABLET DAILY; Therapy: 23JZH6897 to (Sarkis Grande)  Requested for: 84QBC3667; Last   Rx:08Nov2012 Ordered  Medication List Reviewed: The medication list was reviewed and updated today  1        1 Amended By: Paul Merlos; Jan 25 2016 10:33 AM EST    Allergies   1  Penicillins    Vitals  Signs [Data Includes: Current Encounter]   Recorded: 73MDL7521 90:54FS    Systolic: 801 1    Diastolic: 72 1   Recorded: 25Jan2016 10:18AM    Temperature: 97 2 F 1    Height: 4 ft 9 5 in 1    Weight: 120 lb  1    BMI Calculated: 25 52 1    BSA Calculated: 1 46 1      1 Amended By: Paul Merlos; Jan 25 2016 10:33 AM EST    Physical Exam    Constitutional1    General appearance: No acute distress, well appearing and well nourished  1  Pleasant, comfortable1   Eyes1    Conjunctiva and lids: No swelling, erythema or discharge  1    Pupils and irises: Equal, round and reactive to light  1    Ears, Nose, Mouth, and Throat1    External inspection of ears and nose: Normal 1    Otoscopic examination: Tympanic membranes translucent with normal light reflex  Canals patent without erythema  1    Nasal mucosa, septum, and turbinates: Normal without edema or erythema  1    Oropharynx: Normal with no erythema, edema, exudate or lesions  1    Pulmonary1    Respiratory effort: No increased work of breathing or signs of respiratory distress  1    Auscultation of lungs: Clear to auscultation  1    Cardiovascular1    Palpation of heart: Normal PMI, no thrills  1    Auscultation of heart: Normal rate and rhythm, normal S1 and S2, without murmurs  1    Examination of extremities for edema and/or varicosities: Normal 1    Carotid pulses: Normal 1    Abdomen1    Abdomen: Non-tender, no masses  1    Liver and spleen: No hepatomegaly or splenomegaly  1    Lymphatic1    Palpation of lymph nodes in neck: No lymphadenopathy  1 Musculoskeletal1    Gait and station: Normal 1    Digits and nails: Normal without clubbing or cyanosis  1    Inspection/palpation of joints, bones, and muscles: Normal 1    Skin1    Skin and subcutaneous tissue: Normal without rashes or lesions  1    Neurologic1    Cranial nerves: Cranial nerves 2-12 intact  1    Reflexes: 2+ and symmetric  1    Sensation: No sensory loss  1    Psychiatric1    Orientation to person, place, and time: Normal 1    Mood and affect: Normal 1          1 Amended By: Constantino Porter; Jan 25 2016 5:06 PM EST    Health Management  Health Maintenance   Medicare Annual Wellness Visit; every 1 year; Last 75XJA9322; Next Due: 43MYF9137;  Active    Future Appointments    Date/Time Provider Specialty Site   01/25/2016 03:00 PM Constantino Porter DO Internal Medicine [facility] INTERNAL MEDICINE   03/25/2016 11:15 AM Constantino Porter DO Internal Medicine [facility] INTERNAL MEDICINE     Signatures   Electronically signed by : Maru Ribeiro DO; Jan 13 2016 10:37AM EST                       (Author)    Electronically signed by : Maru Ribeiro DO; Jan 25 2016 10:36AM EST                       (Author)    Electronically signed by : Maru Ribeiro DO; Jan 25 2016  5:08PM EST                       (Author)

## 2018-01-23 NOTE — MISCELLANEOUS
Message   Recorded as Task   Date: 12/08/2017 10:48 AM, Created By: Basilia Rodríguez   Task Name: Miscellaneous   Assigned To: Roz Chowdhury Clinical,Team   Regarding Patient: Mustapha De Luna, Status: In Progress   AlliMara Beth - 08 Dec 7223 49:06 AM     TASK CREATED  caller: Enoch Rojas [facility]  call back number: [phone]    patient is in assisted living and they need clear directions on how to give gabapentin  please advise   [facility] [place] - 08 Dec 2017 10:54 AM     TASK IN PROGRESS   [facility] [place] - 08 Dec 2017 10:56 AM     TASK EDITED  I called and spoke with Enoch Quill at [facility], explained gabapentin titration schedule, 1 tab x 3 days @ hs, 2 tabs x 3 days @ hs, then 3 tabs @ hs as tolerated  Claribel verbalized understanding and was pleasant and appreciative of quick response        Active Problems    1  Advance directive discussed with patient (V65 49) (Z71 89)   2  Angina pectoris (413 9) (I20 9)   3  Aortic sclerosis   4  Arteriosclerosis of coronary artery (414 00) (I25 10)   5  Chronic lumbar radiculopathy (724 4) (M54 16)   6  Chronic pain syndrome (338 4) (G89 4)   7  Esophageal reflux (530 81) (K21 9)   8  H/O aortic valve replacement (V43 3) (Z95 2)   9  Hyperlipidemia (272 4) (E78 5)   10  Hypertension (401 9) (I10)   11  Low back pain (724 2) (M54 5)   12  Lumbar radiculopathy (724 4) (M54 16)   13  Lumbar radiculopathy, chronic (724 4) (M54 16)   14  Need for influenza vaccination (V04 81) (Z23)   15  Osteoarthritis (715 90) (M19 90)   16  Post-menopausal osteoporosis (733 01) (M81 0)   17  Screening for depression (V79 0) (Z13 89)    Current Meds   1  Aspirin 81 MG Oral Tablet Chewable; CHEW AND SWALLOW 1 TABLET DAILY; Therapy: 84FEZ1665 to (Evaluate:21Apr2018)  Requested for: 23Oct2017; Last   GH:40MIH5640 Ordered   2  Bisacodyl 10 MG Rectal Suppository; USE ONE SUPPOSITORY RECTALLY ON DAY 4   OF NO BOWEL MOVEMENT;    Therapy: 26JOG7898 to (Last AJ:68XKU6477)  Requested for: 23Oct2017 Ordered   3  Calcium 600 MG Oral Tablet; Therapy: (Recorded:34Czj9023) to Recorded   4  Cartia  MG Oral Capsule Extended Release 24 Hour; take 1 capsule by mouth   once daily; Therapy: 30ZRM7764 to (Last XJ:10RAO6687)  Requested for: 23Oct2017 Ordered   5  Carvedilol 6 25 MG Oral Tablet; TAKE 1 TABLET DAILY IN THE MORNING; Therapy: 23BSI8697 to (Last GO:74MGI6334)  Requested for: 23Oct2017 Ordered   6  DocQLace 100 MG Oral Capsule; TAKE 1 CAPSULE TWICE DAILY AS NEEDED; Therapy: 59JYX2766 to (Evaluate:21Apr2018)  Requested for: 23Oct2017; Last   Rx:23Oct2017 Ordered   7  Ensure Clear Oral Liquid; use 1 can daily; Therapy: 73BEZ7540 to (Last JE:66GST1061)  Requested for: 23Oct2017 Ordered   8  Gabapentin 100 MG Oral Capsule; TAKE 1 CAPSULE BEDTIME MAY INCREASE TO 3   CAPSULES AT BEDTIME AS TOLERATED; Therapy: 13ZJM6388 to (Evaluate:08Mar2018); Last Rx:69Ifs6404 Ordered   9  Mapap 325 MG Oral Tablet; take 1 tablet every 6 hours as needed; Therapy: 11IOS1976 to (Last Rx:27Gvm9125)  Requested for: 26KQP5241 Ordered   10  Multi Vitamin Daily Oral Tablet; TAKE 1 TABLET DAILY; Therapy: 92BPY5621 to (Evaluate:21Apr2018)  Requested for: 23Oct2017; Last    Rx:52Nio3993 Ordered   11  Pantoprazole Sodium 40 MG Oral Tablet Delayed Release; Take 1 tablet daily; Therapy: 81CHZ9952 to (Evaluate:01Edy2281)  Requested for: 08Fqx3181; Last    Rx:54Rja9812 Ordered   12  Refresh Tears SOLN;    Therapy: (Recorded:38Qxu7830) to Recorded   13  Risamine 0 44-20 625 % External Ointment; APPLY TO AFFECTED AREA(S) ON RIGHT    BUTTOCKS TWICE DAILY (SKIN PROTECTANT); Therapy: 38AAB8835 to (Last 609 8074)  Requested for: [phone] Ordered   14  Simvastatin 10 MG Oral Tablet; Take 1 daily; Therapy: 66VGD5989 to (Evaluate:21Apr2018)  Requested for: 23Oct2017; Last    JX:44LZU0642 Ordered   15   Sorbitol 70 % Solution; TAKE 2 TABLESPOONFULS BY MOUTH DAILY AS NEEDED, GIVE ON DAY 3 OF NO BOWEL MOVEMENT; Therapy: 51ZVY9307 to (Last NM:21IAS7827)  Requested for: 23Oct2017 Ordered   16  TraMADol HCl - 50 MG Oral Tablet; TAAKE 1 TABLET TWICE DAILY; Therapy: 72Uvk6777 to (Last Rx:23Oct2017) Ordered   17  Vitamin D (Ergocalciferol) 55113 UNIT Oral Capsule; TAKE 1 CAPSULE BY MOUTH ON    THE 13TH; Therapy: 31JFX5773 to (Last Rx:23Oct2017)  Requested for: 23Oct2017 Ordered    Allergies    1   Penicillins    Signatures   Electronically signed by : Daily Maynard, ; Dec  8 2017 10:56AM EST                       (Author)

## 2018-01-23 NOTE — PROGRESS NOTES
Assessment    1  Never a smoker   2  Encounter for preventive health examination (V70 0) (Z00 00)    Plan  Health Maintenance    · *VB - Fall Risk Assessment  (Dx Z13 89 Screen for Neurologic Disorder);  Status:Complete - Retrospective By Protocol Authorization;   Done: 49VCY6276 08:48AM   · *VB - Urinary Incontinence Screen (Dx Z13 89 Screen for UI); Status:Complete -  Retrospective By Protocol Authorization;   Done: 62PXF1268 08:49AM   · Avoid using throw rugs ; Status:Complete - Retrospective By Protocol Authorization;    Done: 15EAM0160   · There ways to avoid falling ; Status:Complete - Retrospective By Protocol Authorization;    Done: 72WKG6714   · These are things you can do to prevent falls in and around the home ; Status:Complete -  Retrospective By Protocol Authorization;   Done: 77OKJ2249   · Medicare Annual Wellness Visit ; every 1 year; Last 77EMJ2724; Next 44XMS9741;  Status:Active  Screening for depression    · *VB-Depression Screening; Status:Complete - Retrospective By Protocol Authorization;    Done: 00LBL6574 08:49AM    Discussion/Summary    Patient doing ok and has family support and some community services  She does not feel she needs more assistance at this time but knows to call if that changes  Has CArdiology appt in August  continue present rx and reiterated use of her cane for safety  Impression: Subsequent Annual Wellness Visit, with preventive exam as well as age and risk appropriate counseling completed  Cardiovascular screening and counseling: screening is current and counseling was given on maintaining a healthy diet  Diabetes screening and counseling: screening is current and counseling was given on maintaining a healthy diet  Colorectal cancer screening and counseling: screening not indicated and counseling was given on ways to eat a high fiber diet  Breast cancer screening and counseling: the patient declines screening     Cervical cancer screening and counseling: screening not indicated  Osteoporosis screening and counseling: the patient declines screening, counseling was given on obtaining adequate amounts of calcium and vitamin D on a daily basis and counseling was given on the importance of regular weightbearing exercise  Abdominal aortic aneurysm screening and counseling: screening not indicated  Glaucoma screening and counseling: screening is current  HIV screening and counseling: screening not indicated  Immunizations: influenza vaccine is up to date this year, influenza vaccination is recommended annually, the lifetime pneumococcal vaccine has been completed, hepatitis B vaccination series is not indicated at this time due to the patient's low risk of christelle the disease, the patient declines the Zostavax vaccine, Td vaccination status is unknown and Tdap vaccination status is unknown  Advance Directive Planning: complete and up to date  Advice and education were given regarding fall risk reduction  Patient Discussion: plan discussed with the patient, follow-up visit needed in 3 months  Possible side effects of new medications were reviewed with the patient/guardian today  The treatment plan was reviewed with the patient/guardian  The patient/guardian understands and agrees with the treatment plan         Self Referrals: No      Chief Complaint  Pt presents for Well Exam today  Pt feels ok today she states  NO falls and she is using her cane  No refills needed at this time  Appetite is normal for her she states  Advance Directives  Advance Directive [facility]:   The patient is in agreement to receive the Advance Care Planning service    YES - Patient has an advance health care directive  The patient has a living will located  in patient's home  Capacity/Competence:  This patient has full decision making capacity for discussion of advance care planning and This patient has full decision making competency for discussion of advance care planning  History of Present Illness  HPI: Patient has been "holding her own" No acute issues, no falls  No chest pain  Has been doing less but does not feel she needs more in home help yet  She has a volunteer  to get to appts and grocery shopping  Some joint pain, takes tylenol prn  Appetite fair - eating less but does eat  Welcome to [address] and Wellness Visits: The patient is being seen for the subsequent annual wellness visit  Medicare Screening and Risk Factors   Hospitalizations: she has been previously hospitalizied, she has been hospitalized none in recent past but hx valve replacement,cad times and hx uti, valve replacement  Once per lifetime medicare screening tests: ECG and AAA screening US has not yet been done  Medicare Screening Tests Risk Questions   Abdominal aortic aneurysm risk assessment: none indicated  Osteoporosis risk assessment: , female gender and over 48years of age  HIV risk assessment: none indicated  Drug and Alcohol Use: The patient has never smoked cigarettes  The patient reports never drinking alcohol  She has never used illicit drugs  Diet and Physical Activity: Current diet includes well balanced meals  She is sedentary  Mood Disorder and Cognitive Impairment Screening:   Depression screening  negative for symptoms  She denies feeling down, depressed, or hopeless over the past two weeks  She denies feeling little interest or pleasure in doing things over the past two weeks  Cognitive impairment screening: denies difficulty learning/retaining new information, difficulty handling complex tasks, denies difficulty with reasoning, denies difficulty with spatial ability and orientation, denies difficulty with language and denies difficulty with behavior  Functional Ability/Level of Safety: Hearing is slightly decreased and a hearing aid is not used  She denies hearing difficulties   The patient is currently able to do activities of daily living with limitations, able to do instrumental activities of daily living with limitations, able to participate in social activities with limitations and unable to drive  Activities of daily living details: transportation help needed, needs help shopping, meal preparation help needed, needs help managing medications, needs help managing money and has meals on wheels, transpiortation and his son/nieces do her medications, but does not need help using the phone, does not need help doing housework and does not need help doing laundry  Fall risk factors: The patient fell 0 times in the past 12 months  Home safety risk factors:  no unfamiliar surroundings, no loose rugs, no poor household lighting, no uneven floors, no household clutter, grab bars in the bathroom and handrails on the stairs  Advance Directives: Advance directives: living will and advance directives  Co-Managers and Medical Equipment/Suppliers: See Patient Care Team   Reviewed Updated [address] [place] [address] [address]:   Last Medicare Wellness Visit Information was reviewed, patient interviewed, no change since last [facility]  Preventive Quality Program 65 and Older: Falls Risk: The patient fell 0 times in the past 12 months  The patient is currently asymptomatic Symptoms Include:  Associated symptoms:  No associated symptoms are reported  The patient currently has no urinary incontinence symptoms  Patient Care Team    Care Team Member Role Specialty Office Number   [facility]  Internal Medicine [phone]     Active Problems    1  Advance directive discussed with patient (V65 49) (Z71 89)   2  Angina pectoris (413 9) (I20 9)   3  Aortic sclerosis   4  Arteriosclerosis of coronary artery (414 00) (I25 10)   5  Esophageal reflux (530 81) (K21 9)   6  H/O aortic valve replacement (V43 3) (Z95 2)   7  Hyperlipidemia (272 4) (E78 5)   8  Hypertension (401 9) (I10)   9  Need for influenza vaccination (V04 81) (Z23)   10  Osteoarthritis (715 90) (M19 90)   11  Post-menopausal osteoporosis (733 01) (M81 0)    Past Medical History    · History of Abnormal laboratory test (796 4) (R89 9)   · History of Encounter for routine pelvic examination (V72 31) (Z01 419)   · History of Encounter for screening mammogram for malignant neoplasm of breast  (V76 12) (Z12 31)   · History of Need for prophylactic vaccination and inoculation against influenza (V04 81)  (Z23)   · History of Sinusitis (473 9) (J32 9)    The active problems and past medical history were reviewed and updated today  Surgical History    · History of Aortic Valve Replacement   · History of Appendectomy   · History of Cataract Surgery    The surgical history was reviewed and updated today  Family History  Sister    · Family history of Colon Cancer (V16 0)  Family History    · Denied: Family history of Drug abuse    The family history was reviewed and updated today  Social History    · Assistive Devices: Walker   · Dental care, regularly   · Good dental hygiene   · Never a smoker   · Never Drank Alcohol   · No caffeine use   · Occupation: Retired   · Patient has living will (V49 89) (Z78 9)   · Uses Safety Equipment - Seatbelts   ·   The social history was reviewed and updated today  The social history was reviewed and is unchanged  Current Meds   1  Aspirin 81 MG TABS; Take 1 tablet daily Recorded   2  Carvedilol 3 125 MG Oral Tablet; TAKE 1 TABLET TWICE DAILY WITH MEALS; Therapy: 73STN9412 to (Evaluate:60Dmz7572)  Requested for: 91UYD1154; Last   Rx:57Oeh3919 Ordered   3  Clopidogrel Bisulfate 75 MG Oral Tablet; TAKE 1 TABLET DAILY; Therapy: 56YXK1152 to (Evaluate:29Hlh9814)  Requested for: 99OVF5026; Last   Rx:83Sle0632 Ordered   4  DilTIAZem HCl ER Coated Beads 180 MG Oral Capsule Extended Release 24 Hour;   take 1 capsule daily; Therapy: 21Jan2012 to (Evaluate:87Whw2789)  Requested for: 33FGK8537; Last   Rx:49Hxn3380 Ordered   5   Pantoprazole Sodium 40 MG Oral Tablet Delayed Release; Take 1 tablet daily; Therapy: 45UQC4914 to (Evaluate:88Tzq0148)  Requested for: 26UQT1277; Last   Rx:68Oln9328 Ordered   6  Simvastatin 10 MG Oral Tablet; Therapy: 71PPH5165 to Recorded    The medication list was reviewed and updated today  Allergies    1  Penicillins    Immunizations   ** Printed in Appendix #1 below  Vitals  Signs    Systolic: 965  Diastolic: 78   Temperature: 97 2 F, Tympanic  Heart Rate: 115  Height: 4 ft 9 5 in  Weight: 121 lb 6 oz  BMI Calculated: 25 81  BSA Calculated: 1 46  O2 Saturation: 88, RA    Physical Exam    Constitutional   General appearance: No acute distress, well appearing and well nourished  no distress  Psychiatric   Judgment and insight: Normal     Orientation to person, place, and time: Normal     Recent and remote memory: Intact  Mood and affect: Normal        Results/Data  PHQ-2 Adult Depression Screening 26Kgf5717 08:49AM User, [employer]     Test Name Result Flag Reference   PHQ-2 Adult Depression Score 0     Over the last two weeks, how often have you been bothered by any of the following problems?   Little interest or pleasure in doing things: Not at all - 0  Feeling down, depressed, or hopeless: Not at all - 0   PHQ-2 Adult Depression Screening Negative       Falls Risk Assessment (Dx Z13 89 Screen for Neurologic Disorder) 31WQX1702 08:49AM User, [employer]     Test Name Result Flag Reference   Falls Risk      No falls in the past year     *VB - Urinary Incontinence Screen (Dx Z13 89 Screen for UI) 69HRV5526 08:49AM Krishnamurthy Ahumada     Test Name Result Flag Reference   Urinary Incontinence Assessment 70EMT2112       *VB-Depression Screening 99YHL1004 08:49AM Krishnamurthy Ahumada     Test Name Result Flag Reference   Depression Scale Result      Depression Screen - Negative For Symptoms     *VB - Fall Risk Assessment  (Dx Z13 89 Screen for Neurologic Disorder) 36AEC2271 08:48AM Krishnamurthy Ahumada     Test Name Result Flag Reference   Falls Risk      No falls in the past year       Health Management  Health Maintenance   Medicare Annual Wellness Visit; every 1 year; Last 77SPM1907; Next Due: 50Dvr6790;   Active    Signatures   Electronically signed by : Laura Melendez DO; 2017  9:05AM EST                       (Author)    Appendix #1     Patient: Dia Barbosa ; : 1923; MRN: [id number]      1 2 3 4 5    Influenza  13-Nov-2012 29-Oct-2013 14-Nov-2014 30-Sep-2015 26-Sep-2016    PPSV  01-Sep-2015

## 2018-01-30 NOTE — MISCELLANEOUS
Assessment   1  Chronic diastolic CHF (congestive heart failure) (428 32,428 0) (I50 32)  2  Chronic lumbar radiculopathy (724 4) (M54 16)1   3  Arteriosclerosis of coronary artery (414 00) (I25 10)  4  1   5  1   6  Osteoarthritis (715 90) (M19 90)1      1 Amended By: Salty Fernandez; Jan 18 2018 2:28 PM EST    Plan  Arteriosclerosis of coronary artery, Chronic diastolic CHF (congestive heart failure)    · (1) CBC/PLT/DIFF; Status:Active; Requested RUU:[id number]; Perform:[facility]; MIGUEL:[id number];SVDDZHR; For:Arteriosclerosis of   coronary artery, Chronic diastolic CHF (congestive heart failure); Ordered By:Mallorie Mcgregor;  Chronic diastolic CHF (congestive heart failure)    · (Q) B TYPE NATRIURETIC PEPTIDE (BNP); Status:Active; Requested WGT:[id number]; Perform:[facility]; KHE:63ELQ8416;ZCAVIOF; For:Chronic diastolic CHF (congestive heart   failure); Ordered By:Sadiq Mcgregor;   · Follow-up visit in 2 months Evaluation and Treatment  Follow-up  Status: Hold For -  Scheduling  Requested for: 40QRF4848  Ordered; For: Chronic diastolic CHF (congestive heart failure); Ordered By: Jose Daniel Roper  Performed:   Due: 54MZN3398  Hypertension    · (1) COMPREHENSIVE METABOLIC PANEL; Status:Active; Requested ARM:[id number]; Perform:[facility]; HFV:[id number];CHARLESUW; For:Hypertension; Ordered   By:Sadiq Mcgregor;    Discussion/Summary  Discussion Summary:   Ruddy labs  Continue ensure daily and encouraged small meals  Setup cardio followup  Increase water intake  Continue PT at facility  Rto 2 months1    Medication SE Review and Pt Understands Tx: Possible side effects of new medications were reviewed with the patient/guardian today  The treatment plan was reviewed with the patient/guardian   The patient/guardian understands and agrees with the treatment plan   Self Referrals:   Self Referrals: No       1 Amended By: Salty Fernandez; Jan 18 2018 9:29 PM EST    Chief Complaint  Chief Complaint Free Text Note Form: Pt presents for hosp f/up exam today  She is using a wheelchair to ambulate today  States she has no appetite  She states her cough is getting better  Sleep is disruptive she states due to cough at times  Chief Complaint Chronic Condition [facility]: Patient is here today for follow up of chronic conditions described in HPI  History of Present Illness  TCM Communication [facility]: The patient is being contacted for follow-up after hospitalization  Hospital records were reviewed  She was hospitalized at [facility]  The dates of hospitalization:, January 4, 2018 thru January 8, 2018  Diagnosis: Congestive heart failure  She was discharged to a long term care facility  Medications reviewed and updated today  She scheduled a follow up appointment  She did not schedule a follow up appointment  Communication performed and completed by   HPI: Pt recently admitted w sob  She is feeling better but appetite decreased  She now lives at [facility] and seems content there  No chest pain and less dyspnea  She is doing PT  No falls, deniees pain  1        1 Amended By: Woody Gilbert; Jan 18 2018 9:27 PM EST    Review of Systems  Complete-Female:   Constitutional:1  No fever, no chills, feels well, no tiredness, no recent weight gain or weight loss1   Eyes:1  No complaints of eye pain, no red eyes, no eyesight problems, no discharge, no dry eyes, no itching of eyes1   ENT:1  no complaints of earache, no loss of hearing, no nose bleeds, no nasal discharge, no sore throat, no hoarseness1   Cardiovascular:1  no lower extremity edema1   Respiratory:1  shortness of breath during exertion1   Gastrointestinal:1  No complaints of abdominal pain, no constipation, no nausea or vomiting, no diarrhea, no bloody stools1   Genitourinary:1  No complaints of dysuria, no incontinence, no pelvic pain, no dysmenorrhea, no vaginal discharge or bleeding1      Musculoskeletal:1 myalgias1  and joint stiffness1   Integumentary:1  No complaints of skin rash or lesions, no itching, no skin wounds, no breast pain or lump1   Neurological:1  limb weakness1  and difficulty walking1   Psychiatric:1  Not suicidal, no sleep disturbance, no anxiety or depression, no change in personality, no emotional problems1  and no sleep disturbances1   Endocrine:1  No complaints of proptosis, no hot flashes, no muscle weakness, no deepening of the voice, no feelings of weakness1   Hematologic/Lymphatic:1  No complaints of swollen glands, no swollen glands in the neck, does not bleed easily, does not bruise easily1         1 Amended By: Lory Shaffer; Jan 18 2018 9:27 PM EST    Active Problems   1  Advance directive discussed with patient (V65 49) (Z71 89)  2  Angina pectoris (413 9) (I20 9)  3  Aortic sclerosis  4  Arteriosclerosis of coronary artery (414 00) (I25 10)  5  Chronic lumbar radiculopathy (724 4) (M54 16)  6  Chronic pain syndrome (338 4) (G89 4)  7  Cough (786 2) (R05)  8  Esophageal reflux (530 81) (K21 9)  9  H/O aortic valve replacement (V43 3) (Z95 2)  10  Hyperlipidemia (272 4) (E78 5)  11  Hypertension (401 9) (I10)  12  Low back pain (724 2) (M54 5)  13  Lumbar radiculopathy (724 4) (M54 16)  14  Lumbar radiculopathy, chronic (724 4) (M54 16)  15  Need for influenza vaccination (V04 81) (Z23)  16  Osteoarthritis (715 90) (M19 90)  17  Post-menopausal osteoporosis (733 01) (M81 0)  18  Screening for depression (V79 0) (Z13 89)  19  Status post fall (V15 88) (Z91 81)    Surgical History   1  History of Aortic Valve Replacement  2  History of Appendectomy  3  History of Cataract Surgery  Surgical History Reviewed: The surgical history was reviewed and updated today  Family History  Sister   1  Family history of Colon Cancer (V16 0)  Family History   2  Denied: Family history of Drug abuse  Family History Reviewed: The family history was reviewed and updated today  Social History    · Assistive Devices: Walker   · Dental care, regularly   · Good dental hygiene   · Never a smoker   · Never Drank Alcohol   · No caffeine use   · Occupation: Retired   · Patient has living will (V49 89) (Z78 9)   · Uses Safety Equipment - Seatbelts   ·   Social History Reviewed: The social history was reviewed and updated today  The social history was reviewed and is unchanged  Current Meds  1  Aspirin 81 MG Oral Tablet Chewable; CHEW AND SWALLOW 1 TABLET DAILY; Therapy: 05ZUA3185 to (Evaluate:2018)  Requested for: 2017; Last   [id number] Ordered  2  Benzonatate 100 MG Oral Capsule; TAKE 1 CAPSULE 3 times daily PRN; Therapy: 63KLB5547 to 477)  Requested for: 68TTD9733; Last   Rx:2018 Ordered  3  Bisacodyl 10 MG Rectal Suppository; USE ONE SUPPOSITORY RECTALLY ON DAY 4 OF   NO BOWEL MOVEMENT; Therapy: 29AMX6224 to (Last FM:44JWX4852)  Requested for: 2017 Ordered  4  Calcium 600 MG Oral Tablet; Therapy: (Recorded:52Hye7060) to Recorded  5  Cartia  MG Oral Capsule Extended Release 24 Hour; take 1 capsule by mouth   once daily; Therapy: 61WBL9787 to (Last XY:60NNF4398)  Requested for: 2017 Ordered  6  Carvedilol 6 25 MG Oral Tablet; TAKE 1 TABLET DAILY IN THE MORNING; Therapy: 07BKV5569 to (Last AB:49QLB1265)  Requested for: 2017 Ordered  7  DocQLace 100 MG Oral Capsule; TAKE 1 CAPSULE TWICE DAILY AS NEEDED; Therapy: 32ZYD5649 to (Evaluate:2018)  Requested for: 2017; Last   JR:34XOC0645 Ordered  8  Ensure Clear Oral Liquid; use 1 can daily; Therapy: 85URP0043 to (Last GL:22TEY7078)  Requested for: 2017 Ordered  9  Gabapentin 100 MG Oral Capsule; TAKE 1 CAPSULE BEDTIME MAY INCREASE TO 3   CAPSULES AT BEDTIME AS TOLERATED; Therapy: 65PYG5831 to (Evaluate:2018); Last Rx:71Ugz4188 Ordered  10  Mapap 325 MG Oral Tablet; take 1 tablet every 6 hours as needed;     Therapy: 39QTN4458 to (Last Rx:2017) Requested for: 39ZNH1832 Ordered  11  Multi Vitamin Daily Oral Tablet; TAKE 1 TABLET DAILY; Therapy: 69KPR2186 to (Evaluate:50Shx7702)  Requested for: 23Oct2017; Last    XQ:20UFX6960 Ordered  12  Pantoprazole Sodium 40 MG Oral Tablet Delayed Release; Take 1 tablet daily; Therapy: 42XGE7712 to (Evaluate:23Wht7915)  Requested for: 79Lcw6058; Last    Rx:09Pdh6574 Ordered  13  Refresh Tears SOLN;    Therapy: (Recorded:35Gry6932) to Recorded  14  Risamine 0 44-20 625 % External Ointment; APPLY TO AFFECTED AREA(S) ON RIGHT    BUTTOCKS TWICE DAILY (SKIN PROTECTANT); Therapy: 35DEN3499 to (Last [phone])  Requested for: [phone] Ordered  15  Simvastatin 10 MG Oral Tablet; Take 1 daily; Therapy: 01YDF8750 to (Evaluate:21Apr2018)  Requested for: 23Oct2017; Last    ALEM:92IOI0943 Ordered  12  Sorbitol 70 % Solution; TAKE 2 TABLESPOONFULS BY MOUTH DAILY AS NEEDED, GIVE    ON DAY 3 OF NO BOWEL MOVEMENT; Therapy: 52CGJ3271 to (Last IB:80GPS2083)  Requested for: 23Oct2017 Ordered  17  TraMADol HCl - 50 MG Oral Tablet; TAAKE 1 TABLET TWICE DAILY; Therapy: 60Smo8680 to (Last Rx:49Gko6921) Ordered  18  Vitamin D (Ergocalciferol) 47619 UNIT Oral Capsule; TAKE 1 CAPSULE BY MOUTH ON    THE 13TH; Therapy: 57AHT2093 to (Last Alma Yolanda)  Requested for: 99JKF7178 Ordered  Medication List Reviewed: The medication list was reviewed and updated today  Allergies   1  Penicillins    Vitals  Signs   Recorded: 28WKE7548 74:36YY    Systolic: 502 1    Diastolic: 72 1   Recorded: 57JQB2812 02:08PM    Temperature: 96 8 F, Tympanic 1    Heart Rate: 87 1    Height: 4 ft 9 5 in 1    Weight: 111 lb 1 oz 1    BMI Calculated: 23 62 1    BSA Calculated: 1 41 1    O2 Saturation: 92, RA 1      1 Amended By: Maddie Ortez; Jan 18 2018 9:27 PM EST    Physical Exam    Constitutional   General appearance: Abnormal   frail appearing  Eyes   Conjunctiva and lids: No swelling, erythema or discharge      Pupils and irises: Equal, round and reactive to light  Ears, Nose, Mouth, and Throat   External inspection of ears and nose: Normal     Otoscopic examination: Tympanic membranes translucent with normal light reflex  Canals patent without erythema  Nasal mucosa, septum, and turbinates: Normal without edema or erythema  Oropharynx: Normal with no erythema, edema, exudate or lesions  Pulmonary   Respiratory effort: No increased work of breathing or signs of respiratory distress  Auscultation of lungs: Abnormal   decrease bs  Cardiovascular   Palpation of heart: Normal PMI, no thrills  Auscultation of heart: Normal rate and rhythm, normal S1 and S2, without murmurs  Examination of extremities for edema and/or varicosities: Normal     Carotid pulses: Normal     Abdomen   Abdomen: Non-tender, no masses  Liver and spleen: No hepatomegaly or splenomegaly  Lymphatic   Palpation of lymph nodes in neck: No lymphadenopathy  Musculoskeletal   Gait and station: Abnormal   in wheelchair  Digits and nails: Normal without clubbing or cyanosis  Inspection/palpation of joints, bones, and muscles: Abnormal   kyphosis  Skin   Skin and subcutaneous tissue: Normal without rashes or lesions  Neurologic   Cranial nerves: Cranial nerves 2-12 intact  Reflexes: 2+ and symmetric  Sensation: No sensory loss  Psychiatric   Orientation to person, place, and time: Normal     Mood and affect: Normal          Health Management  Health Maintenance   Medicare Annual Wellness Visit; every 1 year; Last 03QDC5143; Next Due: 80Npq9513;  Active    Future Appointments    Date/Time Provider Specialty Site   02/02/2018 03:00 PM Fatimah Michel DO Pain Management [facility]     Signatures   Electronically signed by : Sandra Luna, ; Jan 12 2018 10:34AM EST                       (Co-author)    Electronically signed by : Carmella oDnahue DO; Jan 18 2018  2:28PM EST                       (Author)    Electronically signed by : Maru Ribeiro DO; Jan 18 2018  9:30PM EST                       (Author)

## 2018-02-05 NOTE — TELEPHONE ENCOUNTER
If worsening sxs return to ER but if can get nocturnal oxygen test setup and oxygen coordinated patient should be more comfortable  Make sure she has cardiology followup scheduled as well

## 2018-02-11 PROBLEM — G93.41 ACUTE METABOLIC ENCEPHALOPATHY: Status: ACTIVE | Noted: 2018-01-01

## 2018-02-11 PROBLEM — J20.9 ACUTE INFECTIVE TRACHEOBRONCHITIS: Status: ACTIVE | Noted: 2018-01-01

## 2018-02-11 PROBLEM — I50.32 CHRONIC DIASTOLIC CHF (CONGESTIVE HEART FAILURE) (HCC): Status: ACTIVE | Noted: 2018-01-01

## 2018-02-11 PROBLEM — J96.01 ACUTE RESPIRATORY FAILURE WITH HYPOXIA (HCC): Status: ACTIVE | Noted: 2018-01-01

## 2018-02-11 NOTE — ED PROVIDER NOTES
History  Chief Complaint   Patient presents with    Shortness of Breath     SOB due to recently being dx with hypoxia and O2 was not delivered to home yet  SATS were 78% for EMS  Denies chest pain, Headache, dizziness, N/V     Patient is a 57-year-old female with a history of congestive heart failure, coronary artery disease, GERD, aortic valve replacement, hyperlipidemia, and stage 3 chronic kidney disease coming in today after reports per EMS the patient was out her for home O2 for several days  Patient is a poor historian and primary history is from past records  Patient was found by EMS to have O2 sats in the 70s; however, after being placed on nasal cannula O2 patient's O2 sats did decrease to the 90s  For me, patient denies any pain and has no complaints at this time  Per last discharge summary from approximately 1 month ago patient was admitted for acute on chronic CHF exacerbation was started on IV Lasix  Patient's EF is at 45%  History provided by:  EMS personnel and patient   used: No        Prior to Admission Medications   Prescriptions Last Dose Informant Patient Reported? Taking?    ASPIRIN EC EXTRA STRENGTH PO   Yes No   Sig: Take 81 mg by mouth daily   Docusate Sodium (DOC-Q-LACE PO)   Yes No   Sig: Take 1 capsule by mouth 2 (two) times a day as needed   Multiple Vitamin (MULTI-DAY VITAMINS PO)   Yes No   Sig: Take 1 tablet by mouth daily   Nutritional Supplements (ENSURE CLEAR) LIQD   Yes No   Sig: Take by mouth   acetaminophen (MAPAP) 325 mg tablet   Yes No   Sig: Take 1 tablet by mouth every 6 (six) hours as needed   acetaminophen (TYLENOL) 500 mg tablet   Yes No   Sig: Take 325 mg by mouth every 6 (six) hours as needed for mild pain     bisacodyl (DULCOLAX) 10 mg suppository   Yes No   Sig: Insert into the rectum   calcium carbonate (CALCIUM 600) 600 MG tablet   Yes No   Sig: Take 1 tablet by mouth daily   carboxymethylcellulose (REFRESH TEARS) 0 5 % SOLN   Yes No   Sig: Apply to eye   carvedilol (COREG) 6 25 mg tablet   Yes No   Sig: Take 1 tablet by mouth daily   clopidogrel (PLAVIX) 75 mg tablet   Yes No   Sig: Take 1 tablet by mouth daily   diltiazem (CARDIZEM CD) 120 mg 24 hr capsule   No No   Sig: Take 1 capsule (120 mg total) by mouth daily for 90 days   ergocalciferol (VITAMIN D2) 50,000 units   Yes No   Sig: Take by mouth   furosemide (LASIX) 20 mg tablet   No No   Sig: Take 1 tablet (20 mg total) by mouth every other day   gabapentin (NEURONTIN) 100 mg capsule   Yes No   Sig: Take 300 mg by mouth daily at bedtime   menthol-zinc oxide (RISAMINE) 0 44-20 625 %   Yes No   Sig: Apply 1 application topically 2 (two) times a day   pantoprazole (PROTONIX) 40 mg tablet   Yes No   Sig: Take 1 tablet by mouth daily   polyvinyl alcohol-povidone (REFRESH) 1 4-0 6 % ophthalmic solution   Yes No   Si drop 3 (three) times a day   potassium chloride (K-DUR,KLOR-CON) 20 mEq tablet   No No   Sig: Take 1 tablet (20 mEq total) by mouth daily for 30 days   simvastatin (ZOCOR) 10 mg tablet   Yes No   Sig: Take by mouth daily   sorbitol 70 % solution   Yes No   Sig: Take 30 mL by mouth daily as needed   traMADol (ULTRAM) 50 mg tablet   Yes No   Sig: Take 50 mg by mouth 2 (two) times a day      Facility-Administered Medications: None       Past Medical History:   Diagnosis Date    Angina pectoris (HCC)     Arthritis     CAD (coronary artery disease)     GERD (gastroesophageal reflux disease)     Hypertension     Lyme disease     Osteoporosis     Vertigo        Past Surgical History:   Procedure Laterality Date    AORTIC VALVE REPLACEMENT      APPENDECTOMY      EYE SURGERY      cataract bilateral       History reviewed  No pertinent family history  I have reviewed and agree with the history as documented      Social History   Substance Use Topics    Smoking status: Never Smoker    Smokeless tobacco: Never Used    Alcohol use No        Review of Systems   Constitutional: Negative for chills and fever  Respiratory: Positive for shortness of breath  Negative for chest tightness and wheezing  Cardiovascular: Negative for chest pain, palpitations and leg swelling  Gastrointestinal: Negative for abdominal pain, nausea and vomiting  All other systems reviewed and are negative  Physical Exam  ED Triage Vitals   Temperature Pulse Respirations Blood Pressure SpO2   02/11/18 1117 02/11/18 1117 02/11/18 1128 02/11/18 1117 02/11/18 1117   98 4 °F (36 9 °C) 94 18 122/92 (!) 86 %      Temp Source Heart Rate Source Patient Position - Orthostatic VS BP Location FiO2 (%)   02/11/18 1117 02/11/18 1117 02/11/18 1117 02/11/18 1117 --   Temporal Monitor Sitting Right arm       Pain Score       02/11/18 1117       No Pain           Orthostatic Vital Signs  Vitals:    02/11/18 1117 02/11/18 1333   BP: 122/92 122/58   Pulse: 94 73   Patient Position - Orthostatic VS: Sitting Sitting       Physical Exam   Constitutional: She appears well-developed and well-nourished  No distress  HENT:   Head: Normocephalic and atraumatic  Mouth/Throat: Oropharynx is clear and moist    Eyes: Conjunctivae and EOM are normal  Pupils are equal, round, and reactive to light  Neck: Normal range of motion  Neck supple  Cardiovascular: Normal rate, regular rhythm, normal heart sounds and intact distal pulses  No murmur heard  Pulmonary/Chest: Effort normal  No stridor  No respiratory distress  She has rales (Bilateral bases)  Abdominal: Soft  Bowel sounds are normal  She exhibits no distension  There is no tenderness  Musculoskeletal: Normal range of motion  She exhibits no edema or deformity  Neurological: She is alert  She displays normal reflexes  No cranial nerve deficit or sensory deficit  She exhibits normal muscle tone  Alert to person place   Skin: Skin is warm  Capillary refill takes less than 2 seconds  She is not diaphoretic  Nursing note and vitals reviewed        ED Medications  Medications   ondansetron (ZOFRAN) injection 4 mg (4 mg Intravenous Given 2/11/18 1153)   furosemide (LASIX) injection 20 mg (20 mg Intravenous Given 2/11/18 1344)       Diagnostic Studies  Results Reviewed     Procedure Component Value Units Date/Time    [place] draw [[id number]] Collected:  02/11/18 1143    Lab Status:  Final result Specimen:  Blood Updated:  02/11/18 1301    Narrative: The following orders were created for panel order [place] draw  Procedure                               Abnormality         Status                     ---------                               -----------         ------                     Val[place] Top on AFHP[[id number]]                            Final result               [place] Top 7ml on NTNR[[id number]]                          Final result                 Please view results for these tests on the individual orders  Troponin I [[id number]]  (Normal) Collected:  02/11/18 1143    Lab Status:  Final result Specimen:  Blood from Arm, Right Updated:  02/11/18 1218     Troponin I <0 02 ng/mL     Narrative:         [employer] Chemistry analyzer 99% cutoff is > 0 04 ng/mL in network labs    o cTnI 99% cutoff is useful only when applied to patients in the clinical setting of myocardial ischemia  o cTnI 99% cutoff should be interpreted in the context of clinical history, ECG findings and possibly cardiac imaging to establish correct diagnosis  o cTnI 99% cutoff may be suggestive but clearly not indicative of a coronary event without the clinical setting of myocardial ischemia      Comprehensive metabolic panel [[id number]]  (Abnormal) Collected:  02/11/18 1143    Lab Status:  Final result Specimen:  Blood from Arm, Right Updated:  02/11/18 1218     Sodium 138 mmol/L      Potassium 4 3 mmol/L      Chloride 99 (L) mmol/L      CO2 31 mmol/L      Anion Gap 8 mmol/L      BUN 16 mg/dL      Creatinine 1 07 mg/dL      Glucose 120 mg/dL      Calcium 9 0 mg/dL      AST 32 U/L ALT 15 U/L      Alkaline Phosphatase 56 U/L      Total Protein 7 5 g/dL      Albumin 2 8 (L) g/dL      Total Bilirubin 0 60 mg/dL      eGFR 45 ml/min/1 73sq m     Narrative:         National Kidney Disease Education Program recommendations are as follows:  GFR calculation is accurate only with a steady state creatinine  Chronic Kidney disease less than 60 ml/min/1 73 sq  meters  Kidney failure less than 15 ml/min/1 73 sq  meters  B-type natriuretic peptide [[id number]]  (Abnormal) Collected:  02/11/18 1143    Lab Status:  Final result Specimen:  Blood from Arm, Right Updated:  02/11/18 1218     NT-proBNP 1,560 (H) pg/mL     Magnesium [[id number]]  (Normal) Collected:  02/11/18 1143    Lab Status:  Final result Specimen:  Blood from Arm, Right Updated:  02/11/18 1211     Magnesium 1 8 mg/dL     CBC and differential [[id number]]  (Abnormal) Collected:  02/11/18 1143    Lab Status:  Final result Specimen:  Blood from Arm, Right Updated:  02/11/18 1206     WBC 5 19 Thousand/uL      RBC 3 56 (L) Million/uL      Hemoglobin 10 9 (L) g/dL      Hematocrit 33 4 (L) %      MCV 94 fL      MCH 30 6 pg      MCHC 32 6 g/dL      RDW 13 6 %      MPV 8 6 (L) fL      Platelets 921 Thousands/uL     Narrative: This is an appended report  These results have been appended to a previously verified report  XR chest 2 views    (Results Pending)              Procedures  Procedures       Phone Contacts  ED Phone Contact    ED Course  ED Course                                MDM  Number of Diagnoses or Management Options  Acute on chronic diastolic CHF (congestive heart failure) ([facility] [address] ): Hypoxia:   Shortness of breath:   Diagnosis management comments: 1:15 PM  Chest xray with chronic interstitial changes as well as consistent with CHF  Mild worsening in comparison to old  Based on history as well as physical exam acute on chronic CHF exacerbation  Discussed with Dr herrera who will contact case management for assistance  If patient is unable to have O2 delivered today will admit for observation  Discussed with admitting regarding patient  Staff had called [facility] and reports from their state that patient has been having intermittent periods of hypoxia into the 70s over the weekend and was waiting home oxygen  We reviewed chest x-ray together with hospitalist   Patient is afebrile without any productive cough or leukocytosis  EKG 11:19 a m : normal sinus rhythm at 88 beats per minute  Left axis deviation  Low intervals within normal limits and stable  QTC measured at 459 milliseconds  Artifact present  Nonspecific ST segment changes  T-wave inversion in 1, aVL  Amount and/or Complexity of Data Reviewed  Clinical lab tests: ordered and reviewed  Tests in the radiology section of CPT®: ordered and reviewed  Tests in the medicine section of CPT®: ordered and reviewed  Independent visualization of images, tracings, or specimens: yes (No pneumothoraces  )      CritCare Time    Disposition  Final diagnoses:   Acute on chronic diastolic CHF (congestive heart failure) ([facility])   Hypoxia   Shortness of breath     Time reflects when diagnosis was documented in both MDM as applicable and the Disposition within this note     Time User Action Codes Description Comment    2/11/2018  1:16 PM Nataliia Cheng Add [I50 33] Acute on chronic diastolic CHF (congestive heart failure) ([facility] [address] )     2/11/2018  1:16 PM Lavinia Cheng Add [R09 02] Hypoxia     2/11/2018  1:16 PM Nataliia Cheng Add [R06 02] Shortness of breath       ED Disposition     ED Disposition Condition Comment    Admit  Case was discussed with Dr Tulio Lopez and the patient's admission status was agreed to be Admission Status: observation status to the service of Dr Tulio Lopez   Follow-up Information    None       Patient's Medications   Discharge Prescriptions    No medications on file     No discharge procedures on file      ED Provider  Electronically Signed by           Saskia Mckeon DO  02/11/18 8672

## 2018-02-11 NOTE — ASSESSMENT & PLAN NOTE
· Acute tracheobronchitis vs pneumonia, given hypoxia with PO2 in the 70's, acute encephalopathy and cough, nursing home resident, will treat for HCAP  Due to Penicillin allergy place on Aztreonam, Gentamycin and Vancomycin as well as Flagyl in the case of aspiration   Avoiding Levaquin due to acute encephalopathy that Levaquin can exacerbate  · Close monitoring of VS  · Daily CBC and BMP

## 2018-02-11 NOTE — ED NOTES
Pt started gagging and saying she "felt pukey"  Stated this just came out of nowhere    Consulted with Dr Sima Acharya and received and order for 4mg of Zofran IV     Abel Liu RN  02/11/18 3205

## 2018-02-11 NOTE — ED NOTES
Pt resting comfortably at this time   Does not state she feels nausea at this time     Regino Cooney, NISHI  02/11/18 1977

## 2018-02-11 NOTE — ASSESSMENT & PLAN NOTE
· Not in exacerbation and patient appears dry on exam  · Hold Lasix and cautious with IVF  · Monitor daily weights, I's and O's

## 2018-02-11 NOTE — ED NOTES
Pt was incontinent of urine upon leaving ED to go to inpatient room  Was cleaned, new bedding applied and brief        Laury Pozo RN  02/11/18 1776

## 2018-02-11 NOTE — PLAN OF CARE
DISCHARGE PLANNING     Discharge to home or other facility with appropriate resources Progressing        INFECTION - ADULT     Absence or prevention of progression during hospitalization Progressing     Absence of fever/infection during neutropenic period Progressing        Knowledge Deficit     Patient/family/caregiver demonstrates understanding of disease process, treatment plan, medications, and discharge instructions Progressing        PAIN - ADULT     Verbalizes/displays adequate comfort level or baseline comfort level Progressing        Potential for Falls     Patient will remain free of falls Progressing        RESPIRATORY - ADULT     Achieves optimal ventilation and oxygenation Progressing        SAFETY ADULT     Maintain or return to baseline ADL function Progressing     Maintain or return mobility status to optimal level Progressing

## 2018-02-11 NOTE — ASSESSMENT & PLAN NOTE
· Due to acute hypoxic respiratory failure with chronic interstitial disease and suspected infectious tracheobronchitis vs pneumonia  · Montior labs/check metabolic workup  · IV fluids and antibitoics

## 2018-02-11 NOTE — ASSESSMENT & PLAN NOTE
· Suspect secondary to infectious process with hx of interstitial lung disease  · SPO2 normalized to 90's on 2L NC from 70's initially  · Consult Pulmonology  · Continue infectious workup and broad spectrum antibiotics  · Patient will likely need to be discharged on home O2

## 2018-02-11 NOTE — H&P
H&P- Jeanie Corbin 9/25/1923, 80 y o  female MRN: [id number]    Unit/Bed#: RM10 Encounter: [id number]    Primary Care Provider: Clement Chapa DO   Date and time admitted to hospital: 2/11/2018 11:15 AM        Acute respiratory failure with hypoxia ([address] )   Assessment & Plan    · Suspect secondary to infectious process with hx of interstitial lung disease  · SPO2 normalized to 90's on 2L NC from 70's initially  · Consult Pulmonology  · Continue infectious workup and broad spectrum antibiotics  · Patient will likely need to be discharged on home O2        Acute metabolic encephalopathy   Assessment & Plan    · Due to acute hypoxic respiratory failure with chronic interstitial disease and suspected infectious tracheobronchitis vs pneumonia  · Montior labs/check metabolic workup  · IV fluids and antibitoics        Acute infective tracheobronchitis   Assessment & Plan    · Acute tracheobronchitis vs early pneumonia, given hypoxia with PO2 in the 70's, acute encephalopathy and cough, nursing home resident, will treat for HCAP  Has hx of penicillin allergy but has tolerated cephalosporins in the past, placed on Cefepime and Vanco with Flagyl in the case of aspiration component   Avoiding Levaquin due to acute encephalopathy that Levaquin can exacerbate in an elderly  · Close monitoring of VS  · Daily CBC and BMP        Chronic diastolic CHF (congestive heart failure) (HCC)   Assessment & Plan    · Not in exacerbation and patient appears dry on exam  · Hold Lasix and cautious with IVF  · Monitor daily weights, I's and O's        Stage 3 chronic kidney disease   Assessment & Plan    · Cr at baseline  · Monitor BMP  · Avoid nephrotoxins and contrast        Frequent falls   Assessment & Plan    · Due to advanced age/chronic ambulatory dysfunction  · PT/OT        Gastroesophageal reflux disease without esophagitis   Assessment & Plan    · Continue Protonix        Essential hypertension   Assessment & Plan · Controlled  · Continue home meds and monitor BMP           VTE Prophylaxis: Enoxaparin (Lovenox)  / sequential compression device   Code Status: DNR  POLST: There is no POLST form on file for this patient (pre-hospital)  Discussion with family: Kevin Ruby    Anticipated Length of Stay:  Patient will be admitted on an Inpatient basis with an anticipated length of stay of more than 2 midnights  Justification for Hospital Stay: acute respiratory failure    Total Time for Visit, including Counseling / Coordination of Care: 1 hour  Greater than 50% of this total time spent on direct patient counseling and coordination of care  Chief Complaint:   Hypoxia, mental status change    History of Present Illness:    Angelina Christianson is a 80 y o  female who presents with hypoxia with SpO2 in the 70s  The patient was noted for breathing difficulty in the nursing home where she resides  Attempts were made to obtain oxygen however patient continued to deteriorate and EMS was called  Patient was found to have oxygen saturation in the 70s on air and was placed on oxygen and brought to the [facility] Emergency Room  The patient improved on nasal cannula with oxygen saturation in the 90s  She continued to have cough productive thick sputum  She was noted to be confused and was unable to provide history  She denies any pain  She did admit to shortness of breath and cough  She denied nausea or vomiting  She stated that she felt she had some chills  There were no documented fevers  Patient has not been noted for vomiting or diarrhea  I spoke to nursing home staff and apparently patient is normally oriented x 3  She is able to ambulate some but usually uses a wheel chair  She has been having respiratory symptoms for 1 week but today became lethargic, did not come down for her meal and was weak and confused  Staff denies noting fevers or diaphoresis in the patient   It was noted that patient is normally on a regular diet and has not exhibited signs of aspiration  Review of Systems:    Review of Systems    Past Medical and Surgical History:     Past Medical History:   Diagnosis Date    Angina pectoris ([address] )     Arthritis     CAD (coronary artery disease)     GERD (gastroesophageal reflux disease)     Hypertension     Lyme disease     Osteoporosis     Vertigo        Past Surgical History:   Procedure Laterality Date    AORTIC VALVE REPLACEMENT      APPENDECTOMY      EYE SURGERY      cataract bilateral       Meds/Allergies:    Prior to Admission medications    Medication Sig Start Date End Date Taking?  Authorizing Provider   acetaminophen (MAPAP) 325 mg tablet Take 1 tablet by mouth every 6 (six) hours as needed 10/23/17   Historical Provider, MD   acetaminophen (TYLENOL) 500 mg tablet Take 325 mg by mouth every 6 (six) hours as needed for mild pain      Historical Provider, MD   ASPIRIN EC EXTRA STRENGTH PO Take 81 mg by mouth daily    Historical Provider, MD   bisacodyl (DULCOLAX) 10 mg suppository Insert into the rectum 10/23/17   Historical Provider, MD   calcium carbonate (CALCIUM 600) 600 MG tablet Take 1 tablet by mouth daily    Historical Provider, MD   carboxymethylcellulose (REFRESH TEARS) 0 5 % SOLN Apply to eye    Historical Provider, MD   carvedilol (COREG) 6 25 mg tablet Take 1 tablet by mouth daily 10/23/17   Historical Provider, MD   clopidogrel (PLAVIX) 75 mg tablet Take 1 tablet by mouth daily 1/25/16   Historical Provider, MD   diltiazem (CARDIZEM CD) 120 mg 24 hr capsule Take 1 capsule (120 mg total) by mouth daily for 90 days 2/5/18 5/6/18  Kathe Laurent DO   Docusate Sodium (DOC-Q-LACE PO) Take 1 capsule by mouth 2 (two) times a day as needed 10/23/17   Historical Provider, MD   ergocalciferol (VITAMIN D2) 50,000 units Take by mouth 10/23/17   Historical Provider, MD   furosemide (LASIX) 20 mg tablet Take 1 tablet (20 mg total) by mouth every other day 2/5/18   Kathe Laurent DO gabapentin (NEURONTIN) 100 mg capsule Take 300 mg by mouth daily at bedtime    Historical Provider, MD   menthol-zinc oxide (RISAMINE) 6 92-37 880 % Apply 1 application topically 2 (two) times a day    Historical Provider, MD   Multiple Vitamin (MULTI-DAY VITAMINS PO) Take 1 tablet by mouth daily 10/23/17   Historical Provider, MD   Nutritional Supplements (ENSURE CLEAR) LIQD Take by mouth 10/23/17   Historical Provider, MD   pantoprazole (PROTONIX) 40 mg tablet Take 1 tablet by mouth daily 1/29/16   Historical Provider, MD   polyvinyl alcohol-povidone (REFRESH) 1 4-0 6 % ophthalmic solution 1 drop 3 (three) times a day    Historical Provider, MD   potassium chloride (K-DUR,KLOR-CON) 20 mEq tablet Take 1 tablet (20 mEq total) by mouth daily for 30 days 2/6/18 3/8/18  Maru Ribeiro DO   simvastatin (ZOCOR) 10 mg tablet Take by mouth daily 6/14/16   Historical Provider, MD   sorbitol 70 % solution Take 30 mL by mouth daily as needed    Historical Provider, MD   traMADol (ULTRAM) 50 mg tablet Take 50 mg by mouth 2 (two) times a day    Historical Provider, MD   aspirin 81 MG tablet Take 81 mg by mouth daily    2/11/18  Historical Provider, MD   bisacodyl (bisacodyl) 5 mg EC tablet Take 10 mg by mouth daily as needed for constipation  2/11/18  Historical Provider, MD   Calcium Carbonate-Vitamin D 600-200 MG-UNIT CAPS Take 2 tablets by mouth daily  2/11/18  Historical Provider, MD   docusate sodium (COLACE) 100 mg capsule Take 1 capsule by mouth 2 (two) times a day 9/8/17 2/11/18  Sofia Christensen DO   Ergocalciferol (VITAMIN D2 PO) Take 50,000 Units by mouth every 30 (thirty) days  2/11/18  Historical Provider, MD   gabapentin (NEURONTIN) 100 mg capsule Take 1 capsule by mouth 12/8/17 2/11/18  Historical Provider, MD   menthol-zinc oxide (RISAMINE) 0 44-20 625 % Apply topically 11/27/17 2/11/18  Historical Provider, MD   Multiple Vitamin (MULTIVITAMIN) capsule Take 1 capsule by mouth daily  2/11/18  Historical Provider, MD   Nutritional Supplements (ENSURE ACTIVE PO) Take by mouth  2/11/18  Historical Provider, MD   pantoprazole (PROTONIX) 40 mg tablet Take 40 mg by mouth daily  2/11/18  Historical Provider, MD   simvastatin (ZOCOR) 10 mg tablet Take 10 mg by mouth daily  2/11/18  Historical Provider, MD   sorbitol 70 % solution by Does not apply route 10/23/17 2/11/18  Historical Provider, MD   traMADol Mark Lies) 50 mg tablet Take by mouth 9/1/17 2/11/18  Historical Provider, MD     I have reviewed home medications with a medical source (PCP, Pharmacy, other)  Allergies: Allergies   Allergen Reactions    Penicillins        Social History:     Marital Status:    Occupation: Retired  Patient Pre-hospital Living Situation: [address]  Patient Pre-hospital Level of Mobility: Limited  Patient Pre-hospital Diet Restrictions: None  Substance Use History:   History   Alcohol Use No     History   Smoking Status    Never Smoker   Smokeless Tobacco    Never Used     History   Drug Use No       Family History:    non-contributory    Physical Exam:     Vitals:   Blood Pressure: 122/58 (02/11/18 1333)  Pulse: 73 (02/11/18 1333)  Temperature: 98 4 °F (36 9 °C) (02/11/18 1117)  Temp Source: Temporal (02/11/18 1117)  Respirations: 19 (02/11/18 1333)  Height: 4' 9" (144 8 cm) (02/11/18 1117)  Weight - Scale: 48 8 kg (107 lb 9 4 oz) (02/11/18 1117)  SpO2: 97 % (02/11/18 1333)    Physical Exam   Constitutional: She appears lethargic  She appears ill  No distress  HENT:   Head: Normocephalic and atraumatic  Eyes: Pupils are equal, round, and reactive to light  Neck: No JVD present  Cardiovascular: Normal rate and regular rhythm  Pulmonary/Chest: Effort normal  No respiratory distress  She has no wheezes  She has rales (diffuse)  Abdominal: Soft  She exhibits no distension  There is no tenderness  There is no guarding  Musculoskeletal: She exhibits no edema  Neurological: She appears lethargic   No cranial nerve deficit  She exhibits normal muscle tone  Skin: Skin is warm and dry  Psychiatric: Cognition and memory are impaired  Additional Data:     Lab Results: I have personally reviewed pertinent reports  Results from last 7 days  Lab Units 02/11/18  1143   WBC Thousand/uL 5 19   HEMOGLOBIN g/dL 10 9*   HEMATOCRIT % 33 4*   PLATELETS Thousands/uL 190   LYMPHO PCT % 17   MONO PCT MAN % 3*   EOSINO PCT MANUAL % 0       Results from last 7 days  Lab Units 02/11/18  1143   SODIUM mmol/L 138   POTASSIUM mmol/L 4 3   CHLORIDE mmol/L 99*   CO2 mmol/L 31   BUN mg/dL 16   CREATININE mg/dL 1 07   CALCIUM mg/dL 9 0   TOTAL PROTEIN g/dL 7 5   BILIRUBIN TOTAL mg/dL 0 60   ALK PHOS U/L 56   ALT U/L 15   AST U/L 32   GLUCOSE RANDOM mg/dL 120           Imaging: I have personally reviewed pertinent reports  XR chest 2 views    (Results Pending)   CT chest wo contrast    (Results Pending)       EKG, Pathology, and Other Studies Reviewed on Admission:   · EKG: ordered    Allscripts / Epic Records Reviewed: Yes     ** Please Note: This note has been constructed using a voice recognition system   **

## 2018-02-11 NOTE — ED NOTES
Pt O2 SAT is 96% on 2L nasal cannula    Weaned patient down to 1L and is 97%     Dalton Simms RN  02/11/18 2559

## 2018-02-11 NOTE — ED NOTES
Pt resting comfortably at this time  Has not passed urine yet  Awaiting inpatient bed   Patient O2 SAT 96% on 1L nasal cannula     Laury Pozo RN  02/11/18 6940

## 2018-02-12 PROBLEM — R13.19 OTHER DYSPHAGIA: Status: ACTIVE | Noted: 2018-01-01

## 2018-02-12 NOTE — PLAN OF CARE
Problem: SLP ADULT - SWALLOWING, IMPAIRED  Goal: Initial SLP swallow eval performed  Outcome: Completed Date Met: 02/12/18

## 2018-02-12 NOTE — ASSESSMENT & PLAN NOTE
· Improved, patient is alert and oriented x3  · Was due to acute hypoxic respiratory failure with chronic interstitial disease and suspected infectious tracheobronchitis vs pneumonia  · Montior labs/VS  · IV fluids discontinued

## 2018-02-12 NOTE — ASSESSMENT & PLAN NOTE
· Not in exacerbation and patient appears dry on exam  · Lasix held for now as patient appeared dehydrated at admission, may be able to restart tomorrow  · Monitor daily weights, I's and O's

## 2018-02-12 NOTE — PHYSICAL THERAPY NOTE
PT Evaluation     02/12/18 1027   Note Type   Note type Eval/Treat   Pain Assessment   Pain Assessment No/denies pain   Pain Score No Pain   Home Living   Type of Home Assisted living  [facility])   Prior Function   Level of [place] Needs assistance with ADLs and functional mobility  (ambulates with RW Supervision or uses w/c)   Receives Help From Personal care attendant  (staff at assisted living facility)   ADL Assistance Needs assistance  (pt states he is able to dress herself, gets assist prn)   IADLs Needs assistance   Restrictions/Precautions   Other Precautions Contact/isolation;Droplet precautions; Bed Alarm; Chair Alarm;Multiple lines;Telemetry;O2;Fall Risk  (pt is on 0 5L's O2)   General   Family/Caregiver Present No   Cognition   Arousal/Participation Alert   Orientation Level Oriented to person;Oriented to place   Following Commands Follows all commands and directions without difficulty   RLE Assessment   RLE Assessment WFL  (4- to 4/5)   LLE Assessment   LLE Assessment WFL  (4- to 4/5)   Coordination   Sensation WFL   Light Touch   RLE Light Touch Grossly intact   LLE Light Touch Grossly intact   Bed Mobility   Supine to Sit 4  Minimal assistance   Additional items Assist x 2;Verbal cues   Sit to Supine (OOB in chair)   Additional Comments Pt on  5L's O2 with spO2 93% HR 66 prior to ambulation  Pt with decreased endurance and fatigue but no complaint of SOB   Transfers   Sit to Stand 4  Minimal assistance   Additional items Assist x 1;Verbal cues  (with RW)   Stand to Sit 4  Minimal assistance   Additional items Assist x 1;Verbal cues  (with RW)   Stand pivot 4  Minimal assistance   Additional items Assist x 1;Assist x 2;Verbal cues  (with RW)   Additional Comments Pt requiring assistance with all functional mobility and assist with management of RW  Fatigue after 15ft ambulation but no drop in spO2 on  5L's   Ambulation/Elevation   Gait pattern Foward flexed; Short stride   Gait Assistance 4 Minimal assist   Additional items Assist x 1   Assistive Device Rolling walker   Distance 15ft with RW min(A)x1-2 requiring assistance for RW management and demonstrates fatigue with limited endurance   Balance   Static Sitting Good   Dynamic Sitting Fair +   Static Standing Fair  (with RW)   Dynamic Standing Fair  (with RW)   Ambulatory Fair  (with RW)   Endurance Deficit   Endurance Deficit Yes   Endurance Deficit Description limited ambulation and activity tolerance due to weakness and fatigue   Activity Tolerance   Activity Tolerance Patient limited by fatigue   Assessment   Prognosis Good   Problem List Decreased strength;Decreased endurance; Impaired balance;Decreased mobility   Assessment Pt is a 80year old female presenting with decreased strength balance endurance safety and functional mobility requiring supplemental O2 to maintain SpO2 greater than 90% and min(A) for all bed mobility transfers and ambulation  Pt is in need of continued activity in PT to improve impairments and functional deficits  Anticipate pt will be able to [facility] when medically stable  Goals   Patient Goals To feel better   LTG Expiration Date 02/26/18   Long Term Goal #1 (S) with all bed mobility and transfers with RW   Long Term Goal #2 Pt will ambulate 150ft with RW Supervision no drop in spO2 below 90%   Plan   Treatment/Interventions Functional transfer training;LE strengthening/ROM; Therapeutic exercise; Endurance training;Patient/family training;Bed mobility;Gait training   PT Frequency (3-5x/wk)   Recommendation   Recommendation Home PT  (return to assisted living facility)   PT - OK to Discharge No  (when medically stable for discharge)   Pt with SCD's on when PT entered room  SCD's reapplied and turned on with pt seated in chair at bedside with call bell in reach and chair alarm on

## 2018-02-12 NOTE — SPEECH THERAPY NOTE
Speech Language/Pathology  Speech/Language Pathology  Assessment    Patient Name: Elizabeth Ring  FTSOW'Z Date: 2/12/2018     Problem List  Patient Active Problem List   Diagnosis    Essential hypertension    Gastroesophageal reflux disease without esophagitis    Coronary artery disease involving native coronary artery of native heart    H/O aortic valve replacement    Hyperlipidemia    Frequent falls    Stage 3 chronic kidney disease    Chronic diastolic CHF (congestive heart failure) (HCC)    Acute respiratory failure with hypoxia (HCC)    Acute infective tracheobronchitis    Acute metabolic encephalopathy     Past Medical History  Past Medical History:   Diagnosis Date    Angina pectoris ([address] )     Arthritis     CAD (coronary artery disease)     GERD (gastroesophageal reflux disease)     Hypertension     Lyme disease     Osteoporosis     Vertigo      Past Surgical History  Past Surgical History:   Procedure Laterality Date    AORTIC VALVE REPLACEMENT      APPENDECTOMY      EYE SURGERY      cataract bilateral        02/12/18 1200   Swallow Information   Current Risks for Dysphagia & Aspiration Respiratory compromise   Current Symptoms/Concerns Current Pneumonia;Cough; With liquids   Current Diet Regular; Thin liquid   Baseline Assessment   Behavior/Cognition Cooperative; Impulsive; Alert; Interactive; Requires cueing   Speech/Language Status Verbal  Follows simple commands  Patient Positioning Upright in chair   Swallow Mechanism Exam   Gag WFL   Dentition Partial dentition   Volitional Cough Strong   Consistencies Assessed and Performance   Materials Admnistered Soft/Level 3;Puree/Level 1;Mechanical Soft/Level 2; Thin liquid; Nectar thick liquid   Materials Adminstered Comment Nursing reports patient taking whole pills in applesauce WFL   Oral Stage Mild impaired   Oral Stage Comment Patient has reduced oral strength/ROM for adequate mastication and reduced AP transfer  Holding regular food  Swallowed with prompts  Trials of thin via straw and cup were with reduced bolus control  Phargngeal Stage Mild impaired;Aspiration risk   Pharyngeal Stage Comment Patient has overt s/s with thin liquids via straw and cup  No s/s with solids  Swallow Mechanics Mild delayed;Good Larygneal rise;Aspiration risk   Esophageal Concerns Hx GERDS   Summary   Swallow Summary Patient presents for eval after s/s of cough with thin liquids intermittently  The patient is pleasant and cooperative and trying to feed herself, but needs assistance  Oral motor mech exam reveals mild/mod oral motor strength/ROM for adequate control of thin liquids  She has reduced bolus formation and reduced AP transfer  Patient is [facility] with MS texture of solids when being fed and prompted to swallow; as she holds solids in oral cavity  Patient has loss of control of thin liquids, resulting in suspected premature leakage  Mild delay in swallow initiation with thin liquids results in immediate cough  Patient is [facility] with nectar thick liquids via straw or cup; but she can be impulsive, so requested sippy cup at bedside/with meals  She will need help with feeding  Recommendations   Risk for Aspiration Mild   Recommendations Consider oral diet; Dysphagia treatment   Diet Solid Recommendation Level 2 Dysphagia/ mechanical soft/altered   Diet Liquid Recommendation Nectar thick liquid   Recommended Form of Meds Whole; With puree   General Precautions Aspiration precautions; Feed only when alert;Upright as possible for all oral intake;Remain upright for 45 mins after meals;Assist with feeding   Compensatory Swallowing Strategies Cue for lingual sweep   Further Evaluations Dietician   Results Reviewed with RN;PT/Family/Caregiver   Treatment Recommendations   Duration of treatment one week   Follow up treatments Assure diet tolerance; Patient/family education;Strategy training   Dysphagia Goals Patient will tolerate recommended diet without observed clinical signs of oral/pharngeal dysphagia; Patient will tolerate advanced diet with no signs of oral or pharngeal dysphagia; Patient will utilize appropriate strategies for swallowing safety   Speech Therapy Prognosis   Prognosis Fair   Prognosis Considerations Age; Patient Participation Level

## 2018-02-12 NOTE — ASSESSMENT & PLAN NOTE
· Possibly contributing to acute respiratory failure  · Speech therapy evaluated and diet modified to mechanical soft with nectar thickened fluids

## 2018-02-12 NOTE — ASSESSMENT & PLAN NOTE
· Suspect secondary to possible infectious process with hx of interstitial lung disease  · SPO2 normalized to 90's on 2L NC from 70's initially  · Consulted Pulmonology  · Continue infectious workup and broad spectrum antibiotics  · Patient will likely need to be discharged on home O2

## 2018-02-12 NOTE — PROGRESS NOTES
Progress Note - Diane Rashid 9/25/1923, 80 y o  female MRN: [id number]    Unit/Bed#: 410-01 Encounter: [id number]    Primary Care Provider: Esther Singleton DO   Date and time admitted to hospital: 2/11/2018 11:15 AM        * Acute respiratory failure with hypoxia ([facility] [address] )   Assessment & Plan    · Suspect secondary to possible infectious process with hx of interstitial lung disease  · SPO2 normalized to 90's on 2L NC from 70's initially  · Consulted Pulmonology  · Continue infectious workup and broad spectrum antibiotics  · Patient will likely need to be discharged on home O2        Other dysphagia   Assessment & Plan    · Possibly contributing to acute respiratory failure  · Speech therapy evaluated and diet modified to mechanical soft with nectar thickened fluids        Acute metabolic encephalopathy   Assessment & Plan    · Improved, patient is alert and oriented x3  · Was due to acute hypoxic respiratory failure with chronic interstitial disease and suspected infectious tracheobronchitis vs pneumonia  · Montior labs/VS  · IV fluids discontinued        Acute infective tracheobronchitis   Assessment & Plan    · Treat for HCAB (health-care acquired bronchitis) vs HCAP with Cefepime, Vanco and flagyl as possible microaspiration - patient nursing home resident  · Await infectious workup and expect to de-escalate vs d/c antibiotics as not completely convinced there is infectious process, CT chest revealed interstitial thickening consistent with chronic interstitial lung disease and possible interstitial pneumonia component  · Close monitoring of VS  · Daily CBC and BMP  · Pulmonology consult        Chronic diastolic CHF (congestive heart failure) ([facility] [address] )   Assessment & Plan    · Not in exacerbation and patient appears dry on exam  · Lasix held for now as patient appeared dehydrated at admission, may be able to restart tomorrow  · Monitor daily weights, I's and O's        Stage 3 chronic kidney disease Assessment & Plan    · Cr at baseline  · Monitor BMP  · Avoid nephrotoxins and contrast        Frequent falls   Assessment & Plan    · Due to advanced age/chronic ambulatory dysfunction  · PT/OT        Gastroesophageal reflux disease without esophagitis   Assessment & Plan    · Continue Protonix        Essential hypertension   Assessment & Plan    · Controlled with BP on low side  · Continue home meds with holding parameters          VTE Pharmacologic Prophylaxis:   Pharmacologic: Enoxaparin (Lovenox)  Mechanical VTE Prophylaxis in Place: Yes    Patient Centered Rounds: I have performed bedside rounds with nursing staff today  Discussions with Specialists or Other Care Team Provider: Pulmonology, multidisciplinary meeting    Education and Discussions with Family / Patient: Patient, ongoing update of son Maile Tracey    Time Spent for Care: 30 minutes  More than 50% of total time spent on counseling and coordination of care as described above  Current Length of Stay: 1 day(s)    Current Patient Status: Inpatient   Certification Statement: The patient will continue to require additional inpatient hospital stay due to need for close monitoring and supplemental oxygen    Discharge Plan: 1-2 days    Code Status: Level 3 - DNAR and DNI      Subjective:   Patient seen and examined  She still appeared lethargic this morning, however, on my current re-evaluation, she appears more alert and awake, she is sitting in arm chair, able to answer questions appropriately and appears in NAD  She admits to feeling better  She states that her breathing is "all right" and close to her baseline  She is on 2L O2 via [place]  She denies chest pain  Denies abdominal pain, nausea or vomiting  Reports improvement in her appetite and consuming Ensure supplements      Objective:     Vitals:   Temp (24hrs), Av 3 °F (36 8 °C), Min:97 1 °F (36 2 °C), Max:99 3 °F (37 4 °C)    HR:  [67-86] 70  Resp:  [18-21] 18  BP: ()/(53-69) 110/56  SpO2: [95 %-97 %] 97 %  Body mass index is 19 68 kg/m²  Input and Output Summary (last 24 hours): Intake/Output Summary (Last 24 hours) at 02/12/18 1536  Last data filed at 02/12/18 1235   Gross per 24 hour   Intake          1958 34 ml   Output                0 ml   Net          1958 34 ml       Physical Exam:     Physical Exam   Constitutional: She is oriented to person, place, and time  She appears cachectic  No distress  Eyes: Right eye exhibits discharge  Left eye exhibits discharge  Neck: No JVD present  Cardiovascular: Normal rate  Exam reveals no gallop and no friction rub  No murmur heard  Pulmonary/Chest: Effort normal  No respiratory distress  She has no wheezes  She has rales (Fine crackles throughout)  Abdominal: Soft  She exhibits no distension  There is no tenderness  There is no guarding  Musculoskeletal: She exhibits no edema  Neurological: She is alert and oriented to person, place, and time  No cranial nerve deficit  Skin:   Fragile   Psychiatric: She is slowed  Additional Data:     Labs:      Results from last 7 days  Lab Units 02/12/18  0621  02/11/18  1143   WBC Thousand/uL 7 03  --  5 19   HEMOGLOBIN g/dL 9 5*  --  10 9*   HEMATOCRIT % 29 2*  --  33 4*   PLATELETS Thousands/uL 165  < > 190   LYMPHO PCT %  --   --  17   MONO PCT MAN %  --   --  3*   EOSINO PCT MANUAL %  --   --  0   < > = values in this interval not displayed  Results from last 7 days  Lab Units 02/12/18  0620 02/11/18  1143   SODIUM mmol/L 137 138   POTASSIUM mmol/L 3 8 4 3   CHLORIDE mmol/L 100 99*   CO2 mmol/L 27 31   BUN mg/dL 19 16   CREATININE mg/dL 1 05 1 07   CALCIUM mg/dL 8 5 9 0   TOTAL PROTEIN g/dL  --  7 5   BILIRUBIN TOTAL mg/dL  --  0 60   ALK PHOS U/L  --  56   ALT U/L  --  15   AST U/L  --  32   GLUCOSE RANDOM mg/dL 99 120           * I Have Reviewed All Lab Data Listed Above  * Additional Pertinent Lab Tests Reviewed:  [facility] [facility] Admission Reviewed    Imaging:    Imaging Reports Reviewed Today Include: CT chest    Recent Cultures (last 7 days):           Last 24 Hours Medication List:     Current Facility-Administered Medications:  acetaminophen 325 mg Oral Q6H PRN Idania Ward MD    aspirin 81 mg Oral Daily Idania Ward MD    bisacodyl 10 mg Rectal Daily Idania Ward MD    carvedilol 6 25 mg Oral Daily Idania Ward MD    cefepime 1,000 mg Intravenous Q24H Idania Ward MD Last Rate: 1,000 mg (02/11/18 2218)   clopidogrel 75 mg Oral Daily Anu Cuba MD    diltiazem 120 mg Oral Daily Idania Ward MD    docusate sodium 100 mg Oral Daily Anu Cuba MD    enoxaparin 30 mg Subcutaneous Daily Idania Ward MD    ENSURE CLEAR 1 Bottle Oral TID Idania Ward MD    lactulose 10 g Oral Daily PRN Idania Ward MD    metroNIDAZOLE 500 mg Intravenous Q8H Anu Cuab MD Last Rate: 500 mg (02/12/18 1214)   ondansetron 4 mg Intravenous Q6H PRN Idania Ward MD    pantoprazole 40 mg Oral Daily Anu Cuba MD    polyvinyl alcohol 1 drop Both Eyes PRN Idania Ward MD    potassium chloride 20 mEq Oral Daily Idania Ward MD    pravastatin 20 mg Oral Daily With Enrrique Linder MD    vancomycin 12 5 mg/kg Intravenous Q24H Idania Ward MD         Today, Patient Was Seen By: Gilmer Riddle MD    ** Please Note: Dictation voice to text software may have been used in the creation of this document   **

## 2018-02-12 NOTE — OCCUPATIONAL THERAPY NOTE
Occupational Therapy Evaluation     Patient Name: Vickey Giron  [id number] Date: 2/12/2018  Problem List  Patient Active Problem List   Diagnosis    Essential hypertension    Gastroesophageal reflux disease without esophagitis    Coronary artery disease involving native coronary artery of native heart    H/O aortic valve replacement    Hyperlipidemia    Frequent falls    Stage 3 chronic kidney disease    Chronic diastolic CHF (congestive heart failure) (HCC)    Acute respiratory failure with hypoxia (HCC)    Acute infective tracheobronchitis    Acute metabolic encephalopathy     Past Medical History  Past Medical History:   Diagnosis Date    Angina pectoris ([address] )     Arthritis     CAD (coronary artery disease)     GERD (gastroesophageal reflux disease)     Hypertension     Lyme disease     Osteoporosis     Vertigo      Past Surgical History  Past Surgical History:   Procedure Laterality Date    AORTIC VALVE REPLACEMENT      APPENDECTOMY      EYE SURGERY      cataract bilateral           02/12/18 1001   Note Type   Note type Eval/Treat   Restrictions/Precautions   Weight Bearing Precautions Per Order No   Other Precautions Contact/isolation;Droplet precautions; Chair Alarm; Bed Alarm;Multiple lines;Telemetry;O2;Fall Risk   Pain Assessment   Pain Assessment No/denies pain   Home Living   Type of Home Assisted living  [facility])   Prior Function   Level of Ray Needs assistance with ADLs and functional mobility; Needs assistance with IADLs   Lives With Facility staff   Receives Help From Personal care attendant   ADL Assistance Needs assistance   IADLs Needs assistance   Falls in the last 6 months 0   Comments pt states she was utilizing the w/c and RW at [facility] ([facility]   ADL   Where Assessed (bathroom)   Grooming Assistance 4  Minimal Assistance   Grooming Deficit Wash/dry hands   LB Bathing Assistance 1  Total Assistance   LB Bathing Deficit Right upper leg;Left upper leg;Right lower leg including foot; Left lower leg including foot   LB Dressing Assistance 1  Total Assistance   LB Dressing Deficit Don/doff R sock; Don/doff L sock; Thread RLE into underwear; Thread LLE into underwear;Pull up over hips; Fasteners   Toileting Assistance  1  Total Assistance   Toileting Deficit Clothing management up;Clothing management down;Grab bar use; Increased time to complete;Supervison/safety; Perineal hygiene   Additional Comments pt incontinent of large amount of urine and feces during session and required LB bathing, brief changed, and perineal hygiene and replaced socks; pt required total (A) during session for these tasks due to fatigue and inability to complete    Bed Mobility   Supine to Sit 4  Minimal assistance   Additional items Assist x 2;Bedrails; Increased time required;Verbal cues;LE management   Sit to Supine (seated in chair at end of session)   Transfers   Sit to Stand 4  Minimal assistance   Additional items Assist x 1;Bedrails;Verbal cues  (RW)   Stand to Sit 4  Minimal assistance   Additional items Assist x 1;Verbal cues  (RW)   Functional Mobility   Functional Mobility 4  Minimal assistance   Additional Comments X1 c use of RW; pt required guidance of RW during session and cues to continue with FM   Additional items Rolling walker   Balance   Static Sitting Good   Dynamic Sitting Fair +   Static Standing Fair   Dynamic Standing Fair   Ambulatory Fair   Activity Tolerance   Activity Tolerance Patient limited by fatigue   RUE Assessment   RUE Assessment X  (3+/5 grossly; WFL AROM )   LUE Assessment   LUE Assessment X  (3+/5 grossly; WFL AROM )   Hand Function   Gross Motor Coordination Functional   Fine Motor Coordination Functional   Sensation   Light Touch No apparent deficits   Sharp/Dull No apparent deficits   Cognition   Overall Cognitive Status Impaired   Arousal/Participation Alert; Responsive   Attention Within functional limits   Orientation Level Oriented to person;Oriented to place;Oriented to situation   Memory Within functional limits   Following Commands Follows one step commands without difficulty   Assessment   Limitation Decreased ADL status; Decreased UE strength;Decreased endurance;Decreased self-care trans;Decreased high-level ADLs; Decreased cognition;Decreased Safe judgement during ADL   Assessment pt presents at evaluation performing at overall min (A) level with use of RW during FM; pt with significant decrease in endurance, strength, and (I) c ADL tasks; recommend continued OT intervention in order to maximize (I) prior to return to [facility]   Goals   Short Term Goal  pt will perform UE strengthening exercises    Long Term Goal #1 pt will perform UB bathing and grooming tasks at min (A) level    Long Term Goal #2 pt will perform FM c RW at (S) level with decreased cues    Long Term Goal pt will perform toilet transfers and tasks at min (A) level    Plan   Treatment Interventions ADL retraining;Functional transfer training;UE strengthening/ROM; Endurance training;Patient/family training; Activityengagement   Goal Expiration Date 02/26/18   OT Frequency 3-5x/wk   Recommendation   OT Discharge Recommendation 24 hour supervision/assist   OT - OK to Discharge No   Barthel Index   Feeding 10   Bathing 0   Grooming Score 5   Dressing Score 5   Bladder Score 0   Bowels Score 5   Toilet Use Score 5   Transfers (Bed/Chair) Score 10   Mobility (Level Surface) Score 10   Stairs Score 0   Barthel Index Score 50       Pt will benefit from continued OT services in order to maximize (I) c ADL performance, FM c RW, and improve overall endurance/strength required to complete functional tasks in preparation for d/c  Pt left seated in chair at end of session; all needs within reach; all lines intact; scds connected and turned on

## 2018-02-12 NOTE — MALNUTRITION/BMI
This medical record reflects one or more clinical indicators suggestive of malnutrition and/or morbid obesity  Please indicate the one diagnosis below which you feel best reflects the clinical picture  Malnutrition Findings:   Malnutrition type: Chronic illness (r/t advanced age and medical conditions)  Degree of Malnutrition: Other severe protein calorie malnutrition  Chronic severe protein calorie malnutrition r/t advanced age and medical conditions as evidenced by severe body fat (orbitals) and muscle mass (temples, clavicle) depletion, 10% wt loss x 1 month  See Nutrition note dated 2/12/2018 for additional details  Completed nutrition assessment is viewable in the nutrition documentation

## 2018-02-12 NOTE — SOCIAL WORK
Pt was admitted from [facility] assisted [facility]  Pt had been pretty independent at [facility] and needed some assistance with her adls  Used a walker for ambulation  Pt is active with [facility] homecare   Pt uses [facility] for appointments  Pcp is Balwinder Corcoran and pharmacy is [facility]  When pt is dc'd from the hospital she will need ambulance (wc) transport back to [facility]  Pt will also be checked on dc to see if she qualifies for 02 on dc  Cm will continue to follow and assist in dc planning

## 2018-02-12 NOTE — DISCHARGE INSTR - DIET
Recommend Puree diet and nectar thick liquids  Sippy cup  Pills whole in puree  Assist with feeding

## 2018-02-12 NOTE — CONSULTS
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:  There is no treatment for IPF and this is likely an indolent course in this elderly woman  Would proceed w/ DNR/DNI status and document this in a POLST     · Bronchiectasis: this we could treat and diagnose  The most likely cause is chronic aspiration  Will ask speech to evaluate swallow to be sure her diet is modified enough  [place] for mucous clearance here and at her SNF  · Influenza B  Begin Tamiflu  This is likely what caused her admission but she is currently NOT toxic  Discontinue Vanco since we have a source of infection  History of Present Illness   HPI:  Elizabeth Ring is a 80 y o  female who was admitted yesterday w/ lethargy and moist cough  She required supplemental O2 on admit which was new for her  She states she is better today and denies current respiratory symptoms  NOTE: she is already on a thickened liquid diet  NOTE: Flu was just resulted as (+) B    Review of systems:  Patient denies headache or vision changes  Weight is stable  Denies sore throat or runny nose  Denies fever, chills or sweats  Denies chest pain or palpitations  Denies nausea, vomiting or diarrhea  Denies lower extremity edema  Denies skin rashes  Denies dysuria or hematuria  All other 12-point review of systems are negative      Historical Information   Past Medical History:   Diagnosis Date    Angina pectoris ([address] )     Arthritis     CAD (coronary artery disease)     GERD (gastroesophageal reflux disease)     Hypertension     Lyme disease     Osteoporosis     Vertigo      Past Surgical History:   Procedure Laterality Date    AORTIC VALVE REPLACEMENT      APPENDECTOMY      EYE SURGERY      cataract bilateral     Family History   Problem Relation Age of Onset    Family history unknown: Yes       Tobacco history: none    Family history: longevity in her family    Meds/Allergies   Current Facility-Administered Medications   Medication Dose Route Frequency    acetaminophen (TYLENOL) tablet 325 mg  325 mg Oral Q6H PRN    aspirin chewable tablet 81 mg  81 mg Oral Daily    bisacodyl (DULCOLAX) rectal suppository 10 mg  10 mg Rectal Daily    carvedilol (COREG) tablet 6 25 mg  6 25 mg Oral Daily    cefepime (MAXIPIME) IVPB (premix) 1,000 mg  1,000 mg Intravenous Q24H    clopidogrel (PLAVIX) tablet 75 mg  75 mg Oral Daily    diltiazem (CARDIZEM CD) 24 hr capsule 120 mg  120 mg Oral Daily    docusate sodium (COLACE) capsule 100 mg  100 mg Oral Daily    enoxaparin (LOVENOX) subcutaneous injection 30 mg  30 mg Subcutaneous Daily    ENSURE CLEAR LIQD 1 Bottle  1 Bottle Oral TID    lactulose 20 g/30 mL oral solution 10 g  10 g Oral Daily PRN    metroNIDAZOLE (FLAGYL) IVPB (premix) 500 mg  500 mg Intravenous Q8H    ondansetron (ZOFRAN) injection 4 mg  4 mg Intravenous Q6H PRN    [START ON 2/13/2018] oseltamivir (TAMIFLU) capsule 30 mg  30 mg Oral Q24H    pantoprazole (PROTONIX) EC tablet 40 mg  40 mg Oral Daily    polyvinyl alcohol (LIQUIFILM TEARS) 1 4 % ophthalmic solution 1 drop  1 drop Both Eyes PRN    potassium chloride (K-DUR,KLOR-CON) CR tablet 20 mEq  20 mEq Oral Daily    pravastatin (PRAVACHOL) tablet 20 mg  20 mg Oral Daily With Dinner     Allergies   Allergen Reactions    Penicillins        Vitals: Blood pressure 105/54, pulse 61, temperature 98 7 °F (37 1 °C), temperature source Temporal, resp  rate 18, height 5' (1 524 m), weight 45 7 kg (100 lb 12 oz), SpO2 95 % , RA, Body mass index is 19 68 kg/m²      Intake/Output Summary (Last 24 hours) at 02/12/18 1810  Last data filed at 02/12/18 1235   Gross per 24 hour   Intake          1958 34 ml   Output                0 ml   Net          1958 34 ml Physical exam:    General Appearance:    Alert, cooperative, no conversational dyspnea or accessory     muscle use       Head/eyes:    Normocephalic, without obvious abnormality, atraumatic,         PERRL, extraocular muscles intact, no scleral icterus    Nose:   Nares normal, septum midline, mucosa normal, no drainage    or sinus tenderness   Throat:   Moist mucous membranes, no thrush   Neck:   Supple, trachea midline, no adenopathy; no carotid    bruit or JVD   Lungs:     Diminished w/ anterior rales  NO wheezes  Kyphosis without chest wall pain        Heart:    Regular rate and rhythm, S1 and S2 normal, no murmur, rub   or gallop   Abdomen:     Soft, non-tender, bowel sounds active all four quadrants,     no masses, no organomegaly   Extremities:   Extremities normal, atraumatic, no cyanosis or edema  thin   Skin:   Warm, dry, turgor normal, no rashes or lesions   Lymph nodes:   Cervical and supraclavicular nodes normal   Neurologic:   CNII-XII intact, normal strength, non-focal   O X 3 and in my best medical judgement she is competent  Labs: I have personally reviewed pertinent lab results  Results from last 7 days  Lab Units 02/12/18  0621 02/11/18  2055 02/11/18  1143   WBC Thousand/uL 7 03  --  5 19   HEMOGLOBIN g/dL 9 5*  --  10 9*   HEMATOCRIT % 29 2*  --  33 4*   PLATELETS Thousands/uL 165 197 190           Results from last 7 days  Lab Units 02/12/18  0620 02/11/18  1143   SODIUM mmol/L 137 138   POTASSIUM mmol/L 3 8 4 3   CHLORIDE mmol/L 100 99*   CO2 mmol/L 27 31   BUN mg/dL 19 16   CREATININE mg/dL 1 05 1 07   CALCIUM mg/dL 8 5 9 0   TOTAL PROTEIN g/dL  --  7 5   BILIRUBIN TOTAL mg/dL  --  0 60   ALK PHOS U/L  --  56   ALT U/L  --  15   AST U/L  --  32   GLUCOSE RANDOM mg/dL 99 120           Results from last 7 days  Lab Units 02/11/18  1143   MAGNESIUM mg/dL 1 8       Imaging and other studies: I have personally reviewed pertinent reports     (+) for influenza B    Other Studies: I have personally reviewed pertinent films in PACS CT chest shows pulmonary fibrosis, Fx T8 vertebrae and bronchiectasis, especially in the MRL    Code Status: Level 3 - DNAR and DNI    Thank you for allowing us to participate in the care of your patient      Rivera Chatterjee MD

## 2018-02-12 NOTE — CASE MANAGEMENT
Initial Clinical Review    Admission: Date/Time/Statement: 2/11/18 @ 1537     Orders Placed This Encounter   Procedures    Inpatient Admission     Standing Status:   Standing     Number of Occurrences:   1     Order Specific Question:   Admitting Physician     Answer:   Antonia Fink [[id number]]     Order Specific Question:   Level of Care     Answer:   Med Surg [16]     Order Specific Question:   Estimated length of stay     Answer:   More than 2 Midnights     Order Specific Question:   Certification     Answer:   I certify that inpatient services are medically necessary for this patient for a duration of greater than two midnights  See H&P and MD Progress Notes for additional information about the patient's course of treatment  ED: Date/Time/Mode of Arrival:   ED Arrival Information     Expected Arrival Acuity Means of Arrival Escorted By Service Admission Type    - 2/11/2018 11:15 Urgent Ambulance [address] [facility] Ambulance General Medicine Urgent    Arrival Complaint    breathing problem          Chief Complaint:   Chief Complaint   Patient presents with    Shortness of Breath     SOB due to recently being dx with hypoxia and O2 was not delivered to home yet  SATS were 78% for EMS  Denies chest pain, Headache, dizziness, N/V       History of Illness:  80 y o  female who presents with hypoxia with SpO2 in the 70s  The patient was noted for breathing difficulty in the nursing home where she resides  Attempts were made to obtain oxygen however patient continued to deteriorate and EMS was called  Patient was found to have oxygen saturation in the 70s on room air and was placed on oxygen and brought to the [facility] Emergency Room  The patient improved on nasal cannula with oxygen saturation in the 90s  She continued to have cough productive of thick sputum  She was noted to be confused and was unable to provide history  She denies any pain  She did admit to shortness of breath and cough    She stated that she felt she had some chills  There were no documented fevers  I spoke to nursing home staff and apparently patient is normally oriented x 3  She is able to ambulate some but usually uses a wheel chair  She has been having respiratory symptoms for 1 week but today became lethargic, did not come down for her meal and was weak and confused  Staff denies noting fevers or diaphoresis in the patient  It was noted that patient is normally on a regular diet and has not exhibited signs of aspiration      ED Vital Signs:   ED Triage Vitals   Temperature Pulse Respirations Blood Pressure SpO2   02/11/18 1117 02/11/18 1117 02/11/18 1128 02/11/18 1117 02/11/18 1117   98 4 °F (36 9 °C) 94 18 122/92 (!) 86 %      Temp Source Heart Rate Source Patient Position - Orthostatic VS BP Location FiO2 (%)   02/11/18 1117 02/11/18 1117 02/11/18 1117 02/11/18 1117 --   Temporal Monitor Sitting Right arm       Pain Score       02/11/18 1117       No Pain        Wt Readings from Last 1 Encounters:   02/11/18 45 9 kg (101 lb 3 1 oz)       Vital Signs:  02/11 0701  02/12 0700 02/12 0701  02/12 1042  Most Recent     Temperature (°F) 98 499 3 97 1  97 1 (36 2)    Pulse 6894 6770  70    Respirations 1821 18  18    Blood Pressure 96/61131/63 110/53110/56  110/56    SpO2 (%) 8699 97  97        Abnormal Labs/Diagnostic Test Results:   Lab Units 02/11/18  1143   WBC Thousand/uL 5 19   HEMOGLOBIN g/dL 10 9*   HEMATOCRIT % 33 4*   PLATELETS Thousands/uL 190   LYMPHO PCT % 17   MONO PCT MAN % 3*   EOSINO PCT MANUAL % 0       Lab Units 02/11/18  1143   SODIUM mmol/L 138   POTASSIUM mmol/L 4 3   CHLORIDE mmol/L 99*   CO2 mmol/L 31   BUN mg/dL 16   CREATININE mg/dL 1 07   CALCIUM mg/dL 9 0   TOTAL PROTEIN g/dL 7 5   BILIRUBIN TOTAL mg/dL 0 60   ALK PHOS U/L 56   ALT U/L 15   AST U/L 32   GLUCOSE RANDOM mg/dL 120     CXR --Moderate to advanced chronic, progressive interstitial fibrotic process without new, superimposed acute consolidation  CT chest -- Acute or early subacute moderate superior endplate compression fracture of T12    Interstitial lung disease, clearly significant but not significantly changed when compared to March 23, 2017  There is associated tubular bronchiectasis  The pattern, predominantly in the costophrenic angles and peripheral peripheral lungs, suggests idiopathic pulmonary fibrosis and possible usual interstitial pneumonia  No new airspace opacity to suggest superimposed community-acquired pneumonia  EKG      ED Treatment:   Medication Administration from 02/11/2018 1115 to 02/11/2018 1719       Date/Time Order Dose Route Action Action by Comments     02/11/2018 1153 ondansetron (ZOFRAN) injection 4 mg 4 mg Intravenous Given Shivani Perez RN      02/11/2018 1344 furosemide (LASIX) injection 20 mg 20 mg Intravenous Given Shivani Perez RN           Past Medical/Surgical History:    Active Ambulatory Problems     Diagnosis Date Noted    Essential hypertension 03/23/2017    Gastroesophageal reflux disease without esophagitis 03/23/2017    Coronary artery disease involving native coronary artery of native heart 03/23/2017    H/O aortic valve replacement 03/23/2017    Hyperlipidemia 03/23/2017    Frequent falls 09/06/2017    Stage 3 chronic kidney disease 01/06/2018    Chronic diastolic CHF (congestive heart failure) ([facility] ) 01/06/2018     Resolved Ambulatory Problems     Diagnosis Date Noted    Generalized weakness 03/23/2017    UTI (urinary tract infection) 03/23/2017    [facility] fibrosis ([facility] ) 03/23/2017    Dyspnea on exertion 03/23/2017    MENDOZA (acute kidney injury) ([facility] ) 09/06/2017    Back pain 09/06/2017    Closed compression fracture of lumbar vertebra [facility] ) 09/06/2017    Acute cystitis without hematuria 09/06/2017     Past Medical History:   Diagnosis Date    Angina pectoris (HCC)     Arthritis     CAD (coronary artery disease)     GERD (gastroesophageal reflux disease)     Hypertension     Lyme disease     Osteoporosis     Vertigo        Admitting Diagnosis: Shortness of breath [R06 02]  Breathing problem [R06 9]  Hypoxia [R09 02]  Acute respiratory failure with hypoxia ([facility]) [J96 01]  Acute on chronic diastolic CHF (congestive heart failure) ([facility]) [I50 33]    Age/Sex: 80 y o  female    Assessment/Plan:          Acute respiratory failure with hypoxia ([facility])   Assessment & Plan     · Suspect secondary to infectious process with hx of interstitial lung disease  · SPO2 normalized to 90's on 2L NC from 70's initially  · Consult Pulmonology  · Continue infectious workup and broad spectrum antibiotics  · Patient will likely need to be discharged on home O2          Acute metabolic encephalopathy   Assessment & Plan     · Due to acute hypoxic respiratory failure with chronic interstitial disease and suspected infectious tracheobronchitis vs pneumonia  · Montior labs/check metabolic workup  · IV fluids and antibitoics          Acute infective tracheobronchitis   Assessment & Plan     · Acute tracheobronchitis vs early pneumonia, given hypoxia with PO2 in the 70's, acute encephalopathy and cough, nursing home resident, will treat for HCAP  Has hx of penicillin allergy but has tolerated cephalosporins in the past, placed on Cefepime and Vanco with Flagyl in the case of aspiration component   Avoiding Levaquin due to acute encephalopathy that Levaquin can exacerbate in an elderly  · Close monitoring of VS  · Daily CBC and BMP          Chronic diastolic CHF (congestive heart failure) ([facility])   Assessment & Plan     · Not in exacerbation and patient appears dry on exam  · Hold Lasix and cautious with IVF  · Monitor daily weights, I's and O's          Stage 3 chronic kidney disease   Assessment & Plan     · Cr at baseline  · Monitor BMP  · Avoid nephrotoxins and contrast          Frequent falls   Assessment & Plan     · Due to advanced age/chronic ambulatory dysfunction  · PT/OT        Gastroesophageal reflux disease without esophagitis   Assessment & Plan     · Continue Protonix          Essential hypertension   Assessment & Plan     · Controlled  · Continue home meds and monitor BMP              VTE Prophylaxis: Enoxaparin (Lovenox)  / sequential compression device   Code Status: DNR  POLST: There is no POLST form on file for this patient (pre-hospital)  Discussion with family: Kevin Marie     Anticipated Length of Stay:  Patient will be admitted on an Inpatient basis with an anticipated length of stay of more than 2 midnights     Justification for Hospital Stay: acute respiratory failure      Admission Orders:  Admit to M/S/Tele unit  Telem  Consult pulmonology  PT/OT/Speech evals  Cardiac, 4 gm sodium diet  O2 2L nc  Bed rest  SCD's      Scheduled Meds:   Current Facility-Administered Medications:  acetaminophen 325 mg Oral Q6H PRN Armida Joyner MD    aspirin 81 mg Oral Daily Armida Joyner MD    bisacodyl 10 mg Rectal Daily Armida Joyner MD    carvedilol 6 25 mg Oral Daily Armida Joyner MD    cefepime 1,000 mg Intravenous Q24H Armida Joyner MD Last Rate: 1,000 mg (02/11/18 2218)   clopidogrel 75 mg Oral Daily Anu Cuba MD    diltiazem 120 mg Oral Daily Armida Joyner MD    docusate sodium 100 mg Oral Daily Armida Joyner MD    enoxaparin 30 mg Subcutaneous Daily Armida Joyner MD    ENSURE CLEAR 1 Bottle Oral TID Armida Joyner MD    hydroxypropyl methylcellulose 1 drop Both Eyes PRN Armida Joyner MD    lactulose 10 g Oral Daily PRN Armida Joyner MD    metroNIDAZOLE 500 mg Intravenous Q8H Anu Cuba MD Last Rate: 500 mg (02/12/18 9916)   ondansetron 4 mg Intravenous Q6H PRN Anu Cuba MD    pantoprazole 40 mg Oral Daily Anu Cuba MD    polyvinyl alcohol 1 drop Both Eyes PRN Armida Joyner MD    potassium chloride 20 mEq Oral Daily Armida Joyner MD    pravastatin 20 mg Oral Daily With Germania Echevarria MD    sodium chloride 100 mL/hr Intravenous Continuous Anu Yin Miles MD Last Rate: 100 mL/hr (02/12/18 0916)   vancomycin 12 5 mg/kg Intravenous Q24H Anu Cuba MD      Continuous Infusions:   sodium chloride 100 mL/hr Last Rate: 100 mL/hr (02/12/18 0916)     PRN Meds:   acetaminophen    hydroxypropyl methylcellulose    lactulose    ondansetron    polyvinyl alcohol

## 2018-02-12 NOTE — PLAN OF CARE
Problem: DISCHARGE PLANNING - CARE MANAGEMENT  Goal: Discharge to post-acute care or home with appropriate resources  INTERVENTIONS:  - Conduct assessment to determine patient/family and health care team treatment goals, and need for post-acute services based on payer coverage, community resources, and patient preferences, and barriers to discharge  - Address psychosocial, clinical, and financial barriers to discharge as identified in assessment in conjunction with the patient/family and health care team  - Arrange appropriate level of post-acute services according to patient's   needs and preference and payer coverage in collaboration with the physician and health care team  - Communicate with and update the patient/family, physician, and health care team regarding progress on the discharge plan  - Arrange appropriate transportation to post-acute venues  Outcome: Progressing  -return to [facility] assisted [facility] on dc  -will need transportation arranged on dc  -check to see if qualifies for 02 on dc

## 2018-02-12 NOTE — ASSESSMENT & PLAN NOTE
· Treat for HCAB (health-care acquired bronchitis) vs HCAP with Cefepime, Vanco and flagyl as possible microaspiration - patient nursing home resident  · Await infectious workup and expect to de-escalate vs d/c antibiotics as not completely convinced there is infectious process, CT chest revealed interstitial thickening consistent with chronic interstitial lung disease and possible interstitial pneumonia component  · Close monitoring of VS  · Daily CBC and BMP  · Pulmonology consult

## 2018-02-13 NOTE — PHYSICAL THERAPY NOTE
PT treatment note      02/13/18 1055   Restrictions/Precautions   Other Precautions (Contact/isolation;Droplet precautions; Chair Alarm; Bed Alarm;)   Cognition   Overall Cognitive Status Impaired   Following Commands Follows one step commands with increased time or repetition   Subjective   Subjective Sleeping difficult to arrouse for PT  eyes closed most of PT session  Bed Mobility   Supine to Sit 2  Maximal assistance   Additional items Assist x 1;HOB elevated; Increased time required;Verbal cues;LE management   Transfers   Sit to Stand 3  Moderate assistance   Additional items Assist x 1;Bedrails;Verbal cues   Stand to Sit 3  Moderate assistance   Additional items Assist x 1; Armrests; Verbal cues   Ambulation/Elevation   Gait pattern Forward Flexion; Short stride   Gait Assistance 4  Minimal assist   Additional items Assist x 1;Verbal cues   Assistive Device Rolling walker   Distance 3' bed to chair   Balance   Static Sitting Fair +   Dynamic Sitting Fair +   Static Standing Fair   Dynamic Standing Chang Mckeon [id number]  (with RW)   Endurance Deficit   Endurance Deficit Yes   Activity Tolerance   Activity Tolerance Patient limited by fatigue   Assessment   Prognosis Guarded   Problem List Decreased strength;Decreased endurance; Impaired balance;Decreased mobility   Assessment Performing functional mobility at Max-min x 1 level of function  Lethargic and difficult to arrouse for PT  Fatigued quickly, limited ambulation this session  Pt is in need of continued activity in PT to improve impairments and functional deficits  Plan   Treatment/Interventions Functional transfer training;LE strengthening/ROM; Therapeutic exercise; Endurance training;Bed mobility;Gait training   Progress Slow progress, decreased activity tolerance   Recommendation   Recommendation Short-term skilled PT  (Home PT (return to assisted living facility)  Pending progre)   Pt   OOB with call bell within reach, scd's connected and turned on and alarm on at end of PT session

## 2018-02-13 NOTE — PLAN OF CARE
DISCHARGE PLANNING     Discharge to home or other facility with appropriate resources Progressing        DISCHARGE PLANNING - CARE MANAGEMENT     Discharge to post-acute care or home with appropriate resources Progressing        INFECTION - ADULT     Absence or prevention of progression during hospitalization Progressing     Absence of fever/infection during neutropenic period Progressing        Knowledge Deficit     Patient/family/caregiver demonstrates understanding of disease process, treatment plan, medications, and discharge instructions Progressing        Nutrition/Hydration-ADULT     Nutrient/Hydration intake appropriate for improving, restoring or maintaining nutritional needs Progressing        PAIN - ADULT     Verbalizes/displays adequate comfort level or baseline comfort level Progressing        Potential for Falls     Patient will remain free of falls Progressing        Prexisting or High Potential for Compromised Skin Integrity     Skin integrity is maintained or improved Progressing        RESPIRATORY - ADULT     Achieves optimal ventilation and oxygenation Progressing        SAFETY ADULT     Maintain or return to baseline ADL function Progressing     Maintain or return mobility status to optimal level Progressing

## 2018-02-13 NOTE — CONSULTS
Patient incontinent of non-formed stool  Moisture associated incontinence dermatitis of sacrum and bilateral inner buttocks  DTI present of right sacrum (2 purple areas)  Incontinence care provided  Hydroguard applied  Patient OOB to chair  Soft care cushion placed on chair  Heels intact  Right elbow pink but blanchable  Agus 12  High risk for pressure injury  Albumin 2 8   DIetary on consult  Recommendations:  1  Calmoseptine to sacral and bilateral inner buttocks  Apply q shift and with incontinence care  2   Soft care cushion when OOB to chair  3   Alternating air mattress  4  Turn and repositions q 2 hours  5   Elevate heels of bed with pillows  6   Leave right sacral DTI open to air  7   Avoid brief when patient in bed  Pressure Ulcer 02/13/18 Sacrum Right DTI (Active)   Staging Deep Tissue Injury 2/13/2018  1:00 PM   Wound Description Light purple 2/13/2018  1:00 PM   [place]-wound Assessment Intact 2/13/2018  1:00 PM   Shape 2 areas 2/13/2018  1:00 PM   Wound Length (cm) 2 cm 2/13/2018  1:00 PM   Wound Width (cm) 3 cm 2/13/2018  1:00 PM   Calculated Wound Area (cm^2) 6 cm^2 2/13/2018  1:00 PM   Drainage Amount None 2/13/2018  1:00 PM   Treatment Softcare cushion 2/13/2018  1:00 PM   Number of days: 0       Wound Moisture associated skin damage Sacrum Mid (Active)   Wound Description Epithelialization;Pink 2/13/2018  1:00 PM   [place]-wound Assessment Intact 2/13/2018  1:00 PM   Shape sacrum and b/l inner buttocks 2/13/2018  1:00 PM   Drainage Amount None 2/13/2018  1:00 PM   Non-staged Wound Description Not applicable 5/55/3394  2:25 PM   Treatments Other (Hydroguard) 2/13/2018  1:00 PM   Number of days:      Smita Guevara RN   CWOCN  2/13/2018 14:00

## 2018-02-13 NOTE — SPEECH THERAPY NOTE
Speech Language/Pathology    Speech/Language Pathology Progress Note    Patient Name: Atul Hernandez  WHNRD'N Date: 2/13/2018     Problem List  Patient Active Problem List   Diagnosis    Essential hypertension    Gastroesophageal reflux disease without esophagitis    Coronary artery disease involving native coronary artery of native heart    H/O aortic valve replacement    Hyperlipidemia    Frequent falls    Stage 3 chronic kidney disease    Chronic diastolic CHF (congestive heart failure) (HCC)    Acute respiratory failure with hypoxia (HCC)    Acute infective tracheobronchitis    Acute metabolic encephalopathy    Other dysphagia        Past Medical History  Past Medical History:   Diagnosis Date    Angina pectoris ([address] )     Arthritis     CAD (coronary artery disease)     GERD (gastroesophageal reflux disease)     Hypertension     Lyme disease     Osteoporosis     Vertigo         Past Surgical History  Past Surgical History:   Procedure Laterality Date    AORTIC VALVE REPLACEMENT      APPENDECTOMY      EYE SURGERY      cataract bilateral         Subjective:  Patient OOB in chair, upright  Patient states she feels a little tired today  Objective:  Nursing states she has no difficulties during meals, they are feeding her  Assessment:  Patient did not want much  SLP fed her tsp Ensure pudding  She transferred with mild delay, mild swallow delay; and no s/s  Straw and cup sips of nectar thick apple juice were WFL  No s/s  Plan/Recommendations:  Continue mechanical soft diet and nectar thick liquids  Continue ST  Continue to feed patient and monitor for changes in status

## 2018-02-13 NOTE — PROGRESS NOTES
Progress Note - Franc Cornelius 80 y o  female MRN: [id number]    Unit/Bed#: 410-01 Encounter: [id number]      Assessment/Plan:    1  Acute respiratory failure with hypoxia:  Improving, patient on 2 L nasal cannula with sats in the mid 90s  1  Presumed aspiration pneumonia  / dysphagia:  Continue cefepime and Flagyl, speech eval reviewed, modified diet instituted  2  Influenza B:  tamiflu  3  Interstitial lung disease:  Appreciate Pulmonary Medicine input, continue with treatment for aspiration and influenza  2  Chronic diastolic congestive heart failure: Will hold off on resumption of diuretics times 24 hours  3  CKD III: at baseline   4  Ambulatory dysfunction: PT/OT, consider STR  5  GERD : PPI    Subjective:   Seen and examiend @ bedside  She's fatigued and tells me she just wants to sleep, she's able to follow commands and knows she's in [facility] and the year     Objective:     Vitals:   Vitals:    02/13/18 0732   BP: (!) 113/48   Pulse: 74   Resp: 18   Temp: (!) 97 4 °F (36 3 °C)   SpO2: 97%     Body mass index is 20 84 kg/m²      Intake/Output Summary (Last 24 hours) at 02/13/18 1122  Last data filed at 02/12/18 1908   Gross per 24 hour   Intake           796 67 ml   Output                0 ml   Net           796 67 ml       Physical Exam:   BP (!) 113/48 (BP Location: Left arm)   Pulse 74   Temp (!) 97 4 °F (36 3 °C) (Temporal)   Resp 18   Ht 5' (1 524 m)   Wt 48 4 kg (106 lb 11 2 oz)   SpO2 97%   BMI 20 84 kg/m²   General appearance: alert and oriented, in no acute distress  Lungs: clear to auscultation bilaterally  Heart: regular rate and rhythm, S1, S2 normal, no murmur, click, rub or gallop  Abdomen: soft, non-tender; bowel sounds normal; no masses,  no organomegaly  Extremities: extremities normal, warm and well-perfused; no cyanosis, clubbing, or edema  Pulses: 2+ and symmetric  Neurologic: Grossly normal     Invasive Devices     Peripheral Intravenous Line            Peripheral IV 02/11/18 Right Antecubital 1 day    Peripheral IV 02/12/18 Right Forearm less than 1 day                  Results from last 7 days  Lab Units 02/13/18  0406 02/12/18  0621 02/11/18  2055 02/11/18  1143   WBC Thousand/uL 6 96 7 03  --  5 19   HEMOGLOBIN g/dL 9 6* 9 5*  --  10 9*   HEMATOCRIT % 29 7* 29 2*  --  33 4*   PLATELETS Thousands/uL 175 165 197 190         Results from last 7 days  Lab Units 02/13/18  0406 02/12/18  0620 02/11/18  1143   SODIUM mmol/L 139 137 138   POTASSIUM mmol/L 3 1* 3 8 4 3   CHLORIDE mmol/L 104 100 99*   CO2 mmol/L 28 27 31   BUN mg/dL 18 19 16   CREATININE mg/dL 0 83 1 05 1 07   CALCIUM mg/dL 7 8* 8 5 9 0   TOTAL PROTEIN g/dL  --   --  7 5   BILIRUBIN TOTAL mg/dL  --   --  0 60   ALK PHOS U/L  --   --  56   ALT U/L  --   --  15   AST U/L  --   --  32   GLUCOSE RANDOM mg/dL 94 99 120       Medication Administration - last 24 hours from 02/12/2018 1122 to 02/13/2018 1122       Date/Time Order Dose Route Action Action by     02/13/2018 0934 aspirin chewable tablet 81 mg 81 mg Oral Given Sydney Calderon RN     02/13/2018 0934 bisacodyl (DULCOLAX) rectal suppository 10 mg 10 mg Rectal Given Sydney Calderon RN     02/13/2018 0933 carvedilol (COREG) tablet 6 25 mg 6 25 mg Oral Given Sydney Calderon RN     02/13/2018 0933 clopidogrel (PLAVIX) tablet 75 mg 75 mg Oral Given Sydney Calderon RN     02/13/2018 0934 diltiazem (CARDIZEM CD) 24 hr capsule 120 mg 120 mg Oral Given Sydney Calderon RN     02/13/2018 0934 docusate sodium (COLACE) capsule 100 mg 100 mg Oral Given Sydney Calderon RN     02/12/2018 2127 ENSURE CLEAR LIQD 1 Bottle 1 Bottle Oral Not Given NISHI Blackwood     02/12/2018 1810 ENSURE CLEAR LIQD 1 Bottle 1 Bottle Oral Given NISHI Blackwood     02/13/2018 0939 pantoprazole (PROTONIX) EC tablet 40 mg 40 mg Oral Given Sydney Calderon RN     02/13/2018 0920 potassium chloride (K-DUR,KLOR-CON) CR tablet 20 mEq 20 mEq Oral Given Sydney Calderon RN     02/12/2018 0891 pravastatin (PRAVACHOL) tablet 20 mg 20 mg Oral Given NISHI Blackwood     02/12/2018 1214 sodium chloride 0 9 % infusion 0 mL/hr Intravenous Stopped Anai Dodd, RN     02/13/2018 0934 enoxaparin (LOVENOX) subcutaneous injection 30 mg 30 mg Subcutaneous Given Rolando Ruiz, NISHI     02/13/2018 0408 metroNIDAZOLE (FLAGYL) IVPB (premix) 500 mg 500 mg Intravenous New Bag [facility], RN     02/12/2018 2006 metroNIDAZOLE (FLAGYL) IVPB (premix) 500 mg 500 mg Intravenous New Bag [place], RN     02/12/2018 1214 metroNIDAZOLE (FLAGYL) IVPB (premix) 500 mg 500 mg Intravenous New [address] [facility] Quintin Claude, RN     02/12/2018 2130 cefepime (MAXIPIME) IVPB (premix) 1,000 mg 1,000 mg Intravenous New Bag [place]ison, NISHI     02/12/2018 1706 oseltamivir (TAMIFLU) capsule 30 mg 30 mg Oral Given NISHI Blackwood            Lab, Imaging and other studies: I have personally reviewed pertinent reports      VTE Pharmacologic Prophylaxis: Enoxaparin (Lovenox)  VTE Mechanical Prophylaxis: sequential compression device     Derek Becerra MD  2/13/2018,11:22 AM

## 2018-02-14 NOTE — PLAN OF CARE
Problem: DISCHARGE PLANNING - CARE MANAGEMENT  Goal: Discharge to post-acute care or home with appropriate resources  INTERVENTIONS:  - Conduct assessment to determine patient/family and health care team treatment goals, and need for post-acute services based on payer coverage, community resources, and patient preferences, and barriers to discharge  - Address psychosocial, clinical, and financial barriers to discharge as identified in assessment in conjunction with the patient/family and health care team  - Arrange appropriate level of post-acute services according to patient's   needs and preference and payer coverage in collaboration with the physician and health care team  - Communicate with and update the patient/family, physician, and health care team regarding progress on the discharge plan  - Arrange appropriate transportation to post-acute venues   Outcome: Completed Date Met: 02/14/18  -dc back to [facility]  -[facility] w[facility] to pick pt up at 2:30 pm  -resumption of homecare services from [address]

## 2018-02-14 NOTE — SOCIAL WORK
Pt being dc'd on this date back to [facility] assisted living  [facility] to pick pt up at 2:30pm  Was checked by respiratory dept to see if she qualifies for 02 on dc, pt did not qualify for 02

## 2018-02-14 NOTE — SOCIAL WORK
Pt's son Marsha Martinez aware of  time for his mom to return to [place] on this date  CM also went over IMM with Yariel Beck who agrees with dc today

## 2018-02-14 NOTE — RESPIRATORY THERAPY NOTE
Home Oxygen Qualifying Test       Patient name: Abimael Hearn        : 1923   Date of Test:  2018  Diagnosis: Acute Respiratory Failure with Hypoxia     Home Oxygen Test:    **Medicare Guidelines require item(s) 1-5 on all ambulatory patients or 1 and 2 on on-ambulatory patients  1   Baseline SPO2 on Room Air at rest 95 %  If = or < 88% on room air add O2 via NC and titrate patient  Patient must be ambulated with O2 and titrated to > 88% with exertion  2   SPO2 on Oxygen at rest 97 %  1 l/m    3  SPO2 during exercise on Room Air 91 %  4   SPO2 during exercise on Oxygen  N/a % at a liter flow of n/a   lpm     5   Exercise performed:    [x] Walking     [] Stairs     [x] Duration 10 (min )     [x] Distance 32 (ft )       []  Supplemental Home Oxygen is indicated  [x]  Client does not qualify for home oxygen        Benito Martinez, RT

## 2018-02-14 NOTE — OCCUPATIONAL THERAPY NOTE
OT Note     02/14/18 0943   Restrictions/Precautions   Other Precautions Droplet precautions; Chair Alarm; Bed Alarm;O2;Fall Risk   Therapeutic Excerise-Strength   UE Strength Yes   Right Upper Extremity- Strength   R Shoulder Flexion; Extension;Horizontal ABduction   R Elbow Elbow flexion;Elbow extension   R Wrist Wrist flexion;Wrist extension   R Hand (Forearm pro/supination)   R Weight/Reps/Sets All movements completed 2 sets/10, no resistance   Left Upper Extremity-Strength   L Shoulder Flexion; Extension;Horizontal ABduction   L Elbow Elbow flexion;Elbow extension   L Wrist Wrist flexion;Wrist extension   L Hand (Forearm pro/supination)   L Weights/Reps/Sets All movements completed 2 sets/10, no resistance   Activity Tolerance   Activity Tolerance (Tolerates tx session)   Assessment   Assessment Presents supine, agreeable to participate  Completes BUE therex as per above without resistance  No apparent s/s of distress  Call bell in reach     Recommendation   Recommendation (Continue OT services )

## 2018-02-14 NOTE — PLAN OF CARE
Problem: Potential for Falls  Goal: Patient will remain free of falls  INTERVENTIONS:  - Assess patient frequently for physical needs  -  Identify cognitive and physical deficits and behaviors that affect risk of falls    -  [place] fall precautions as indicated by assessment   - Educate patient/family on patient safety including physical limitations  - Instruct patient to call for assistance with activity based on assessment  - Modify environment to reduce risk of injury  - Consider OT/PT consult to assist with strengthening/mobility   Outcome: Progressing      Problem: PAIN - ADULT  Goal: Verbalizes/displays adequate comfort level or baseline comfort level  Interventions:  - Encourage patient to monitor pain and request assistance  - Assess pain using appropriate pain scale  - Administer analgesics based on type and severity of pain and evaluate response  - Implement non-pharmacological measures as appropriate and evaluate response  - Consider cultural and social influences on pain and pain management  - Notify physician/advanced practitioner if interventions unsuccessful or patient reports new pain   Outcome: Progressing      Problem: INFECTION - ADULT  Goal: Absence or prevention of progression during hospitalization  INTERVENTIONS:  - Assess and monitor for signs and symptoms of infection  - Monitor lab/diagnostic results  - Monitor all insertion sites, i e  indwelling lines, tubes, and drains  - Monitor endotracheal (as able) and nasal secretions for changes in amount and color  - [place] appropriate cooling/warming therapies per order  - Administer medications as ordered  - Instruct and encourage patient and family to use good hand hygiene technique  - Identify and instruct in appropriate isolation precautions for identified infection/condition   Outcome: Progressing    Goal: Absence of fever/infection during neutropenic period  INTERVENTIONS:  - Monitor WBC  - Implement neutropenic guidelines   Outcome: Progressing      Problem: SAFETY ADULT  Goal: Maintain or return to baseline ADL function  INTERVENTIONS:  -  Assess patient's ability to carry out ADLs; assess patient's baseline for ADL function and identify physical deficits which impact ability to perform ADLs (bathing, care of mouth/teeth, toileting, grooming, dressing, etc )  - Assess/evaluate cause of self-care deficits   - Assess range of motion  - Assess patient's mobility; develop plan if impaired  - Assess patient's need for assistive devices and provide as appropriate  - Encourage maximum independence but intervene and supervise when necessary  ¯ Involve family in performance of ADLs  ¯ Assess for home care needs following discharge   ¯ Request OT consult to assist with ADL evaluation and planning for discharge  ¯ Provide patient education as appropriate   Outcome: Progressing    Goal: Maintain or return mobility status to optimal level  INTERVENTIONS:  - Assess patient's baseline mobility status (ambulation, transfers, stairs, etc )    - Identify cognitive and physical deficits and behaviors that affect mobility  - Identify mobility aids required to assist with transfers and/or ambulation (gait belt, sit-to-stand, lift, walker, cane, etc )  - [place] fall precautions as indicated by assessment  - Record patient progress and toleration of activity level on Mobility SBAR; progress patient to next Phase/Stage  - Instruct patient to call for assistance with activity based on assessment  - Request Rehabilitation consult to assist with strengthening/weightbearing, etc    Outcome: Progressing      Problem: DISCHARGE PLANNING  Goal: Discharge to home or other facility with appropriate resources  INTERVENTIONS:  - Identify barriers to discharge w/patient and caregiver  - Arrange for needed discharge resources and transportation as appropriate  - Identify discharge learning needs (meds, wound care, etc )  - Arrange for interpretive services to assist at discharge as needed  - Refer to Case Management Department for coordinating discharge planning if the patient needs post-hospital services based on physician/advanced practitioner order or complex needs related to functional status, cognitive ability, or social support system   Outcome: Progressing      Problem: Knowledge Deficit  Goal: Patient/family/caregiver demonstrates understanding of disease process, treatment plan, medications, and discharge instructions  Complete learning assessment and assess knowledge base  Interventions:  - Provide teaching at level of understanding  - Provide teaching via preferred learning methods   Outcome: Progressing      Problem: RESPIRATORY - ADULT  Goal: Achieves optimal ventilation and oxygenation  INTERVENTIONS:  - Assess for changes in respiratory status  - Assess for changes in mentation and behavior  - Position to facilitate oxygenation and minimize respiratory effort  - Oxygen administration by appropriate delivery method based on oxygen saturation (per order) or ABGs  - Initiate smoking cessation education as indicated  - Encourage broncho-pulmonary hygiene including cough, deep breathe, Incentive Spirometry  - Assess the need for suctioning and aspirate as needed  - Assess and instruct to report SOB or any respiratory difficulty  - Respiratory Therapy support as indicated   Outcome: Progressing      Problem: Nutrition/Hydration-ADULT  Goal: Nutrient/Hydration intake appropriate for improving, restoring or maintaining nutritional needs  Monitor and assess patient's nutrition/hydration status for malnutrition (ex- brittle hair, bruises, dry skin, pale skin and conjunctiva, muscle wasting, smooth red tongue, and disorientation)  Collaborate with interdisciplinary team and initiate plan and interventions as ordered  Monitor patient's weight and dietary intake as ordered or per policy  Utilize nutrition screening tool and intervene per policy   Determine patient's food preferences and provide high-protein, high-caloric foods as appropriate       INTERVENTIONS:  - Monitor oral intake, urinary output, labs, and treatment plans  - Assess nutrition and hydration status and recommend course of action  - Evaluate amount of meals eaten  - Assist patient with eating if necessary   - Allow adequate time for meals  - Recommend/ encourage appropriate diets, oral nutritional supplements, and vitamin/mineral supplements  - Order, calculate, and assess calorie counts as needed  - Recommend, monitor, and adjust tube feedings and TPN/PPN based on assessed needs  - Assess need for intravenous fluids  - Provide specific nutrition/hydration education as appropriate  - Include patient/family/caregiver in decisions related to nutrition   Outcome: Progressing      Problem: Prexisting or High Potential for Compromised Skin Integrity  Goal: Skin integrity is maintained or improved  INTERVENTIONS:  - Identify patients at risk for skin breakdown  - Assess and monitor skin integrity  - Assess and monitor nutrition and hydration status  - Monitor labs (i e  albumin)  - Assess for incontinence   - Turn and reposition patient  - Assist with mobility/ambulation  - Relieve pressure over bony prominences  - Avoid friction and shearing  - Provide appropriate hygiene as needed including keeping skin clean and dry  - Evaluate need for skin moisturizer/barrier cream  - Collaborate with interdisciplinary team (i e  Nutrition, Rehabilitation, etc )   - Patient/family teaching   Outcome: Progressing      Problem: DISCHARGE PLANNING - CARE MANAGEMENT  Goal: Discharge to post-acute care or home with appropriate resources  INTERVENTIONS:  - Conduct assessment to determine patient/family and health care team treatment goals, and need for post-acute services based on payer coverage, community resources, and patient preferences, and barriers to discharge  - Address psychosocial, clinical, and financial barriers to discharge as identified in assessment in conjunction with the patient/family and health care team  - Arrange appropriate level of post-acute services according to patient's   needs and preference and payer coverage in collaboration with the physician and health care team  - Communicate with and update the patient/family, physician, and health care team regarding progress on the discharge plan  - Arrange appropriate transportation to post-acute venues   Outcome: Progressing

## 2018-02-14 NOTE — TELEPHONE ENCOUNTER
When was discharge?   Can see Thursday Feb 22 nd - check if she can be PATRICE as we saw her recently for hospital followup visit

## 2018-02-14 NOTE — NURSING NOTE
Pt wheeled off unit by transporter  Discharge instructions sent with patient  Report given to receiving facility  Verbal understanding of same  Pt in no acute distress

## 2018-02-14 NOTE — DISCHARGE SUMMARY
Discharge Summary - Kimberley Parson 80 y o  female MRN: [id number]    Unit/Bed#: 410-01 Encounter: [id number]    Admission Date: 2/11/2018     Admitting Diagnosis: Shortness of breath [R06 02]  Breathing problem [R06 9]  Hypoxia [R09 02]  Acute respiratory failure with hypoxia (HCC) [J96 01]  Acute on chronic diastolic CHF (congestive heart failure) (HCC) [I50 33]    HPI: Kimberley Parson is a 80 y o  female who presents with hypoxia with SpO2 in the 70s  The patient was noted for breathing difficulty in the nursing home where she resides  Attempts were made to obtain oxygen however patient continued to deteriorate and EMS was called  Patient was found to have oxygen saturation in the 70s on air and was placed on oxygen and brought to the [facility] Emergency Room  The patient improved on nasal cannula with oxygen saturation in the 90s  She continued to have cough productive thick sputum  She was noted to be confused and was unable to provide history  She denies any pain  She did admit to shortness of breath and cough  She denied nausea or vomiting  She stated that she felt she had some chills  There were no documented fevers  Patient has not been noted for vomiting or diarrhea  I spoke to nursing home staff and apparently patient is normally oriented x 3  She is able to ambulate some but usually uses a wheel chair  She has been having respiratory symptoms for 1 week but today became lethargic, did not come down for her meal and was weak and confused  Staff denies noting fevers or diaphoresis in the patient  It was noted that patient is normally on a regular diet and has not exhibited signs of aspiration  Procedures Performed: No orders of the defined types were placed in this encounter        Hospital Course:    acute hypoxic respiratory failure secondary to influenza and Presume aspiration pneumonia/ Interstitiaal lung disease :  Patient was placed on 2 L nasal cannula with resultant improvement of her oxygen saturation to the mid 90s  She was, started on vancomycin, cefepime, Flagyl  Consultation with Pulmonary Medicine was obtained  She was eventually started on Tamiflu and vancomycin was discontinued  speech evaluation was obtained and modified diet was instituted  She will also be discharged on modified diet (however I believe she was on this prior to admission and still had an aspiration episode) She completed 3 days of cefepime, Flagyl will be discharged on Ceftin to finish a 7 day course  She also completed 5 day course of Tamiflu  She did not qualify for home oxygen on date of discharge  I recommended outpatient follow up with pulmonary medicine   Diuretics were held for 48 hours the setting of acute illness  Her renal function was at baseline given her history of CKD stage 3  She was at her baseline ambulatory status for physical therapy and will return to her assisted living    Significant Findings, Care, Treatment and Services Provided:   CT Chest w/o contrast  Interstitial lung disease, clearly significant but not significantly changed when compared to March 23, 2017   There is associated tubular bronchiectasis   The pattern, predominantly in the costophrenic angles and peripheral peripheral lungs, suggests   idiopathic pulmonary fibrosis and possible usual interstitial pneumonia   No new airspace opacity to suggest superimposed community-acquired pneumonia  Complications: none    Discharge Diagnosis: acute respiratory failure with hypoxia 2/2 aspiration pneumonia and influenza B    Resolved Problems  Date Reviewed: 2/12/2018    None          Condition at Discharge: fair     Discharge instructions/Information to patient and family:   See after visit summary for information provided to patient and family  Provisions for Follow-Up Care:  See after visit summary for information related to follow-up care and any pertinent home health orders        Disposition: Assisted living [facility]     Planned Readmission: No    Discharge Statement   I spent 60 minutes discharging the patient  This time was spent on the day of discharge  I had direct contact with the patient on the day of discharge  Additional documentation is required if more than 30 minutes were spent on discharge  Discharge Medications:  See after visit summary for reconciled discharge medications provided to patient and family

## 2018-02-14 NOTE — OCCUPATIONAL THERAPY NOTE
OT Note     02/14/18 1016   Restrictions/Precautions   Other Precautions Droplet precautions; Chair Alarm;O2;Fall Risk   ADL   LB Bathing Assistance 1  Total Assistance   LB Bathing Deficit Buttocks; Perineal area;Right lower leg including foot; Left lower leg including foot   LB Bathing Comments Secondary incontinent BM   LB Dressing Assistance 1  Total Assistance   LB Dressing Deficit ([place], doff/don non skids)   Transfers   Sit to Stand 4  Minimal assistance   Additional items Assist x 2;Verbal cues   Stand to Sit 4  Minimal assistance   Additional items Verbal cues   Functional Mobility   Functional Mobility 4  Minimal assistance   Additional Comments Completes approx  28' func mobility   Additional items Rolling walker   Activity Tolerance   Activity Tolerance Patient limited by fatigue   Assessment   Assessment Presents seated recliner  Agreeable to participate  Completes func mobility as per harsh  Pt incontinent BM, requires toatl A for hygiene and clothing mgmt  Tolerates static stand with support of RW thru hygiene  Returned recliner at bedside  SCDs applied and turned on  Call bell and phone in reach       Recommendation   Recommendation (Continue OT services )

## 2018-02-14 NOTE — SOCIAL WORK
Per Nikki Galicia at [facility] they had done a 24 hour pulse ox on pt there and they will be following up on when pt returns there  Maddie Book aware pt did not qualify here for 02 on dc

## 2018-02-14 NOTE — PLAN OF CARE
Problem: SLP ADULT - SWALLOWING, IMPAIRED  Goal: Advance to least restrictive diet without signs or symptoms of aspiration for planned discharge setting  See evaluation for individualized goals  Patient will:    1  Tolerate Puree diet and nectar thick liquids across meals without s/s oral/pharyngeal dysphagia      Outcome: Completed Date Met: 02/14/18

## 2018-02-14 NOTE — SPEECH THERAPY NOTE
Speech Language/Pathology    Speech/Language Pathology Progress Note    Patient Name: Scott Bragg  JHEYX'E Date: 2/14/2018     Problem List  Patient Active Problem List   Diagnosis    Essential hypertension    Gastroesophageal reflux disease without esophagitis    Coronary artery disease involving native coronary artery of native heart    H/O aortic valve replacement    Hyperlipidemia    Frequent falls    Stage 3 chronic kidney disease    Chronic diastolic CHF (congestive heart failure) (HCC)    Acute respiratory failure with hypoxia (HCC)    Acute infective tracheobronchitis    Acute metabolic encephalopathy    Other dysphagia        Past Medical History  Past Medical History:   Diagnosis Date    Angina pectoris ([address] )     Arthritis     CAD (coronary artery disease)     GERD (gastroesophageal reflux disease)     Hypertension     Lyme disease     Osteoporosis     Vertigo         Past Surgical History  Past Surgical History:   Procedure Laterality Date    AORTIC VALVE REPLACEMENT      APPENDECTOMY      EYE SURGERY      cataract bilateral         Subjective:  Patient is verbal and oriented to name and place  Objective:  She is having difficulty with holding food orally, as stated by nursing  Continue to tolerate pills without difficulty  Assessment:  Trials of MS were with long delay (moderate level) in oral transfer and holding  Patient also with munchlike chew  She is controlling and transferring nectar liquids WFL  Puree food is more functional, with mild reduced transfer  Risk is increased with MS diet at this time  Plan/Recommendations:  Recommend diet level change to Puree food and nectar thick liquids  Discharge from [address] [place] [address], as she is being discharged today  Recommended d/c diet: Puree with nectar thick liquids

## 2018-02-14 NOTE — SOCIAL WORK
CM called pt's PCP office:Mallorie Lowe and notified them that pt needs a PATRICE appointment  Message was also left at wound care center to call [facility] with a follow up appointment for pt   CM did make pt a follow up appointment with pulmonary and put on AVS

## 2018-02-14 NOTE — DISCHARGE INSTR - APPOINTMENTS
Dr Odelia Meigs office was notified that need a follow up appointment, they will be calling [facility] with an appointment  Message left at [facility] who will be calling [facility] with an appointment        Dr Patricia Del Rio follow up  Wednesday March 21st at [address] [place] [address],[place] [id number]

## 2018-02-14 NOTE — SOCIAL WORK
Melchor Fowler at [address] aware of dc today back to [facility] and to resume pt's homecare services  AVS sent over on this date

## 2018-02-14 NOTE — PHYSICAL THERAPY NOTE
PT treatment note      02/14/18 1024   Restrictions/Precautions   Other Precautions (Droplet precautions; Chair Alarm;O2;Fall Risk)   Subjective   Subjective Agreeable to therapy  Slow to wake for PT session  Bed Mobility   Supine to Sit 3  Moderate assistance   Additional items Assist x 1;Bedrails; Increased time required;LE management;Verbal cues   Transfers   Sit to Stand 4  Minimal assistance   Additional items Assist x 2;Verbal cues   Stand to Sit 4  Minimal assistance   Additional items Assist x 2;Armrests; Verbal cues   Stand pivot 4  Minimal assistance   Additional items Assist x 2;Verbal cues   Ambulation/Elevation   Gait pattern (Foward flexed; Short stride)   Gait Assistance 4  Minimal assist   Additional items Assist x 2   Assistive Device Rolling walker   Distance 3' bed to chair   Balance   Static Sitting Fair +   Dynamic Sitting Fair +   Static Standing Fair   Dynamic Standing [address]  (with RW)   Endurance Deficit   Endurance Deficit Yes   Activity Tolerance   Activity Tolerance Patient limited by fatigue   Assessment   Prognosis Fair   Problem List Decreased strength;Decreased endurance; Impaired balance;Decreased mobility   Assessment OOB to chair at (mod/min) x 2 level of function  Pt  sleeping prior to PT session  Slow to wake  Fair stability with RW  Able to follow directions throughout session  Pt is in need of continued activity in PT to improve impairments and functional deficits  Plan   Treatment/Interventions Functional transfer training;LE strengthening/ROM; Therapeutic exercise; Endurance training;Bed mobility;Gait training   Progress Slow progress, decreased activity tolerance   Recommendation   Recommendation (Home PT (return to assisted living facility))   Pt  OOB with call bell within reach, scd's connected and turned on and alarm on at end of PT session

## 2018-02-19 PROBLEM — G93.41 ACUTE METABOLIC ENCEPHALOPATHY: Status: RESOLVED | Noted: 2018-01-01 | Resolved: 2018-01-01

## 2018-02-19 PROBLEM — R13.10 DYSPHAGIA: Status: ACTIVE | Noted: 2018-01-01

## 2018-02-19 PROBLEM — R79.89 ELEVATED PLATELET COUNT: Status: ACTIVE | Noted: 2018-01-01

## 2018-02-19 PROBLEM — J96.01 ACUTE RESPIRATORY FAILURE WITH HYPOXIA (HCC): Status: RESOLVED | Noted: 2018-01-01 | Resolved: 2018-01-01

## 2018-02-19 PROBLEM — E88.09 HYPOALBUMINEMIA: Status: ACTIVE | Noted: 2018-01-01

## 2018-02-19 PROBLEM — J84.10 PULMONARY FIBROSIS (HCC): Status: ACTIVE | Noted: 2018-01-01

## 2018-02-19 PROBLEM — E87.0 HYPERNATREMIA: Status: ACTIVE | Noted: 2018-01-01

## 2018-02-19 PROBLEM — E86.0 DEHYDRATION: Status: ACTIVE | Noted: 2018-01-01

## 2018-02-19 PROBLEM — E87.8 HYPERCHLOREMIA: Status: ACTIVE | Noted: 2018-01-01

## 2018-02-19 PROBLEM — J20.9 ACUTE INFECTIVE TRACHEOBRONCHITIS: Status: RESOLVED | Noted: 2018-01-01 | Resolved: 2018-01-01

## 2018-02-19 PROBLEM — J10.1 INFLUENZA B: Status: ACTIVE | Noted: 2018-01-01

## 2018-02-19 PROBLEM — J84.9 INTERSTITIAL LUNG DISEASE (HCC): Status: ACTIVE | Noted: 2018-01-01

## 2018-02-19 PROBLEM — D64.9 ANEMIA: Status: ACTIVE | Noted: 2018-01-01

## 2018-02-19 PROBLEM — R11.2 NAUSEA AND VOMITING: Status: ACTIVE | Noted: 2018-01-01

## 2018-02-19 NOTE — ED PROVIDER NOTES
History  Chief Complaint   Patient presents with    Flu Symptoms     [facility] personnel report patient spitting out tamiflu  Patient not eating or drinking  Patient lethargic  70-year-old female presents by ambulance with concern of dehydration after she has been spitting out her Tamiflu tablets at [facility] assisted [facility]  Patient denies any complaints of pain she has no headache she feels generally weak she denies any chest pain or shortness of breath  There is no history of fever or chills  Is unsure if she was tested positive for influenza or if this is prophylaxis  She was admitted recently from 15 from 2011-214 for dyspnea hypoxia and CHF  Her ambulatory status she is able to walk but will get around mainly using a wheelchair  She denies any abdominal or back pain she states she did urinate today there is no lower extremity edema no history of trauma or falls  Prior to Admission Medications   Prescriptions Last Dose Informant Patient Reported? Taking?    ASPIRIN EC EXTRA STRENGTH PO 2/18/2018 at Unknown time  Yes Yes   Sig: Take 81 mg by mouth daily   Docusate Sodium (DOC-Q-LACE PO) Past Month at Unknown time  Yes Yes   Sig: Take 1 capsule by mouth 2 (two) times a day as needed   Multiple Vitamin (MULTI-DAY VITAMINS PO) 2/18/2018 at Unknown time  Yes Yes   Sig: Take 1 tablet by mouth daily   Nutritional Supplements (ENSURE CLEAR) LIQD 2/18/2018 at Unknown time  Yes Yes   Sig: Take by mouth   acetaminophen (MAPAP) 325 mg tablet Unknown at Unknown time  Yes No   Sig: Take 1 tablet by mouth every 6 (six) hours as needed   benzonatate (TESSALON PERLES) 100 mg capsule 2/18/2018 at Unknown time  Yes Yes   Sig: Take 100 mg by mouth 3 (three) times a day as needed for cough   bisacodyl (DULCOLAX) 5 mg EC tablet Past Month at Unknown time  Yes Yes   Sig: Take 10 mg by mouth daily as needed for constipation   calcium carbonate (CALCIUM 600) 600 MG tablet 2/18/2018 at Unknown time  Yes Yes   Sig: Take 2 tablets by mouth daily     carboxymethylcellulose (REFRESH TEARS) 0 5 % SOLN 2/19/2018 at Unknown time  Yes Yes   Sig: Apply 1 drop to eye daily 1 drop each eye    diltiazem (CARDIZEM CD) 120 mg 24 hr capsule 2/18/2018 at Unknown time  No Yes   Sig: Take 1 capsule (120 mg total) by mouth daily for 90 days   ergocalciferol (VITAMIN D2) 50,000 units More than a month at Unknown time  Yes No   Sig: Take 50,000 Units by mouth every 30 (thirty) days Takes on the 13th of the month    furosemide (LASIX) 20 mg tablet 2/17/2018 at Unknown time  No Yes   Sig: Take 1 tablet (20 mg total) by mouth every other day   gabapentin (NEURONTIN) 100 mg capsule 2/18/2018 at Unknown time  Yes Yes   Sig: Take 300 mg by mouth daily at bedtime   menthol-zinc oxide (CALMOSEPTINE) 0 44-20 6 % OINT   No Yes   Sig: Apply topically 2 (two) times a day to sacral and bilateral inner buttocks   menthol-zinc oxide (RISAMINE) 0 44-20 625 % 2/18/2018 at Unknown time  Yes Yes   Sig: Apply 1 application topically 2 (two) times a day   pantoprazole (PROTONIX) 40 mg tablet Past Week at Unknown time  Yes Yes   Sig: Take 1 tablet by mouth daily   potassium chloride (K-DUR,KLOR-CON) 20 mEq tablet 2/18/2018 at Unknown time  No Yes   Sig: Take 1 tablet (20 mEq total) by mouth daily for 30 days   simvastatin (ZOCOR) 10 mg tablet 2/18/2018 at Unknown time  Yes Yes   Sig: Take by mouth daily   sorbitol 70 % solution Past Month at Unknown time  Yes Yes   Sig: Take 30 mL by mouth daily as needed   traMADol (ULTRAM) 50 mg tablet 2/18/2018 at Unknown time  Yes Yes   Sig: Take 50 mg by mouth 2 (two) times a day      Facility-Administered Medications: None       Past Medical History:   Diagnosis Date    Angina pectoris (HCC)     Arthritis     CAD (coronary artery disease)     Gait abnormality     GERD (gastroesophageal reflux disease)     Hypertension     Lyme disease     Osteoporosis     Vertigo        Past Surgical History:   Procedure Laterality Date    AORTIC VALVE REPLACEMENT      APPENDECTOMY      EYE SURGERY  08/19/1996    cataract bilateral       Family History   Problem Relation Age of Onset    Colon cancer Sister      I have reviewed and agree with the history as documented  Social History   Substance Use Topics    Smoking status: Never Smoker    Smokeless tobacco: Never Used      Comment: Pt is a non smoker    Alcohol use No        Review of Systems   Constitutional: Negative for activity change and fever  HENT: Negative for congestion, sore throat and voice change  Eyes: Negative for discharge  Respiratory: Negative for cough and shortness of breath  Cardiovascular: Negative for chest pain and leg swelling  Gastrointestinal: Negative for abdominal distention, abdominal pain, nausea and vomiting  Genitourinary: Negative for decreased urine volume and difficulty urinating  Musculoskeletal: Negative for back pain and neck pain  Skin: Negative for rash  Neurological: Negative for light-headedness and headaches  Psychiatric/Behavioral: Negative for confusion  All other systems reviewed and are negative  Physical Exam  ED Triage Vitals   Temperature Pulse Respirations Blood Pressure SpO2   02/19/18 1018 02/19/18 1015 02/19/18 1018 02/19/18 1018 02/19/18 1015   98 4 °F (36 9 °C) 105 18 163/91 98 %      Temp Source Heart Rate Source Patient Position - Orthostatic VS BP Location FiO2 (%)   02/19/18 1018 02/19/18 1018 02/19/18 1018 02/19/18 1018 --   Temporal Monitor Lying Right arm       Pain Score       02/19/18 1018       No Pain           Orthostatic Vital Signs  Vitals:    02/19/18 1851 02/20/18 0053 02/20/18 0734 02/20/18 1157   BP: 140/81 147/87 151/92 121/80   Pulse: 101 99 105    Patient Position - Orthostatic VS:  Lying Lying        Physical Exam   Constitutional: She appears well-developed  No distress  Frail elderly female   HENT:   Head: Normocephalic     Right Ear: External ear normal    Left Ear: External ear normal    Nose: Nose normal    Mouth/Throat: No oropharyngeal exudate  TMS pale oropharynx dry   Eyes: Conjunctivae and EOM are normal  Pupils are equal, round, and reactive to light  Right eye exhibits no discharge  Left eye exhibits no discharge  Neck: Normal range of motion  Neck supple  Cardiovascular: Regular rhythm and intact distal pulses  tachy   Pulmonary/Chest: Effort normal and breath sounds normal  No respiratory distress  She has no wheezes  She has no rales  Abdominal: Soft  Bowel sounds are normal  She exhibits no distension and no mass  There is no tenderness  There is no rebound and no guarding  Musculoskeletal: Normal range of motion  She exhibits no edema, tenderness or deformity  Lymphadenopathy:     She has no cervical adenopathy  Neurological: She is alert  No cranial nerve deficit or sensory deficit  She exhibits normal muscle tone  Coordination normal    Oriented to self and place   Skin: Skin is warm and dry  Psychiatric:   flat   Vitals reviewed        ED Medications  Medications   benzonatate (TESSALON PERLES) capsule 100 mg (not administered)   docusate sodium (COLACE) capsule 100 mg (not administered)   menthol-zinc oxide (CALMOSEPTINE) 0 44-20 6 % ointment ( Topical Given 2/20/18 0859)   ondansetron (ZOFRAN) injection 4 mg (not administered)   heparin (porcine) subcutaneous injection 5,000 Units (5,000 Units Subcutaneous Given 2/20/18 0849)   acetaminophen (TYLENOL) tablet 650 mg (not administered)   albuterol inhalation solution 2 5 mg (not administered)   aspirin chewable tablet 81 mg (not administered)   atorvastatin (LIPITOR) tablet 10 mg (not administered)   diltiazem (CARDIZEM CD) 24 hr capsule 120 mg (120 mg Oral Not Given 2/20/18 1157)   potassium chloride 10 % oral solution 40 mEq (not administered)   dextrose 5 % and sodium chloride 0 2 % infusion (not administered)   dextrose 50 % IV solution 25 mL (not administered)   ondansetron (ZOFRAN) injection 4 mg (4 mg Intravenous Given 2/19/18 1403)   sodium chloride 0 9 % bolus 1,000 mL (0 mL Intravenous Stopped 2/19/18 1400)       Diagnostic Studies  Results Reviewed     Procedure Component Value Units Date/Time    Prealbumin [[id number]]  (Abnormal) Collected:  02/20/18 1940    Lab Status:  Final result Specimen:  Blood from Arm, Right Updated:  02/20/18 1320     Prealbumin 13 1 (L) mg/dL     Influenza A/B and RSV by PCR (indicated for patients >2 mo of age) [[id number]]  (Normal) Collected:  02/19/18 1345    Lab Status:  Final result Specimen:  Nasopharyngeal from Nasopharyngeal Swab Updated:  02/20/18 0741     INFLU A PCR None Detected     INFLU B PCR None Detected     RSV PCR None Detected    Troponin I [[id number]]  (Normal) Collected:  02/19/18 2156    Lab Status:  Final result Specimen:  Blood from Arm, Right Updated:  02/19/18 2226     Troponin I <0 02 ng/mL     Narrative:         [employer] Chemistry analyzer 99% cutoff is > 0 04 ng/mL in network labs    o cTnI 99% cutoff is useful only when applied to patients in the clinical setting of myocardial ischemia  o cTnI 99% cutoff should be interpreted in the context of clinical history, ECG findings and possibly cardiac imaging to establish correct diagnosis  o cTnI 99% cutoff may be suggestive but clearly not indicative of a coronary event without the clinical setting of myocardial ischemia  Troponin I [[id number]]  (Normal) Collected:  02/19/18 1843    Lab Status:  Final result Specimen:  Blood from Arm, Right Updated:  02/19/18 1908     Troponin I <0 02 ng/mL     Narrative:         [employer] analyzer 99% cutoff is > 0 04 ng/mL in network labs    o cTnI 99% cutoff is useful only when applied to patients in the clinical setting of myocardial ischemia  o cTnI 99% cutoff should be interpreted in the context of clinical history, ECG findings and possibly cardiac imaging to establish correct diagnosis    o cTnI 99% cutoff may be suggestive but clearly not indicative of a coronary event without the clinical setting of myocardial ischemia  Lactic acid, plasma [[id number]]  (Normal) Collected:  02/19/18 1843    Lab Status:  Final result Specimen:  Blood from Arm, Right Updated:  02/19/18 1908     LACTIC ACID 1 5 mmol/L     Narrative:         Result may be elevated if tourniquet was used during collection  MRSA culture [[id number]] Collected:  02/19/18 1837    Lab Status: In process Specimen:  Nares from Nose Updated:  02/19/18 1849    Urine Microscopic [[id number]]  (Abnormal) Collected:  02/19/18 1754    Lab Status:  Final result Specimen:  Urine from Urine, Clean Catch Updated:  02/19/18 1838     RBC, UA 1-2 (A) /hpf      WBC, UA 1-2 (A) /hpf      Epithelial Cells Occasional /hpf      Bacteria, UA Occasional /hpf      Hyaline Casts, UA 2-4 (A) /lpf      COARSE GRANULAR CASTS 1-2 /lpf      MUCOUS THREADS Moderate    UA w Reflex to Microscopic w Reflex to Culture [[id number]]  (Abnormal) Collected:  02/19/18 1754    Lab Status:  Final result Specimen:  Urine from Urine, Clean Catch Updated:  02/19/18 1817     Color, UA Yellow     Clarity, UA Slightly Cloudy     Specific Gravity, UA >=1 030     pH, UA 5 5     Leukocytes, UA Negative     Nitrite, UA Negative     Protein, UA 30 (1+) (A) mg/dl      Glucose, UA Negative mg/dl      Ketones, UA 40 (2+) (A) mg/dl      Urobilinogen, UA 0 2 E U /dl      Bilirubin, UA Interference- unable to analyze (A)     Blood, UA Trace-Intact    Urine culture [[id number]] Collected:  02/19/18 1754    Lab Status:   In process Specimen:  Urine from Urine, Clean Catch Updated:  02/19/18 1801    CBC and differential [[id number]]  (Abnormal) Collected:  02/19/18 1345    Lab Status:  Final result Specimen:  Blood from Hand, Right Updated:  02/19/18 1456     WBC 8 45 Thousand/uL      RBC 3 53 (L) Million/uL      Hemoglobin 10 8 (L) g/dL      Hematocrit 34 1 (L) %      MCV 97 fL      MCH 30 6 pg      MCHC 31 7 g/dL      RDW 14 2 %      MPV 9 0 fL Platelets 494 (H) Thousands/uL     Narrative: This is an appended report  These results have been appended to a previously verified report  Protime-INR [[id number]]  (Abnormal) Collected:  02/19/18 1345    Lab Status:  Final result Specimen:  Blood from Hand, Right Updated:  02/19/18 1428     Protime 16 9 (H) seconds      INR 1 38 (H)    APTT [[id number]]  (Normal) Collected:  02/19/18 1345    Lab Status:  Final result Specimen:  Blood from Hand, Right Updated:  02/19/18 1428     PTT 31 seconds     Narrative: Therapeutic Heparin Range = 60-90 seconds    Comprehensive metabolic panel [[id number]]  (Abnormal) Collected:  02/19/18 1345    Lab Status:  Final result Specimen:  Blood from Hand, Right Updated:  02/19/18 1419     Sodium 154 (H) mmol/L      Potassium 3 6 mmol/L      Chloride 116 (H) mmol/L      CO2 28 mmol/L      Anion Gap 10 mmol/L      BUN 30 (H) mg/dL      Creatinine 0 84 mg/dL      Glucose 101 mg/dL      Calcium 8 5 mg/dL      AST 22 U/L      ALT 12 U/L      Alkaline Phosphatase 40 (L) U/L      Total Protein 7 4 g/dL      Albumin 2 5 (L) g/dL      Total Bilirubin 0 50 mg/dL      eGFR 60 ml/min/1 73sq m     Narrative:         National Kidney Disease Education Program recommendations are as follows:  GFR calculation is accurate only with a steady state creatinine  Chronic Kidney disease less than 60 ml/min/1 73 sq  meters  Kidney failure less than 15 ml/min/1 73 sq  meters  TSH [[id number]]  (Normal) Collected:  02/19/18 1345    Lab Status:  Final result Specimen:  Blood from Hand, Right Updated:  02/19/18 1419     TSH 3RD GENERATON 0 858 uIU/mL     Narrative:         Patients undergoing fluorescein dye angiography may retain small amounts of fluorescein in the body for 48-72 hours post procedure  Samples containing fluorescein can produce falsely depressed TSH values  If the patient had this procedure,a specimen should be resubmitted post fluorescein clearance            The recommended reference ranges for TSH during pregnancy are as follows:  First trimester 0 1 to 2 5 uIU/mL  Second trimester  0 2 to 3 0 uIU/mL  Third trimester 0 3 to 3 0 uIU/m      Lipase [[id number]]  (Normal) Collected:  02/19/18 1345    Lab Status:  Final result Specimen:  Blood from Hand, Right Updated:  02/19/18 1419     Lipase 75 u/L     Magnesium [[id number]]  (Normal) Collected:  02/19/18 1345    Lab Status:  Final result Specimen:  Blood from Hand, Right Updated:  02/19/18 1419     Magnesium 2 2 mg/dL     Troponin I [[id number]]  (Normal) Collected:  02/19/18 1345    Lab Status:  Final result Specimen:  Blood from Hand, Right Updated:  02/19/18 1414     Troponin I <0 02 ng/mL     Narrative:         [employer] Chemistry analyzer 99% cutoff is > 0 04 ng/mL in network labs    o cTnI 99% cutoff is useful only when applied to patients in the clinical setting of myocardial ischemia  o cTnI 99% cutoff should be interpreted in the context of clinical history, ECG findings and possibly cardiac imaging to establish correct diagnosis  o cTnI 99% cutoff may be suggestive but clearly not indicative of a coronary event without the clinical setting of myocardial ischemia  Blood culture #2 [[id number]] Collected:  02/19/18 1345    Lab Status: In process Specimen:  Blood from Hand, Right Updated:  02/19/18 1352    Blood culture #1 [[id number]] Collected:  02/19/18 1345    Lab Status: In process Specimen:  Blood from Hand, Right Updated:  02/19/18 1351                 CT abdomen pelvis wo contrast   Final Result by Stephanie Ohara MD (02/20 7860)   1  Limited evaluation secondary to lack of oral and intravenous contrast   Diverticulosis without evidence of acute diverticulitis  2   Cholelithiasis and mild gallbladder wall distention  If there is focal right upper quadrant pain further evaluation with right upper quadrant ultrasound could be considered  3   Stable compression deformity of the L1 vertebral body    Age indeterminate compression deformities of L2 and L4    4   Fibrotic changes at the lung bases with patchy bibasilar airspace opacities likely representing atelectasis  5   3 mm left upper lobe pulmonary nodule  Based on current Fleischner Society 2017 Guidelines on incidental pulmonary nodule, no routine follow-up is needed if the patient is considered low risk for lung cancer  If the patient is considered high risk    for lung cancer, 12 month follow-up non-contrast chest CT is recommended  Considerations related to the patient's age and/or comorbidities may be used to alter these recommendations  Workstation performed: [id number]         CT head without contrast   Final Result by Rosa Portillo MD (02/19 1541)      No acute intracranial abnormality  Microangiopathic changes  Workstation performed: [id number]         XR chest 1 view portable   Final Result by Yonathan Chew MD (02/19 8214)   Chronic interstitial fibrotic disease without acute findings or significant change from prior           Workstation performed: [id number]                    Procedures  ECG 12 Lead Documentation  Date/Time: 2/19/2018 2:31 PM  Performed by: Dakota Rubio  Authorized by: Dakota Rubio     Indications / Diagnosis:  Dehydration  ECG reviewed by me, the ED Provider: yes    Patient location:  ED  Previous ECG:     Previous ECG:  Compared to current    Comparison ECG info:  2/11/18 11:19    Similarity:  Changes noted  Rate:     ECG rate:  99    ECG rate assessment: normal    Rhythm:     Rhythm: sinus rhythm    QRS:     QRS axis:  Left  Comments:      LVH vs  Prior TWI V3-v6           Phone Contacts  ED Phone Contact    ED Course  ED Course as of Feb 20 1327   Mon Feb 19, 2018   1324 Reviewed of advanced directives demonstrates patient is DNR, DNI and does not want invasive testing; discussed with patient if she can feel similar to last week would be OK with that      1600 Case reviewed Dr Renay Jackson med surg full admit u/a pending                                MDM  Number of Diagnoses or Management Options  Acute hypernatremia:   Dehydration:   Diagnosis management comments: Mdm: will assess for pneumnia, influenze, dehydration, uti, electrolyte disturbance supportive care initated    Reviewed of recent d/c summary 2/11-2/14/18 indicates was admitted for SOB, hypoxia, and CHF initially started on vanco, cefepime and flagyl (3 days) then tamiflu for 5 days with ceftin for 7 days  Did not require O2 at d/c; has h/o interstitial lung dz    CritCare Time    Disposition  Final diagnoses:   Acute hypernatremia   Dehydration     Time reflects when diagnosis was documented in both MDM as applicable and the Disposition within this note     Time User Action Codes Description Comment    2/19/2018  3:57 PM [address] [place] [E87 0] Acute hypernatremia     2/19/2018  3:57 PM Andriy Sebas Add [E86 0] Dehydration     2/19/2018  4:16 PM Milad aSldaña Add [E87 0] Hypernatremia     2/19/2018  4:16 PM Juany Salgado [E87 0] Hypernatremia       ED Disposition     ED Disposition Condition Comment    Admit  Case was discussed with Dr Bello Roche and the patient's admission status was agreed to be Admission Status: inpatient status to the service of Dr Bello Roche           Follow-up Information    None       Current Discharge Medication List      CONTINUE these medications which have NOT CHANGED    Details   ASPIRIN EC EXTRA STRENGTH PO Take 81 mg by mouth daily      benzonatate (TESSALON PERLES) 100 mg capsule Take 100 mg by mouth 3 (three) times a day as needed for cough      bisacodyl (DULCOLAX) 5 mg EC tablet Take 10 mg by mouth daily as needed for constipation      calcium carbonate (CALCIUM 600) 600 MG tablet Take 2 tablets by mouth daily        carboxymethylcellulose (REFRESH TEARS) 0 5 % SOLN Apply 1 drop to eye daily 1 drop each eye       diltiazem (CARDIZEM CD) 120 mg 24 hr capsule Take 1 capsule (120 mg total) by mouth daily for 90 days  Qty: 90 capsule, Refills: 3    Associated Diagnoses: Congestive heart failure, unspecified congestive heart failure chronicity, unspecified congestive heart failure type (HCC)      Docusate Sodium (DOC-Q-LACE PO) Take 1 capsule by mouth 2 (two) times a day as needed      furosemide (LASIX) 20 mg tablet Take 1 tablet (20 mg total) by mouth every other day  Qty: 15 tablet, Refills: 5    Associated Diagnoses: Edema, unspecified type      gabapentin (NEURONTIN) 100 mg capsule Take 300 mg by mouth daily at bedtime      menthol-zinc oxide (CALMOSEPTINE) 0 44-20 6 % OINT Apply topically 2 (two) times a day to sacral and bilateral inner buttocks  Qty: 71 g, Refills: 0    Associated Diagnoses: Sacral ulcer (HCC)      menthol-zinc oxide (RISAMINE) 7 45-35 225 % Apply 1 application topically 2 (two) times a day      Multiple Vitamin (MULTI-DAY VITAMINS PO) Take 1 tablet by mouth daily      Nutritional Supplements (ENSURE CLEAR) LIQD Take by mouth      pantoprazole (PROTONIX) 40 mg tablet Take 1 tablet by mouth daily      potassium chloride (K-DUR,KLOR-CON) 20 mEq tablet Take 1 tablet (20 mEq total) by mouth daily for 30 days  Qty: 30 tablet, Refills: 5    Associated Diagnoses: Low blood potassium      simvastatin (ZOCOR) 10 mg tablet Take by mouth daily      sorbitol 70 % solution Take 30 mL by mouth daily as needed      traMADol (ULTRAM) 50 mg tablet Take 50 mg by mouth 2 (two) times a day      acetaminophen (MAPAP) 325 mg tablet Take 1 tablet by mouth every 6 (six) hours as needed      ergocalciferol (VITAMIN D2) 50,000 units Take 50,000 Units by mouth every 30 (thirty) days Takes on the 13th of the month            No discharge procedures on file      ED Provider  Electronically Signed by           Sade Prakash MD  02/20/18 3714

## 2018-02-19 NOTE — RESPIRATORY THERAPY NOTE
RT Protocol Note  Elroy Chowdhury 80 y o  female MRN: [id number]  Unit/Bed#: 584-97 Encounter: [id number]    Assessment    Principal Problem:    Hypernatremia  Active Problems:    Essential hypertension    Gastroesophageal reflux disease without esophagitis    Coronary artery disease involving native coronary artery of native heart    H/O aortic valve replacement    Hyperlipidemia    Frequent falls    Stage 3 chronic kidney disease    Chronic diastolic CHF (congestive heart failure) (HCC)    Other dysphagia    Elevated platelet count (HCC)    Anemia    Dehydration    Influenza B    Hypoalbuminemia    Hyperchloremia      Home Pulmonary Medications:  None    Past Medical History:   Diagnosis Date    Angina pectoris (HCC)     Arthritis     CAD (coronary artery disease)     GERD (gastroesophageal reflux disease)     Hypertension     Lyme disease     Osteoporosis     Vertigo      Social History     Social History    Marital status:      Spouse name: N/A    Number of children: N/A    Years of education: N/A     Occupational History    Retired      Social History Main Topics    Smoking status: Never Smoker    Smokeless tobacco: Never Used      Comment: Pt is a non smoker    Alcohol use No    Drug use: No    Sexual activity: No     Other Topics Concern    None     Social History Narrative    Assistive devices:  Walker    Good dental hygiene    No caffeine use    Uses seat belt     Has living will       Subjective         Objective    Physical Exam:   Assessment Type: Assess only  General Appearance: Alert, Awake  Respiratory Pattern: Normal  Chest Assessment: Chest expansion symmetrical  Bilateral Breath Sounds: Diminished, Clear  Cough: None  O2 Device: 1 l/m nasal cannula    Vitals:  Blood pressure 146/78, pulse 104, temperature 98 4 °F (36 9 °C), temperature source Temporal, resp  rate 16, height 5' 4" (1 626 m), weight 48 4 kg (106 lb 11 2 oz), SpO2 100 %            Imaging and other studies: I have personally reviewed pertinent reports        O2 Device: 1 l/m nasal cannula     Plan  Respiratory  Albuterol 0 083% Q6PRN shortness of breath and wheeze

## 2018-02-20 PROBLEM — S32.040A CLOSED COMPRESSION FRACTURE OF L4 LUMBAR VERTEBRA: Status: ACTIVE | Noted: 2018-01-01

## 2018-02-20 PROBLEM — K80.20 CHOLELITHIASIS: Status: ACTIVE | Noted: 2018-01-01

## 2018-02-20 PROBLEM — R91.1 PULMONARY NODULE: Status: ACTIVE | Noted: 2018-01-01

## 2018-02-20 PROBLEM — S32.010A CLOSED COMPRESSION FRACTURE OF L1 LUMBAR VERTEBRA: Status: ACTIVE | Noted: 2018-01-01

## 2018-02-20 PROBLEM — J98.11 ATELECTASIS: Status: ACTIVE | Noted: 2018-01-01

## 2018-02-20 PROBLEM — E16.2 HYPOGLYCEMIA: Status: ACTIVE | Noted: 2018-01-01

## 2018-02-20 PROBLEM — E87.6 HYPOKALEMIA: Status: ACTIVE | Noted: 2018-01-01

## 2018-02-20 PROBLEM — S32.020A CLOSED COMPRESSION FRACTURE OF L2 LUMBAR VERTEBRA: Status: ACTIVE | Noted: 2018-01-01

## 2018-02-20 PROBLEM — J10.1 INFLUENZA B: Status: RESOLVED | Noted: 2018-01-01 | Resolved: 2018-01-01

## 2018-02-20 NOTE — SPEECH THERAPY NOTE
Speech Language/Pathology  Speech/Language Pathology  Assessment    Patient Name: Mini VARNER Date: 2/20/2018     Problem List  Patient Active Problem List   Diagnosis    Essential hypertension    Gastroesophageal reflux disease without esophagitis    Coronary artery disease involving native coronary artery of native heart    H/O aortic valve replacement    Hyperlipidemia    Frequent falls    Stage 3 chronic kidney disease    Chronic diastolic CHF (congestive heart failure) (HCC)    Dysphagia    Hypernatremia    Elevated platelet count (HCC)    Anemia    Dehydration    Influenza B    Hypoalbuminemia    Hyperchloremia    Nausea and vomiting    [facility]stitial lung disease [facility] [address] [facility]    [facility]     Past Medical History  Past Medical History:   Diagnosis Date    [facility]     Arthritis     CAD (coronary artery disease)     Gait abnormality     GERD (gastroesophageal reflux disease)     Hypertension     Lyme disease     Osteoporosis     Vertigo      Past Surgical History  Past Surgical History:   Procedure Laterality Date    AORTIC VALVE REPLACEMENT      APPENDECTOMY      EYE SURGERY  08/19/1996    cataract bilateral        02/20/18 1050   Swallow Information   Current Risks for Dysphagia & Aspiration General debilitation; Reduced alertnes   Current Symptoms/Concerns Cough; With liquids;HX Dysphagia   Current Diet NPO   Baseline Diet Dysphagia pureed; Nectar thick liquids   Baseline Assessment   Behavior/Cognition Alert; Cooperative;Lethargic;Requires cueing   Speech/Language Status speech is clear; receptive [facility]   Patient Positioning Upright in bed   Swallow Mechanism Exam   Facial Strength Reduced   Lingual Symmetry WFL   Lingual Strength Moderate reduced   Lingual ROM Moderate reduced   Lingual Sensation Reduced   Gag Reduced   Mandible Impaired   Dentition Adequate   Consistencies Assessed and Performance   Materials Admnistered Puree/Level 1;[place] thick liquid   Materials Adminstered Comment Patient accepting very limited amt  Oral Stage Moderate impaired   Phargngeal Stage Mild impaired   Swallow Mechanics Mild delayed;Swallow initation;Good [place]ary[place]al rise;Aspiration risk   Esophageal Concerns Hx GERDS   Summary   Swallow Summary Patient is known to Evangelista Castro as she was evaluated and followed last week  Patient is lethargic, but able to answer using verbal speech, and she attempts to answer questions  Oriented to name and place  Patient presents with moderate oral dysphagia and suspected mild pharyngeal dysphagia  She has reduced oral motor strength and ROM, loss of control of thin liquids, reduced masication and bolus formation  Patient has overt s/s with thin liquids at bedside  She is [facility] with Puree trials and nectar thick liquid trials  Patient is not able to use straw with nectar liquids  Recommend a sippy cup with meals; otherwise patient is [facility] with cup sips or tsp of nectar liquids  She is a total feed  Recommendations   Risk for Aspiration Mild   Recommendations Consider oral diet   Diet Solid Recommendation Level 1 Dysphagia/pureed   Diet Liquid Recommendation Nectar thick liquid   Recommended Form of Meds Crushed; With puree   General Precautions Aspiration precautions; Feed only when alert;Upright as possible for all oral intake;Remain upright for 45 mins after meals;Assist with feeding   Further Evaluations Dietician   Results Reviewed with RN;MD;PT/Family/Caregiver

## 2018-02-20 NOTE — PLAN OF CARE
DISCHARGE PLANNING     Discharge to home or other facility with appropriate resources Progressing        INFECTION - ADULT     Absence or prevention of progression during hospitalization Progressing        Knowledge Deficit     Patient/family/caregiver demonstrates understanding of disease process, treatment plan, medications, and discharge instructions Progressing        Nutrition/Hydration-ADULT     Nutrient/Hydration intake appropriate for improving, restoring or maintaining nutritional needs Progressing        PAIN - ADULT     Verbalizes/displays adequate comfort level or baseline comfort level Progressing        Potential for Falls     Patient will remain free of falls Progressing        Prexisting or High Potential for Compromised Skin Integrity     Skin integrity is maintained or improved Progressing        SAFETY ADULT     Maintain or return to baseline ADL function Progressing     Maintain or return mobility status to optimal level Progressing     Patient will remain free of falls Progressing

## 2018-02-20 NOTE — H&P
H&P Exam - Hallie Velez 80 y o  female MRN: [id number]    Unit/Bed#: 796-84 Encounter: [id number]    Assessment:  Patient Active Problem List   Diagnosis    Essential hypertension    Gastroesophageal reflux disease without esophagitis    Coronary artery disease involving native coronary artery of native heart    H/O aortic valve replacement    Hyperlipidemia    Frequent falls    Stage 3 chronic kidney disease    Chronic diastolic CHF (congestive heart failure) (HCC)    Dysphagia    Hypernatremia    Elevated platelet count (HCC)    Anemia    Dehydration    Influenza B    Hypoalbuminemia    Hyperchloremia    Nausea and vomiting    Interstitial lung disease (HCC)    Pulmonary fibrosis (HCC)       Plan:  1)  Hypernatremia/Dehydration/Nausea and vomiting:  Admit the patient to inpatient status, med/surg level of care with telemetry monitoring  The patient will require at least a 2-midnight inpatient hospitalization for work-up and treatment of the symptomatology  IV fluids with 1/2 NSS at 100 ml/hr  Consult Nephrology  Follow the sodium level  The hypernatremia is likely secondary to lack of PO fluid intake  PRN IV Zofran  Check a CT scan of the abdomen/pelvis without contrast     2)  Dysphagia: The patient failed a bedside swallow evaluation  The patient will be made NPO  Consult speech therapy for a dysphagia evaluation  3)  Recent Influenza B:  She does not need any further Tamiflu  Maintain droplet isolation/precautions for now  4)  Anemia/Elevated platelet count:  Follow the patient's CBC  Check an iron panel, a vitamin B12 level, and a folate level  5)  Interstitial lung disease/Pulmonary fibrosis:  Initiate the respiratory protocol  Continuous pulse oximetry  Supplemental oxygen to maintain oxygen saturation levels at 92% and above  6)  DVT Prophylaxis:  Heparin 5000 Units SQ every 12 hours dosed based on her age of 80years old  SCD's bilaterally  History of Present Illness   The patient is a 80year-old female who presented to the ED with the complaints of nausea and vomiting  The patient had a recent hospitalization from 2/11/18 through 2/14/18 for Influenza B  Over the last few days, she has been experiencing nausea and vomiting with a decreased PO intake  She was found to have severe hypernatremia on laboratory testing completed in the ED today, 2/19/18  No chest pain  No shortness of breath  She complains of generalized weakness  Review of Systems:  Per HPI, all other systems have been reviewed and were negative      Historical Information   Past Medical History:   Diagnosis Date    Angina pectoris (HCC)     Arthritis     CAD (coronary artery disease)     Gait abnormality     GERD (gastroesophageal reflux disease)     Hypertension     Lyme disease     Osteoporosis     Vertigo      Past Surgical History:   Procedure Laterality Date    AORTIC VALVE REPLACEMENT      APPENDECTOMY      EYE SURGERY  08/19/1996    cataract bilateral     Social History   History   Alcohol Use No     History   Drug Use No     History   Smoking Status    Never Smoker   Smokeless Tobacco    Never Used     Comment: Pt is a non smoker     Family History: non-contributory    Meds/Allergies   all medications and allergies reviewed  Allergies   Allergen Reactions    Penicillins        Objective   First Vitals:   Blood Pressure: 163/91 (02/19/18 1018)  Pulse: 105 (02/19/18 1015)  Temperature: 98 4 °F (36 9 °C) (02/19/18 1018)  Temp Source: Temporal (02/19/18 1018)  Respirations: 18 (02/19/18 1018)  Height: 5' 4" (162 6 cm) (02/19/18 1018)  Weight - Scale: 48 4 kg (106 lb 11 2 oz) (02/19/18 1018)  SpO2: 98 % (02/19/18 1015)    Current Vitals:   Blood Pressure: 140/81 (02/19/18 1851)  Pulse: 101 (02/19/18 1851)  Temperature: 98 1 °F (36 7 °C) (02/19/18 1851)  Temp Source: Temporal (02/19/18 1851)  Respirations: 18 (02/19/18 1851)  Height: 5' 2" (157 5 cm) (02/19/18 1851)  Weight - Scale: 46 9 kg (103 lb 6 3 oz) (02/19/18 1851)  SpO2: 100 % (02/19/18 1851)      Intake/Output Summary (Last 24 hours) at 02/19/18 1910  Last data filed at 02/19/18 1622   Gross per 24 hour   Intake          1779 17 ml   Output                0 ml   Net          1779 17 ml       Invasive Devices     Peripheral Intravenous Line            Peripheral IV 02/19/18 Left;Dorsal (posterior) Hand less than 1 day                Physical Exam   General:  NAD, awake, alert, oriented x 3  HEENT:  NC/AT, mucous membranes dry  Neck:  Supple, No JVP elevation  CV:  + S1, + S2, Tachycardic, Regular rhythm  Pulm:  Bilateral inspiratory crackles  Abd:  Soft, Non-tender, Non-distended, + jacky-umbilical mass  Ext:  No clubbing/cyanosis/edema      Lab Results:      Results from last 7 days  Lab Units 02/19/18  1843 02/19/18  1345 02/13/18  0406   WBC Thousand/uL  --  8 45 6 96   HEMOGLOBIN g/dL  --  10 8* 9 6*   HEMATOCRIT %  --  34 1* 29 7*   PLATELETS Thousands/uL 500* 477* 175         Results from last 7 days  Lab Units 02/19/18  1345 02/13/18  0406   SODIUM mmol/L 154* 139   POTASSIUM mmol/L 3 6 3 1*   CHLORIDE mmol/L 116* 104   CO2 mmol/L 28 28   BUN mg/dL 30* 18   CREATININE mg/dL 0 84 0 83   GLUCOSE RANDOM mg/dL 101 94   CALCIUM mg/dL 8 5 7 8*         Results from last 7 days  Lab Units 02/19/18  1345   ALK PHOS U/L 40*   BILIRUBIN TOTAL mg/dL 0 50   TOTAL PROTEIN g/dL 7 4   ALT U/L 12   AST U/L 22       Imaging:   Chest portable (2/19/18):  CHEST      INDICATION:  Dehydration      COMPARISON:  Chest CT and radiographs dated 2/11/2018  Radiographs dated 1/6/2018      VIEWS:  Frontal and lateral projections     IMAGES:  1     FINDINGS:     Normal lung volumes  Chronic, peripheral interstitial reticular markings  No superimposed acute consolidation  Blunting of the right costophrenic angle is chronic and likely due to fibrosis  No layering effusions    No pneumothorax      Heart size within normal limits  Prior aortic root repair  Aortic tortuosity without aneurysm  Pulmonary vasculature is largely obscured by interstitial process  No janny cephalization      Osteopenia  High riding humeri with remodeling of the acetabular undersurfaces--suggestive of chronic rotator cuff tears  No acute osseous findings demonstrated  Thoracic spine poorly evaluated due to underpenetration      IMPRESSION:  Chronic interstitial fibrotic disease without acute findings or significant change from prior  EKG, Pathology, and Other Studies:  EKG (2/11/18):  ECG 12 lead   Order: [id number]   Status:  Final result   Visible to patient:  No (Inaccessible in [address])   Next appt:  02/22/2018 at 08:00 AM in Wound Care ([facility])    Ref Range & Units 2/11/18 1119   Ventricular Rate BPM 88    Atrial Rate BPM 88    PA Interval ms 154    QRSD Interval ms 112    QT Interval ms 380    QTC Interval ms 459    P [place] degrees 39    QRS Axis degrees -33    T Wave Axis degrees 96    Narrative     Normal sinus rhythm  Left axis deviation  Left ventricular hypertrophy with repolarization abnormality  Abnormal ECG  When compared with ECG of 04-JAN-2018 10:32,  Premature ventricular complexes are no longer Present  Confirmed by DONTE Doyle ([id number]) on 2/11/2018 5:44:13 PM      Specimen Collected: 02/11/18 11:19   Last Resulted: 02/11/18 17:44                  Code Status: Level 3 - DNAR and DNI  Advance Directive and Living Will: Yes    Power of : Yes  POLST:      Counseling / Coordination of Care: Total floor / unit time spent today 30 minutes

## 2018-02-20 NOTE — CONSULTS
Consultation - Nephrology   Vu Colbert 80 y o  female MRN: [id number]  Unit/Bed#: 416-01 Encounter: [id number]    A/P:  1  Hypernatremic hypovolemia: She needs dilute fluids and 1/2 normal saline has been ordered  Will check daily weights, serial studies and I/O  2  Interstitial lung disease: chronic  3  Hypoalbuminemia: nutritionally deficient  4  Ketonuria: due to lack of po intake  Bicarbonate is within normal limits probably related to contraction alkalosis  Will have a a swallowing evaluation       Thank you for allowing us to participate in the care of your patient  Please feel free to contact us regarding the care of this patient, or any other questions/concerns that may be applicable  Patient Active Problem List   Diagnosis    Essential hypertension    Gastroesophageal reflux disease without esophagitis    Coronary artery disease involving native coronary artery of native heart    H/O aortic valve replacement    Hyperlipidemia    Frequent falls    Stage 3 chronic kidney disease    Chronic diastolic CHF (congestive heart failure) (HCC)    Dysphagia    Hypernatremia    Elevated platelet count (HCC)    Anemia    Dehydration    Influenza B    Hypoalbuminemia    Hyperchloremia    Nausea and vomiting    Interstitial lung disease (HCC)    Pulmonary fibrosis (HCC)       History of Present Illness   Physician Requesting Consult: Misha Rodriguez DO  Reason for Consult / Principal Problem: hypernatremia  Hx and PE limited by:   HPI: Vu Colbert is a 80y o  year old female who presents with relatively recent onset of hypernatremia  She was inadequately treated for influenza due to noncompliance with meds and was not eating and drinking well  According to the history she had nausea and vomiting    History obtained from chart review and the patient    Review of Systems - Negative except as mentioned above      Historical Information   Past Medical History:   Diagnosis Date  Angina pectoris (HCC)     Arthritis     CAD (coronary artery disease)     Gait abnormality     GERD (gastroesophageal reflux disease)     Hypertension     Lyme disease     Osteoporosis     Vertigo      Past Surgical History:   Procedure Laterality Date    AORTIC VALVE REPLACEMENT      APPENDECTOMY      EYE SURGERY  08/19/1996    cataract bilateral     Social History   History   Alcohol Use No     History   Drug Use No     History   Smoking Status    Never Smoker   Smokeless Tobacco    Never Used     Comment: Pt is a non smoker     Family History   Problem Relation Age of Onset    Colon cancer Sister      Patient doesn't know  Meds/Allergies   all current active meds have been reviewed, current meds: Current Facility-Administered Medications   Medication Dose Route Frequency    acetaminophen (TYLENOL) tablet 650 mg  650 mg Oral Q6H PRN    albuterol inhalation solution 2 5 mg  2 5 mg Nebulization Q6H PRN    benzonatate (TESSALON PERLES) capsule 100 mg  100 mg Oral TID PRN    docusate sodium (COLACE) capsule 100 mg  100 mg Oral BID PRN    heparin (porcine) subcutaneous injection 5,000 Units  5,000 Units Subcutaneous Q12H [facility]    menthol-zinc oxide (CALMOSEPTINE) 0 44-20 6 % ointment   Topical BID    ondansetron (ZOFRAN) injection 4 mg  4 mg Intravenous Q6H PRN    sodium chloride infusion 0 45 %  100 mL/hr Intravenous Continuous    and PTA meds:  Prescriptions Prior to Admission   Medication    ASPIRIN EC EXTRA STRENGTH PO    benzonatate (TESSALON PERLES) 100 mg capsule    bisacodyl (DULCOLAX) 5 mg EC tablet    calcium carbonate (CALCIUM 600) 600 MG tablet    carboxymethylcellulose (REFRESH TEARS) 0 5 % SOLN    diltiazem (CARDIZEM CD) 120 mg 24 hr capsule    Docusate Sodium (DOC-Q-LACE PO)    furosemide (LASIX) 20 mg tablet    gabapentin (NEURONTIN) 100 mg capsule    menthol-zinc oxide (CALMOSEPTINE) 0 44-20 6 % OINT    menthol-zinc oxide (RISAMINE) 0 44-20 625 %    Multiple Vitamin (MULTI-DAY VITAMINS PO)    Nutritional Supplements (ENSURE CLEAR) LIQD    pantoprazole (PROTONIX) 40 mg tablet    potassium chloride (K-DUR,KLOR-CON) 20 mEq tablet    simvastatin (ZOCOR) 10 mg tablet    sorbitol 70 % solution    traMADol (ULTRAM) 50 mg tablet    acetaminophen (MAPAP) 325 mg tablet    ergocalciferol (VITAMIN D2) 50,000 units         Allergies   Allergen Reactions    Penicillins        Objective     Intake/Output Summary (Last 24 hours) at 02/20/18 0900  Last data filed at 02/20/18 0858   Gross per 24 hour   Intake          2779 17 ml   Output              700 ml   Net          2079 17 ml       Invasive Devices:        Physical Exam      I/O last 3 completed shifts: In: 1779 2 [I V :779 2; IV Piggyback:1000]  Out: 700 [Urine:700]    Vitals:    02/20/18 0734   BP: 151/92   Pulse: 105   Resp: 20   Temp: (!) 97 4 °F (36 3 °C)   SpO2: 98%       Gen: in NAD,t/Awake Looks cry with dry mucus membraines  HEENT: no sclerous icterus, MMM, neck supple  CV: +S1/S2, RRR  Lungs: CTA bilaterally  Abd: +BS, soft NT/ND  Ext: all four extremities are warm  Skin: no rashes noted  Neuro:     Current Weight: Weight - Scale: 44 kg (97 lb)  First Weight: Weight - Scale: 48 4 kg (106 lb 11 2 oz)    Lab Results:  I have personally reviewed pertinent labs      CBC: Lab Results   Component Value Date    WBC 8 17 02/20/2018    HGB 10 7 (L) 02/20/2018    HCT 34 8 02/20/2018    MCV 97 02/20/2018     (H) 02/20/2018    MCH 29 9 02/20/2018    MCHC 30 7 (L) 02/20/2018    RDW 14 5 02/20/2018    MPV 8 9 02/20/2018     CMP: Lab Results   Component Value Date     (H) 02/20/2018    K 3 4 (L) 02/20/2018     (H) 02/20/2018    CO2 27 02/20/2018    ANIONGAP 12 02/20/2018    BUN 24 02/20/2018    CREATININE 0 72 02/20/2018    GLUCOSE 63 (L) 02/20/2018    CALCIUM 8 5 02/20/2018    AST 27 02/20/2018    ALT 15 02/20/2018    ALKPHOS 37 (L) 02/20/2018    PROT 7 2 02/20/2018    BILITOT 0 50 02/20/2018    EGFR 72 02/20/2018 Phosphorus:   Lab Results   Component Value Date    PHOS 2 5 02/20/2018     Magnesium:   Lab Results   Component Value Date    MG 2 1 02/20/2018     Urinalysis: Lab Results   Component Value Date    COLORU Yellow 02/19/2018    CLARITYU Slightly Cloudy 02/19/2018    SPECGRAV >=1 030 02/19/2018    PHUR 5 5 02/19/2018    LEUKOCYTESUR Negative 02/19/2018    NITRITE Negative 02/19/2018    PROTEINUA 30 (1+) (A) 02/19/2018    GLUCOSEU Negative 02/19/2018    KETONESU 40 (2+) (A) 02/19/2018    BILIRUBINUR Interference- unable to analyze (A) 02/19/2018    BLOODU Trace-Intact 02/19/2018     Ionized Calcium: No results found for: CAION  Coagulation:   Lab Results   Component Value Date    INR 1 38 (H) 02/19/2018     Troponin:   Lab Results   Component Value Date    TROPONINI <0 02 02/19/2018     ABG: No results found for: PHART, LXT2MMZ, PO2ART, GLJ7FQX, T8LSKIHI, BEART, SOURCE      Results from last 7 days  Lab Units 02/20/18  0623 02/19/18  1345   SODIUM mmol/L 153* 154*   POTASSIUM mmol/L 3 4* 3 6   CHLORIDE mmol/L 114* 116*   CO2 mmol/L 27 28   BUN mg/dL 24 30*   CREATININE mg/dL 0 72 0 84   CALCIUM mg/dL 8 5 8 5   TOTAL PROTEIN g/dL 7 2 7 4   BILIRUBIN TOTAL mg/dL 0 50 0 50   ALK PHOS U/L 37* 40*   ALT U/L 15 12   AST U/L 27 22   GLUCOSE RANDOM mg/dL 63* 101       Radiology review:  Procedure: Ct Abdomen Pelvis Wo Contrast    Result Date: 2/20/2018  Narrative: CT ABDOMEN AND PELVIS WITHOUT IV CONTRAST INDICATION:  Nausea, vomiting and diarrhea  COMPARISON: CT abdomen pelvis 3/23/2017 TECHNIQUE:  CT examination of the abdomen and pelvis was performed without intravenous contrast   Axial, sagittal, and coronal 2D reformatted images were created from the source data and submitted for interpretation  Radiation dose length product (DLP) for this visit:  243 88 mGy-cm     This examination, like all CT scans performed in the [facility], was performed utilizing techniques to minimize radiation dose exposure, including the use of iterative  reconstruction and automated exposure control  Enteric contrast was administered  FINDINGS: ABDOMEN LOWER CHEST:  There are bilateral fibrotic changes  Bibasilar dependent opacities likely represent atelectasis  3 mm left upper lobe nodule (series 2 image 6)  There are postsurgical changes of the heart status post aortic valve replacement  There are atherosclerotic changes of the aorta  There is enlargement of the main pulmonary artery  LIVER/BILIARY TREE:  Stable calcifications in the anterior right lobe of the liver  No evidence of liver mass or ductal dilation on this noncontrast evaluation  GALLBLADDER:  There are gallstone(s) within the gallbladder, without pericholecystic inflammatory changes  Mild gallbladder distention  SPLEEN:  Unremarkable  PANCREAS:  The pancreas is atrophic  No pancreatic mass  ADRENAL GLANDS:  Unremarkable  KIDNEYS/URETERS:  Unremarkable  No hydronephrosis  STOMACH AND BOWEL:  There is a small hiatal hernia  Evaluation of the stomach and bowel is limited given lack of oral and intravenous contrast material   No evident bowel obstruction  Mild stool retention throughout the colon  There is colonic diverticulosis without evidence of diverticulitis  APPENDIX:  No findings to suggest appendicitis  ABDOMINOPELVIC CAVITY:  No ascites or free intraperitoneal air  No lymphadenopathy  VESSELS:  Atherosclerotic changes are present  No evidence of aneurysm  PELVIS REPRODUCTIVE ORGANS:  Unremarkable for patient's age  URINARY BLADDER:  Unremarkable  ABDOMINAL WALL/INGUINAL REGIONS:  Unremarkable  OSSEOUS STRUCTURES:  There are compression deformities of the L1, L2 and L4 vertebral bodies  L1 vertebral body compression deformity is similar in appearance to 2/11/2018 exam   L2 and L4 compression deformities are age indeterminate  Impression: 1    Limited evaluation secondary to lack of oral and intravenous contrast   Diverticulosis without evidence of acute diverticulitis  2   Cholelithiasis and mild gallbladder wall distention  If there is focal right upper quadrant pain further evaluation with right upper quadrant ultrasound could be considered  3   Stable compression deformity of the L1 vertebral body  Age indeterminate compression deformities of L2 and L4  4   Fibrotic changes at the lung bases with patchy bibasilar airspace opacities likely representing atelectasis  5   3 mm left upper lobe pulmonary nodule  Based on current Fleischner Society 2017 Guidelines on incidental pulmonary nodule, no routine follow-up is needed if the patient is considered low risk for lung cancer  If the patient is considered high risk for lung cancer, 12 month follow-up non-contrast chest CT is recommended  Considerations related to the patient's age and/or comorbidities may be used to alter these recommendations  Workstation performed: [id number]     Procedure: Xr Chest 1 View Portable    Result Date: 2/19/2018  Narrative: CHEST INDICATION:  Dehydration  COMPARISON:  Chest CT and radiographs dated 2/11/2018  Radiographs dated 1/6/2018  VIEWS:  Frontal and lateral projections IMAGES:  1 FINDINGS: Normal lung volumes  Chronic, peripheral interstitial reticular markings  No superimposed acute consolidation  Blunting of the right costophrenic angle is chronic and likely due to fibrosis  No layering effusions  No pneumothorax  Heart size within normal limits  Prior aortic root repair  Aortic tortuosity without aneurysm  Pulmonary vasculature is largely obscured by interstitial process  No janny cephalization  Osteopenia  High riding humeri with remodeling of the acetabular undersurfaces--suggestive of chronic rotator cuff tears  No acute osseous findings demonstrated  Thoracic spine poorly evaluated due to underpenetration  Impression: Chronic interstitial fibrotic disease without acute findings or significant change from prior   Workstation performed: [id number]     Procedure: Ct Head Without Contrast    Result Date: 2/19/2018  Narrative: CT BRAIN - WITHOUT CONTRAST INDICATION: chest pain  History taken directly from the electronic ordering system  COMPARISON:  None  TECHNIQUE:  CT examination of the brain was performed  In addition to axial images, coronal reformatted images were created and submitted for interpretation  Radiation dose length product (DLP) for this visit:  1076 81 mGy-cm   This examination, like all CT scans performed in the [facility], was performed utilizing techniques to minimize radiation dose exposure, including the use of iterative reconstruction and automated exposure control  IMAGE QUALITY:  Diagnostic  FINDINGS:  PARENCHYMA:  Decreased attenuation is noted in the supratentorial white matter demonstrating an appearance most consistent with moderate microangiopathic change  No intracranial mass, mass effect or midline shift  No CT signs of acute infarction  There is no parenchymal hemorrhage  VENTRICLES AND EXTRA-AXIAL SPACES:  Age-appropriate volume loss is noted  No hydrocephalus  VISUALIZED ORBITS AND PARANASAL SINUSES:  Orbits appear normal   There is near complete opacification of the right maxillary sinus  No fluid levels are seen  CALVARIUM AND EXTRACRANIAL SOFT TISSUES:   Normal      Impression: No acute intracranial abnormality  Microangiopathic changes  Workstation performed: [id number]         EKG, Pathology, and Other Studies: reviewed      Counseling / Coordination of Care  Total floor / unit time spent today 30 minutes  Greater than 50% of total time was spent with the patient and / or family counseling and / or coordination of care   A description of the counseling / coordination of care: follows    Chetan Atwood MD

## 2018-02-20 NOTE — SPEECH THERAPY NOTE
Speech Language/Pathology    ST checked with nursing for patient's status with lunch; and nsg stated patient was not swallowing at all  Screened patient and no inititating of swallow  Recommended NPO with status change  Will re evaluate tomorrow

## 2018-02-20 NOTE — CASE MANAGEMENT
Initial Clinical Review    Admission: Date/Time/Statement: 2/19/18 @ 1557     Orders Placed This Encounter   Procedures    Inpatient Admission (expected length of stay for this patient is greater than two midnights)     Standing Status:   Standing     Number of Occurrences:   1     Order Specific Question:   Admitting Physician     Answer:   Vallecito Bone     Order Specific Question:   Level of Care     Answer:   Med Surg [16]     Order Specific Question:   Estimated length of stay     Answer:   More than 2 Midnights     Order Specific Question:   Certification     Answer:   I certify that inpatient services are medically necessary for this patient for a duration of greater than two midnights  See H&P and MD Progress Notes for additional information about the patient's course of treatment  ED: Date/Time/Mode of Arrival:   ED Arrival Information     Expected Arrival Acuity Means of Arrival Escorted By Service Admission Type    - 2/19/2018 10:05 Urgent Ambulance [facility] General Medicine Urgent    Arrival Complaint    Flu Symptoms          Chief Complaint:   Chief Complaint   Patient presents with    Flu Symptoms     [facility] personnel report patient spitting out tamiflu  Patient not eating or drinking  Patient lethargic  History of Illness:  80year-old female who presented to the ED with the complaints of nausea and vomiting  The patient had a recent hospitalization from 2/11/18 through 2/14/18 for Influenza B  Over the last few days, she has been experiencing nausea and vomiting with a decreased PO intake  She was found to have severe hypernatremia on laboratory testing completed in the ED today, 2/19/18  No chest pain  No shortness of breath  She complains of generalized weakness        Date/Time Temp Pulse Resp BP SpO2 Weight [facility]   02/19/18 1315 -- 102 20 -- 99 % --    02/19/18 1300 -- 101 16 146/78 99 % --    02/19/18 1245 -- 102 20 -- 99 % --    02/19/18 1230 -- 100 22 138/79 98 % -- BK   02/19/18 1215 -- 98 17 -- 98 % -- BK   02/19/18 1200 -- 103 16 129/75 98 % -- BK   02/19/18 1145 -- 104 22 -- 99 % -- BK   02/19/18 1130 -- 102 18 128/72 99 % -- BK   02/19/18 1115 -- 100 20 -- 100 % -- BK   02/19/18 1100 -- 102 20 151/77 97 % -- BK   02/19/18 1045 -- 101 14 -- 99 % -- BK   02/19/18 1030 -- 103 19 146/78 99 % -- BK   02/19/18 1018 98 4 °F (36 9 °C) 105 18 163/91 98 % 48 4 kg (106 lb 11 2 oz) PMH   02/19/18 1015 -- 105 -- -- 98 % -- BK     Abnormal Labs/Diagnostic Test Results:   Na  154  >  153  K  3 6  >  3 4  BUN  30   >    24  Crt  0 84  >  0 72  Pre albumin  13 1  Iron  30   TIBC  1119  Folate  19 6  hgb  10 8  >  10 7  Hct  34 1  >  34 8    UA sg  >1 030    ED Treatment:   Medication Administration from 02/19/2018 1005 to 02/19/2018 1813       Date/Time Order Dose Route Action Action by Comments     02/19/2018 1403 ondansetron (ZOFRAN) injection 4 mg 4 mg Intravenous Given Michelle Sanchez RN                 02/19/2018 1300 sodium chloride 0 9 % bolus 1,000 mL 1,000 mL Intravenous New Nathaly Knapp RN      02/19/2018 1622 sodium chloride 0 9 % infusion 0 mL/hr Intravenous Stopped Romy Knapp RN      02/19/2018 1403 sodium chloride 0 9 % infusion 250 mL/hr Intravenous Kp Wilhelm RN      02/19/2018 1759 sodium chloride infusion 0 45 % 100 mL/hr Intravenous New Nathaly Knapp RN           Past Medical/Surgical History:    Active Ambulatory Problems     Diagnosis Date Noted    Essential hypertension 03/23/2017    Gastroesophageal reflux disease without esophagitis 03/23/2017    Coronary artery disease involving native coronary artery of native heart 03/23/2017    H/O aortic valve replacement 03/23/2017    Hyperlipidemia 03/23/2017    Frequent falls 09/06/2017    Stage 3 chronic kidney disease 01/06/2018    Chronic diastolic CHF (congestive heart failure) ([facility] ) 01/06/2018    Dysphagia 02/12/2018 Resolved Ambulatory Problems     Diagnosis Date Noted    Generalized weakness 03/23/2017    UTI (urinary tract infection) 03/23/2017    Pulmonary fibrosis (HCC) 03/23/2017    Dyspnea on exertion 03/23/2017    MENDOZA (acute kidney injury) ([facility] ) 09/06/2017    Back pain 09/06/2017    Closed compression fracture of lumbar vertebra ([facility] ) 09/06/2017    Acute cystitis without hematuria 09/06/2017    Acute respiratory failure with hypoxia ([facility] ) 02/11/2018    Acute infective tracheobronchitis 02/11/2018    Acute metabolic encephalopathy 02/40/3865     Past Medical History:   Diagnosis Date    Angina pectoris (HCC)     Arthritis     CAD (coronary artery disease)     Gait abnormality     GERD (gastroesophageal reflux disease)     Hypertension     Lyme disease     Osteoporosis     Vertigo        Admitting Diagnosis: Dehydration [E86 0]  Acute hypernatremia [E87 0]  Hypernatremia [E87 0]  Flu-like symptoms [R68 89]    Age/Sex: 80 y o  female    Assessment/Plan:   Assessment:      Patient Active Problem List   Diagnosis    Essential hypertension    Gastroesophageal reflux disease without esophagitis    Coronary artery disease involving native coronary artery of native heart    H/O aortic valve replacement    Hyperlipidemia    Frequent falls    Stage 3 chronic kidney disease    Chronic diastolic CHF (congestive heart failure) (HCC)    Dysphagia    Hypernatremia    Elevated platelet count (HCC)    Anemia    Dehydration    Influenza B    Hypoalbuminemia    Hyperchloremia    Nausea and vomiting    Interstitial lung disease (HCC)    Pulmonary fibrosis (HCC)         Plan:  1)  Hypernatremia/Dehydration/Nausea and vomiting:  Admit the patient to inpatient status, med/surg level of care with telemetry monitoring  The patient will require at least a 2-midnight inpatient hospitalization for work-up and treatment of the symptomatology  IV fluids with 1/2 NSS at 100 ml/hr  Consult Nephrology  Follow the sodium level  The hypernatremia is likely secondary to lack of PO fluid intake  PRN IV Zofran  Check a CT scan of the abdomen/pelvis without contrast      2)  Dysphagia: The patient failed a bedside swallow evaluation  The patient will be made NPO  Consult speech therapy for a dysphagia evaluation     3)  Recent Influenza B:  She does not need any further Tamiflu  Maintain droplet isolation/precautions for now      4)  Anemia/Elevated platelet count:  Follow the patient's CBC  Check an iron panel, a vitamin B12 level, and a folate level      5)  Interstitial lung disease/Pulmonary fibrosis:  Initiate the respiratory protocol  Continuous pulse oximetry  Supplemental oxygen to maintain oxygen saturation levels at 92% and above      6)  DVT Prophylaxis:  Heparin 5000 Units SQ every 12 hours dosed based on her age of 80years old  SCD's bilaterally  Admission Orders:  Admit telemetry wcontinuous pulse ox  NPO  Oxygen prn   PT/OT / Speech swallow eval   Daily wgt; I and O  IVF @ 75         Scheduled Meds:   Current Facility-Administered Medications:  acetaminophen 650 mg Oral Q6H PRN Martines Quang, DO    albuterol 2 5 mg Nebulization Q6H PRN Martines Quang, DO    benzonatate 100 mg Oral TID PRN Martines Quang, DO    dextrose 5 % and sodium chloride 0 2 % 75 mL/hr Intravenous Continuous Martines Quang, DO Last Rate: 75 mL/hr (02/20/18 1430)   dextrose 25 mL Intravenous PRN Martines Quang, DO    docusate sodium 100 mg Oral BID PRN Martines Quang, DO    heparin (porcine) 5,000 Units Subcutaneous Q12H [address] Martines Quang, DO    menthol-zinc oxide  Topical BID Martines Quang, DO    ondansetron 4 mg Intravenous Q6H PRN Martines Quang, DO      Continuous Infusions:   dextrose 5 % and sodium chloride 0 2 % 75 mL/hr Last Rate: 75 mL/hr (02/20/18 1430)         2/20  NEPHROLOGY  1  Hypernatremic hypovolemia: She needs dilute fluids and 1/2 normal saline has been ordered  Will check daily weights, serial studies and I/O  2  Interstitial lung disease: chronic  3  Hypoalbuminemia: nutritionally deficient  4  Ketonuria: due to lack of po intake  Bicarbonate is within normal limits probably related to contraction alkalosis  Will have a a swallowing evaluation       2/20  INTERNAL MEDICINE  1  Hypernatrremia persists  Change IV fluids to D5 1/4 NSS at 75 ml/hr  Follow the patient's sodium level and blood glucose trend  I will order D50 25 ml PRN for a blood glucose less than 70  PT/OT  2   Dysphagia:  Trial a diet of dysphagia pureed consistency with nectar thick liquids and sippy cups with all liquid intake  Aspiration precautions  Continue to monitor closely  3   Hypokalemia:  Replete with IV potassium chloride  Follow the potassium level  4   Compression deformities of L1, L2, and L4:  Oscal + Vitamin D BID  Check a Vitamin D        25-OH level  She will need an outpatient DEXA scan  5   Gallbladder wall distension/Cholelithiasis:  Check a gallbladder ultrasound  Follow the patient's LFT's   6   Pulmonary nodule: The patient needs outpatient surveillance imaging of the pulmonary nodule

## 2018-02-20 NOTE — SOCIAL WORK
Pt is a readmission from [facility] assisted living  Was here from 2/11-2/14/18  Pt does well at [facility] and requires some assistance with her adls  Pt uses her walker  Pt is active with [facility]  Set up with [facility] for transportation  PCP is Tyesha Mendoza and pharmacy is [facility]  At this time dc needs are questionable (?STR/SNF vs return to [facility] on dc-will follow PT/OT recommendations)  Will continue to follow

## 2018-02-20 NOTE — PROGRESS NOTES
Progress Note - Juju Doctor 80 y o  female MRN: [id number]    Unit/Bed#: 376-39 Encounter: [id number]      Assessment:  Patient Active Problem List   Diagnosis    Essential hypertension    Gastroesophageal reflux disease without esophagitis    Coronary artery disease involving native coronary artery of native heart    H/O aortic valve replacement    Hyperlipidemia    Frequent falls    Stage 3 chronic kidney disease    Chronic diastolic CHF (congestive heart failure) (HCC)    Dysphagia    Hypernatremia    Elevated platelet count (HCC)    Anemia    Dehydration    Hypoalbuminemia    Hyperchloremia    Nausea and vomiting    Interstitial lung disease (HCC)    Pulmonary fibrosis (HCC)    Hypokalemia    Hypoglycemia    Pulmonary nodule    Atelectasis    Closed compression fracture of L1 lumbar vertebra (HCC)    Closed compression fracture of L2 lumbar vertebra (HCC)    Closed compression fracture of L4 lumbar vertebra (HCC)    Cholelithiasis         Plan:  1)  Hypernatremia/Dehydration/Nausea and vomiting/Hypoglycemia:  The hypernatremia persists  Change IV fluids to D5 1/4 NSS at 75 ml/hr  Follow the patient's sodium level and blood glucose trend  I will order D50 25 ml PRN for a blood glucose less than 70  PT/OT      2)  Dysphagia:  Trial a diet of dysphagia pureed consistency with nectar thick liquids and sippy cups with all liquid intake  Aspiration precautions  Continue to monitor closely     3)  Anemia/Elevated platelet count:  Follow the patient's CBC  Transfuse PRBC's for a hemoglobin less than 7     4)  Interstitial lung disease/Pulmonary fibrosis:  Continue the respiratory protocol  Maintain continuous pulse oximetry  Supplemental oxygen to maintain oxygen saturation levels at 92% and above  5)  Hypokalemia:  Replete with IV potassium chloride  Follow the potassium level  6)  Compression deformities of L1, L2, and L4:  Oscal + Vitamin D BID    Check a Vitamin D 25-OH level  She will need an outpatient DEXA scan  7)  Gallbladder wall distension/Cholelithiasis:  Check a gallbladder ultrasound  Follow the patient's LFT's     8)  Pulmonary nodule: The patient needs outpatient surveillance imaging of the pulmonary nodule  Subjective:   Patient seen and examined  The patient offers no specific complaints  Objective:     Vitals: Blood pressure 121/80, pulse 105, temperature (!) 97 4 °F (36 3 °C), temperature source Temporal, resp  rate 20, height 5' 2" (1 575 m), weight 44 7 kg (98 lb 8 oz), SpO2 98 %  ,Body mass index is 18 02 kg/m²    Weight (last 2 days)     Date/Time   Weight    02/20/18 1148  44 7 (98 5)    02/20/18 0600  44 (97)    02/19/18 1851  46 9 (103 4)    02/19/18 1018  48 4 (106 7)              Intake/Output Summary (Last 24 hours) at 02/20/18 1317  Last data filed at 02/20/18 0858   Gross per 24 hour   Intake          2579 17 ml   Output              700 ml   Net          1879 17 ml       Physical Exam: General:  NAD, awake, follows commands, confused  HEENT:  NC/AT, mucous membranes dry, lips are dry and cracked  Neck:  Supple, No JVP elevation  CV:  + S1, + S2, Tachycardic, Regular rhythm  Pulm:  Bilateral inspiratory crackles  Abd:  Soft, Non-tender, Non-distended  Ext:  No clubbing/cyanosis/edema    Scheduled Meds:  Current Facility-Administered Medications:  acetaminophen 650 mg Oral Q6H PRN Tri Anderson DO   albuterol 2 5 mg Nebulization Q6H PRN Tri Anderson DO   [START ON 2/21/2018] aspirin 81 mg Oral Daily Tri Anderson DO   atorvastatin 10 mg Oral Daily With Martin Sadler DO   benzonatate 100 mg Oral TID PRN Tri Anderson DO   dextrose 5 % and sodium chloride 0 2 % 75 mL/hr Intravenous Continuous Tri Anderson DO   dextrose 25 mL Intravenous PRN Tri Anderson DO   diltiazem 120 mg Oral Daily Tri Anderson DO   docusate sodium 100 mg Oral BID PRN Tri Anderson DO   heparin (porcine) 5,000 Units Subcutaneous Q12H [address] Zorita Hoguet, DO   menthol-zinc oxide  Topical BID Zorita Hoguet, DO   ondansetron 4 mg Intravenous Q6H PRN Zorita Hoguet, DO   potassium chloride 40 mEq Oral Once Zorita Hoguet, DO     Continuous Infusions:  dextrose 5 % and sodium chloride 0 2 % 75 mL/hr     PRN Meds:   acetaminophen    albuterol    benzonatate    dextrose    docusate sodium    ondansetron      Invasive Devices     Peripheral Intravenous Line            Peripheral IV 02/19/18 Left;Dorsal (posterior) Hand 1 day                  Results from last 7 days  Lab Units 02/20/18  0623 02/19/18  1843 02/19/18  1345   WBC Thousand/uL 8 17  --  8 45   HEMOGLOBIN g/dL 10 7*  --  10 8*   HEMATOCRIT % 34 8  --  34 1*   PLATELETS Thousands/uL 459* 500* 477*   NEUTROS PCT % 68  --   --    MONOS PCT % 8  --   --    MONO PCT MAN %  --   --  9         Results from last 7 days  Lab Units 02/20/18  0623 02/19/18  1345   SODIUM mmol/L 153* 154*   POTASSIUM mmol/L 3 4* 3 6   CHLORIDE mmol/L 114* 116*   CO2 mmol/L 27 28   BUN mg/dL 24 30*   CREATININE mg/dL 0 72 0 84   CALCIUM mg/dL 8 5 8 5   TOTAL PROTEIN g/dL 7 2 7 4   BILIRUBIN TOTAL mg/dL 0 50 0 50   ALK PHOS U/L 37* 40*   ALT U/L 15 12   AST U/L 27 22   GLUCOSE RANDOM mg/dL 63* 101       Lab Results   Component Value Date    CKTOTAL 66 02/20/2018    TROPONINI <0 02 02/19/2018       Lab Results   Component Value Date    INR 1 38 (H) 02/19/2018    INR 1 08 09/07/2017    INR 1 10 03/23/2017    PROTIME 16 9 (H) 02/19/2018    PROTIME 13 9 09/07/2017    PROTIME 13 9 03/23/2017                 Lab, Imaging and other studies: I have personally reviewed pertinent reports      VTE Pharmacologic Prophylaxis: Heparin 5000 Units SQ every 12 hours dosed based on her age of 80years-old  VTE Mechanical Prophylaxis: sequential compression device

## 2018-02-20 NOTE — MALNUTRITION/BMI
This medical record reflects one or more clinical indicators suggestive of malnutrition and/or morbid obesity  Malnutrition Findings:   Malnutrition type: Chronic illness  Degree of Malnutrition: Other severe protein calorie malnutrition  Malnutrition Characteristics: Fat loss, Muscle loss, Inadequate energy, Weight loss (Severe PCM due to significant wt loss of 12% x 1 month, prolong inadequate po intake >9 days with muscle and fat wasting in clavicle, temporal and interosseous regions)    BMI Findings:  BMI Classifications: Underweight < 18 5     Body mass index is 18 02 kg/m²  See Nutrition note dated 2/20/2018 for additional details  Completed nutrition assessment is viewable in the nutrition documentation

## 2018-02-20 NOTE — PLAN OF CARE
Problem: Prexisting or High Potential for Compromised Skin Integrity  Goal: Skin integrity is maintained or improved  INTERVENTIONS:  - Identify patients at risk for skin breakdown  - Assess and monitor skin integrity  - Assess and monitor nutrition and hydration status  - Monitor labs (i e  albumin)  - Assess for incontinence   - Turn and reposition patient  - Assist with mobility/ambulation  - Relieve pressure over bony prominences  - Avoid friction and shearing  - Provide appropriate hygiene as needed including keeping skin clean and dry  - Evaluate need for skin moisturizer/barrier cream  - Collaborate with interdisciplinary team (i e  Nutrition, Rehabilitation, etc )   - Patient/family teaching   Outcome: Progressing      Problem: Potential for Falls  Goal: Patient will remain free of falls  INTERVENTIONS:  - Assess patient frequently for physical needs  -  Identify cognitive and physical deficits and behaviors that affect risk of falls  -  [place] fall precautions as indicated by assessment   - Educate patient/family on patient safety including physical limitations  - Instruct patient to call for assistance with activity based on assessment  - Modify environment to reduce risk of injury  - Consider OT/PT consult to assist with strengthening/mobility    Outcome: Progressing      Problem: Nutrition/Hydration-ADULT  Goal: Nutrient/Hydration intake appropriate for improving, restoring or maintaining nutritional needs  Monitor and assess patient's nutrition/hydration status for malnutrition (ex- brittle hair, bruises, dry skin, pale skin and conjunctiva, muscle wasting, smooth red tongue, and disorientation)  Collaborate with interdisciplinary team and initiate plan and interventions as ordered  Monitor patient's weight and dietary intake as ordered or per policy  Utilize nutrition screening tool and intervene per policy   Determine patient's food preferences and provide high-protein, high-caloric foods as appropriate       INTERVENTIONS:  - Monitor oral intake, urinary output, labs, and treatment plans  - Assess nutrition and hydration status and recommend course of action  - Evaluate amount of meals eaten  - Assist patient with eating if necessary   - Allow adequate time for meals  - Recommend/ encourage appropriate diets, oral nutritional supplements, and vitamin/mineral supplements  - Order, calculate, and assess calorie counts as needed  - Recommend, monitor, and adjust tube feedings and TPN/PPN based on assessed needs  - Assess need for intravenous fluids  - Provide specific nutrition/hydration education as appropriate  - Include patient/family/caregiver in decisions related to nutrition   Outcome: Progressing      Problem: PAIN - ADULT  Goal: Verbalizes/displays adequate comfort level or baseline comfort level  Interventions:  - Encourage patient to monitor pain and request assistance  - Assess pain using appropriate pain scale  - Administer analgesics based on type and severity of pain and evaluate response  - Implement non-pharmacological measures as appropriate and evaluate response  - Consider cultural and social influences on pain and pain management  - Notify physician/advanced practitioner if interventions unsuccessful or patient reports new pain   Outcome: Progressing      Problem: INFECTION - ADULT  Goal: Absence or prevention of progression during hospitalization  INTERVENTIONS:  - Assess and monitor for signs and symptoms of infection  - Monitor lab/diagnostic results  - Monitor all insertion sites, i e  indwelling lines, tubes, and drains  - Monitor endotracheal (as able) and nasal secretions for changes in amount and color  - [place] appropriate cooling/warming therapies per order  - Administer medications as ordered  - Instruct and encourage patient and family to use good hand hygiene technique  - Identify and instruct in appropriate isolation precautions for identified infection/condition Outcome: Progressing      Problem: SAFETY ADULT  Goal: Maintain or return to baseline ADL function  INTERVENTIONS:  -  Assess patient's ability to carry out ADLs; assess patient's baseline for ADL function and identify physical deficits which impact ability to perform ADLs (bathing, care of mouth/teeth, toileting, grooming, dressing, etc )  - Assess/evaluate cause of self-care deficits   - Assess range of motion  - Assess patient's mobility; develop plan if impaired  - Assess patient's need for assistive devices and provide as appropriate  - Encourage maximum independence but intervene and supervise when necessary  ¯ Involve family in performance of ADLs  ¯ Assess for home care needs following discharge   ¯ Request OT consult to assist with ADL evaluation and planning for discharge  ¯ Provide patient education as appropriate   Outcome: Progressing    Goal: Maintain or return mobility status to optimal level  INTERVENTIONS:  - Assess patient's baseline mobility status (ambulation, transfers, stairs, etc )    - Identify cognitive and physical deficits and behaviors that affect mobility  - Identify mobility aids required to assist with transfers and/or ambulation (gait belt, sit-to-stand, lift, walker, cane, etc )  - [place] fall precautions as indicated by assessment  - Record patient progress and toleration of activity level on Mobility SBAR; progress patient to next Phase/Stage  - Instruct patient to call for assistance with activity based on assessment  - Request Rehabilitation consult to assist with strengthening/weightbearing, etc    Outcome: Progressing    Goal: Patient will remain free of falls  INTERVENTIONS:  - Assess patient frequently for physical needs  -  Identify cognitive and physical deficits and behaviors that affect risk of falls    -  [place] fall precautions as indicated by assessment   - Educate patient/family on patient safety including physical limitations  - Instruct patient to call for assistance with activity based on assessment  - Modify environment to reduce risk of injury  - Consider OT/PT consult to assist with strengthening/mobility    Outcome: Progressing      Problem: DISCHARGE PLANNING  Goal: Discharge to home or other facility with appropriate resources  INTERVENTIONS:  - Identify barriers to discharge w/patient and caregiver  - Arrange for needed discharge resources and transportation as appropriate  - Identify discharge learning needs (meds, wound care, etc )  - Arrange for interpretive services to assist at discharge as needed  - Refer to Case Management Department for coordinating discharge planning if the patient needs post-hospital services based on physician/advanced practitioner order or complex needs related to functional status, cognitive ability, or social support system   Outcome: Progressing      Problem: Knowledge Deficit  Goal: Patient/family/caregiver demonstrates understanding of disease process, treatment plan, medications, and discharge instructions  Complete learning assessment and assess knowledge base    Interventions:  - Provide teaching at level of understanding  - Provide teaching via preferred learning methods   Outcome: Progressing      Problem: DISCHARGE PLANNING - CARE MANAGEMENT  Goal: Discharge to post-acute care or home with appropriate resources  INTERVENTIONS:  - Conduct assessment to determine patient/family and health care team treatment goals, and need for post-acute services based on payer coverage, community resources, and patient preferences, and barriers to discharge  - Address psychosocial, clinical, and financial barriers to discharge as identified in assessment in conjunction with the patient/family and health care team  - Arrange appropriate level of post-acute services according to patient's   needs and preference and payer coverage in collaboration with the physician and health care team  - Communicate with and update the patient/family, physician, and health care team regarding progress on the discharge plan  - Arrange appropriate transportation to post-acute venues   Outcome: Progressing

## 2018-02-20 NOTE — PHYSICAL THERAPY NOTE
PT Evaluation     02/20/18 1145   Note Type   Note type Eval/Treat   Pain Assessment   Pain Assessment No/denies pain   Pain Score No Pain   Home Living   Type of Home Assisted living  [facility])   Additional Comments pt when feeling well is (I) with ambulation with RW and states she is (I) with ADL's but since not feeling well requires assistance  Prior Function   Level of [place] Needs assistance with ADLs and functional mobility  (ambulation with RW)   Lives With Facility staff   Receives Help From Family   ADL Assistance Needs assistance   IADLs Needs assistance   Comments pt now requires assist in the last few weeks with ambulation and ADL's   Restrictions/Precautions   Other Precautions Contact/isolation; Bed Alarm;Multiple lines;Telemetry; Fall Risk;O2   General   Family/Caregiver Present No   Cognition   Arousal/Participation Alert   Orientation Level Oriented to person;Oriented to place   Following Commands Follows one step commands without difficulty   RLE Assessment   RLE Assessment WFL  (3+ to 4-/5)   LLE Assessment   LLE Assessment WFL  (3+ to 4-/5)   Coordination   Sensation [facility]   Light Touch   RLE Light Touch Grossly intact   LLE Light Touch Grossly intact   Bed Mobility   Supine to Sit 3  Moderate assistance   Additional items Assist x 1;Verbal cues   Sit to Supine 2  Maximal assistance   Additional items Assist x 2;Verbal cues   Additional Comments pt on 1L with spO2 at rest 98% and with exertion 94%  Pt with decreased sitting balance at EOB requiring min(A)  Sitting tolerance 1-2 minutes prior to requiring min(A) due to posterior lean   Transfers   Sit to Stand 4  Minimal assistance   Additional items Assist x 2   Stand to Sit 4  Minimal assistance   Additional items Assist x 2;Verbal cues   Additional Comments Pt performed sit to stand x1 on scale for daily weight requiring min(A)x2   Pt did not ambulate due to weakness and fatigue with decreased sitting balance requiring min(A) to maintain upright position   Ambulation/Elevation   Gait pattern (unable at this time due to weakness)   Balance   Static Sitting Fair   Dynamic Sitting Fair -  (requires min(A) for sitting balance)   Static Standing Fair -  (with RW or rail on scale)   Dynamic Standing Fair -   Endurance Deficit   Endurance Deficit Yes   Endurance Deficit Description limited activity tolerance due to weakness and fatigue   Activity Tolerance   Activity Tolerance Patient limited by fatigue   Assessment   Prognosis Good   Problem List Decreased strength;Decreased endurance; Impaired balance;Decreased mobility   Assessment Pt is a 80year old female presenting with decreased strength balance endurance and functional mobility  Pt unable to ambulate at this time and is in need of continued activity in PT to improve strength balance endurance and functional mobility  Pt unsafe to return to [facility] and will require short term rehab on discharge  Goals   Patient Goals To get stronger and feel better   LTG Expiration Date 03/06/18   Long Term Goal #1 Min(A)x1 for all bed mobility and transfers with RW   Long Term Goal #2 Pt will ambulate 100ft with RW min(A)x1   Plan   Treatment/Interventions Functional transfer training;LE strengthening/ROM; Therapeutic exercise; Endurance training;Patient/family training;Bed mobility;Gait training   PT Frequency (3-5x/wk)   Recommendation   Recommendation Short-term skilled PT   PT - OK to Discharge No  (to next level of care when medically stable)   Pt with SCD's on when PT entered room  SCD's reapplied and turned on with pt supine in bed with call bell in reach and bed alarm on

## 2018-02-20 NOTE — PLAN OF CARE
Problem: DISCHARGE PLANNING - CARE MANAGEMENT  Goal: Discharge to post-acute care or home with appropriate resources  INTERVENTIONS:  - Conduct assessment to determine patient/family and health care team treatment goals, and need for post-acute services based on payer coverage, community resources, and patient preferences, and barriers to discharge  - Address psychosocial, clinical, and financial barriers to discharge as identified in assessment in conjunction with the patient/family and health care team  - Arrange appropriate level of post-acute services according to patient's   needs and preference and payer coverage in collaboration with the physician and health care team  - Communicate with and update the patient/family, physician, and health care team regarding progress on the discharge plan  - Arrange appropriate transportation to post-acute venues  Outcome: Progressing  -?return to [facility] assisted living or possible SNF/STR  -follow PT/OT recommendations

## 2018-02-21 PROBLEM — E43 SEVERE PROTEIN-CALORIE MALNUTRITION (HCC): Status: ACTIVE | Noted: 2018-01-01

## 2018-02-21 PROBLEM — R79.89 LOW SERUM PREALBUMIN: Status: ACTIVE | Noted: 2018-01-01

## 2018-02-21 NOTE — OCCUPATIONAL THERAPY NOTE
Occupational Therapy Evaluation     Patient Name: Vu STEIN Date: 2/21/2018  Problem List  Patient Active Problem List   Diagnosis    Essential hypertension    Gastroesophageal reflux disease without esophagitis    Coronary artery disease involving native coronary artery of native heart    H/O aortic valve replacement    Hyperlipidemia    Frequent falls    Stage 3 chronic kidney disease    Chronic diastolic CHF (congestive heart failure) (HCC)    Dysphagia    Hypernatremia    Elevated platelet count (HCC)    Anemia    Dehydration    Hypoalbuminemia    Hyperchloremia    Nausea and vomiting    Interstitial lung disease (HCC)    Pulmonary fibrosis (HCC)    Hypokalemia    Hypoglycemia    Pulmonary nodule    Atelectasis    Closed compression fracture of L1 lumbar vertebra (HCC)    Closed compression fracture of L2 lumbar vertebra (HCC)    Closed compression fracture of L4 lumbar vertebra (HCC)    Cholelithiasis    Severe protein-calorie malnutrition (HCC)    BMI less than 19,adult    Low serum prealbumin     Past Medical History  Past Medical History:   Diagnosis Date    Angina pectoris ([address] )     Arthritis     CAD (coronary artery disease)     Gait abnormality     GERD (gastroesophageal reflux disease)     Hypertension     Lyme disease     Osteoporosis     Vertigo      Past Surgical History  Past Surgical History:   Procedure Laterality Date    AORTIC VALVE REPLACEMENT      APPENDECTOMY      EYE SURGERY  08/19/1996    cataract bilateral             02/21/18 0822   Note Type   Note type Eval/Treat   Restrictions/Precautions   Weight Bearing Precautions Per Order No   Other Precautions Contact/isolation; Chair Alarm; Bed Alarm;Multiple lines;Telemetry;O2;Fall Risk   Pain Assessment   Pain Assessment No/denies pain   Home Living   Type of Home Assisted living  (resides at [facility])   Prior Function   Level of [place] Needs assistance with ADLs and functional mobility; Needs assistance with IADLs   Lives With Facility staff   Receives Help From Family;Personal care attendant   ADL Assistance Needs assistance   IADLs Needs assistance   Comments pt utilizes RW and w/c at baseline    Psychosocial   Psychosocial (WDL) X   Patient Behaviors/Mood [place] affect   Bed Mobility   Supine to Sit 3  Moderate assistance   Additional items Assist x 1;Bedrails;Verbal cues;LE management; Increased time required   Sit to Supine (seated in chair at end of session)   Transfers   Sit to Stand 4  Minimal assistance   Additional items Assist x 2;Verbal cues  (RW)   Stand to Sit 4  Minimal assistance   Additional items Verbal cues  (RW)   Additional Comments pt on 2L O2 throughout session SpO2 ranged 93-94%   Functional Mobility   Functional Mobility 4  Minimal assistance   Additional Comments x1-2; pt performed FM to chair c RW; pt appears very weak and fatigued this session    Additional items Rolling walker   Balance   Static Sitting Fair +   Dynamic Sitting Fair +   Static Standing Fair   Dynamic Standing Fair   Ambulatory Fair -   Activity Tolerance   Activity Tolerance Patient limited by fatigue   RUE Assessment   RUE Assessment X  (3+/5 grossly; WFL AROM )   LUE Assessment   LUE Assessment X  (3+/5 grossly; WFL AROM )   Hand Function   Gross Motor Coordination Functional   Fine Motor Coordination Functional   Sensation   Light Touch No apparent deficits   Sharp/Dull No apparent deficits   Cognition   Overall Cognitive Status Impaired   Arousal/Participation Alert   Attention Within functional limits   Orientation Level Oriented to person;Oriented to place   Following Commands Follows one step commands without difficulty   Assessment   Limitation Decreased ADL status; Decreased UE ROM; Decreased UE strength;Decreased Safe judgement during ADL;Decreased cognition;Decreased endurance;Decreased self-care trans;Decreased high-level ADLs   Assessment pt presents at evaluation performing at overall min-mod (A) level with (A) of 1-2 c RW use during FM; pt will benefit from continued OT intervention and recommend SNF placement, pt is known to department and has declined in (I) and mobility now requiring SNF level (A)    Goals   Short Term Goal  pt will perform UE strengthening exercises    Long Term Goal #1 pt will perform grooming tasks at min (A) level   Long Term Goal #2 pt will perform FM c RW at (S) level with no rest breaks    Long Term Goal pt will perform toilet transfers and tasks at min (A) level with decreased cues    Plan   Treatment Interventions ADL retraining;Functional transfer training;UE strengthening/ROM; Endurance training;Patient/family training; Activityengagement   Goal Expiration Date 03/07/18   OT Frequency 3-5x/wk   Recommendation   OT Discharge Recommendation 24 hour supervision/assist   OT - OK to Discharge No   Barthel Index   Feeding 5   Bathing 0   Grooming Score 0   Dressing Score 5   Bladder Score 5   Bowels Score 5   Toilet Use Score 5   Transfers (Bed/Chair) Score 10   Mobility (Level Surface) Score 10   Stairs Score 0   Barthel Index Score 45     Pt will benefit from continued OT services in order to maximize (I) c ADL performance, FM c RW, and improve overall endurance/strength required to complete functional tasks in preparation for d/c  Pt left seated in chair at end of session; all needs within reach; all lines intact; scds connected and turned on

## 2018-02-21 NOTE — SOCIAL WORK
Message left this am for pt's son/POA:Florian Haji to discuss dc plans/needs (SNF/STR as recommended by therapy)  Awaiting a call back

## 2018-02-21 NOTE — SPEECH THERAPY NOTE
Speech Language/Pathology    Speech/Language Pathology Progress Note    Patient Name: Aamir Snyder  KMLYZ'A Date: 2/21/2018     Problem List  Patient Active Problem List   Diagnosis    Essential hypertension    Gastroesophageal reflux disease without esophagitis    Coronary artery disease involving native coronary artery of native heart    H/O aortic valve replacement    Hyperlipidemia    Frequent falls    Stage 3 chronic kidney disease    Chronic diastolic CHF (congestive heart failure) (HCC)    Dysphagia    Hypernatremia    Elevated platelet count (HCC)    Anemia    Dehydration    Hypoalbuminemia    Hyperchloremia    Nausea and vomiting    Interstitial lung disease (HCC)    Pulmonary fibrosis (HCC)    Hypokalemia    Hypoglycemia    Pulmonary nodule    Atelectasis    Closed compression fracture of L1 lumbar vertebra (HCC)    Closed compression fracture of L2 lumbar vertebra (HCC)    Closed compression fracture of L4 lumbar vertebra ([address] )    Cholelithiasis        Past Medical History  Past Medical History:   Diagnosis Date    Angina pectoris (HCC)     Arthritis     CAD (coronary artery disease)     Gait abnormality     GERD (gastroesophageal reflux disease)     Hypertension     Lyme disease     Osteoporosis     Vertigo         Past Surgical History  Past Surgical History:   Procedure Laterality Date    AORTIC VALVE REPLACEMENT      APPENDECTOMY      EYE SURGERY  08/19/1996    cataract bilateral         Subjective:  Pt reporting that she "can't swallow" and unable to report anything that she enjoys eating even after multiple choices provided  Objective:  Pt seen for swallowing therapy today  Pt was evaluated by this service on 2/20/18 with recs for pureed solids and nectar thick liquids  She was then made NPO later that date as NSG said "pt was not swallowing at all " Pt received today OOB in chair awake and alert  She was oriented to name       Assessment:  Pt assessed with pureed solids, nectar thick liquids via straw, honey thick liquids via cup and single ice chip  Pt needed max encouragement to participate, not interested in a wide variety of items presented  Pt did eventually accept minimal trials  Pt needed 1:1 assistance with feeding  Oral phase characterized by adequate orientation/reception, slightly prolonged AP transport and mild anterior residue with puree cleared with liquid wash  Pharyngeal phase characterized by suspected delay in the initiation of the swallow with slightly reduced hyolaryngeal elevation  There was a mild dry cough x1 with nectar via straw but pt denied any difficulty  Across all other trials, pt facial grimmacing and vocalizations of dislike  There were no s s of aspiration observed across all further trials  Plan/Recommendations:  Recommend diet initiation pureed solids and honey thick liquids via single cup sips, assist 1:1 with meals, monitor closely for diet tolerance, feed only as awake/alert, monitor closely for oral holding ( provide cues as needed), meds crushed in puree and maintain aspiration precautions

## 2018-02-21 NOTE — SOCIAL WORK
Spoke with pt's son:Florian who is agreeable to SNF/STR but only wants a referral at this time to the 5th floor as pt has been up there several times in the past  Son will think about other places if no bed or pt not accepted on the 5th floor  Referral made, Cm will f/u

## 2018-02-21 NOTE — PHYSICIAN ADVISOR
This patient is a 80 y o  y/o female who is admitted to the hospital for Flu Symptoms ([place] personnel report patient spitting out tamiflu  Patient not eating or drinking  Patient lethargic )   The patient presented to the ED on 2/19/18 at 1005 and was admitted to the hospital on 2/19/2018 1005  History of Present Illness includes: the patient had a prior admission from 2/11-2/14 for acute hypoxemic respiratory failure  The patient presented on this admission with nausea and vomiting  The patient had influenza on her prior admission  Vital signs in the ER are as follows   ED Triage Vitals   Temperature Pulse Respirations Blood Pressure SpO2   02/19/18 1018 02/19/18 1015 02/19/18 1018 02/19/18 1018 02/19/18 1015   98 4 °F (36 9 °C) 105 18 163/91 98 %      Temp Source Heart Rate Source Patient Position - Orthostatic VS BP Location FiO2 (%)   02/19/18 1018 02/19/18 1018 02/19/18 1018 02/19/18 1018 --   Temporal Monitor Lying Right arm       Pain Score       02/19/18 1018       No Pain           On exam there is dry mucous membranes  There is no edema  Hgb is 10 8, Na is 154 and Cr is 0 8  This patient is being admitted with hypernatremia, dehydration, nausea and vomiting as well as dysphagia  The plan of care includes IVF, nephrology consultation, IV anti-emetics, CT scan of the abdomen and pelvis, NPO and speech therapy consultation  This patient is appropriate for INPATIENT admission as her length of stay is expected to be at least 2 midnights  This admission is UNRELATED to her prior admission as she was treated and discharged in stable condition

## 2018-02-21 NOTE — PROGRESS NOTES
Progress Note - Nephrology   Gisel Mckeon 80 y o  female MRN: [id number]  Unit/Bed#: 416-01 Encounter: [id number]    A/P:  1  Hypernatremia due to hypovolemia and lack of free water  She corrected to 146 with dilute fluids  Initially received D5 1/2NS due to hypotension but is normotensive at this time and improving on 1/4 NS  Would continue to monitor daily weights  She will receive a dysphagia evaluation  2  Chronic kidney disease stage 3 - probably due to renal senescence  Her right kidney was visualized and appears normal  3  Hypokalemia: will reorder supplement  4  Chronic diastolic CHF: appears compensated  5  Hypertension by history: is normotensive today      Follow up reason for today's visit: Hypernatremia    Hypernatremia    Patient Active Problem List   Diagnosis    Essential hypertension    Gastroesophageal reflux disease without esophagitis    Coronary artery disease involving native coronary artery of native heart    H/O aortic valve replacement    Hyperlipidemia    Frequent falls    Stage 3 chronic kidney disease    Chronic diastolic CHF (congestive heart failure) (HCC)    Dysphagia    Hypernatremia    Elevated platelet count (HCC)    Anemia    Dehydration    Hypoalbuminemia    Hyperchloremia    Nausea and vomiting    Interstitial lung disease (HCC)    Pulmonary fibrosis (HCC)    Hypokalemia    Hypoglycemia    Pulmonary nodule    Atelectasis    Closed compression fracture of L1 lumbar vertebra (HCC)    Closed compression fracture of L2 lumbar vertebra (HCC)    Closed compression fracture of L4 lumbar vertebra (HCC)    Cholelithiasis         Subjective:   Feels better - much more responsive  Denies chest pain, dyspnea or dysuria    Objective:     Vitals: Blood pressure 114/67, pulse 94, temperature 97 9 °F (36 6 °C), temperature source Temporal, resp  rate 18, height 5' 2" (1 575 m), weight 44 3 kg (97 lb 10 6 oz), SpO2 99 %  ,Body mass index is 17 86 kg/m²      Weight (last 2 days)     Date/Time   Weight    02/21/18 0600  44 3 (97 66)    02/20/18 1148  44 7 (98 5)    02/20/18 0600  44 (97)    02/19/18 1851  46 9 (103 4)    02/19/18 1018  48 4 (106 7)                Intake/Output Summary (Last 24 hours) at 02/21/18 0940  Last data filed at 02/21/18 0421   Gross per 24 hour   Intake          1550 27 ml   Output                0 ml   Net          1550 27 ml     I/O last 3 completed shifts: In: 2550 3 [I V :2550 3]  Out: 700 [Urine:700]         Physical Exam: /67 (BP Location: Right arm)   Pulse 94   Temp 97 9 °F (36 6 °C) (Temporal)   Resp 18   Ht 5' 2" (1 575 m)   Wt 44 3 kg (97 lb 10 6 oz)   SpO2 99%   BMI 17 86 kg/m²     General Appearance:    Alert, cooperative, no distress, appears stated age   Head:    Normocephalic, without obvious abnormality, atraumatic   Eyes:    Conjunctiva/corneas clear   Ears:    Normal external ears   Nose:   Nares normal, septum midline, mucosa normal, no drainage    or sinus tenderness   Throat:   Lips, mucosa, and tongue normal; teeth and gums normal   Neck:   Supple, symmetrical, trachea midline, no adenopathy;        thyroid:  No enlargement/tenderness/nodules; no carotid    bruit or JVD   Back:     Symmetric, no curvature, ROM normal, no CVA tenderness   Lungs:     Clear to auscultation bilaterally, respirations unlabored   Chest wall:    No tenderness or deformity   Heart:    Regular rate and rhythm, S1 and S2 normal, no murmur, rub   or gallop   Abdomen:     Soft, non-tender, bowel sounds active   Extremities:   Extremities normal, atraumatic, no cyanosis or edema   Skin:   Skin color, texture, turgor normal, no rashes or lesions   Lymph nodes:   Cervical normal   Neurologic:   CNII-XII intact            Lab, Imaging and other studies: I have personally reviewed pertinent labs    CBC: Lab Results   Component Value Date    WBC 8 81 02/21/2018    HGB 10 6 (L) 02/21/2018    HCT 33 5 (L) 02/21/2018    MCV 95 02/21/2018     (H) 02/21/2018    MCH 30 1 02/21/2018    MCHC 31 6 02/21/2018    RDW 14 2 02/21/2018    MPV 9 0 02/21/2018     CMP: Lab Results   Component Value Date     (H) 02/21/2018    K 3 3 (L) 02/21/2018     (H) 02/21/2018    CO2 27 02/21/2018    ANIONGAP 9 02/21/2018    BUN 17 02/21/2018    CREATININE 0 68 02/21/2018    GLUCOSE 128 02/21/2018    CALCIUM 8 2 (L) 02/21/2018    AST 51 (H) 02/21/2018    ALT 13 02/21/2018    ALKPHOS 35 (L) 02/21/2018    PROT 6 5 02/21/2018    BILITOT 0 60 02/21/2018    EGFR 75 02/21/2018         Results from last 7 days  Lab Units 02/21/18  0545 02/20/18  0623 02/19/18  1345   SODIUM mmol/L 146* 153* 154*   POTASSIUM mmol/L 3 3* 3 4* 3 6   CHLORIDE mmol/L 110* 114* 116*   CO2 mmol/L 27 27 28   BUN mg/dL 17 24 30*   CREATININE mg/dL 0 68 0 72 0 84   CALCIUM mg/dL 8 2* 8 5 8 5   TOTAL PROTEIN g/dL 6 5 7 2 7 4   BILIRUBIN TOTAL mg/dL 0 60 0 50 0 50   ALK PHOS U/L 35* 37* 40*   ALT U/L 13 15 12   AST U/L 51* 27 22   GLUCOSE RANDOM mg/dL 128 63* 101         Phosphorus: No results found for: PHOS  Magnesium: No results found for: MG  Urinalysis: No results found for: COLORU, CLARITYU, SPECGRAV, PHUR, LEUKOCYTESUR, NITRITE, PROTEINUA, GLUCOSEU, KETONESU, BILIRUBINUR, BLOODU  Ionized Calcium: No results found for: CAION  Coagulation: No results found for: PT, INR, APTT  Troponin: No results found for: TROPONINI  ABG: No results found for: PHART, EJR8LUZ, PO2ART, EGD5RGU, X2DLSHBN, BEART, SOURCE  Radiology review:     IMAGING  Procedure: Ct Abdomen Pelvis Wo Contrast    Result Date: 2/20/2018  Narrative: CT ABDOMEN AND PELVIS WITHOUT IV CONTRAST INDICATION:  Nausea, vomiting and diarrhea  COMPARISON: CT abdomen pelvis 3/23/2017 TECHNIQUE:  CT examination of the abdomen and pelvis was performed without intravenous contrast   Axial, sagittal, and coronal 2D reformatted images were created from the source data and submitted for interpretation   Radiation dose length product (DLP) for this visit: 243 88 mGy-cm   This examination, like all CT scans performed in the [facility], was performed utilizing techniques to minimize radiation dose exposure, including the use of iterative  reconstruction and automated exposure control  Enteric contrast was administered  FINDINGS: ABDOMEN LOWER CHEST:  There are bilateral fibrotic changes  Bibasilar dependent opacities likely represent atelectasis  3 mm left upper lobe nodule (series 2 image 6)  There are postsurgical changes of the heart status post aortic valve replacement  There are atherosclerotic changes of the aorta  There is enlargement of the main pulmonary artery  LIVER/BILIARY TREE:  Stable calcifications in the anterior right lobe of the liver  No evidence of liver mass or ductal dilation on this noncontrast evaluation  GALLBLADDER:  There are gallstone(s) within the gallbladder, without pericholecystic inflammatory changes  Mild gallbladder distention  SPLEEN:  Unremarkable  PANCREAS:  The pancreas is atrophic  No pancreatic mass  ADRENAL GLANDS:  Unremarkable  KIDNEYS/URETERS:  Unremarkable  No hydronephrosis  STOMACH AND BOWEL:  There is a small hiatal hernia  Evaluation of the stomach and bowel is limited given lack of oral and intravenous contrast material   No evident bowel obstruction  Mild stool retention throughout the colon  There is colonic diverticulosis without evidence of diverticulitis  APPENDIX:  No findings to suggest appendicitis  ABDOMINOPELVIC CAVITY:  No ascites or free intraperitoneal air  No lymphadenopathy  VESSELS:  Atherosclerotic changes are present  No evidence of aneurysm  PELVIS REPRODUCTIVE ORGANS:  Unremarkable for patient's age  URINARY BLADDER:  Unremarkable  ABDOMINAL WALL/INGUINAL REGIONS:  Unremarkable  OSSEOUS STRUCTURES:  There are compression deformities of the L1, L2 and L4 vertebral bodies    L1 vertebral body compression deformity is similar in appearance to 2/11/2018 exam   L2 and L4 compression deformities are age indeterminate  Impression: 1  Limited evaluation secondary to lack of oral and intravenous contrast   Diverticulosis without evidence of acute diverticulitis  2   Cholelithiasis and mild gallbladder wall distention  If there is focal right upper quadrant pain further evaluation with right upper quadrant ultrasound could be considered  3   Stable compression deformity of the L1 vertebral body  Age indeterminate compression deformities of L2 and L4  4   Fibrotic changes at the lung bases with patchy bibasilar airspace opacities likely representing atelectasis  5   3 mm left upper lobe pulmonary nodule  Based on current Fleischner Society 2017 Guidelines on incidental pulmonary nodule, no routine follow-up is needed if the patient is considered low risk for lung cancer  If the patient is considered high risk for lung cancer, 12 month follow-up non-contrast chest CT is recommended  Considerations related to the patient's age and/or comorbidities may be used to alter these recommendations  Workstation performed: [id number]     Procedure: Xr Chest 1 View Portable    Result Date: 2/19/2018  Narrative: CHEST INDICATION:  Dehydration  COMPARISON:  Chest CT and radiographs dated 2/11/2018  Radiographs dated 1/6/2018  VIEWS:  Frontal and lateral projections IMAGES:  1 FINDINGS: Normal lung volumes  Chronic, peripheral interstitial reticular markings  No superimposed acute consolidation  Blunting of the right costophrenic angle is chronic and likely due to fibrosis  No layering effusions  No pneumothorax  Heart size within normal limits  Prior aortic root repair  Aortic tortuosity without aneurysm  Pulmonary vasculature is largely obscured by interstitial process  No janny cephalization  Osteopenia  High riding humeri with remodeling of the acetabular undersurfaces--suggestive of chronic rotator cuff tears  No acute osseous findings demonstrated    Thoracic spine poorly evaluated due to underpenetration  Impression: Chronic interstitial fibrotic disease without acute findings or significant change from prior  Workstation performed: [id number]     Procedure: Ct Head Without Contrast    Result Date: 2/19/2018  Narrative: CT BRAIN - WITHOUT CONTRAST INDICATION: chest pain  History taken directly from the electronic ordering system  COMPARISON:  None  TECHNIQUE:  CT examination of the brain was performed  In addition to axial images, coronal reformatted images were created and submitted for interpretation  Radiation dose length product (DLP) for this visit:  1076 81 mGy-cm   This examination, like all CT scans performed in the [facility], was performed utilizing techniques to minimize radiation dose exposure, including the use of iterative reconstruction and automated exposure control  IMAGE QUALITY:  Diagnostic  FINDINGS:  PARENCHYMA:  Decreased attenuation is noted in the supratentorial white matter demonstrating an appearance most consistent with moderate microangiopathic change  No intracranial mass, mass effect or midline shift  No CT signs of acute infarction  There is no parenchymal hemorrhage  VENTRICLES AND EXTRA-AXIAL SPACES:  Age-appropriate volume loss is noted  No hydrocephalus  VISUALIZED ORBITS AND PARANASAL SINUSES:  Orbits appear normal   There is near complete opacification of the right maxillary sinus  No fluid levels are seen  CALVARIUM AND EXTRACRANIAL SOFT TISSUES:   Normal      Impression: No acute intracranial abnormality  Microangiopathic changes  Workstation performed: [id number]     Procedure: Us Gallbladder    Result Date: 2/20/2018  Narrative: RIGHT UPPER QUADRANT ULTRASOUND INDICATION:  Cholelithiasis, distended gallbladder   COMPARISON:  CT abdomen pelvis 2/20/2018 TECHNIQUE:   Real-time ultrasound of the right upper quadrant was performed with a curvilinear transducer with both volumetric sweeps and still imaging techniques  FINDINGS: PANCREAS:  Portions of the pancreas are obscured by bowel gas  Visualized portions of the pancreas are unremarkable  AORTA AND IVC:  Visualized portions are normal for patient age  LIVER: Size:  Within normal range  The liver measures 10 3 cm in the midclavicular line  Contour:  Surface contour is smooth  Parenchyma:  Echogenicity and echotexture are within normal limits  No evidence of suspicious mass  Limited imaging of the main portal vein shows it to be patent and hepatopetal   BILIARY: The gallbladder is distended  No wall thickening or pericholecystic fluid  Single dependent mobile calculus without sludge  No sonographic Neff's sign  No intrahepatic biliary dilatation  CBD measures 4 5 mm  No choledocholithiasis  KIDNEY: Right kidney measures 8 6 x 4 3 cm  Within normal limits  ASCITES:   None  Impression:  Gallbladder distention and cholelithiasis without other evidence of acute cholecystitis   Workstation performed: [id number]       Current Facility-Administered Medications   Medication Dose Route Frequency    acetaminophen (TYLENOL) tablet 650 mg  650 mg Oral Q6H PRN    benzonatate (TESSALON PERLES) capsule 100 mg  100 mg Oral TID PRN    dextrose 5 % and sodium chloride 0 2 % infusion  75 mL/hr Intravenous Continuous    dextrose 50 % IV solution 25 mL  25 mL Intravenous PRN    docusate sodium (COLACE) capsule 100 mg  100 mg Oral BID PRN    heparin (porcine) subcutaneous injection 5,000 Units  5,000 Units Subcutaneous Q12H [address]    menthol-zinc oxide (CALMOSEPTINE) 0 44-20 6 % ointment   Topical BID    ondansetron (ZOFRAN) injection 4 mg  4 mg Intravenous Q6H PRN     Medications Discontinued During This Encounter   Medication Reason    cefuroxime (CEFTIN) 500 mg tablet     sodium chloride 0 9 % infusion     aspirin chewable tablet 81 mg     atorvastatin (LIPITOR) tablet 10 mg     calcium carbonate (OYSTER SHELL,OSCAL) 500 mg tablet 1 tablet     diltiazem (CARDIZEM CD) 24 hr capsule 120 mg     multivitamin-minerals (CENTRUM) tablet 1 tablet     sodium chloride infusion 0 45 %     potassium chloride 10 % oral solution 40 mEq     aspirin chewable tablet 81 mg     atorvastatin (LIPITOR) tablet 10 mg     calcium carbonate-vitamin D (OSCAL-D) 500 mg-200 units per tablet 1 tablet     diltiazem (CARDIZEM CD) 24 hr capsule 120 mg     albuterol inhalation solution 2 5 mg        Laverna Kehr, MD

## 2018-02-21 NOTE — PLAN OF CARE
Problem: SLP ADULT - SWALLOWING, IMPAIRED  Goal: Initial SLP swallow eval performed  Outcome: Completed Date Met: 02/20/18

## 2018-02-21 NOTE — PHYSICAL THERAPY NOTE
PT Treatment Note      02/21/18 0845   Restrictions/Precautions   Other Precautions (Contact/isolation; Bed Alarm;Multiple lines;Telemetry; Fall Ri)   Subjective   Subjective Agreeable to therapy  Bed Mobility   Supine to Sit 3  Moderate assistance   Additional items Assist x 1;Verbal cues   Transfers   Sit to Stand 4  Minimal assistance   Additional items Assist x 2;Bedrails;Verbal cues   Stand to Sit 4  Minimal assistance   Additional items Assist x 2;Armrests; Verbal cues   Stand pivot 4  Minimal assistance   Additional items Verbal cues   Ambulation/Elevation   Gait pattern Short stride; Foward flexed   Gait Assistance 4  Minimal assist   Additional items Assist x 2   Assistive Device Rolling walker   Distance 5'   Balance   Static Sitting Fair   Dynamic Sitting Fair   Static Standing Fair   Dynamic Standing Chang Mckeon 0291  (with RW)   Endurance Deficit   Endurance Deficit Yes   Activity Tolerance   Activity Tolerance Patient limited by fatigue   Assessment   Prognosis Good   Problem List Decreased strength;Decreased endurance; Impaired balance;Decreased mobility   Assessment performing functional mobility at (mod/min) x 2 level of function  Able to ambulate short distance  Limited by fatigue  Decreased balance, strength and functional mobility  and is in need of continued activity in PT to improve strength balance endurance and functional mobility  Pt unsafe to [facility] to [facility] and will require short term rehab on discharge  Plan   Treatment/Interventions Functional transfer training;LE strengthening/ROM; Therapeutic exercise; Endurance training;Bed mobility;Gait training   Progress Progressing toward goals   Recommendation   Recommendation Short-term skilled PT   Pt  OOB with call bell within reach, scd's connected and turned on and alarm on at end of PT session

## 2018-02-21 NOTE — PROGRESS NOTES
Progress Note - Richard Edmond 80 y o  female MRN: [id number]    Unit/Bed#: 874-97 Encounter: [id number]      Assessment:  Patient Active Problem List   Diagnosis    Essential hypertension    Gastroesophageal reflux disease without esophagitis    Coronary artery disease involving native coronary artery of native heart    H/O aortic valve replacement    Hyperlipidemia    Frequent falls    Stage 3 chronic kidney disease    Chronic diastolic CHF (congestive heart failure) (HCC)    Dysphagia    Hypernatremia    Elevated platelet count (HCC)    Anemia    Dehydration    Hypoalbuminemia    Hyperchloremia    Nausea and vomiting    Interstitial lung disease (HCC)    Pulmonary fibrosis (HCC)    Hypokalemia    Hypoglycemia    Pulmonary nodule    Atelectasis    Closed compression fracture of L1 lumbar vertebra (HCC)    Closed compression fracture of L2 lumbar vertebra (HCC)    Closed compression fracture of L4 lumbar vertebra (HCC)    Cholelithiasis    Severe protein-calorie malnutrition (HCC)    BMI less than 19,adult    Low serum prealbumin         Plan:  1)  Hypernatremia/Dehydration/Nausea and vomiting/Hypoglycemia:  The hypernatremia is improving  Change IV fluids to D5 1/2 NSS with 20 meQ KCl at 75 ml/hr  Follow the patient's sodium level and blood glucose trend  I will order D50 25 ml PRN for a blood glucose less than 70  PT/OT  Continue to monitor the patient's sodium level closely     2)  Dysphagia:  Trial a diet of dysphagia pureed consistency with honey thick liquids and sippy cups with all liquid intake per speech therapy's recommendations  Aspiration precautions  Continue to monitor closely     3)  Anemia/Elevated platelet count:  Follow the patient's CBC  Transfuse PRBC's for a hemoglobin less than 7     4)  Interstitial lung disease/Pulmonary fibrosis:  Continue the respiratory protocol  Maintain continuous pulse oximetry    Supplemental oxygen to maintain oxygen saturation levels at 92% and above  5)  Hypokalemia:  Replete with IV potassium chloride  Follow the potassium level  6)  Compression deformities of L1, L2, and L4:  She will need [place] + Vitamin D BID when she is able to tolerate PO medications  The Vitamin D 25-OH level was within normal limits  She will need an outpatient DEXA scan  7)  Gallbladder wall distension/Cholelithiasis/Transaminitis:  A gallbladder ultrasound was completed on 2/20/18, which showed gallbladder distension and cholelithiasis without other evidence of acute cholecystitis  Follow the patient's LFT's     8)  Pulmonary nodule: The patient needs outpatient surveillance imaging of the pulmonary nodule  9)  Severe protein-calorie malnutrition/BMI less than 19 (BMI=17 86):  Consult Nutrition  I updated the patient's son, Anu Dang, on the telephone today, 2/21/18  The patient will need short-term rehabilitation placement upon discharge  Subjective:   Patient seen and examined  The patient complains of nausea this AM   No chest pain  Objective:     Vitals: Blood pressure 114/67, pulse 94, temperature 97 9 °F (36 6 °C), temperature source Temporal, resp  rate 18, height 5' 2" (1 575 m), weight 44 3 kg (97 lb 10 6 oz), SpO2 99 %  ,Body mass index is 17 86 kg/m²    Weight (last 2 days)     Date/Time   Weight    02/21/18 0600  44 3 (97 66)    02/20/18 1148  44 7 (98 5)    02/20/18 0600  44 (97)    02/19/18 1851  46 9 (103 4)    02/19/18 1018  48 4 (106 7)              Intake/Output Summary (Last 24 hours) at 02/21/18 1223  Last data filed at 02/21/18 1158   Gross per 24 hour   Intake          2066 52 ml   Output                0 ml   Net          2066 52 ml       Physical Exam: General:  NAD, awake, follows commands, confused  HEENT:  NC/AT, mucous membranes dry, lips are dry and cracked  Neck:  Supple, No JVP elevation  CV:  + S1, + S2, Regular rate, Regular rhythm  Pulm:  Bilateral inspiratory crackles  Abd:  Soft, Non-tender, Non-distended  Ext:  No clubbing/cyanosis/edema    Scheduled Meds:    Current Facility-Administered Medications:  acetaminophen 650 mg Oral Q6H PRN Brandon Peek, DO    benzonatate 100 mg Oral TID PRN Brandon Peek, DO    dextrose 5 % and sodium chloride 0 45 % with KCl 20 mEq/L 75 mL/hr Intravenous Continuous Brandon Peek, DO Last Rate: 75 mL/hr (02/21/18 1136)   dextrose 25 mL Intravenous PRN Brandon Peek, DO    docusate sodium 100 mg Oral BID PRN Brandon Peek, DO    heparin (porcine) 5,000 Units Subcutaneous Q12H [address] Brandon Peek, DO    menthol-zinc oxide  Topical BID Brandon Peek, DO    ondansetron 4 mg Intravenous Q6H PRN Brandon Peek, DO    potassium chloride 20 mEq Intravenous Q2H Brandon Peek, DO Last Rate: 20 mEq (02/21/18 1143)     Continuous Infusions:    dextrose 5 % and sodium chloride 0 45 % with KCl 20 mEq/L 75 mL/hr Last Rate: 75 mL/hr (02/21/18 1136)     PRN Meds:   acetaminophen    benzonatate    dextrose    docusate sodium    ondansetron      Invasive Devices     Peripheral Intravenous Line            Peripheral IV 02/19/18 Left;Dorsal (posterior) Hand 2 days                  Results from last 7 days  Lab Units 02/21/18  0545 02/20/18  0623 02/19/18  1843 02/19/18  1345   WBC Thousand/uL 8 81 8 17  --  8 45   HEMOGLOBIN g/dL 10 6* 10 7*  --  10 8*   HEMATOCRIT % 33 5* 34 8  --  34 1*   PLATELETS Thousands/uL 436* 459* 500* 477*   NEUTROS PCT %  --  68  --   --    MONOS PCT %  --  8  --   --    MONO PCT MAN % 5  --   --  9         Results from last 7 days  Lab Units 02/21/18  0545 02/20/18  0623 02/19/18  1345   SODIUM mmol/L 146* 153* 154*   POTASSIUM mmol/L 3 3* 3 4* 3 6   CHLORIDE mmol/L 110* 114* 116*   CO2 mmol/L 27 27 28   BUN mg/dL 17 24 30*   CREATININE mg/dL 0 68 0 72 0 84   CALCIUM mg/dL 8 2* 8 5 8 5   TOTAL PROTEIN g/dL 6 5 7 2 7 4   BILIRUBIN TOTAL mg/dL 0 60 0 50 0 50   ALK PHOS U/L 35* 37* 40*   ALT U/L 13 15 12   AST U/L 51* 27 22   GLUCOSE RANDOM mg/dL 128 63* 101       Lab Results   Component Value Date    CKTOTAL 66 02/20/2018    TROPONINI <0 02 02/19/2018       Lab Results   Component Value Date    INR 1 38 (H) 02/19/2018    INR 1 08 09/07/2017    INR 1 10 03/23/2017    PROTIME 16 9 (H) 02/19/2018    PROTIME 13 9 09/07/2017    PROTIME 13 9 03/23/2017                 Lab, Imaging and other studies: I have personally reviewed pertinent reports      VTE Pharmacologic Prophylaxis: Heparin 5000 Units SQ every 12 hours dosed based on her age of 80years-old  VTE Mechanical Prophylaxis: sequential compression device

## 2018-02-22 NOTE — PROGRESS NOTES
Progress Note - Juju Doctor 80 y o  female MRN: [id number]    Unit/Bed#: 069-72 Encounter: [id number]      Assessment:  Patient Active Problem List   Diagnosis    Essential hypertension    Gastroesophageal reflux disease without esophagitis    Coronary artery disease involving native coronary artery of native heart    H/O aortic valve replacement    Hyperlipidemia    Frequent falls    Stage 3 chronic kidney disease    Chronic diastolic CHF (congestive heart failure) (HCC)    Dysphagia    Hypernatremia    Elevated platelet count (HCC)    Anemia    Dehydration    Hypoalbuminemia    Hyperchloremia    Nausea and vomiting    Interstitial lung disease (HCC)    Pulmonary fibrosis (HCC)    Hypokalemia    Hypoglycemia    Pulmonary nodule    Atelectasis    Closed compression fracture of L1 lumbar vertebra (HCC)    Closed compression fracture of L2 lumbar vertebra (HCC)    Closed compression fracture of L4 lumbar vertebra (HCC)    Cholelithiasis    Severe protein-calorie malnutrition (HCC)    BMI less than 19,adult    Low serum prealbumin         Plan:  1)  Hypernatremia/Dehydration/Nausea and vomiting/Hypoglycemia:  The hypernatremia persists this AM   Change IV fluids to D5W at 50 ml/hr per Nephrology  Follow the patient's sodium level and blood glucose trend  PT/OT  Continue to monitor the patient's sodium level closely     2)  Dysphagia:  Continue a diet of dysphagia pureed consistency with honey thick liquids and sippy cups with all liquid intake per speech therapy's recommendations  Continue aspiration precautions  Continue to monitor closely     3)  Anemia/Elevated platelet count:  The patient's hemoglobin is stable this AM   Follow the patient's CBC  Transfuse PRBC's for a hemoglobin less than 7     4)  Interstitial lung disease/Pulmonary fibrosis:  Continue the respiratory protocol  Maintain continuous pulse oximetry    Supplemental oxygen to maintain oxygen saturation levels at 92% and above  5)  Hypokalemia:  Improved  Continue to follow the potassium level  6)  Compression deformities of L1, L2, and L4:  She will need [place] + Vitamin D BID when she is able to tolerate PO medications  The Vitamin D 25-OH level was within normal limits  She will need an outpatient DEXA scan  7)  Gallbladder wall distension/Cholelithiasis/Transaminitis:  A gallbladder ultrasound was completed on 2/20/18, which showed gallbladder distension and cholelithiasis without other evidence of acute cholecystitis  The patient's LFT's are within normal limits  8)  Pulmonary nodule: The patient needs outpatient surveillance imaging of the pulmonary nodule  9)  Severe protein-calorie malnutrition/BMI less than 19 (BMI=17 74): Consult Nutrition  I updated the patient's son, Danica Torres, on the telephone on 2/21/18  The patient will need short-term rehabilitation placement upon discharge  The patient continues to require inpatient hospitalization for IV fluids in the setting of hypernatremia  Subjective:   Patient seen and examined  The patient continues to complain of nausea and a decreased appetite  Objective:     Vitals: Blood pressure 101/59, pulse 86, temperature 97 7 °F (36 5 °C), temperature source Temporal, resp  rate 18, height 5' 2" (1 575 m), weight 44 kg (97 lb), SpO2 100 %  ,Body mass index is 17 74 kg/m²    Weight (last 2 days)     Date/Time   Weight    02/22/18 0538  44 (97)    02/21/18 0600  44 3 (97 66)    02/20/18 1148  44 7 (98 5)    02/20/18 0600  44 (97)              Intake/Output Summary (Last 24 hours) at 02/22/18 1240  Last data filed at 02/22/18 1100   Gross per 24 hour   Intake             1160 ml   Output                0 ml   Net             1160 ml       Physical Exam: General:  NAD, awake, follows commands, confused  HEENT:  NC/AT, mucous membranes dry, lips are dry and cracked  Neck:  Supple, No JVP elevation  CV:  + S1, + S2, Regular rate, Regular rhythm  Pulm:  Bilateral inspiratory crackles, No wheezing, No rhonchi  Abd:  Soft, Non-tender, Non-distended  Ext:  No clubbing/cyanosis/edema    Scheduled Meds:    Current Facility-Administered Medications:  acetaminophen 650 mg Oral Q6H PRN Trey Chrissy, DO    benzonatate 100 mg Oral TID PRN Trey Chrissy, DO    dextrose 50 mL/hr Intravenous Continuous Tila Schulte MD Last Rate: 50 mL/hr (02/22/18 1100)   dextrose 25 mL Intravenous PRN Trey Chrissy, DO    docusate sodium 100 mg Oral BID PRN Trey Chrissy, DO    heparin (porcine) 5,000 Units Subcutaneous Q12H [address] Trye Chrissy, DO    menthol-zinc oxide  Topical BID Trey Abadkin, DO    ondansetron 4 mg Intravenous Q6H PRN Trey Chrissy, DO      Continuous Infusions:    dextrose 50 mL/hr Last Rate: 50 mL/hr (02/22/18 1100)     PRN Meds:   acetaminophen    benzonatate    dextrose    docusate sodium    ondansetron      Invasive Devices     Peripheral Intravenous Line            Peripheral IV 02/21/18 Right Arm less than 1 day                  Results from last 7 days  Lab Units 02/22/18  0528 02/21/18  0545 02/20/18  0623   WBC Thousand/uL 7 22 8 81 8 17   HEMOGLOBIN g/dL 10 4* 10 6* 10 7*   HEMATOCRIT % 32 9* 33 5* 34 8   PLATELETS Thousands/uL 392* 436* 459*   NEUTROS PCT %  --   --  68   MONOS PCT %  --   --  8   MONO PCT MAN % 7 5  --          Results from last 7 days  Lab Units 02/22/18  0528 02/21/18  0545 02/20/18  0623   SODIUM mmol/L 146* 146* 153*   POTASSIUM mmol/L 3 9 3 3* 3 4*   CHLORIDE mmol/L 112* 110* 114*   CO2 mmol/L 27 27 27   BUN mg/dL 16 17 24   CREATININE mg/dL 0 69 0 68 0 72   CALCIUM mg/dL 8 4 8 2* 8 5   TOTAL PROTEIN g/dL 6 4 6 5 7 2   BILIRUBIN TOTAL mg/dL 0 60 0 60 0 50   ALK PHOS U/L 34* 35* 37*   ALT U/L 11* 13 15   AST U/L 35 51* 27   GLUCOSE RANDOM mg/dL 102 128 63*       Lab Results   Component Value Date    CKTOTAL 66 02/20/2018    TROPONINI <0 02 02/19/2018       Lab Results   Component Value Date INR 1 38 (H) 02/19/2018    INR 1 08 09/07/2017    INR 1 10 03/23/2017    PROTIME 16 9 (H) 02/19/2018    PROTIME 13 9 09/07/2017    PROTIME 13 9 03/23/2017                 Lab, Imaging and other studies: I have personally reviewed pertinent reports      VTE Pharmacologic Prophylaxis: Heparin 5000 Units SQ every 12 hours dosed based on her age of 80years-old  VTE Mechanical Prophylaxis: sequential compression device

## 2018-02-22 NOTE — SPEECH THERAPY NOTE
Speech Language/Pathology    Speech/Language Pathology Progress Note    [facility]: [facility]/2018     Problem List  Patient Active Problem List   Diagnosis    Essential hypertension    Gastroesophageal reflux disease without esophagitis    Coronary artery disease involving native coronary artery of native heart    H/O aortic valve replacement    Hyperlipidemia    Frequent falls    Stage 3 chronic kidney disease    Chronic diastolic CHF (congestive heart failure) (HCC)    Dysphagia    Hypernatremia    Elevated platelet count (HCC)    Anemia    Dehydration    Hypoalbuminemia    Hyperchloremia    Nausea and vomiting    Interstitial lung disease (HCC)    Pulmonary fibrosis (HCC)    Hypokalemia    Hypoglycemia    Pulmonary nodule    Atelectasis    Closed compression fracture of L1 lumbar vertebra (HCC)    Closed compression fracture of L2 lumbar vertebra (HCC)    Closed compression fracture of L4 lumbar vertebra (HCC)    Cholelithiasis    Severe protein-calorie malnutrition (HCC)    BMI less than 19,adult    Low serum prealbumin        Past Medical History  Past Medical History:   Diagnosis Date    Angina pectoris (HCC)     Arthritis     CAD (coronary artery disease)     Gait abnormality     GERD (gastroesophageal reflux disease)     Hypertension     Lyme disease     Osteoporosis     Vertigo         Past Surgical History  Past Surgical History:   Procedure Laterality Date    AORTIC VALVE REPLACEMENT      APPENDECTOMY      EYE SURGERY  08/19/1996    cataract bilateral         Subjective:  Patient is upright in [place] chair  She is alert and oriented to name  She was cooperative for limited trials  Objective:  Patient is tolerating current diet level, but continues to not complete her meals  Assessment:  Patient was agreeable today to 2 tsp trials of puree and straw sips (x2) honey thick liquids and one trial straw sip of nectar thick liquid   She was improved, as she was today able to use the straw with honey and nectar thick liquids  Patient continues with delay in oral transfer and delay in swallow initiation  Patient did have immediate cough with nectar thick, and wet voice quality  WFL without s/s with trials of puree and honey thick liquids  She follows minimal commands  Plan/Recommendations:  Continue on current diet level and continue for tolerance without s/s  Patient will need to be fed and cued at times to swallow more promptly

## 2018-02-22 NOTE — PHYSICAL THERAPY NOTE
PT treatment Note      02/22/18 1035   Subjective   Subjective Agreeable to therapy  Bed Mobility   Supine to Sit 3  Moderate assistance   Additional items Assist x 2;HOB elevated; Increased time required;Verbal cues;LE management   Additional Comments posterior lean in sitting  Required min x2 initially upon sitting EOB  Transfers   Sit to Stand 4  Minimal assistance   Additional items Assist x 2;Verbal cues   Stand to Sit 4  Minimal assistance   Additional items Assist x 2;Armrests; Verbal cues   Stand pivot 4  Minimal assistance   Additional items Assist x 2;Verbal cues   Ambulation/Elevation   Gait pattern Short stride   Gait Assistance 4  Minimal assist   Additional items Assist x 2   Assistive Device Rolling walker   Distance 3' bed to chair   Balance   Static Sitting Fair -   Dynamic Sitting Fair -   Static Standing Fair   Dynamic Standing [address]ke[address] 1013  (with RW)   Endurance Deficit   Endurance Deficit Yes   Activity Tolerance   Activity Tolerance Patient limited by fatigue   Assessment   Prognosis Good   Problem List Decreased strength;Decreased endurance; Impaired balance;Decreased coordination;Decreased safety awareness   Assessment Performing functional mobility at (mod/min) x 2 level of function  Able to ambulate short distance  Limited by fatigue and weakness  Decreased balance and endurance  Pt is  in need of continued activity in PT to improve strength balance endurance and functional mobility  Pt unsafe to [facility] to [facility] and will require short term rehab on discharge  Plan   Treatment/Interventions Functional transfer training;LE strengthening/ROM; Therapeutic exercise; Endurance training;Bed mobility;Gait training   Progress Slow progress, decreased activity tolerance   Recommendation   Recommendation Short-term skilled PT   Pt  OOB with call bell within reach, scd's connected and turned on and alarm on at end of PT session

## 2018-02-22 NOTE — PLAN OF CARE
Problem: SLP ADULT - SWALLOWING, IMPAIRED  Goal: Advance to least restrictive diet without signs or symptoms of aspiration for planned discharge setting  See evaluation for individualized goals  Patient will:    1  Tolerate Puree diet and honey thick liquids with no S/S of oral/pharyngeal dysphagia across meals  2  Demonstrate effective oral transfer and swallow initiation with cues from txpt or nursing         Outcome: Progressing

## 2018-02-22 NOTE — PROGRESS NOTES
Progress Note - Nephrology   Elizabeth Ring 80 y o  female MRN: [id number]  Unit/Bed#: 416-01 Encounter: [id number]    A/P:  1  Hypernatremia due to hypovolemia and lack of free water  She corrected to 146 with dilute fluids  Initially received D5 1/2NS due to hypotension but is normotensive at this time and improving on 1/4 NS  Would continue to monitor daily weights  She will receive a dysphagia evaluation  2/22: Will change IVF to D5W at 50 ml/hr for 2 liters to lower serum sodium  It has been stable at 146 for 2 days  She is starting to take thickened liquids  2  Chronic kidney disease stage 3 - probably due to renal senescence  Her right kidney was visualized and appears normal  3  Hypokalemia: will reorder supplement  2/22 Potasium WNL today  4  Chronic diastolic CHF: appears compensated  5   Hypertension by history: is normotensive today      Follow up reason for today's visit: Hypernatremia    Hypernatremia    Patient Active Problem List   Diagnosis    Essential hypertension    Gastroesophageal reflux disease without esophagitis    Coronary artery disease involving native coronary artery of native heart    H/O aortic valve replacement    Hyperlipidemia    Frequent falls    Stage 3 chronic kidney disease    Chronic diastolic CHF (congestive heart failure) (HCC)    Dysphagia    Hypernatremia    Elevated platelet count (HCC)    Anemia    Dehydration    Hypoalbuminemia    Hyperchloremia    Nausea and vomiting    Interstitial lung disease (HCC)    Pulmonary fibrosis (HCC)    Hypokalemia    Hypoglycemia    Pulmonary nodule    Atelectasis    Closed compression fracture of L1 lumbar vertebra (HCC)    Closed compression fracture of L2 lumbar vertebra (HCC)    Closed compression fracture of L4 lumbar vertebra (HCC)    Cholelithiasis    Severe protein-calorie malnutrition (HCC)    BMI less than 19,adult    Low serum prealbumin         Subjective:   Feels better - much more responsive  Denies chest pain, dyspnea or dysuria    Objective:     Vitals: Blood pressure 101/59, pulse 86, temperature 97 7 °F (36 5 °C), temperature source Temporal, resp  rate 18, height 5' 2" (1 575 m), weight 44 kg (97 lb), SpO2 100 %  ,Body mass index is 17 74 kg/m²  Weight (last 2 days)     Date/Time   Weight    02/22/18 0538  44 (97)    02/21/18 0600  44 3 (97 66)    02/20/18 1148  44 7 (98 5)    02/20/18 0600  44 (97)                Intake/Output Summary (Last 24 hours) at 02/22/18 1001  Last data filed at 02/21/18 2136   Gross per 24 hour   Intake           616 25 ml   Output                0 ml   Net           616 25 ml     I/O last 3 completed shifts: In: 1614 9 [P O :120;  I V :1494 9]  Out: -          Physical Exam: /59 (BP Location: Left arm)   Pulse 86   Temp 97 7 °F (36 5 °C) (Temporal)   Resp 18   Ht 5' 2" (1 575 m)   Wt 44 kg (97 lb)   SpO2 100%   BMI 17 74 kg/m²     General Appearance:    Alert, cooperative, no distress, appears stated age   Head:    Normocephalic, without obvious abnormality, atraumatic   Eyes:    Conjunctiva/corneas clear   Ears:    Normal external ears   Nose:   Nares normal, septum midline, mucosa normal, no drainage    or sinus tenderness   Throat:   Lips, mucosa, and tongue normal; teeth and gums normal   Neck:   Supple, symmetrical, trachea midline, no adenopathy;        thyroid:  No enlargement/tenderness/nodules; no carotid    bruit or JVD   Back:     Symmetric, no curvature, ROM normal, no CVA tenderness   Lungs:     Clear to auscultation bilaterally, respirations unlabored   Chest wall:    No tenderness or deformity   Heart:    Regular rate and rhythm, S1 and S2 normal, no murmur, rub   or gallop   Abdomen:     Soft, non-tender, bowel sounds active   Extremities:   Extremities normal, atraumatic, no cyanosis or edema   Skin:   Skin color, texture, turgor normal, no rashes or lesions   Lymph nodes:   Cervical normal   Neurologic:   CNII-XII intact Lab, Imaging and other studies: I have personally reviewed pertinent labs  CBC:   Lab Results   Component Value Date    WBC 7 22 02/22/2018    HGB 10 4 (L) 02/22/2018    HCT 32 9 (L) 02/22/2018    MCV 95 02/22/2018     (H) 02/22/2018    MCH 29 9 02/22/2018    MCHC 31 6 02/22/2018    RDW 14 0 02/22/2018    MPV 9 4 02/22/2018     CMP:   Lab Results   Component Value Date     (H) 02/22/2018    K 3 9 02/22/2018     (H) 02/22/2018    CO2 27 02/22/2018    ANIONGAP 7 02/22/2018    BUN 16 02/22/2018    CREATININE 0 69 02/22/2018    GLUCOSE 102 02/22/2018    CALCIUM 8 4 02/22/2018    AST 35 02/22/2018    ALT 11 (L) 02/22/2018    ALKPHOS 34 (L) 02/22/2018    PROT 6 4 02/22/2018    BILITOT 0 60 02/22/2018    EGFR 75 02/22/2018         Results from last 7 days  Lab Units 02/22/18  0528 02/21/18  0545 02/20/18  0623   SODIUM mmol/L 146* 146* 153*   POTASSIUM mmol/L 3 9 3 3* 3 4*   CHLORIDE mmol/L 112* 110* 114*   CO2 mmol/L 27 27 27   BUN mg/dL 16 17 24   CREATININE mg/dL 0 69 0 68 0 72   CALCIUM mg/dL 8 4 8 2* 8 5   TOTAL PROTEIN g/dL 6 4 6 5 7 2   BILIRUBIN TOTAL mg/dL 0 60 0 60 0 50   ALK PHOS U/L 34* 35* 37*   ALT U/L 11* 13 15   AST U/L 35 51* 27   GLUCOSE RANDOM mg/dL 102 128 63*         Phosphorus:   Lab Results   Component Value Date    PHOS 2 3 02/22/2018     Magnesium:   Lab Results   Component Value Date    MG 1 9 02/22/2018     Urinalysis: No results found for: [place] [facility], SPECGRAV, PHUR, LEUKOCYTESUR, NITRITE, PROTEINUA, GLUCOSEU, KETONESU, BILIRUBINUR, BLOODU  Ionized Calcium: No results found for: CAION  Coagulation: No results found for: PT, INR, APTT  Troponin: No results found for: TROPONINI  ABG: No results found for: PHART, LBX1GLM, PO2ART, IKO2WWL, J2FPRXXQ, BEART, SOURCE  Radiology review:     IMAGING  Procedure: Ct Abdomen Pelvis Wo Contrast    Result Date: 2/20/2018  Narrative: CT ABDOMEN AND PELVIS WITHOUT IV CONTRAST INDICATION:  Nausea, vomiting and diarrhea  COMPARISON: CT abdomen pelvis 3/23/2017 TECHNIQUE:  CT examination of the abdomen and pelvis was performed without intravenous contrast   Axial, sagittal, and coronal 2D reformatted images were created from the source data and submitted for interpretation  Radiation dose length product (DLP) for this visit:  243 88 mGy-cm   This examination, like all CT scans performed in the [facility], was performed utilizing techniques to minimize radiation dose exposure, including the use of iterative  reconstruction and automated exposure control  Enteric contrast was administered  FINDINGS: ABDOMEN LOWER CHEST:  There are bilateral fibrotic changes  Bibasilar dependent opacities likely represent atelectasis  3 mm left upper lobe nodule (series 2 image 6)  There are postsurgical changes of the heart status post aortic valve replacement  There are atherosclerotic changes of the aorta  There is enlargement of the main pulmonary artery  LIVER/BILIARY TREE:  Stable calcifications in the anterior right lobe of the liver  No evidence of liver mass or ductal dilation on this noncontrast evaluation  GALLBLADDER:  There are gallstone(s) within the gallbladder, without pericholecystic inflammatory changes  Mild gallbladder distention  SPLEEN:  Unremarkable  PANCREAS:  The pancreas is atrophic  No pancreatic mass  ADRENAL GLANDS:  Unremarkable  KIDNEYS/URETERS:  Unremarkable  No hydronephrosis  STOMACH AND BOWEL:  There is a small hiatal hernia  Evaluation of the stomach and bowel is limited given lack of oral and intravenous contrast material   No evident bowel obstruction  Mild stool retention throughout the colon  There is colonic diverticulosis without evidence of diverticulitis  APPENDIX:  No findings to suggest appendicitis  ABDOMINOPELVIC CAVITY:  No ascites or free intraperitoneal air  No lymphadenopathy  VESSELS:  Atherosclerotic changes are present  No evidence of aneurysm   PELVIS REPRODUCTIVE ORGANS:  Unremarkable for patient's age  URINARY BLADDER:  Unremarkable  ABDOMINAL WALL/INGUINAL REGIONS:  Unremarkable  OSSEOUS STRUCTURES:  There are compression deformities of the L1, L2 and L4 vertebral bodies  L1 vertebral body compression deformity is similar in appearance to 2/11/2018 exam   L2 and L4 compression deformities are age indeterminate  Impression: 1  Limited evaluation secondary to lack of oral and intravenous contrast   Diverticulosis without evidence of acute diverticulitis  2   Cholelithiasis and mild gallbladder wall distention  If there is focal right upper quadrant pain further evaluation with right upper quadrant ultrasound could be considered  3   Stable compression deformity of the L1 vertebral body  Age indeterminate compression deformities of L2 and L4  4   Fibrotic changes at the lung bases with patchy bibasilar airspace opacities likely representing atelectasis  5   3 mm left upper lobe pulmonary nodule  Based on current Fleischner Society 2017 Guidelines on incidental pulmonary nodule, no routine follow-up is needed if the patient is considered low risk for lung cancer  If the patient is considered high risk for lung cancer, 12 month follow-up non-contrast chest CT is recommended  Considerations related to the patient's age and/or comorbidities may be used to alter these recommendations  Workstation performed: [id number]     Procedure: Xr Chest 1 View Portable    Result Date: 2/19/2018  Narrative: CHEST INDICATION:  Dehydration  COMPARISON:  Chest CT and radiographs dated 2/11/2018  Radiographs dated 1/6/2018  VIEWS:  Frontal and lateral projections IMAGES:  1 FINDINGS: Normal lung volumes  Chronic, peripheral interstitial reticular markings  No superimposed acute consolidation  Blunting of the right costophrenic angle is chronic and likely due to fibrosis  No layering effusions  No pneumothorax  Heart size within normal limits  Prior aortic root repair    Aortic tortuosity without aneurysm  Pulmonary vasculature is largely obscured by interstitial process  No janny cephalization  Osteopenia  High riding humeri with remodeling of the acetabular undersurfaces--suggestive of chronic rotator cuff tears  No acute osseous findings demonstrated  Thoracic spine poorly evaluated due to underpenetration  Impression: Chronic interstitial fibrotic disease without acute findings or significant change from prior  Workstation performed: [id number]     Procedure: Ct Head Without Contrast    Result Date: 2/19/2018  Narrative: CT BRAIN - WITHOUT CONTRAST INDICATION: chest pain  History taken directly from the electronic ordering system  COMPARISON:  None  TECHNIQUE:  CT examination of the brain was performed  In addition to axial images, coronal reformatted images were created and submitted for interpretation  Radiation dose length product (DLP) for this visit:  1076 81 mGy-cm   This examination, like all CT scans performed in the [facility], was performed utilizing techniques to minimize radiation dose exposure, including the use of iterative reconstruction and automated exposure control  IMAGE QUALITY:  Diagnostic  FINDINGS:  PARENCHYMA:  Decreased attenuation is noted in the supratentorial white matter demonstrating an appearance most consistent with moderate microangiopathic change  No intracranial mass, mass effect or midline shift  No CT signs of acute infarction  There is no parenchymal hemorrhage  VENTRICLES AND EXTRA-AXIAL SPACES:  Age-appropriate volume loss is noted  No hydrocephalus  VISUALIZED ORBITS AND PARANASAL SINUSES:  Orbits appear normal   There is near complete opacification of the right maxillary sinus  No fluid levels are seen  CALVARIUM AND EXTRACRANIAL SOFT TISSUES:   Normal      Impression: No acute intracranial abnormality  Microangiopathic changes   Workstation performed: [id number]     Procedure: Us Gallbladder    Result Date: 2/20/2018  Narrative: RIGHT UPPER QUADRANT ULTRASOUND INDICATION:  Cholelithiasis, distended gallbladder  COMPARISON:  CT abdomen pelvis 2/20/2018 TECHNIQUE:   Real-time ultrasound of the right upper quadrant was performed with a curvilinear transducer with both volumetric sweeps and still imaging techniques  FINDINGS: PANCREAS:  Portions of the pancreas are obscured by bowel gas  Visualized portions of the pancreas are unremarkable  AORTA AND IVC:  Visualized portions are normal for patient age  LIVER: Size:  Within normal range  The liver measures 10 3 cm in the midclavicular line  Contour:  Surface contour is smooth  Parenchyma:  Echogenicity and echotexture are within normal limits  No evidence of suspicious mass  Limited imaging of the main portal vein shows it to be patent and hepatopetal   BILIARY: The gallbladder is distended  No wall thickening or pericholecystic fluid  Single dependent mobile calculus without sludge  No sonographic Neff's sign  No intrahepatic biliary dilatation  CBD measures 4 5 mm  No choledocholithiasis  KIDNEY: Right kidney measures 8 6 x 4 3 cm  Within normal limits  ASCITES:   None  Impression:  Gallbladder distention and cholelithiasis without other evidence of acute cholecystitis   Workstation performed: [id number]       Current Facility-Administered Medications   Medication Dose Route Frequency    acetaminophen (TYLENOL) tablet 650 mg  650 mg Oral Q6H PRN    benzonatate (TESSALON PERLES) capsule 100 mg  100 mg Oral TID PRN    dextrose 5 % and sodium chloride 0 45 % with KCl 20 mEq/L infusion  75 mL/hr Intravenous Continuous    dextrose 50 % IV solution 25 mL  25 mL Intravenous PRN    docusate sodium (COLACE) capsule 100 mg  100 mg Oral BID PRN    heparin (porcine) subcutaneous injection 5,000 Units  5,000 Units Subcutaneous Q12H [address]    menthol-zinc oxide (CALMOSEPTINE) 0 44-20 6 % ointment   Topical BID    ondansetron (ZOFRAN) injection 4 mg  4 mg Intravenous Q6H PRN     Medications Discontinued During This Encounter   Medication Reason    cefuroxime (CEFTIN) 500 mg tablet     sodium chloride 0 9 % infusion     aspirin chewable tablet 81 mg     atorvastatin (LIPITOR) tablet 10 mg     calcium carbonate (OYSTER SHELL,OSCAL) 500 mg tablet 1 tablet     diltiazem (CARDIZEM CD) 24 hr capsule 120 mg     multivitamin-minerals (CENTRUM) tablet 1 tablet     sodium chloride infusion 0 45 %     potassium chloride 10 % oral solution 40 mEq     aspirin chewable tablet 81 mg     atorvastatin (LIPITOR) tablet 10 mg     calcium carbonate-vitamin D (OSCAL-D) 500 mg-200 units per tablet 1 tablet     diltiazem (CARDIZEM CD) 24 hr capsule 120 mg     albuterol inhalation solution 2 5 mg     dextrose 5 % and sodium chloride 0 2 % infusion        Morgan Swan MD

## 2018-02-23 NOTE — PROGRESS NOTES
Progress Note - Gisel Mckeon 80 y o  female MRN: [id number]    Unit/Bed#: 741-76 Encounter: [id number]      Assessment:  Patient Active Problem List   Diagnosis    Essential hypertension    Gastroesophageal reflux disease without esophagitis    Coronary artery disease involving native coronary artery of native heart    H/O aortic valve replacement    Hyperlipidemia    Frequent falls    Stage 3 chronic kidney disease    Chronic diastolic CHF (congestive heart failure) (HCC)    Dysphagia    Hypernatremia    Elevated platelet count (HCC)    Anemia    Dehydration    Hypoalbuminemia    Hyperchloremia    Nausea and vomiting    Interstitial lung disease (HCC)    Pulmonary fibrosis (HCC)    Hypokalemia    Hypoglycemia    Pulmonary nodule    Atelectasis    Closed compression fracture of L1 lumbar vertebra (HCC)    Closed compression fracture of L2 lumbar vertebra (HCC)    Closed compression fracture of L4 lumbar vertebra (HCC)    Cholelithiasis    Severe protein-calorie malnutrition (HCC)    BMI less than 19,adult    Low serum prealbumin         Plan:  1)  Hypernatremia/Dehydration/Nausea and vomiting/Hypoglycemia:  The hypernatremia improved this AM   Continue IV fluids with D5W at 50 ml/hr per Nephrology  Follow the patient's sodium level and blood glucose trend  PT/OT  Continue to monitor the patient's sodium level closely     2)  Dysphagia:  Continue a diet of dysphagia pureed consistency with honey thick liquids and sippy cups with all liquid intake per speech therapy's recommendations  Continue aspiration precautions  Continue to monitor closely     3)  Anemia/Elevated platelet count:  The patient's hemoglobin is stable this AM   Follow the patient's CBC  Transfuse PRBC's for a hemoglobin less than 7     4)  Interstitial lung disease/Pulmonary fibrosis:  Continue the respiratory protocol  Maintain continuous pulse oximetry    Supplemental oxygen to maintain oxygen saturation levels at 92% and above  5)  Hypokalemia:  Improved  Continue to follow the potassium level  6)  Compression deformities of L1, L2, and L4:  She will need [place] + Vitamin D BID when she is able to tolerate PO medications  The Vitamin D 25-OH level was within normal limits  She will need an outpatient DEXA scan  7)  Gallbladder wall distension/Cholelithiasis/Transaminitis:  A gallbladder ultrasound was completed on 2/20/18, which showed gallbladder distension and cholelithiasis without other evidence of acute cholecystitis  The patient's LFT's are within normal limits  8)  Pulmonary nodule: The patient needs outpatient surveillance imaging of the pulmonary nodule  9)  Severe protein-calorie malnutrition/BMI less than 19 (BMI=18 10):  Consult Nutrition  Initiate Marinol as an appetite stimulant  The patient will need short-term rehabilitation placement upon discharge  The patient continues to require inpatient hospitalization for IV fluids with minimal PO intake  Subjective:   Patient seen and examined  The patient continues to complain of nausea and a decreased appetite  No chest pain  No shortness of breath  Objective:     Vitals: Blood pressure 107/57, pulse 94, temperature 97 9 °F (36 6 °C), temperature source Temporal, resp  rate 18, height 5' 2" (1 575 m), weight 44 9 kg (98 lb 15 8 oz), SpO2 99 %  ,Body mass index is 18 1 kg/m²    Weight (last 2 days)     Date/Time   Weight    02/23/18 0544  44 9 (98 99)    02/22/18 0538  44 (97)    02/21/18 0600  44 3 (97 66)              Intake/Output Summary (Last 24 hours) at 02/23/18 1430  Last data filed at 02/23/18 0916   Gross per 24 hour   Intake          1218 33 ml   Output                0 ml   Net          1218 33 ml       Physical Exam: General:  NAD, awake, follows commands, confused  HEENT:  NC/AT, mucous membranes dry, lips are dry and cracked  Neck:  Supple, No JVP elevation  CV:  + S1, + S2, Regular rate, Regular rhythm  Pulm:  Bilateral inspiratory crackles, No wheezing, No rhonchi  Abd:  Soft, Non-tender, Non-distended  Ext:  No clubbing/cyanosis/edema    Scheduled Meds:    Current Facility-Administered Medications:  acetaminophen 650 mg Oral Q6H PRN Bettyann Cordon, DO    benzonatate 100 mg Oral TID PRN Bettyann Cordon, DO    dextrose 50 mL/hr Intravenous Continuous Becky Shepard MD Last Rate: 50 mL/hr (02/23/18 0917)   dextrose 25 mL Intravenous PRN Bettyann Cordon, DO    docusate sodium 100 mg Oral BID PRN Bettyann Cordon, DO    heparin (porcine) 5,000 Units Subcutaneous Q12H [address] Beka Salgado, DO    magnesium sulfate 2 g Intravenous Once Betki Cordon, DO    menthol-zinc oxide  Topical BID Bettypriscilla Bradshawner, DO    ondansetron 4 mg Intravenous Q6H PRN Bettypriscilla Bradshawner, DO      Continuous Infusions:    dextrose 50 mL/hr Last Rate: 50 mL/hr (02/23/18 0917)     PRN Meds:   acetaminophen    benzonatate    dextrose    docusate sodium    ondansetron      Invasive Devices     Peripheral Intravenous Line            Peripheral IV 02/21/18 Right Arm 1 day                  Results from last 7 days  Lab Units 02/23/18  0519 02/22/18  0528 02/21/18  0545 02/20/18  0623   WBC Thousand/uL 6 83 7 22 8 81 8 17   HEMOGLOBIN g/dL 9 8* 10 4* 10 6* 10 7*   HEMATOCRIT % 30 8* 32 9* 33 5* 34 8   PLATELETS Thousands/uL 332 392* 436* 459*   NEUTROS PCT %  --   --   --  68   MONOS PCT %  --   --   --  8   MONO PCT MAN % 5 7 5  --          Results from last 7 days  Lab Units 02/23/18  0519 02/22/18  0528 02/21/18  0545 02/20/18  0623   SODIUM mmol/L 143 146* 146* 153*   POTASSIUM mmol/L 3 5 3 9 3 3* 3 4*   CHLORIDE mmol/L 108 112* 110* 114*   CO2 mmol/L 27 27 27 27   BUN mg/dL 11 16 17 24   CREATININE mg/dL 0 67 0 69 0 68 0 72   CALCIUM mg/dL 8 2* 8 4 8 2* 8 5   TOTAL PROTEIN g/dL  --  6 4 6 5 7 2   BILIRUBIN TOTAL mg/dL  --  0 60 0 60 0 50   ALK PHOS U/L  --  34* 35* 37*   ALT U/L  --  11* 13 15   AST U/L  --  35 51* 27 GLUCOSE RANDOM mg/dL 91 102 128 63*       Lab Results   Component Value Date    CKTOTAL 66 02/20/2018    TROPONINI <0 02 02/19/2018       Lab Results   Component Value Date    INR 1 38 (H) 02/19/2018    INR 1 08 09/07/2017    INR 1 10 03/23/2017    PROTIME 16 9 (H) 02/19/2018    PROTIME 13 9 09/07/2017    PROTIME 13 9 03/23/2017                 Lab, Imaging and other studies: I have personally reviewed pertinent reports      VTE Pharmacologic Prophylaxis: Heparin 5000 Units SQ every 12 hours dosed based on her age of 80years-old  VTE Mechanical Prophylaxis: sequential compression device

## 2018-02-23 NOTE — PROGRESS NOTES
Progress Note - Nephrology   Sim Fairly 80 y o  female MRN: [id number]  Unit/Bed#: 416-01 Encounter: [id number]    A/P:  1  Hypernatremia due to hypovolemia and lack of free water  She corrected to 146 with dilute fluids  Initially received D5 1/2NS due to hypotension but is normotensive at this time and improving on 1/4 NS  Would continue to monitor daily weights  She will receive a dysphagia evaluation   2/22: Will change IVF to D5W at 50 ml/hr for 2 liters to lower serum sodium  It has been stable at 146 for 2 days  She is starting to take thickened liquids   2/23: sodium level is appropriate, patient has poor PO intake, on 50 ml/hr D5W  2  Chronic kidney disease stage 3 - probably due to renal senescence  Her right kidney was visualized and appears normal  3  Hypokalemia: will reorder supplement   2/22 Potasium WNL today   2/23: K on low end of normal, will order for 20 IV KCl and 20 PO KCl to see how she does  4  Chronic diastolic CHF: appears compensated  5   Hypertension by history: is normotensive today      Follow up reason for today's visit: Hypernatremia    Hypernatremia    Patient Active Problem List   Diagnosis    Essential hypertension    Gastroesophageal reflux disease without esophagitis    Coronary artery disease involving native coronary artery of native heart    H/O aortic valve replacement    Hyperlipidemia    Frequent falls    Stage 3 chronic kidney disease    Chronic diastolic CHF (congestive heart failure) (HCC)    Dysphagia    Hypernatremia    Elevated platelet count (HCC)    Anemia    Dehydration    Hypoalbuminemia    Hyperchloremia    Nausea and vomiting    Interstitial lung disease (HCC)    Pulmonary fibrosis (HCC)    Hypokalemia    Hypoglycemia    Pulmonary nodule    Atelectasis    Closed compression fracture of L1 lumbar vertebra (HCC)    Closed compression fracture of L2 lumbar vertebra (HCC)    Closed compression fracture of L4 lumbar vertebra ([facility] )    Cholelithiasis    Severe protein-calorie malnutrition ([facility] )    BMI less than 19,adult    Low serum prealbumin         Subjective:    Patient without acute complaints  Objective:     Vitals: Blood pressure 100/55, pulse 89, temperature (!) 97 4 °F (36 3 °C), temperature source Temporal, resp  rate 18, height 5' 2" (1 575 m), weight 44 9 kg (98 lb 15 8 oz), SpO2 99 %  ,Body mass index is 18 1 kg/m²  Weight (last 2 days)     Date/Time   Weight    02/23/18 0544  44 9 (98 99)    02/22/18 0538  44 (97)    02/21/18 0600  44 3 (97 66)                Intake/Output Summary (Last 24 hours) at 02/23/18 0725  Last data filed at 02/22/18 1748   Gross per 24 hour   Intake             1135 ml   Output                0 ml   Net             1135 ml     I/O last 3 completed shifts: In: 7507 [P O :235;  I V :1000]  Out: -          Physical Exam: /55 (BP Location: Left arm)   Pulse 89   Temp (!) 97 4 °F (36 3 °C) (Temporal)   Resp 18   Ht 5' 2" (1 575 m)   Wt 44 9 kg (98 lb 15 8 oz)   SpO2 99%   BMI 18 10 kg/m²     General Appearance:    Alert, cooperative, no distress, appears stated age   Head:    Normocephalic, without obvious abnormality, atraumatic   Eyes:    Conjunctiva/corneas clear   Ears:    Normal external ears   Nose:   Nares normal, septum midline, mucosa normal, no drainage    or sinus tenderness   Throat:   Lips, mucosa, and tongue normal; teeth and gums normal   Neck:   Supple, symmetrical, trachea midline, no adenopathy;        thyroid:  No enlargement/tenderness/nodules; no carotid    bruit or JVD   Back:     Symmetric, no curvature, ROM normal, no CVA tenderness   Lungs:     Clear to auscultation bilaterally, respirations unlabored   Chest wall:    No tenderness or deformity   Heart:    Regular rate and rhythm, S1 and S2 normal, no murmur, rub   or gallop   Abdomen:     Soft, non-tender, bowel sounds active   Extremities:   Extremities normal, atraumatic, no cyanosis or edema   Skin: Skin color, texture, turgor normal, no rashes or lesions   Lymph nodes:   Cervical normal   Neurologic:   CNII-XII intact            Lab, Imaging and other studies: I have personally reviewed pertinent labs  CBC:   Lab Results   Component Value Date    WBC 6 83 02/23/2018    HGB 9 8 (L) 02/23/2018    HCT 30 8 (L) 02/23/2018    MCV 94 02/23/2018     02/23/2018    MCH 29 9 02/23/2018    MCHC 31 8 02/23/2018    RDW 14 0 02/23/2018    MPV 9 8 02/23/2018     CMP:   Lab Results   Component Value Date     02/23/2018    K 3 5 02/23/2018     02/23/2018    CO2 27 02/23/2018    ANIONGAP 8 02/23/2018    BUN 11 02/23/2018    CREATININE 0 67 02/23/2018    GLUCOSE 91 02/23/2018    CALCIUM 8 2 (L) 02/23/2018    EGFR 75 02/23/2018             Results from last 7 days  Lab Units 02/23/18  0519 02/22/18  0528 02/21/18  0545 02/20/18  0623   SODIUM mmol/L 143 146* 146* 153*   POTASSIUM mmol/L 3 5 3 9 3 3* 3 4*   CHLORIDE mmol/L 108 112* 110* 114*   CO2 mmol/L 27 27 27 27   BUN mg/dL 11 16 17 24   CREATININE mg/dL 0 67 0 69 0 68 0 72   CALCIUM mg/dL 8 2* 8 4 8 2* 8 5   TOTAL PROTEIN g/dL  --  6 4 6 5 7 2   BILIRUBIN TOTAL mg/dL  --  0 60 0 60 0 50   ALK PHOS U/L  --  34* 35* 37*   ALT U/L  --  11* 13 15   AST U/L  --  35 51* 27   GLUCOSE RANDOM mg/dL 91 102 128 63*         Phosphorus:   Lab Results   Component Value Date    PHOS 2 8 02/23/2018     Magnesium:   Lab Results   Component Value Date    MG 1 7 02/23/2018     Urinalysis: No results found for: COLORU, CLARITYU, SPECGRAV, PHUR, LEUKOCYTESUR, NITRITE, PROTEINUA, GLUCOSEU, KETONESU, BILIRUBINUR, BLOODU  Ionized Calcium: No results found for: CAION  Coagulation: No results found for: PT, INR, APTT  Troponin: No results found for: TROPONINI  ABG: No results found for: PHART, ULL1CUZ, PO2ART, PXA6WKF, G7UCTPJU, BEART, SOURCE  Radiology review:     IMAGING  Procedure: Ct Abdomen Pelvis Wo Contrast    Result Date: 2/20/2018  Narrative: CT ABDOMEN AND PELVIS WITHOUT IV CONTRAST INDICATION:  Nausea, vomiting and diarrhea  COMPARISON: CT abdomen pelvis 3/23/2017 TECHNIQUE:  CT examination of the abdomen and pelvis was performed without intravenous contrast   Axial, sagittal, and coronal 2D reformatted images were created from the source data and submitted for interpretation  Radiation dose length product (DLP) for this visit:  243 88 mGy-cm   This examination, like all CT scans performed in the [facility], was performed utilizing techniques to minimize radiation dose exposure, including the use of iterative  reconstruction and automated exposure control  Enteric contrast was administered  FINDINGS: ABDOMEN LOWER CHEST:  There are bilateral fibrotic changes  Bibasilar dependent opacities likely represent atelectasis  3 mm left upper lobe nodule (series 2 image 6)  There are postsurgical changes of the heart status post aortic valve replacement  There are atherosclerotic changes of the aorta  There is enlargement of the main pulmonary artery  LIVER/BILIARY TREE:  Stable calcifications in the anterior right lobe of the liver  No evidence of liver mass or ductal dilation on this noncontrast evaluation  GALLBLADDER:  There are gallstone(s) within the gallbladder, without pericholecystic inflammatory changes  Mild gallbladder distention  SPLEEN:  Unremarkable  PANCREAS:  The pancreas is atrophic  No pancreatic mass  ADRENAL GLANDS:  Unremarkable  KIDNEYS/URETERS:  Unremarkable  No hydronephrosis  STOMACH AND BOWEL:  There is a small hiatal hernia  Evaluation of the stomach and bowel is limited given lack of oral and intravenous contrast material   No evident bowel obstruction  Mild stool retention throughout the colon  There is colonic diverticulosis without evidence of diverticulitis  APPENDIX:  No findings to suggest appendicitis  ABDOMINOPELVIC CAVITY:  No ascites or free intraperitoneal air  No lymphadenopathy   VESSELS:  Atherosclerotic changes are present  No evidence of aneurysm  PELVIS REPRODUCTIVE ORGANS:  Unremarkable for patient's age  URINARY BLADDER:  Unremarkable  ABDOMINAL WALL/INGUINAL REGIONS:  Unremarkable  OSSEOUS STRUCTURES:  There are compression deformities of the L1, L2 and L4 vertebral bodies  L1 vertebral body compression deformity is similar in appearance to 2/11/2018 exam   L2 and L4 compression deformities are age indeterminate  Impression: 1  Limited evaluation secondary to lack of oral and intravenous contrast   Diverticulosis without evidence of acute diverticulitis  2   Cholelithiasis and mild gallbladder wall distention  If there is focal right upper quadrant pain further evaluation with right upper quadrant ultrasound could be considered  3   Stable compression deformity of the L1 vertebral body  Age indeterminate compression deformities of L2 and L4  4   Fibrotic changes at the lung bases with patchy bibasilar airspace opacities likely representing atelectasis  5   3 mm left upper lobe pulmonary nodule  Based on current Fleischner Society 2017 Guidelines on incidental pulmonary nodule, no routine follow-up is needed if the patient is considered low risk for lung cancer  If the patient is considered high risk for lung cancer, 12 month follow-up non-contrast chest CT is recommended  Considerations related to the patient's age and/or comorbidities may be used to alter these recommendations  Workstation performed: [id number]     Procedure: Xr Chest 1 View Portable    Result Date: 2/19/2018  Narrative: CHEST INDICATION:  Dehydration  COMPARISON:  Chest CT and radiographs dated 2/11/2018  Radiographs dated 1/6/2018  VIEWS:  Frontal and lateral projections IMAGES:  1 FINDINGS: Normal lung volumes  Chronic, peripheral interstitial reticular markings  No superimposed acute consolidation  Blunting of the right costophrenic angle is chronic and likely due to fibrosis  No layering effusions  No pneumothorax   Heart size within normal limits  Prior aortic root repair  Aortic tortuosity without aneurysm  Pulmonary vasculature is largely obscured by interstitial process  No janny cephalization  Osteopenia  High riding humeri with remodeling of the acetabular undersurfaces--suggestive of chronic rotator cuff tears  No acute osseous findings demonstrated  Thoracic spine poorly evaluated due to underpenetration  Impression: Chronic interstitial fibrotic disease without acute findings or significant change from prior  Workstation performed: [id number]     Procedure: Ct Head Without Contrast    Result Date: 2/19/2018  Narrative: CT BRAIN - WITHOUT CONTRAST INDICATION: chest pain  History taken directly from the electronic ordering system  COMPARISON:  None  TECHNIQUE:  CT examination of the brain was performed  In addition to axial images, coronal reformatted images were created and submitted for interpretation  Radiation dose length product (DLP) for this visit:  1076 81 mGy-cm   This examination, like all CT scans performed in the [facility], was performed utilizing techniques to minimize radiation dose exposure, including the use of iterative reconstruction and automated exposure control  IMAGE QUALITY:  Diagnostic  FINDINGS:  PARENCHYMA:  Decreased attenuation is noted in the supratentorial white matter demonstrating an appearance most consistent with moderate microangiopathic change  No intracranial mass, mass effect or midline shift  No CT signs of acute infarction  There is no parenchymal hemorrhage  VENTRICLES AND EXTRA-AXIAL SPACES:  Age-appropriate volume loss is noted  No hydrocephalus  VISUALIZED ORBITS AND PARANASAL SINUSES:  Orbits appear normal   There is near complete opacification of the right maxillary sinus  No fluid levels are seen  CALVARIUM AND EXTRACRANIAL SOFT TISSUES:   Normal      Impression: No acute intracranial abnormality  Microangiopathic changes   Workstation performed: [id number]     Procedure: Us Gallbladder    Result Date: 2/20/2018  Narrative: RIGHT UPPER QUADRANT ULTRASOUND INDICATION:  Cholelithiasis, distended gallbladder  COMPARISON:  CT abdomen pelvis 2/20/2018 TECHNIQUE:   Real-time ultrasound of the right upper quadrant was performed with a curvilinear transducer with both volumetric sweeps and still imaging techniques  FINDINGS: PANCREAS:  Portions of the pancreas are obscured by bowel gas  Visualized portions of the pancreas are unremarkable  AORTA AND IVC:  Visualized portions are normal for patient age  LIVER: Size:  Within normal range  The liver measures 10 3 cm in the midclavicular line  Contour:  Surface contour is smooth  Parenchyma:  Echogenicity and echotexture are within normal limits  No evidence of suspicious mass  Limited imaging of the main portal vein shows it to be patent and hepatopetal   BILIARY: The gallbladder is distended  No wall thickening or pericholecystic fluid  Single dependent mobile calculus without sludge  No sonographic Neff's sign  No intrahepatic biliary dilatation  CBD measures 4 5 mm  No choledocholithiasis  KIDNEY: Right kidney measures 8 6 x 4 3 cm  Within normal limits  ASCITES:   None  Impression:  Gallbladder distention and cholelithiasis without other evidence of acute cholecystitis   Workstation performed: [id number]       Current Facility-Administered Medications   Medication Dose Route Frequency    acetaminophen (TYLENOL) tablet 650 mg  650 mg Oral Q6H PRN    benzonatate (TESSALON PERLES) capsule 100 mg  100 mg Oral TID PRN    dextrose 5 % infusion  50 mL/hr Intravenous Continuous    dextrose 50 % IV solution 25 mL  25 mL Intravenous PRN    docusate sodium (COLACE) capsule 100 mg  100 mg Oral BID PRN    heparin (porcine) subcutaneous injection 5,000 Units  5,000 Units Subcutaneous Q12H [facility]    menthol-zinc oxide (CALMOSEPTINE) 0 44-20 6 % ointment   Topical BID    ondansetron (ZOFRAN) injection 4 mg  4 mg Intravenous Q6H PRN     Medications Discontinued During This Encounter   Medication Reason    cefuroxime (CEFTIN) 500 mg tablet     sodium chloride 0 9 % infusion     aspirin chewable tablet 81 mg     atorvastatin (LIPITOR) tablet 10 mg     calcium carbonate (OYSTER SHELL,OSCAL) 500 mg tablet 1 tablet     diltiazem (CARDIZEM CD) 24 hr capsule 120 mg     multivitamin-minerals (CENTRUM) tablet 1 tablet     sodium chloride infusion 0 45 %     potassium chloride 10 % oral solution 40 mEq     aspirin chewable tablet 81 mg     atorvastatin (LIPITOR) tablet 10 mg     calcium carbonate-vitamin D (OSCAL-D) 500 mg-200 units per tablet 1 tablet     diltiazem (CARDIZEM CD) 24 hr capsule 120 mg     albuterol inhalation solution 2 5 mg     dextrose 5 % and sodium chloride 0 2 % infusion     dextrose 5 % and sodium chloride 0 45 % with KCl 20 mEq/L infusion        DIRECTV, DO

## 2018-02-23 NOTE — PHYSICAL THERAPY NOTE
PT treatment Note      02/23/18 0948   Restrictions/Precautions   Other Precautions (Chair Alarm; Bed Alarm;Multiple lines;Telemetry;O2;Fall Risk)   Subjective   Subjective c/o pain at IV site  (nurse informed and checked IV site)   Bed Mobility   Additional Comments see OT note for bed mobility   Transfers   Sit to Stand 4  Minimal assistance   Additional items Assist x 2;Verbal cues   Stand to Sit 4  Minimal assistance   Additional items Assist x 2;Armrests; Verbal cues   Stand pivot 4  Minimal assistance   Additional items Assist x 2;Verbal cues   Ambulation/Elevation   Gait pattern Short stride   Gait Assistance 4  Minimal assist   Additional items Assist x 2   Assistive Device Rolling walker   Distance 3'   Balance   Static Sitting Fair -   Dynamic Sitting Fair -   Static Standing Fair   Dynamic Standing Fair   Ambulatory Fair  (with RW)   Endurance Deficit   Endurance Deficit Yes   Activity Tolerance   Activity Tolerance Patient limited by fatigue;Patient limited by pain   Assessment   Prognosis Good   Problem List Decreased strength;Decreased endurance; Impaired balance;Decreased mobility; Decreased safety awareness   Assessment Performing functional mobility at (mod/min) x 2 level of function  Able to ambulate short distance  Decreased balance and endurance  Pt is in need of continued activity in PT to improve strength balance endurance and functional mobility  Pt unsafe to return to [facility] and will require short term rehab on discharge  Plan   Treatment/Interventions Functional transfer training;LE strengthening/ROM; Therapeutic exercise; Endurance training;Bed mobility;Gait training   Progress Slow progress, decreased activity tolerance   Recommendation   Recommendation Short-term skilled PT   Pt  OOB with call bell within reach, scd's connected and turned on and alarm on at end of PT session

## 2018-02-23 NOTE — CONSULTS
Pt continues with very poor po intake even with altered texture  Pt was offered Ensure pudding, magic cup to which she refused  D/t  recent h/o poor intake, s/s of malnutrition and wt loss, and continuing poor intake; recommend appetite stimulant and /or alternate nutrition support  Consult for rec's

## 2018-02-23 NOTE — SPEECH THERAPY NOTE
Speech Language/Pathology    Speech/Language Pathology Progress Note    Patient Name: Gisel Mckeon  HXROF'S Date: 2/23/2018     Problem List  Patient Active Problem List   Diagnosis    Essential hypertension    Gastroesophageal reflux disease without esophagitis    Coronary artery disease involving native coronary artery of native heart    H/O aortic valve replacement    Hyperlipidemia    Frequent falls    Stage 3 chronic kidney disease    Chronic diastolic CHF (congestive heart failure) (HCC)    Dysphagia    Hypernatremia    Elevated platelet count (HCC)    Anemia    Dehydration    Hypoalbuminemia    Hyperchloremia    Nausea and vomiting    Interstitial lung disease (HCC)    Pulmonary fibrosis (HCC)    Hypokalemia    Hypoglycemia    Pulmonary nodule    Atelectasis    Closed compression fracture of L1 lumbar vertebra (HCC)    Closed compression fracture of L2 lumbar vertebra (HCC)    Closed compression fracture of L4 lumbar vertebra (HCC)    Cholelithiasis    Severe protein-calorie malnutrition (HCC)    BMI less than 19,adult    Low serum prealbumin        Past Medical History  Past Medical History:   Diagnosis Date    Angina pectoris (HCC)     Arthritis     CAD (coronary artery disease)     Gait abnormality     GERD (gastroesophageal reflux disease)     Hypertension     Lyme disease     Osteoporosis     Vertigo         Past Surgical History  Past Surgical History:   Procedure Laterality Date    AORTIC VALVE REPLACEMENT      APPENDECTOMY      EYE SURGERY  08/19/1996    cataract bilateral         Subjective:  Patient is alert and in chair  Verbal and answering questions  Objective:  Nursing states patient with about 10% intake  Patient states "I don't want to eat or drink anything"  Assessment:  Patient with one trial of honey thick liquids via sippy cup with straw  Reduced oral transfer, but WFL  No s/s  Refused any solids after repetitive prompts  Plan/Recommendations:  Patient has been safe without s/s on Puree diet and honey thick liquids  Her intake is reduced; nutrition notified  Discharge from ; as least restrictive safe diet level is achieved  Continue Puree and honey liquids

## 2018-02-23 NOTE — CASE MANAGEMENT
Continued Stay Review    Date: 2/23/18    Vital Signs: /57 (BP Location: Left arm)   Pulse 94   Temp 97 9 °F (36 6 °C) (Temporal)   Resp 18   Ht 5' 2" (1 575 m)   Wt 44 9 kg (98 lb 15 8 oz)   SpO2 99%   BMI 18 10 kg/m²     Medications:   Scheduled Meds:   Current Facility-Administered Medications:  acetaminophen 650 mg Oral Q6H PRN Trey Chrissy, DO    benzonatate 100 mg Oral TID PRN Trey Lowe, DO    dextrose 50 mL/hr Intravenous Continuous Tila Schulte MD Last Rate: 50 mL/hr (02/23/18 0917)   dextrose 25 mL Intravenous PRN Trey Abadkin, DO    docusate sodium 100 mg Oral BID PRN Trey Abadkin, DO    heparin (porcine) 5,000 Units Subcutaneous Q12H [address] Trey Lowe, DO    menthol-zinc oxide  Topical BID Trey Lowe, DO    ondansetron 4 mg Intravenous Q6H PRN Trey Lowe, DO    potassium chloride 20 mEq Oral Q3H Kevin Tay,       Continuous Infusions:   dextrose 50 mL/hr Last Rate: 50 mL/hr (02/23/18 0917)     PRN Meds:   acetaminophen    benzonatate    dextrose    docusate sodium    ondansetron    Abnormal Labs/Diagnostic Results:   HGB 9 8 CA 8 2     Age/Sex: 80 y o  female     Assessment/Plan:   1)  Hypernatremia/Dehydration/Nausea and vomiting/Hypoglycemia:  The hypernatremia improved this AM   Continue IV fluids with D5W at 50 ml/hr per Nephrology  Follow the patient's sodium level and blood glucose trend  PT/OT  Continue to monitor the patient's sodium level closely     2)  Dysphagia:  Continue a diet of dysphagia pureed consistency with honey thick liquids and sippy cups with all liquid intake per speech therapy's recommendations  Continue aspiration precautions  Continue to monitor closely     3)  Anemia/Elevated platelet count:  The patient's hemoglobin is stable this AM   Follow the patient's CBC  Transfuse PRBC's for a hemoglobin less than 7      4)  Interstitial lung disease/Pulmonary fibrosis:  Continue the respiratory protocol  Maintain continuous pulse oximetry   Supplemental oxygen to maintain oxygen saturation levels at 92% and above      5)  Hypokalemia:  Improved  Continue to follow the potassium level       6)  Compression deformities of L1, L2, and L4:  She will need [place] + Vitamin D BID when she is able to tolerate PO medications  The Vitamin D 25-OH level was within normal limits  She will need an outpatient DEXA scan      7)  Gallbladder wall distension/Cholelithiasis/Transaminitis:  A gallbladder ultrasound was completed on 2/20/18, which showed gallbladder distension and cholelithiasis without other evidence of acute cholecystitis  The patient's LFT's are within normal limits     8)  Pulmonary nodule: The patient needs outpatient surveillance imaging of the pulmonary nodule      9)  Severe protein-calorie malnutrition/BMI less than 19 (BMI=18 10):  Consult Nutrition  Initiate Marinol as an appetite stimulant       The patient will need short-term rehabilitation placement upon discharge      The patient continues to require inpatient hospitalization for IV fluids with minimal PO intake      Subjective:   Patient seen and examined  The patient continues to complain of nausea and a decreased appetite  No chest pain  No shortness of breath    Discharge Plan: STR

## 2018-02-23 NOTE — OCCUPATIONAL THERAPY NOTE
Occupational Therapy Progress Note     Patient Name: Argelia Flores  LGMOF'F Date: 2/23/2018  Problem List  Patient Active Problem List   Diagnosis    Essential hypertension    Gastroesophageal reflux disease without esophagitis    Coronary artery disease involving native coronary artery of native heart    H/O aortic valve replacement    Hyperlipidemia    Frequent falls    Stage 3 chronic kidney disease    Chronic diastolic CHF (congestive heart failure) (HCC)    Dysphagia    Hypernatremia    Elevated platelet count (HCC)    Anemia    Dehydration    Hypoalbuminemia    Hyperchloremia    Nausea and vomiting    Interstitial lung disease (HCC)    Pulmonary fibrosis (HCC)    Hypokalemia    Hypoglycemia    Pulmonary nodule    Atelectasis    Closed compression fracture of L1 lumbar vertebra (HCC)    Closed compression fracture of L2 lumbar vertebra (HCC)    Closed compression fracture of L4 lumbar vertebra (HCC)    Cholelithiasis    Severe protein-calorie malnutrition (HCC)    BMI less than 19,adult    Low serum prealbumin           02/23/18 8878   Restrictions/Precautions   Weight Bearing Precautions Per Order No   Other Precautions Chair Alarm; Bed Alarm;Multiple lines;Telemetry;O2;Fall Risk   Pain Assessment   Pain Assessment (pt complaining of IV pain)   Bed Mobility   Supine to Sit 3  Moderate assistance   Additional items Assist x 1;Verbal cues   Additional Comments pt with posterior lean during BM; leaning towards R as well due to pain in R arm   Transfers   Additional Comments see PT treatment for transfers and FM during session to chair    Cognition   Overall Cognitive Status Impaired   Arousal/Participation Alert   Attention Within functional limits   Orientation Level Oriented to person   Following Commands Follows one step commands without difficulty   Activity Tolerance   Activity Tolerance Patient limited by pain; Patient limited by fatigue   Assessment   Assessment continued OT intervention prior to SNF placement recommendation       Pt left seated in chair at end of session; all needs within reach; all lines intact; scds connected and turned on

## 2018-02-23 NOTE — SOCIAL WORK
5th floor accepting pt for Monday 2/26 if pt is medically ready and if their planned dc happens  (this will open Bed for pt)  Cm will f/u on Monday

## 2018-02-23 NOTE — PLAN OF CARE
Problem: SLP ADULT - SWALLOWING, IMPAIRED  Goal: Advance to least restrictive diet without signs or symptoms of aspiration for planned discharge setting  See evaluation for individualized goals  Patient will:    1  Tolerate Puree diet and honey thick liquids with no S/S of oral/pharyngeal dysphagia across meals  2  Demonstrate effective oral transfer and swallow initiation with cues from txpt or nursing          Outcome: Completed Date Met: 02/23/18

## 2018-02-23 NOTE — WOUND OSTOMY CARE
Sacral stage 1  Left medial forefoot-chronic-intact scab-unstageable  Left ear red but blanchable-O2 tubing padding placed    Recommendations:  1  Calmoseptine to sacrum and buttocks q shift  2  Follow up with podiatry post discharge  3  Offload forefoot  4  Keep forefoot eschar intact  Joie Fuentes RN   CWOCN  2/23/2018 09:28

## 2018-02-23 NOTE — PLAN OF CARE
Problem: DISCHARGE PLANNING - CARE MANAGEMENT  Goal: Discharge to post-acute care or home with appropriate resources  INTERVENTIONS:  - Conduct assessment to determine patient/family and health care team treatment goals, and need for post-acute services based on payer coverage, community resources, and patient preferences, and barriers to discharge  - Address psychosocial, clinical, and financial barriers to discharge as identified in assessment in conjunction with the patient/family and health care team  - Arrange appropriate level of post-acute services according to patient's   needs and preference and payer coverage in collaboration with the physician and health care team  - Communicate with and update the patient/family, physician, and health care team regarding progress on the discharge plan  - Arrange appropriate transportation to post-acute venues   Outcome: Progressing  -plans are 5th floor on dc

## 2018-02-24 PROBLEM — J98.11 ATELECTASIS: Status: RESOLVED | Noted: 2018-01-01 | Resolved: 2018-01-01

## 2018-02-24 PROBLEM — R11.2 NAUSEA AND VOMITING: Status: RESOLVED | Noted: 2018-01-01 | Resolved: 2018-01-01

## 2018-02-24 PROBLEM — R29.6 FREQUENT FALLS: Status: RESOLVED | Noted: 2017-01-01 | Resolved: 2018-01-01

## 2018-02-24 PROBLEM — R79.89 LOW SERUM PREALBUMIN: Status: RESOLVED | Noted: 2018-01-01 | Resolved: 2018-01-01

## 2018-02-24 PROBLEM — K80.20 CHOLELITHIASIS: Status: RESOLVED | Noted: 2018-01-01 | Resolved: 2018-01-01

## 2018-02-24 PROBLEM — E16.2 HYPOGLYCEMIA: Status: RESOLVED | Noted: 2018-01-01 | Resolved: 2018-01-01

## 2018-02-24 PROBLEM — E87.8 HYPERCHLOREMIA: Status: RESOLVED | Noted: 2018-01-01 | Resolved: 2018-01-01

## 2018-02-24 NOTE — PROGRESS NOTES
Progress Note - Nephrology   Aamir Snyder 80 y o  female MRN: [id number]  Unit/Bed#: 416-01 Encounter: [id number]    A/P:  1  Hypernatremia due to hypovolemia and lack of free water  She corrected to 146 with dilute fluids  Initially received D5 1/2NS due to hypotension but is normotensive at this time and improving on 1/4 NS  Would continue to monitor daily weights  She will receive a dysphagia evaluation   2/22: Will change IVF to D5W at 50 ml/hr for 2 liters to lower serum sodium  It has been stable at 146 for 2 days  She is starting to take thickened liquids   2/23: sodium level is appropriate, patient has poor PO intake, on 50 ml/hr D5W   2/24: I would like to stop all IVF supplemental water and see how the patient does with oral intake  Will discuss this with hospitalist prior to stopping IVF  2  Chronic kidney disease stage 3 - probably due to renal senescence  Her right kidney was visualized and appears normal  3  Hypokalemia: will reorder supplement   2/22 Potasium WNL today   2/23: K on low end of normal, will order for 20 IV KCl and 20 PO KCl to see how she does  2/24: Will give 20 mEq PO today  4  Chronic diastolic CHF: appears compensated  5   Hypertension       Follow up reason for today's visit: Hypernatremia    Hypernatremia    Patient Active Problem List   Diagnosis    Essential hypertension    Gastroesophageal reflux disease without esophagitis    Coronary artery disease involving native coronary artery of native heart    H/O aortic valve replacement    Hyperlipidemia    Frequent falls    Stage 3 chronic kidney disease    Chronic diastolic CHF (congestive heart failure) (HCC)    Dysphagia    Hypernatremia    Elevated platelet count (HCC)    Anemia    Dehydration    Hypoalbuminemia    Hyperchloremia    Nausea and vomiting    Interstitial lung disease (HCC)    Pulmonary fibrosis (HCC)    Hypokalemia    Hypoglycemia    Pulmonary nodule    Atelectasis    Closed compression fracture of L1 lumbar vertebra (HCC)    Closed compression fracture of L2 lumbar vertebra (HCC)    Closed compression fracture of L4 lumbar vertebra (HCC)    Cholelithiasis    Severe protein-calorie malnutrition (HCC)    BMI less than 19,adult    Low serum prealbumin         Subjective:    Patient without acute complaints  Objective:     Vitals: Blood pressure 104/67, pulse 92, temperature 98 2 °F (36 8 °C), temperature source Temporal, resp  rate 18, height 5' 2" (1 575 m), weight 42 8 kg (94 lb 5 7 oz), SpO2 100 %  ,Body mass index is 17 26 kg/m²  Weight (last 2 days)     Date/Time   Weight    02/24/18 0531  42 8 (94 36)    02/23/18 0544  44 9 (98 99)    02/22/18 0538  44 (97)                Intake/Output Summary (Last 24 hours) at 02/24/18 0945  Last data filed at 02/24/18 0450   Gross per 24 hour   Intake          1078 33 ml   Output                0 ml   Net          1078 33 ml     I/O last 3 completed shifts: In: 2221 7 [P O :130;  I V :2091 7]  Out: -          Physical Exam: /67 (BP Location: Left arm)   Pulse 92   Temp 98 2 °F (36 8 °C) (Temporal)   Resp 18   Ht 5' 2" (1 575 m)   Wt 42 8 kg (94 lb 5 7 oz)   SpO2 100%   BMI 17 26 kg/m²     General Appearance:    Alert, cooperative, no distress, appears stated age   Head:    Normocephalic, without obvious abnormality, atraumatic   Eyes:    Conjunctiva/corneas clear   Ears:    Normal external ears   Nose:   Nares normal, septum midline, mucosa normal, no drainage    or sinus tenderness   Throat:   Lips, mucosa, and tongue normal; teeth and gums normal   Neck:   Supple   Back:     Symmetric, no curvature, ROM normal, no CVA tenderness   Lungs:     Clear to auscultation bilaterally, respirations unlabored   Chest wall:    No tenderness or deformity   Heart:    Regular rate and rhythm, S1 and S2 normal, no murmur, rub   or gallop   Abdomen:     Soft, non-tender, bowel sounds active   Extremities:   Extremities normal, atraumatic, no cyanosis or edema   Skin:   Skin color, texture, turgor normal, no rashes or lesions   Lymph nodes:   Cervical normal   Neurologic:   CNII-XII intact            Lab, Imaging and other studies: I have personally reviewed pertinent labs  CBC:   Lab Results   Component Value Date    WBC 6 15 02/24/2018    HGB 10 0 (L) 02/24/2018    HCT 30 9 (L) 02/24/2018    MCV 93 02/24/2018     02/24/2018    MCH 30 2 02/24/2018    MCHC 32 4 02/24/2018    RDW 14 0 02/24/2018    MPV 9 4 02/24/2018     CMP:   Lab Results   Component Value Date     02/24/2018    K 3 5 02/24/2018     02/24/2018    CO2 30 02/24/2018    ANIONGAP 6 02/24/2018    BUN 9 02/24/2018    CREATININE 0 70 02/24/2018    GLUCOSE 90 02/24/2018    CALCIUM 8 5 02/24/2018    AST 31 02/24/2018    ALT 12 02/24/2018    ALKPHOS 36 (L) 02/24/2018    PROT 6 3 (L) 02/24/2018    BILITOT 0 80 02/24/2018    EGFR 74 02/24/2018             Results from last 7 days  Lab Units 02/24/18  0506 02/23/18  0519 02/22/18  0528 02/21/18  0545   SODIUM mmol/L 141 143 146* 146*   POTASSIUM mmol/L 3 5 3 5 3 9 3 3*   CHLORIDE mmol/L 105 108 112* 110*   CO2 mmol/L 30 27 27 27   BUN mg/dL 9 11 16 17   CREATININE mg/dL 0 70 0 67 0 69 0 68   CALCIUM mg/dL 8 5 8 2* 8 4 8 2*   TOTAL PROTEIN g/dL 6 3*  --  6 4 6 5   BILIRUBIN TOTAL mg/dL 0 80  --  0 60 0 60   ALK PHOS U/L 36*  --  34* 35*   ALT U/L 12  --  11* 13   AST U/L 31  --  35 51*   GLUCOSE RANDOM mg/dL 90 91 102 128         Phosphorus:   Lab Results   Component Value Date    PHOS 3 2 02/24/2018     Magnesium:   Lab Results   Component Value Date    MG 2 4 02/24/2018     Urinalysis: No results found for: COLORU, CLARITYU, SPECGRAV, PHUR, LEUKOCYTESUR, NITRITE, PROTEINUA, GLUCOSEU, KETONESU, BILIRUBINUR, BLOODU  Ionized Calcium: No results found for: CAION  Coagulation: No results found for: PT, INR, APTT  Troponin: No results found for: TROPONINI  ABG: No results found for: PHART, NDH3YAZ, PO2ART, THW4QKC, YULY Contreras  Radiology review:     IMAGING  Procedure: Ct Abdomen Pelvis Wo Contrast    Result Date: 2/20/2018  Narrative: CT ABDOMEN AND PELVIS WITHOUT IV CONTRAST INDICATION:  Nausea, vomiting and diarrhea  COMPARISON: CT abdomen pelvis 3/23/2017 TECHNIQUE:  CT examination of the abdomen and pelvis was performed without intravenous contrast   Axial, sagittal, and coronal 2D reformatted images were created from the source data and submitted for interpretation  Radiation dose length product (DLP) for this visit:  243 88 mGy-cm   This examination, like all CT scans performed in the [facility], was performed utilizing techniques to minimize radiation dose exposure, including the use of iterative  reconstruction and automated exposure control  Enteric contrast was administered  FINDINGS: ABDOMEN LOWER CHEST:  There are bilateral fibrotic changes  Bibasilar dependent opacities likely represent atelectasis  3 mm left upper lobe nodule (series 2 image 6)  There are postsurgical changes of the heart status post aortic valve replacement  There are atherosclerotic changes of the aorta  There is enlargement of the main pulmonary artery  LIVER/BILIARY TREE:  Stable calcifications in the anterior right lobe of the liver  No evidence of liver mass or ductal dilation on this noncontrast evaluation  GALLBLADDER:  There are gallstone(s) within the gallbladder, without pericholecystic inflammatory changes  Mild gallbladder distention  SPLEEN:  Unremarkable  PANCREAS:  The pancreas is atrophic  No pancreatic mass  ADRENAL GLANDS:  Unremarkable  KIDNEYS/URETERS:  Unremarkable  No hydronephrosis  STOMACH AND BOWEL:  There is a small hiatal hernia  Evaluation of the stomach and bowel is limited given lack of oral and intravenous contrast material   No evident bowel obstruction  Mild stool retention throughout the colon  There is colonic diverticulosis without evidence of diverticulitis   APPENDIX: No findings to suggest appendicitis  ABDOMINOPELVIC CAVITY:  No ascites or free intraperitoneal air  No lymphadenopathy  VESSELS:  Atherosclerotic changes are present  No evidence of aneurysm  PELVIS REPRODUCTIVE ORGANS:  Unremarkable for patient's age  URINARY BLADDER:  Unremarkable  ABDOMINAL WALL/INGUINAL REGIONS:  Unremarkable  OSSEOUS STRUCTURES:  There are compression deformities of the L1, L2 and L4 vertebral bodies  L1 vertebral body compression deformity is similar in appearance to 2/11/2018 exam   L2 and L4 compression deformities are age indeterminate  Impression: 1  Limited evaluation secondary to lack of oral and intravenous contrast   Diverticulosis without evidence of acute diverticulitis  2   Cholelithiasis and mild gallbladder wall distention  If there is focal right upper quadrant pain further evaluation with right upper quadrant ultrasound could be considered  3   Stable compression deformity of the L1 vertebral body  Age indeterminate compression deformities of L2 and L4  4   Fibrotic changes at the lung bases with patchy bibasilar airspace opacities likely representing atelectasis  5   3 mm left upper lobe pulmonary nodule  Based on current Fleischner Society 2017 Guidelines on incidental pulmonary nodule, no routine follow-up is needed if the patient is considered low risk for lung cancer  If the patient is considered high risk for lung cancer, 12 month follow-up non-contrast chest CT is recommended  Considerations related to the patient's age and/or comorbidities may be used to alter these recommendations  Workstation performed: [id number]     Procedure: Xr Chest 1 View Portable    Result Date: 2/19/2018  Narrative: CHEST INDICATION:  Dehydration  COMPARISON:  Chest CT and radiographs dated 2/11/2018  Radiographs dated 1/6/2018  VIEWS:  Frontal and lateral projections IMAGES:  1 FINDINGS: Normal lung volumes  Chronic, peripheral interstitial reticular markings    No superimposed acute consolidation  Blunting of the right costophrenic angle is chronic and likely due to fibrosis  No layering effusions  No pneumothorax  Heart size within normal limits  Prior aortic root repair  Aortic tortuosity without aneurysm  Pulmonary vasculature is largely obscured by interstitial process  No janny cephalization  Osteopenia  High riding humeri with remodeling of the acetabular undersurfaces--suggestive of chronic rotator cuff tears  No acute osseous findings demonstrated  Thoracic spine poorly evaluated due to underpenetration  Impression: Chronic interstitial fibrotic disease without acute findings or significant change from prior  Workstation performed: [id number]     Procedure: Ct Head Without Contrast    Result Date: 2/19/2018  Narrative: CT BRAIN - WITHOUT CONTRAST INDICATION: chest pain  History taken directly from the electronic ordering system  COMPARISON:  None  TECHNIQUE:  CT examination of the brain was performed  In addition to axial images, coronal reformatted images were created and submitted for interpretation  Radiation dose length product (DLP) for this visit:  1076 81 mGy-cm   This examination, like all CT scans performed in the [facility], was performed utilizing techniques to minimize radiation dose exposure, including the use of iterative reconstruction and automated exposure control  IMAGE QUALITY:  Diagnostic  FINDINGS:  PARENCHYMA:  Decreased attenuation is noted in the supratentorial white matter demonstrating an appearance most consistent with moderate microangiopathic change  No intracranial mass, mass effect or midline shift  No CT signs of acute infarction  There is no parenchymal hemorrhage  VENTRICLES AND EXTRA-AXIAL SPACES:  Age-appropriate volume loss is noted  No hydrocephalus  VISUALIZED ORBITS AND PARANASAL SINUSES:  Orbits appear normal   There is near complete opacification of the right maxillary sinus    No fluid levels are seen  CALVARIUM AND EXTRACRANIAL SOFT TISSUES:   Normal      Impression: No acute intracranial abnormality  Microangiopathic changes  Workstation performed: [id number]     Procedure: Us Gallbladder    Result Date: 2/20/2018  Narrative: RIGHT UPPER QUADRANT ULTRASOUND INDICATION:  Cholelithiasis, distended gallbladder  COMPARISON:  CT abdomen pelvis 2/20/2018 TECHNIQUE:   Real-time ultrasound of the right upper quadrant was performed with a curvilinear transducer with both volumetric sweeps and still imaging techniques  FINDINGS: PANCREAS:  Portions of the pancreas are obscured by bowel gas  Visualized portions of the pancreas are unremarkable  AORTA AND IVC:  Visualized portions are normal for patient age  LIVER: Size:  Within normal range  The liver measures 10 3 cm in the midclavicular line  Contour:  Surface contour is smooth  Parenchyma:  Echogenicity and echotexture are within normal limits  No evidence of suspicious mass  Limited imaging of the main portal vein shows it to be patent and hepatopetal   BILIARY: The gallbladder is distended  No wall thickening or pericholecystic fluid  Single dependent mobile calculus without sludge  No sonographic Neff's sign  No intrahepatic biliary dilatation  CBD measures 4 5 mm  No choledocholithiasis  KIDNEY: Right kidney measures 8 6 x 4 3 cm  Within normal limits  ASCITES:   None  Impression:  Gallbladder distention and cholelithiasis without other evidence of acute cholecystitis   Workstation performed: [id number]       Current Facility-Administered Medications   Medication Dose Route Frequency    acetaminophen (TYLENOL) tablet 650 mg  650 mg Oral Q6H PRN    benzonatate (TESSALON PERLES) capsule 100 mg  100 mg Oral TID PRN    calcium carbonate-vitamin D (OSCAL-D) 500 mg-200 units per tablet 1 tablet  1 tablet Oral BID With Meals    dextrose 5 % infusion  50 mL/hr Intravenous Continuous    dextrose 50 % IV solution 25 mL  25 mL Intravenous PRN    docusate sodium (COLACE) capsule 100 mg  100 mg Oral BID PRN    dronabinol (MARINOL) capsule 2 5 mg  2 5 mg Oral BID before lunch/dinner    heparin (porcine) subcutaneous injection 5,000 Units  5,000 Units Subcutaneous Q12H [facility]    menthol-zinc oxide (CALMOSEPTINE) 0 44-20 6 % ointment   Topical BID    ondansetron (ZOFRAN) injection 4 mg  4 mg Intravenous Q6H PRN     Medications Discontinued During This Encounter   Medication Reason    cefuroxime (CEFTIN) 500 mg tablet     sodium chloride 0 9 % infusion     aspirin chewable tablet 81 mg     atorvastatin (LIPITOR) tablet 10 mg     calcium carbonate (OYSTER SHELL,OSCAL) 500 mg tablet 1 tablet     diltiazem (CARDIZEM CD) 24 hr capsule 120 mg     multivitamin-minerals (CENTRUM) tablet 1 tablet     sodium chloride infusion 0 45 %     potassium chloride 10 % oral solution 40 mEq     aspirin chewable tablet 81 mg     atorvastatin (LIPITOR) tablet 10 mg     calcium carbonate-vitamin D (OSCAL-D) 500 mg-200 units per tablet 1 tablet     diltiazem (CARDIZEM CD) 24 hr capsule 120 mg     albuterol inhalation solution 2 5 mg     dextrose 5 % and sodium chloride 0 2 % infusion     dextrose 5 % and sodium chloride 0 45 % with KCl 20 mEq/L infusion     potassium chloride 20 mEq IVPB (premix)        Eros Haque DO

## 2018-02-24 NOTE — PROGRESS NOTES
[facility] Internal Medicine Progress Note  Patient: Reynaldo Lund 80 y o  female   MRN: [id number]  PCP: Scott Summers DO  Unit/Bed#: 344-54 Encounter: [id number]  Date Of Visit: 18    Assessment:    Principal Problem:    Hypernatremia  Active Problems:    Essential hypertension    Gastroesophageal reflux disease without esophagitis    Coronary artery disease involving native coronary artery of native heart    H/O aortic valve replacement    Hyperlipidemia    Stage 3 chronic kidney disease    Chronic diastolic CHF (congestive heart failure) (HCC)    Dysphagia    Elevated platelet count (HCC)    Anemia    Dehydration    Hypoalbuminemia    Interstitial lung disease (HCC)    Pulmonary fibrosis (HCC)    Hypokalemia    Pulmonary nodule    Closed compression fracture of L1 lumbar vertebra (HCC)    Closed compression fracture of L2 lumbar vertebra (HCC)    Closed compression fracture of L4 lumbar vertebra (HCC)    Severe protein-calorie malnutrition (HCC)    BMI less than 19,adult      Plan:    ·   Hypernatremia  ·  resolved  ·  discontinue IV fluid and monitor daily labs/p o  Intake  ·  dysphagia  ·   Continue with modified diet  ·  anemia  ·  hemoglobin stable  ·  interstitial lung disease / pulmonary fibrosis  ·  breathing status stable  ·  patient is a pulse ox of 100% on 1 L  Nasal cannula  Current Length of Stay: 5 day(s)    Current Patient Status: Inpatient     Code Status: Level 3 - DNAR and DNI      Subjective:     Patient has no acute complaints    Objective:     Vitals:   Temp (24hrs), Av 2 °F (36 8 °C), Min:97 5 °F (36 4 °C), Max:98 9 °F (37 2 °C)    HR:  [] 92  Resp:  [18] 18  BP: ()/(52-69) 104/67  SpO2:  [98 %-100 %] 100 %  Body mass index is 17 26 kg/m²  Input and Output Summary (last 24 hours):        Intake/Output Summary (Last 24 hours) at 18 1112  Last data filed at 18 0450   Gross per 24 hour   Intake          1078 33 ml   Output                0 ml Net          1078 33 ml       Physical Exam:     Physical Exam   Constitutional: She is oriented to person, place, and time  She appears well-developed  No distress  Frail / cachectic elderly female   HENT:   Head: Normocephalic  Eyes: Conjunctivae and EOM are normal  Pupils are equal, round, and reactive to light  Right eye exhibits no discharge  Left eye exhibits no discharge  Cardiovascular: Normal rate, regular rhythm and intact distal pulses  No murmur heard  Pulmonary/Chest: Effort normal and breath sounds normal  No respiratory distress  She has no wheezes  Abdominal: Soft  Bowel sounds are normal  She exhibits no distension  There is no tenderness  Neurological: She is alert and oriented to person, place, and time  No cranial nerve deficit  Coordination normal    Skin: She is not diaphoretic  Nursing note and vitals reviewed  Additional Data:     Labs:      Results from last 7 days  Lab Units 02/24/18  0506  02/20/18  0623   WBC Thousand/uL 6 15  < > 8 17   HEMOGLOBIN g/dL 10 0*  < > 10 7*   HEMATOCRIT % 30 9*  < > 34 8   PLATELETS Thousands/uL 328  < > 459*   NEUTROS PCT %  --   --  68   LYMPHS PCT %  --   --  19   LYMPHO PCT % 25  < >  --    MONOS PCT %  --   --  8   MONO PCT MAN % 3*  < >  --    EOS PCT %  --   --  5   EOSINO PCT MANUAL % 0  < >  --    < > = values in this interval not displayed  Results from last 7 days  Lab Units 02/24/18  0506   SODIUM mmol/L 141   POTASSIUM mmol/L 3 5   CHLORIDE mmol/L 105   CO2 mmol/L 30   BUN mg/dL 9   CREATININE mg/dL 0 70   CALCIUM mg/dL 8 5   TOTAL PROTEIN g/dL 6 3*   BILIRUBIN TOTAL mg/dL 0 80   ALK PHOS U/L 36*   ALT U/L 12   AST U/L 31   GLUCOSE RANDOM mg/dL 90       Results from last 7 days  Lab Units 02/19/18  1345   INR  1 38*       * I Have Reviewed All Lab Data Listed Above  * Additional Pertinent Lab Tests Reviewed:  [facility]      Recent Cultures (last 7 days):       Results from last 7 days  Lab Units 02/19/18  1754 02/19/18  1345   BLOOD CULTURE   --  No Growth After 4 Days  No Growth After 4 Days     URINE CULTURE  No Growth <1000 cfu/mL  --    INFLUENZA A PCR   --  None Detected   INFLUENZA B PCR   --  None Detected   RSV PCR   --  None Detected       Last 24 Hours Medication List:     Current Facility-Administered Medications:  acetaminophen 650 mg Oral Q6H PRN Emily Bal, DO   benzonatate 100 mg Oral TID PRN Emily Bal, DO   calcium carbonate-vitamin D 1 tablet Oral BID With Meals Emily Bal, DO   dextrose 25 mL Intravenous PRN Emily Bal, DO   docusate sodium 100 mg Oral BID PRN Emily Bal, DO   dronabinol 2 5 mg Oral BID before lunch/dinner Emily Bal, DO   heparin (porcine) 5,000 Units Subcutaneous Q12H [address] Emily Bal, DO   menthol-zinc oxide  Topical BID Emily Bal, DO   ondansetron 4 mg Intravenous Q6H PRN Emily Bal, DO        Today, Patient Was Seen By: Melissa Ramírez DO

## 2018-02-25 NOTE — PROGRESS NOTES
Progress Note - Nephrology   Atul Hernandez 80 y o  female MRN: [id number]  Unit/Bed#: 416-01 Encounter: [id number]    A/P:  1  Hypernatremia due to hypovolemia and lack of free water  2/23: sodium level is appropriate, patient has poor PO intake, on 50 ml/hr D5W   2/24: I would like to stop all IVF supplemental water and see how the patient does with oral intake  Will discuss this with hospitalist prior to stopping IVF  2/25: Patient ordered 1x dose of D5W infusion at 2300 last night and is finishing up right now  She has decreased PO intake and appears to be failing in keeping up with her free water intake  2  Chronic kidney disease stage 3   3  Chronic tubulointerstitial disease  4  Hypokalemia: will reorder supplement   2/23: K on low end of normal, will order for 20 IV KCl and 20 PO KCl to see how she does  2/24: Will give 20 mEq PO today  2/25: Patient's K is low normal, will observe for today, reassess in the morning  The low normal value is likely due to lack of good nutritional support  5  Chronic diastolic CHF: appears compensated  6   Hypertension       Follow up reason for today's visit: Hypernatremia    Hypernatremia    Patient Active Problem List   Diagnosis    Essential hypertension    Gastroesophageal reflux disease without esophagitis    Coronary artery disease involving native coronary artery of native heart    H/O aortic valve replacement    Hyperlipidemia    Stage 3 chronic kidney disease    Chronic diastolic CHF (congestive heart failure) (HCC)    Dysphagia    Hypernatremia    Elevated platelet count (HCC)    Anemia    Dehydration    Hypoalbuminemia    Interstitial lung disease (HCC)    Pulmonary fibrosis (HCC)    Hypokalemia    Pulmonary nodule    Closed compression fracture of L1 lumbar vertebra (HCC)    Closed compression fracture of L2 lumbar vertebra (HCC)    Closed compression fracture of L4 lumbar vertebra (HCC)    Severe protein-calorie malnutrition ([facility] 75 )    BMI less than 19,adult         Subjective:    Patient without acute complaints  Objective:     Vitals: Blood pressure 104/57, pulse 90, temperature (!) 96 7 °F (35 9 °C), temperature source Temporal, resp  rate 18, height 5' 2" (1 575 m), weight 44 kg (97 lb), SpO2 100 %  ,Body mass index is 17 74 kg/m²  Weight (last 2 days)     Date/Time   Weight    02/25/18 0600  44 (97)    02/24/18 0531  42 8 (94 36)    02/23/18 0544  44 9 (98 99)              No intake or output data in the 24 hours ending 02/25/18 1326  I/O last 3 completed shifts: In: 1316 7 [P O :10; I V :1306 7]  Out: -          Physical Exam: /57 (BP Location: Left arm)   Pulse 90   Temp (!) 96 7 °F (35 9 °C) (Temporal)   Resp 18   Ht 5' 2" (1 575 m)   Wt 44 kg (97 lb)   SpO2 100%   BMI 17 74 kg/m²     General Appearance:    Alert, cooperative, no distress, appears stated age   Head:    Normocephalic, without obvious abnormality, atraumatic   Eyes:    Conjunctiva/corneas clear   Ears:    Normal external ears   Nose:   Nares normal, septum midline, mucosa normal, no drainage    or sinus tenderness   Throat:   Lips, mucosa, and tongue normal; teeth and gums normal   Neck:   Supple   Back:     Symmetric, no curvature, ROM normal, no CVA tenderness   Lungs:     Clear to auscultation bilaterally, respirations unlabored   Chest wall:    No tenderness or deformity   Heart:    Regular rate and rhythm, S1 and S2 normal, no murmur, rub   or gallop   Abdomen:     Soft, non-tender, bowel sounds active   Extremities:   Extremities normal, atraumatic, no cyanosis or edema   Skin:   Skin color, texture, turgor normal, no rashes or lesions   Lymph nodes:   Cervical normal   Neurologic:   CNII-XII intact            Lab, Imaging and other studies: I have personally reviewed pertinent labs    CBC:   Lab Results   Component Value Date    WBC 6 39 02/25/2018    HGB 10 0 (L) 02/25/2018    HCT 31 4 (L) 02/25/2018    MCV 94 02/25/2018     02/25/2018    MCH 29 9 02/25/2018    MCHC 31 8 02/25/2018    RDW 14 0 02/25/2018    MPV 10 2 02/25/2018     CMP:   Lab Results   Component Value Date     02/25/2018    K 3 6 02/25/2018     02/25/2018    CO2 28 02/25/2018    ANIONGAP 8 02/25/2018    BUN 10 02/25/2018    CREATININE 0 71 02/25/2018    GLUCOSE 99 02/25/2018    CALCIUM 8 3 02/25/2018    AST 32 02/25/2018    ALT 13 02/25/2018    ALKPHOS 37 (L) 02/25/2018    PROT 6 2 (L) 02/25/2018    BILITOT 0 70 02/25/2018    EGFR 73 02/25/2018         Results from last 7 days  Lab Units 02/25/18  0521 02/24/18  0506 02/23/18  0519 02/22/18  0528   SODIUM mmol/L 144 141 143 146*   POTASSIUM mmol/L 3 6 3 5 3 5 3 9   CHLORIDE mmol/L 108 105 108 112*   CO2 mmol/L 28 30 27 27   BUN mg/dL 10 9 11 16   CREATININE mg/dL 0 71 0 70 0 67 0 69   CALCIUM mg/dL 8 3 8 5 8 2* 8 4   TOTAL PROTEIN g/dL 6 2* 6 3*  --  6 4   BILIRUBIN TOTAL mg/dL 0 70 0 80  --  0 60   ALK PHOS U/L 37* 36*  --  34*   ALT U/L 13 12  --  11*   AST U/L 32 31  --  35   GLUCOSE RANDOM mg/dL 99 90 91 102         Phosphorus:   No results found for: PHOS  Magnesium:   Lab Results   Component Value Date    MG 2 1 02/25/2018     Urinalysis: No results found for: COLORU, CLARITYU, SPECGRAV, PHUR, LEUKOCYTESUR, NITRITE, PROTEINUA, GLUCOSEU, KETONESU, BILIRUBINUR, BLOODU  Ionized Calcium: No results found for: CAION  Coagulation: No results found for: PT, INR, APTT  Troponin: No results found for: TROPONINI  ABG: No results found for: PHART, ZEM5BXJ, PO2ART, FFU7WPS, E6ZDMIEB, BEART, SOURCE  Radiology review:     IMAGING  Procedure: Ct Abdomen Pelvis Wo Contrast    Result Date: 2/20/2018  Narrative: CT ABDOMEN AND PELVIS WITHOUT IV CONTRAST INDICATION:  Nausea, vomiting and diarrhea   COMPARISON: CT abdomen pelvis 3/23/2017 TECHNIQUE:  CT examination of the abdomen and pelvis was performed without intravenous contrast   Axial, sagittal, and coronal 2D reformatted images were created from the source data and submitted for interpretation  Radiation dose length product (DLP) for this visit:  243 88 mGy-cm   This examination, like all CT scans performed in the [facility], was performed utilizing techniques to minimize radiation dose exposure, including the use of iterative  reconstruction and automated exposure control  Enteric contrast was administered  FINDINGS: ABDOMEN LOWER CHEST:  There are bilateral fibrotic changes  Bibasilar dependent opacities likely represent atelectasis  3 mm left upper lobe nodule (series 2 image 6)  There are postsurgical changes of the heart status post aortic valve replacement  There are atherosclerotic changes of the aorta  There is enlargement of the main pulmonary artery  LIVER/BILIARY TREE:  Stable calcifications in the anterior right lobe of the liver  No evidence of liver mass or ductal dilation on this noncontrast evaluation  GALLBLADDER:  There are gallstone(s) within the gallbladder, without pericholecystic inflammatory changes  Mild gallbladder distention  SPLEEN:  Unremarkable  PANCREAS:  The pancreas is atrophic  No pancreatic mass  ADRENAL GLANDS:  Unremarkable  KIDNEYS/URETERS:  Unremarkable  No hydronephrosis  STOMACH AND BOWEL:  There is a small hiatal hernia  Evaluation of the stomach and bowel is limited given lack of oral and intravenous contrast material   No evident bowel obstruction  Mild stool retention throughout the colon  There is colonic diverticulosis without evidence of diverticulitis  APPENDIX:  No findings to suggest appendicitis  ABDOMINOPELVIC CAVITY:  No ascites or free intraperitoneal air  No lymphadenopathy  VESSELS:  Atherosclerotic changes are present  No evidence of aneurysm  PELVIS REPRODUCTIVE ORGANS:  Unremarkable for patient's age  URINARY BLADDER:  Unremarkable  ABDOMINAL WALL/INGUINAL REGIONS:  Unremarkable  OSSEOUS STRUCTURES:  There are compression deformities of the L1, L2 and L4 vertebral bodies    L1 vertebral body compression deformity is similar in appearance to 2/11/2018 exam   L2 and L4 compression deformities are age indeterminate  Impression: 1  Limited evaluation secondary to lack of oral and intravenous contrast   Diverticulosis without evidence of acute diverticulitis  2   Cholelithiasis and mild gallbladder wall distention  If there is focal right upper quadrant pain further evaluation with right upper quadrant ultrasound could be considered  3   Stable compression deformity of the L1 vertebral body  Age indeterminate compression deformities of L2 and L4  4   Fibrotic changes at the lung bases with patchy bibasilar airspace opacities likely representing atelectasis  5   3 mm left upper lobe pulmonary nodule  Based on current Fleischner Society 2017 Guidelines on incidental pulmonary nodule, no routine follow-up is needed if the patient is considered low risk for lung cancer  If the patient is considered high risk for lung cancer, 12 month follow-up non-contrast chest CT is recommended  Considerations related to the patient's age and/or comorbidities may be used to alter these recommendations  Workstation performed: [id number]     Procedure: Xr Chest 1 View Portable    Result Date: 2/19/2018  Narrative: CHEST INDICATION:  Dehydration  COMPARISON:  Chest CT and radiographs dated 2/11/2018  Radiographs dated 1/6/2018  VIEWS:  Frontal and lateral projections IMAGES:  1 FINDINGS: Normal lung volumes  Chronic, peripheral interstitial reticular markings  No superimposed acute consolidation  Blunting of the right costophrenic angle is chronic and likely due to fibrosis  No layering effusions  No pneumothorax  Heart size within normal limits  Prior aortic root repair  Aortic tortuosity without aneurysm  Pulmonary vasculature is largely obscured by interstitial process  No janny cephalization  Osteopenia    High riding humeri with remodeling of the acetabular undersurfaces--suggestive of chronic rotator cuff tears  No acute osseous findings demonstrated  Thoracic spine poorly evaluated due to underpenetration  Impression: Chronic interstitial fibrotic disease without acute findings or significant change from prior  Workstation performed: [id number]     Procedure: Ct Head Without Contrast    Result Date: 2/19/2018  Narrative: CT BRAIN - WITHOUT CONTRAST INDICATION: chest pain  History taken directly from the electronic ordering system  COMPARISON:  None  TECHNIQUE:  CT examination of the brain was performed  In addition to axial images, coronal reformatted images were created and submitted for interpretation  Radiation dose length product (DLP) for this visit:  1076 81 mGy-cm   This examination, like all CT scans performed in the [facility], was performed utilizing techniques to minimize radiation dose exposure, including the use of iterative reconstruction and automated exposure control  IMAGE QUALITY:  Diagnostic  FINDINGS:  PARENCHYMA:  Decreased attenuation is noted in the supratentorial white matter demonstrating an appearance most consistent with moderate microangiopathic change  No intracranial mass, mass effect or midline shift  No CT signs of acute infarction  There is no parenchymal hemorrhage  VENTRICLES AND EXTRA-AXIAL SPACES:  Age-appropriate volume loss is noted  No hydrocephalus  VISUALIZED ORBITS AND PARANASAL SINUSES:  Orbits appear normal   There is near complete opacification of the right maxillary sinus  No fluid levels are seen  CALVARIUM AND EXTRACRANIAL SOFT TISSUES:   Normal      Impression: No acute intracranial abnormality  Microangiopathic changes  Workstation performed: [id number]     Procedure: Us Gallbladder    Result Date: 2/20/2018  Narrative: RIGHT UPPER QUADRANT ULTRASOUND INDICATION:  Cholelithiasis, distended gallbladder   COMPARISON:  CT abdomen pelvis 2/20/2018 TECHNIQUE:   Real-time ultrasound of the right upper quadrant was performed with a curvilinear transducer with both volumetric sweeps and still imaging techniques  FINDINGS: PANCREAS:  Portions of the pancreas are obscured by bowel gas  Visualized portions of the pancreas are unremarkable  AORTA AND IVC:  Visualized portions are normal for patient age  LIVER: Size:  Within normal range  The liver measures 10 3 cm in the midclavicular line  Contour:  Surface contour is smooth  Parenchyma:  Echogenicity and echotexture are within normal limits  No evidence of suspicious mass  Limited imaging of the main portal vein shows it to be patent and hepatopetal   BILIARY: The gallbladder is distended  No wall thickening or pericholecystic fluid  Single dependent mobile calculus without sludge  No sonographic Neff's sign  No intrahepatic biliary dilatation  CBD measures 4 5 mm  No choledocholithiasis  KIDNEY: Right kidney measures 8 6 x 4 3 cm  Within normal limits  ASCITES:   None  Impression:  Gallbladder distention and cholelithiasis without other evidence of acute cholecystitis   Workstation performed: [id number]       Current Facility-Administered Medications   Medication Dose Route Frequency    acetaminophen (TYLENOL) tablet 650 mg  650 mg Oral Q6H PRN    dextrose 50 % IV solution 25 mL  25 mL Intravenous PRN    docusate sodium (COLACE) capsule 100 mg  100 mg Oral BID PRN    dronabinol (MARINOL) capsule 5 mg  5 mg Oral BID before lunch/dinner    heparin (porcine) subcutaneous injection 5,000 Units  5,000 Units Subcutaneous Q12H [facility]    menthol-zinc oxide (CALMOSEPTINE) 0 44-20 6 % ointment   Topical BID    mirtazapine (REMERON) tablet 15 mg  15 mg Oral HS    ondansetron (ZOFRAN) injection 4 mg  4 mg Intravenous Q6H PRN     Medications Discontinued During This Encounter   Medication Reason    cefuroxime (CEFTIN) 500 mg tablet     sodium chloride 0 9 % infusion     aspirin chewable tablet 81 mg     atorvastatin (LIPITOR) tablet 10 mg     calcium carbonate (OYSTER SHELL,OSCAL) 500 mg tablet 1 tablet     diltiazem (CARDIZEM CD) 24 hr capsule 120 mg     multivitamin-minerals (CENTRUM) tablet 1 tablet     sodium chloride infusion 0 45 %     potassium chloride 10 % oral solution 40 mEq     aspirin chewable tablet 81 mg     atorvastatin (LIPITOR) tablet 10 mg     calcium carbonate-vitamin D (OSCAL-D) 500 mg-200 units per tablet 1 tablet     diltiazem (CARDIZEM CD) 24 hr capsule 120 mg     albuterol inhalation solution 2 5 mg     dextrose 5 % and sodium chloride 0 2 % infusion     dextrose 5 % and sodium chloride 0 45 % with KCl 20 mEq/L infusion     potassium chloride 20 mEq IVPB (premix)     dextrose 5 % infusion     dronabinol (MARINOL) capsule 2 5 mg     benzonatate (TESSALON PERLES) capsule 100 mg     calcium carbonate-vitamin D (OSCAL-D) 500 mg-200 units per tablet 1 tablet     pantoprazole (PROTONIX) EC tablet 40 mg        Crystal Vuong DO

## 2018-02-25 NOTE — PLAN OF CARE
Nutrition/Hydration-ADULT     Nutrient/Hydration intake appropriate for improving, restoring or maintaining nutritional needs Not Progressing          DISCHARGE PLANNING     Discharge to home or other facility with appropriate resources Progressing        DISCHARGE PLANNING - CARE MANAGEMENT     Discharge to post-acute care or home with appropriate resources Progressing        INFECTION - ADULT     Absence or prevention of progression during hospitalization Progressing        Knowledge Deficit     Patient/family/caregiver demonstrates understanding of disease process, treatment plan, medications, and discharge instructions Progressing        PAIN - ADULT     Verbalizes/displays adequate comfort level or baseline comfort level Progressing        Potential for Falls     Patient will remain free of falls Progressing        Prexisting or High Potential for Compromised Skin Integrity     Skin integrity is maintained or improved Progressing        SAFETY ADULT     Maintain or return to baseline ADL function Progressing     Maintain or return mobility status to optimal level Progressing     Patient will remain free of falls Progressing

## 2018-02-25 NOTE — PROGRESS NOTES
[facility] Internal Medicine Progress Note  Patient: Reggie Maguire 80 y o  female   MRN: [id number]  PCP: Elroy Trimble DO  Unit/Bed#: 388-82 Encounter: [id number]  Date Of Visit: 18    Assessment:    Principal Problem:    Hypernatremia  Active Problems:    Essential hypertension    Gastroesophageal reflux disease without esophagitis    Coronary artery disease involving native coronary artery of native heart    H/O aortic valve replacement    Hyperlipidemia    Stage 3 chronic kidney disease    Chronic diastolic CHF (congestive heart failure) (HCC)    Dysphagia    Elevated platelet count (HCC)    Anemia    Dehydration    Hypoalbuminemia    Interstitial lung disease (HCC)    Pulmonary fibrosis (HCC)    Hypokalemia    Pulmonary nodule    Closed compression fracture of L1 lumbar vertebra (HCC)    Closed compression fracture of L2 lumbar vertebra (HCC)    Closed compression fracture of L4 lumbar vertebra (HCC)    Severe protein-calorie malnutrition (HCC)    BMI less than 19,adult      Plan:    ·  Hypernatremia due to limited p o  intake  · Hypernatremia has resolved but patient is not eating adequate food or liquid  · Continue with Marinol twice daily  · Add Remeron 15 mg at bedtime  · Encourage p o  Intake  · Follow up repeat labs tomorrow  · Discontinue all nonessential medications including calcium/vitamin-D supplement  ·  Dysphagia  ·   Continue with modified diet  ·  anemia  ·  hemoglobin stable  ·  interstitial lung disease / pulmonary fibrosis  ·  breathing status stable  ·  patient is a pulse ox of 100% on 1 L  Nasal cannula        Current Length of Stay: 6 day(s)    Current Patient Status: Inpatient     Code Status: Level 3 - DNAR and DNI      Subjective:    Patient has no acute complaints    Objective:     Vitals:   Temp (24hrs), Av 5 °F (36 4 °C), Min:96 7 °F (35 9 °C), Max:98 °F (36 7 °C)    HR:  [] 90  Resp:  [18] 18  BP: ()/(57-72) 104/57  SpO2:  [93 %-100 %] 100 %  Body mass index is 17 74 kg/m²  Input and Output Summary (last 24 hours): Intake/Output Summary (Last 24 hours) at 02/25/18 1148  Last data filed at 02/24/18 1203   Gross per 24 hour   Intake               10 ml   Output                0 ml   Net               10 ml       Physical Exam:     Physical Exam   Constitutional: She is oriented to person, place, and time  She appears well-developed  No distress  Frail / cachectic elderly female   HENT:   Head: Normocephalic  Eyes: Conjunctivae and EOM are normal  Pupils are equal, round, and reactive to light  Right eye exhibits no discharge  Left eye exhibits no discharge  Cardiovascular: Normal rate, regular rhythm and intact distal pulses  No murmur heard  Pulmonary/Chest: Effort normal and breath sounds normal  No respiratory distress  She has no wheezes  Abdominal: Soft  Bowel sounds are normal  She exhibits no distension  There is no tenderness  Neurological: She is alert and oriented to person, place, and time  No cranial nerve deficit  Coordination normal    Skin: She is not diaphoretic  Nursing note and vitals reviewed  Additional Data:     Labs:      Results from last 7 days  Lab Units 02/25/18  0521  02/20/18  0623   WBC Thousand/uL 6 39  < > 8 17   HEMOGLOBIN g/dL 10 0*  < > 10 7*   HEMATOCRIT % 31 4*  < > 34 8   PLATELETS Thousands/uL 311  < > 459*   NEUTROS PCT %  --   --  68   LYMPHS PCT %  --   --  19   LYMPHO PCT % 27  < >  --    MONOS PCT %  --   --  8   MONO PCT MAN % 4  < >  --    EOS PCT %  --   --  5   EOSINO PCT MANUAL % 1  < >  --    < > = values in this interval not displayed      Results from last 7 days  Lab Units 02/25/18  0521   SODIUM mmol/L 144   POTASSIUM mmol/L 3 6   CHLORIDE mmol/L 108   CO2 mmol/L 28   BUN mg/dL 10   CREATININE mg/dL 0 71   CALCIUM mg/dL 8 3   TOTAL PROTEIN g/dL 6 2*   BILIRUBIN TOTAL mg/dL 0 70   ALK PHOS U/L 37*   ALT U/L 13   AST U/L 32   GLUCOSE RANDOM mg/dL 99       Results from last 7 days  Lab Units 02/19/18  1345   INR  1 38*       * I Have Reviewed All Lab Data Listed Above  * Additional Pertinent Lab Tests Reviewed: Finesse [facility] Admission Reviewed      Recent Cultures (last 7 days):       Results from last 7 days  Lab Units 02/19/18  1754 02/19/18  1345   BLOOD CULTURE   --  No Growth After 5 Days  No Growth After 5 Days     URINE CULTURE  No Growth <1000 cfu/mL  --    INFLUENZA A PCR   --  None Detected   INFLUENZA B PCR   --  None Detected   RSV PCR   --  None Detected       Last 24 Hours Medication List:     Current Facility-Administered Medications:  acetaminophen 650 mg Oral Q6H PRN Carlos Mcfarland DO   dextrose 25 mL Intravenous PRN Carlos Mcfarland DO   docusate sodium 100 mg Oral BID PRN Carlos Mcfarland DO   dronabinol 5 mg Oral BID before lunch/dinner Alex Beltran DO   heparin (porcine) 5,000 Units Subcutaneous Q12H [facility] Carlos Mcfarland DO   menthol-zinc oxide  Topical BID Carlos Mcfarland DO   ondansetron 4 mg Intravenous Q6H PRN Carlos Mcfarland DO        Today, Patient Was Seen By: Alex Beltran DO

## 2018-02-25 NOTE — PROGRESS NOTES
Patient has not voided all shift, bladder scanned for 662ml, 1 time straight cath attempted per order  Catheter inserted into urethra with no urine return  Upon removal of catheter patient incontinent of large amount of urine, post void scan 177ml  Will continue to monitor

## 2018-02-26 PROBLEM — K12.30 MUCOSITIS ORAL: Status: ACTIVE | Noted: 2018-01-01

## 2018-02-26 NOTE — PLAN OF CARE
Problem: DISCHARGE PLANNING - CARE MANAGEMENT  Goal: Discharge to post-acute care or home with appropriate resources  INTERVENTIONS:  - Conduct assessment to determine patient/family and health care team treatment goals, and need for post-acute services based on payer coverage, community resources, and patient preferences, and barriers to discharge  - Address psychosocial, clinical, and financial barriers to discharge as identified in assessment in conjunction with the patient/family and health care team  - Arrange appropriate level of post-acute services according to patient's   needs and preference and payer coverage in collaboration with the physician and health care team  - Communicate with and update the patient/family, physician, and health care team regarding progress on the discharge plan  - Arrange appropriate transportation to post-acute venues   Outcome: Not Progressing  -poor po intake and not taking medications  -discussion to take place with pt's family

## 2018-02-26 NOTE — PROGRESS NOTES
[facility] Internal Medicine Progress Note  Patient: Gonzales Harris 80 y o  female   MRN: [id number]  PCP: Mary Andrews DO  Unit/Bed#: 587-14 Encounter: [id number]  Date Of Visit: 02/26/18    Assessment:    Principal Problem:    Mucositis oral  Active Problems:    Essential hypertension    Gastroesophageal reflux disease without esophagitis    Coronary artery disease involving native coronary artery of native heart    H/O aortic valve replacement    Hyperlipidemia    Stage 3 chronic kidney disease    Chronic diastolic CHF (congestive heart failure) (HCC)    Dysphagia    Hypernatremia    Elevated platelet count (HCC)    Anemia    Dehydration    Hypoalbuminemia    Interstitial lung disease (HCC)    Pulmonary fibrosis (HCC)    Hypokalemia    Pulmonary nodule    Closed compression fracture of L1 lumbar vertebra (HCC)    Closed compression fracture of L2 lumbar vertebra (HCC)    Closed compression fracture of L4 lumbar vertebra (HCC)    Severe protein-calorie malnutrition (HCC)    BMI less than 19,adult      Plan:    Patient appears to have severe mucositis, add Magic mouthwash 3 times daily, discussed with nursing staff  · Hypernatremia due to limited p o  intake  ¨ Hypernatremia has resolved but patient is not eating adequate food or liquid  ¨ Continue with Marinol twice daily  ¨ Add Remeron 15 mg at bedtime  ¨ Severe protein calorie malnutrition BMI of 18  ¨ Encourage p o  Intake  ¨ Follow up daily labs  ¨ Discontinue all nonessential medications including calcium/vitamin-D supplement  ·  Dysphagia  ¨   Continue with modified diet  ·  anemia  ¨  hemoglobin stable  ·  interstitial lung disease / pulmonary fibrosis  ¨  breathing status stable  ¨  patient is a pulse ox of 100% on 1 L  Nasal cannula         VTE Pharmacologic Prophylaxis:   Pharmacologic: Heparin  Mechanical VTE Prophylaxis in Place: Yes    Current Length of Stay: 7 day(s)    Current Patient Status: Inpatient     Code Status: Level 3 - DNAR and DNI      Subjective:   Mouth pain, patient is demented, she is a poor historian  Objective:     Vitals:   Temp (24hrs), Av 1 °F (36 7 °C), Min:97 8 °F (36 6 °C), Max:98 3 °F (36 8 °C)    HR:  [94-98] 96  Resp:  [18] 18  BP: (107-127)/(67-78) 114/78  SpO2:  [96 %-100 %] 100 %  Body mass index is 18 02 kg/m²  Input and Output Summary (last 24 hours): Intake/Output Summary (Last 24 hours) at 18 1405  Last data filed at 18 0842   Gross per 24 hour   Intake                0 ml   Output                0 ml   Net                0 ml       Physical Exam:     Physical Exam   Constitutional: She is oriented to person, place, and time  She appears well-developed and well-nourished  No distress  HENT:   Right Ear: External ear normal    Severe mucositis noted in the oral cavity   Eyes: Pupils are equal, round, and reactive to light  Pulmonary/Chest: Effort normal and breath sounds normal  No respiratory distress  She has no wheezes  Musculoskeletal: Normal range of motion  Neurological: She is alert and oriented to person, place, and time  No cranial nerve deficit  Coordination normal    Skin: Skin is warm and dry  No rash noted  No erythema  Psychiatric: She has a normal mood and affect  Her behavior is normal  Judgment and thought content normal    Nursing note and vitals reviewed        Additional Data:     Labs:      Results from last 7 days  Lab Units 18  0438   WBC Thousand/uL 6 24   HEMOGLOBIN g/dL 10 7*   HEMATOCRIT % 33 0*   PLATELETS Thousands/uL 312   NEUTROS PCT % 54   LYMPHS PCT % 30   MONOS PCT % 10   EOS PCT % 5       Results from last 7 days  Lab Units 18  0438   SODIUM mmol/L 144   POTASSIUM mmol/L 3 3*   CHLORIDE mmol/L 107   CO2 mmol/L 28   BUN mg/dL 9   CREATININE mg/dL 0 70   CALCIUM mg/dL 8 6   TOTAL PROTEIN g/dL 6 5   BILIRUBIN TOTAL mg/dL 0 80   ALK PHOS U/L 40*   ALT U/L 17   AST U/L 31   GLUCOSE RANDOM mg/dL 76           * I Have Reviewed All Lab Data Listed Above  * Additional Pertinent Lab Tests Reviewed:  [address] [facility] Admission Reviewed    Recent Cultures (last 7 days):       Results from last 7 days  Lab Units 02/19/18  5944   URINE CULTURE  No Growth <1000 cfu/mL       Last 24 Hours Medication List:     Current Facility-Administered Medications:  acetaminophen 650 mg Oral Q6H PRN Conrad Del Rio DO   al mag oxide-diphenhydramine-lidocaine viscous 10 mL Swish & Swallow TID AC Capo Morrison DO   dextrose 25 mL Intravenous PRN Conrad Del Rio DO   docusate sodium 100 mg Oral BID PRN Conrad Del Rio DO   dronabinol 5 mg Oral BID before lunch/dinner Capo Morrison DO   heparin (porcine) 5,000 Units Subcutaneous Q12H [address] Conrad Del Rio DO   menthol-zinc oxide  Topical BID Conrad Del Rio DO   mirtazapine 15 mg Oral HS Capo Morrison DO   ondansetron 4 mg Intravenous Q6H PRN Conrad Del Rio DO        Today, Patient Was Seen By: Capo Morrison DO

## 2018-02-26 NOTE — PROGRESS NOTES
Progress Note - Nephrology   Denise Cisneros 80 y o  female MRN: [id number]  Unit/Bed#: 416-01 Encounter: [id number]    A/P:  1  Hypernatremia due to hypovolemia and lack of free water  2/25: Patient ordered 1x dose of D5W infusion at 2300 last night and is finishing up right now  She has decreased PO intake and appears to be failing in keeping up with her free water intake  2/26: Serum sodium is increasing, we will need to restart IV hydration with D5W to keep her out of hyernatremia  2  Chronic kidney disease stage 3   3  Chronic tubulointerstitial disease  4  Hypokalemia: will reorder supplement   2/25: Patient's K is low normal, will observe for today, reassess in the morning  The low normal value is likely due to lack of good nutritional support  2/26: I will order for 40 mEq of KCL IV today  5  Chronic diastolic CHF: appears compensated  6  Hypertension       Follow up reason for today's visit: Hypernatremia    Hypernatremia    Patient Active Problem List   Diagnosis    Essential hypertension    Gastroesophageal reflux disease without esophagitis    Coronary artery disease involving native coronary artery of native heart    H/O aortic valve replacement    Hyperlipidemia    Stage 3 chronic kidney disease    Chronic diastolic CHF (congestive heart failure) (HCC)    Dysphagia    Hypernatremia    Elevated platelet count (HCC)    Anemia    Dehydration    Hypoalbuminemia    Interstitial lung disease (HCC)    Pulmonary fibrosis (HCC)    Hypokalemia    Pulmonary nodule    Closed compression fracture of L1 lumbar vertebra (HCC)    Closed compression fracture of L2 lumbar vertebra (HCC)    Closed compression fracture of L4 lumbar vertebra (HCC)    Severe protein-calorie malnutrition (HCC)    BMI less than 19,adult         Subjective:    Patient without acute complaints      Objective:     Vitals: Blood pressure 107/69, pulse 94, temperature 98 3 °F (36 8 °C), temperature source Temporal, resp  rate 18, height 5' 2" (1 575 m), weight 44 7 kg (98 lb 8 7 oz), SpO2 100 %  ,Body mass index is 18 02 kg/m²  Weight (last 2 days)     Date/Time   Weight    02/26/18 0546  44 7 (98 55)    02/25/18 0600  44 (97)    02/24/18 0531  42 8 (94 36)              No intake or output data in the 24 hours ending 02/26/18 0637  I/O last 3 completed shifts: In: 338 3 [P O :10; I V :328 3]  Out: -          Physical Exam: /69 (BP Location: Left arm)   Pulse 94   Temp 98 3 °F (36 8 °C) (Temporal)   Resp 18   Ht 5' 2" (1 575 m)   Wt 44 7 kg (98 lb 8 7 oz)   SpO2 100%   BMI 18 02 kg/m²     General Appearance:    Alert, cooperative, no distress   Head:    Normocephalic, without obvious abnormality, atraumatic   Eyes:    Conjunctiva/corneas clear   Ears:    Normal external ears   Nose:   Nares normal, septum midline, mucosa normal, no drainage    or sinus tenderness   Throat:   Lips, mucosa, and tongue normal; teeth and gums normal   Neck:   Supple   Back:     Symmetric, no curvature, ROM normal, no CVA tenderness   Lungs:     Clear to auscultation bilaterally, respirations unlabored   Chest wall:    No tenderness or deformity   Heart:    Regular rate and rhythm, S1 and S2 normal, no murmur, rub   or gallop   Abdomen:     Soft, non-tender, bowel sounds active   Extremities:   Extremities normal, atraumatic, no cyanosis or edema   Skin:   Skin color, texture, turgor normal, no rashes or lesions   Lymph nodes:   Cervical normal   Neurologic:   CNII-XII intact            Lab, Imaging and other studies: I have personally reviewed pertinent labs    CBC:   Lab Results   Component Value Date    WBC 6 24 02/26/2018    HGB 10 7 (L) 02/26/2018    HCT 33 0 (L) 02/26/2018    MCV 94 02/26/2018     02/26/2018    MCH 30 4 02/26/2018    MCHC 32 4 02/26/2018    RDW 14 3 02/26/2018    MPV 9 9 02/26/2018     CMP:   Lab Results   Component Value Date     02/26/2018    K 3 3 (L) 02/26/2018     02/26/2018 CO2 28 02/26/2018    ANIONGAP 9 02/26/2018    BUN 9 02/26/2018    CREATININE 0 70 02/26/2018    GLUCOSE 76 02/26/2018    CALCIUM 8 6 02/26/2018    AST 31 02/26/2018    ALT 17 02/26/2018    ALKPHOS 40 (L) 02/26/2018    PROT 6 5 02/26/2018    BILITOT 0 80 02/26/2018    EGFR 74 02/26/2018         Results from last 7 days  Lab Units 02/26/18  0438 02/25/18  0521 02/24/18  0506   SODIUM mmol/L 144 144 141   POTASSIUM mmol/L 3 3* 3 6 3 5   CHLORIDE mmol/L 107 108 105   CO2 mmol/L 28 28 30   BUN mg/dL 9 10 9   CREATININE mg/dL 0 70 0 71 0 70   CALCIUM mg/dL 8 6 8 3 8 5   TOTAL PROTEIN g/dL 6 5 6 2* 6 3*   BILIRUBIN TOTAL mg/dL 0 80 0 70 0 80   ALK PHOS U/L 40* 37* 36*   ALT U/L 17 13 12   AST U/L 31 32 31   GLUCOSE RANDOM mg/dL 76 99 90         Phosphorus:   No results found for: PHOS  Magnesium:   Lab Results   Component Value Date    MG 2 0 02/26/2018     Urinalysis: No results found for: COLORU, CLARITYU, SPECGRAV, PHUR, LEUKOCYTESUR, NITRITE, PROTEINUA, GLUCOSEU, KETONESU, BILIRUBINUR, BLOODU  Ionized Calcium: No results found for: CAION  Coagulation: No results found for: PT, INR, APTT  Troponin: No results found for: TROPONINI  ABG: No results found for: PHART, OOE1JER, PO2ART, IWF5MQQ, J1KSCFYR, BEART, SOURCE  Radiology review:     IMAGING  Procedure: Ct Abdomen Pelvis Wo Contrast    Result Date: 2/20/2018  Narrative: CT ABDOMEN AND PELVIS WITHOUT IV CONTRAST INDICATION:  Nausea, vomiting and diarrhea  COMPARISON: CT abdomen pelvis 3/23/2017 TECHNIQUE:  CT examination of the abdomen and pelvis was performed without intravenous contrast   Axial, sagittal, and coronal 2D reformatted images were created from the source data and submitted for interpretation  Radiation dose length product (DLP) for this visit:  243 88 mGy-cm     This examination, like all CT scans performed in the [facility], was performed utilizing techniques to minimize radiation dose exposure, including the use of iterative reconstruction and automated exposure control  Enteric contrast was administered  FINDINGS: ABDOMEN LOWER CHEST:  There are bilateral fibrotic changes  Bibasilar dependent opacities likely represent atelectasis  3 mm left upper lobe nodule (series 2 image 6)  There are postsurgical changes of the heart status post aortic valve replacement  There are atherosclerotic changes of the aorta  There is enlargement of the main pulmonary artery  LIVER/BILIARY TREE:  Stable calcifications in the anterior right lobe of the liver  No evidence of liver mass or ductal dilation on this noncontrast evaluation  GALLBLADDER:  There are gallstone(s) within the gallbladder, without pericholecystic inflammatory changes  Mild gallbladder distention  SPLEEN:  Unremarkable  PANCREAS:  The pancreas is atrophic  No pancreatic mass  ADRENAL GLANDS:  Unremarkable  KIDNEYS/URETERS:  Unremarkable  No hydronephrosis  STOMACH AND BOWEL:  There is a small hiatal hernia  Evaluation of the stomach and bowel is limited given lack of oral and intravenous contrast material   No evident bowel obstruction  Mild stool retention throughout the colon  There is colonic diverticulosis without evidence of diverticulitis  APPENDIX:  No findings to suggest appendicitis  ABDOMINOPELVIC CAVITY:  No ascites or free intraperitoneal air  No lymphadenopathy  VESSELS:  Atherosclerotic changes are present  No evidence of aneurysm  PELVIS REPRODUCTIVE ORGANS:  Unremarkable for patient's age  URINARY BLADDER:  Unremarkable  ABDOMINAL WALL/INGUINAL REGIONS:  Unremarkable  OSSEOUS STRUCTURES:  There are compression deformities of the L1, L2 and L4 vertebral bodies  L1 vertebral body compression deformity is similar in appearance to 2/11/2018 exam   L2 and L4 compression deformities are age indeterminate  Impression: 1  Limited evaluation secondary to lack of oral and intravenous contrast   Diverticulosis without evidence of acute diverticulitis   2  Cholelithiasis and mild gallbladder wall distention  If there is focal right upper quadrant pain further evaluation with right upper quadrant ultrasound could be considered  3   Stable compression deformity of the L1 vertebral body  Age indeterminate compression deformities of L2 and L4  4   Fibrotic changes at the lung bases with patchy bibasilar airspace opacities likely representing atelectasis  5   3 mm left upper lobe pulmonary nodule  Based on current Fleischner Society 2017 Guidelines on incidental pulmonary nodule, no routine follow-up is needed if the patient is considered low risk for lung cancer  If the patient is considered high risk for lung cancer, 12 month follow-up non-contrast chest CT is recommended  Considerations related to the patient's age and/or comorbidities may be used to alter these recommendations  Workstation performed: [id number]     Procedure: Xr Chest 1 View Portable    Result Date: 2/19/2018  Narrative: CHEST INDICATION:  Dehydration  COMPARISON:  Chest CT and radiographs dated 2/11/2018  Radiographs dated 1/6/2018  VIEWS:  Frontal and lateral projections IMAGES:  1 FINDINGS: Normal lung volumes  Chronic, peripheral interstitial reticular markings  No superimposed acute consolidation  Blunting of the right costophrenic angle is chronic and likely due to fibrosis  No layering effusions  No pneumothorax  Heart size within normal limits  Prior aortic root repair  Aortic tortuosity without aneurysm  Pulmonary vasculature is largely obscured by interstitial process  No janny cephalization  Osteopenia  High riding humeri with remodeling of the acetabular undersurfaces--suggestive of chronic rotator cuff tears  No acute osseous findings demonstrated  Thoracic spine poorly evaluated due to underpenetration  Impression: Chronic interstitial fibrotic disease without acute findings or significant change from prior   Workstation performed: [id number]     Procedure: Ct Head Without Contrast    Result Date: 2/19/2018  Narrative: CT BRAIN - WITHOUT CONTRAST INDICATION: chest pain  History taken directly from the electronic ordering system  COMPARISON:  None  TECHNIQUE:  CT examination of the brain was performed  In addition to axial images, coronal reformatted images were created and submitted for interpretation  Radiation dose length product (DLP) for this visit:  1076 81 mGy-cm   This examination, like all CT scans performed in the [facility], was performed utilizing techniques to minimize radiation dose exposure, including the use of iterative reconstruction and automated exposure control  IMAGE QUALITY:  Diagnostic  FINDINGS:  PARENCHYMA:  Decreased attenuation is noted in the supratentorial white matter demonstrating an appearance most consistent with moderate microangiopathic change  No intracranial mass, mass effect or midline shift  No CT signs of acute infarction  There is no parenchymal hemorrhage  VENTRICLES AND EXTRA-AXIAL SPACES:  Age-appropriate volume loss is noted  No hydrocephalus  VISUALIZED ORBITS AND PARANASAL SINUSES:  Orbits appear normal   There is near complete opacification of the right maxillary sinus  No fluid levels are seen  CALVARIUM AND EXTRACRANIAL SOFT TISSUES:   Normal      Impression: No acute intracranial abnormality  Microangiopathic changes  Workstation performed: [id number]     Procedure: Us Gallbladder    Result Date: 2/20/2018  Narrative: RIGHT UPPER QUADRANT ULTRASOUND INDICATION:  Cholelithiasis, distended gallbladder  COMPARISON:  CT abdomen pelvis 2/20/2018 TECHNIQUE:   Real-time ultrasound of the right upper quadrant was performed with a curvilinear transducer with both volumetric sweeps and still imaging techniques  FINDINGS: PANCREAS:  Portions of the pancreas are obscured by bowel gas  Visualized portions of the pancreas are unremarkable  AORTA AND IVC:  Visualized portions are normal for patient age   LIVER: Size:  Within normal range  The liver measures 10 3 cm in the midclavicular line  Contour:  Surface contour is smooth  Parenchyma:  Echogenicity and echotexture are within normal limits  No evidence of suspicious mass  Limited imaging of the main portal vein shows it to be patent and hepatopetal   BILIARY: The gallbladder is distended  No wall thickening or pericholecystic fluid  Single dependent mobile calculus without sludge  No sonographic Neff's sign  No intrahepatic biliary dilatation  CBD measures 4 5 mm  No choledocholithiasis  KIDNEY: Right kidney measures 8 6 x 4 3 cm  Within normal limits  ASCITES:   None  Impression:  Gallbladder distention and cholelithiasis without other evidence of acute cholecystitis   Workstation performed: [id number]       Current Facility-Administered Medications   Medication Dose Route Frequency    acetaminophen (TYLENOL) tablet 650 mg  650 mg Oral Q6H PRN    dextrose 50 % IV solution 25 mL  25 mL Intravenous PRN    docusate sodium (COLACE) capsule 100 mg  100 mg Oral BID PRN    dronabinol (MARINOL) capsule 5 mg  5 mg Oral BID before lunch/dinner    heparin (porcine) subcutaneous injection 5,000 Units  5,000 Units Subcutaneous Q12H [address]    menthol-zinc oxide (CALMOSEPTINE) 0 44-20 6 % ointment   Topical BID    mirtazapine (REMERON) tablet 15 mg  15 mg Oral HS    ondansetron (ZOFRAN) injection 4 mg  4 mg Intravenous Q6H PRN    potassium chloride 20 mEq IVPB (premix)  20 mEq Intravenous Q2H     Medications Discontinued During This Encounter   Medication Reason    cefuroxime (CEFTIN) 500 mg tablet     sodium chloride 0 9 % infusion     aspirin chewable tablet 81 mg     atorvastatin (LIPITOR) tablet 10 mg     calcium carbonate (OYSTER SHELL,OSCAL) 500 mg tablet 1 tablet     diltiazem (CARDIZEM CD) 24 hr capsule 120 mg     multivitamin-minerals (CENTRUM) tablet 1 tablet     sodium chloride infusion 0 45 %     potassium chloride 10 % oral solution 40 mEq     aspirin chewable tablet 81 mg     atorvastatin (LIPITOR) tablet 10 mg     calcium carbonate-vitamin D (OSCAL-D) 500 mg-200 units per tablet 1 tablet     diltiazem (CARDIZEM CD) 24 hr capsule 120 mg     albuterol inhalation solution 2 5 mg     dextrose 5 % and sodium chloride 0 2 % infusion     dextrose 5 % and sodium chloride 0 45 % with KCl 20 mEq/L infusion     potassium chloride 20 mEq IVPB (premix)     dextrose 5 % infusion     dronabinol (MARINOL) capsule 2 5 mg     benzonatate (TESSALON PERLES) capsule 100 mg     calcium carbonate-vitamin D (OSCAL-D) 500 mg-200 units per tablet 1 tablet     pantoprazole (PROTONIX) EC tablet 40 mg        Salvatore Stuart DO

## 2018-02-26 NOTE — PHYSICAL THERAPY NOTE
PT treatment note      02/26/18 1122   Cognition   Overall Cognitive Status Impaired   Arousal/Participation Alert   Following Commands Follows one step commands with increased time or repetition   Subjective   Subjective Agreeable to therapy  Bed Mobility   Rolling R 3  Moderate assistance   Additional items Assist x 1;Verbal cues   Rolling L 3  Moderate assistance   Additional items Assist x 1;Verbal cues   Supine to Sit 3  Moderate assistance   Additional items Assist x 1;HOB elevated; Bedrails; Increased time required;Verbal cues;LE management   Transfers   Sit to Stand 4  Minimal assistance   Additional items Assist x 2;Verbal cues   Stand to Sit 4  Minimal assistance   Additional items Assist x 2;Armrests; Verbal cues   Stand pivot 4  Minimal assistance   Additional items Assist x 2;Verbal cues   Ambulation/Elevation   Gait pattern Short stride   Gait Assistance 4  Minimal assist   Additional items Assist x 2;Verbal cues   Assistive Device Rolling walker   Distance 3'   Balance   Static Sitting Fair   Dynamic Sitting Fair   Static Standing Fair   Dynamic Standing Fair   Ambulatory Fair  (RW)   Endurance Deficit   Endurance Deficit Yes   Activity Tolerance   Activity Tolerance Patient limited by fatigue   Assessment   Prognosis Good   Problem List Decreased strength;Decreased endurance; Impaired balance;Decreased mobility; Decreased safety awareness   Assessment Performing functional mobility at (mod/min) x 2 level of function  Able to ambulate short distance  Fatigues quickly  Decreased balance and endurance  Pt is in need of continued activity in PT to improve strength balance endurance and functional mobility  Pt unsafe to return to [facility] and will require short term rehab on discharge  Plan   Treatment/Interventions Functional transfer training;LE strengthening/ROM; Therapeutic exercise; Endurance training;Bed mobility;Gait training   Progress Slow progress, decreased activity tolerance   Recommendation Recommendation Short-term skilled PT   Pt  OOB with call bell within reach, scd's connected and turned on and alarm on at end of PT session

## 2018-02-26 NOTE — SOCIAL WORK
Continuing to follow pt's medical status  Plans are 5th floor on dc pending bed availability as it is uncertain when pt will be medically ready for dc  Will continue to follow

## 2018-02-27 NOTE — CASE MANAGEMENT
Continued Stay Review    Date: 2/27    Vital Signs: /77 (BP Location: Left arm)   Pulse (!) 106   Temp 97 7 °F (36 5 °C) (Temporal)   Resp 18   Ht 5' 2" (1 575 m)   Wt 42 1 kg (92 lb 13 oz)   SpO2 96%   BMI 16 98 kg/m²     Medications:   Scheduled Meds:   Current Facility-Administered Medications:  acetaminophen 650 mg Oral Q6H [address] Angelic Jackson, DO   al mag oxide-diphenhydramine-lidocaine viscous 10 mL Swish & Swallow TID AC Angelic Jackson, DO   dextrose 25 mL Intravenous PRN Lorjennye Levi, DO   heparin (porcine) 5,000 Units Subcutaneous Q12H [address] Abdullahi Sims, DO   menthol-zinc oxide  Topical BID Abdullahi Sims, DO   mirtazapine 15 mg Oral HS Jasper Joey, DO   ondansetron 4 mg Intravenous Q6H PRN Lorjennye Levi, DO     Continuous Infusions:    PRN Meds: dextrose    ondansetron  Abnormal Labs/Diagnostic Results: pt inr   15 9  1 28, na   146, cl   109    Age/Sex: 80 y o  female     Assessment/Plan: Assessment:     Principal Problem:    Mucositis oral  Active Problems:    Essential hypertension    Gastroesophageal reflux disease without esophagitis    Coronary artery disease involving native coronary artery of native heart    H/O aortic valve replacement    Hyperlipidemia    Stage 3 chronic kidney disease    Chronic diastolic CHF (congestive heart failure) (HCC)    Dysphagia    Hypernatremia    Elevated platelet count (HCC)    Anemia    Dehydration    Hypoalbuminemia    Interstitial lung disease (HCC)    Pulmonary fibrosis (HCC)    Hypokalemia    Pulmonary nodule    Closed compression fracture of L1 lumbar vertebra (HCC)    Closed compression fracture of L2 lumbar vertebra (HCC)    Closed compression fracture of L4 lumbar vertebra (HCC)    Severe protein-calorie malnutrition (HCC)    BMI less than 19,adult   Plan:   Patient appears to have severe mucositis, add Magic mouthwash 3 times daily, discussed with nursing staff    Hypernatremia due to limited p o  intake  ?  Hypernatremia has resolved but patient is not eating adequate food or liquid  ? Continue with Marinol twice daily  ? Add Remeron 15 mg at bedtime  ? Severe protein calorie malnutrition BMI of 18  ? Encourage p o  Intake  ? Follow up daily labs  ? Discontinue all nonessential medications including calcium/vitamin-D supplement  ·  Dysphagia  ?   Continue with modified diet  ·  anemia  ?  hemoglobin stable  ·  interstitial lung disease / pulmonary fibrosis  ?  breathing status stable  ?  patient is a pulse ox of 100% on 1 L  Nasal cannula    VTE Pharmacologic Prophylaxis:   Pharmacologic: Heparin  Mechanical VTE Prophylaxis in Place: Yes   Current Length of Stay: 7 day(s)   Current Patient Status: Inpatient    Code Status: Level 3 - DNAR and DNI  Discharge Plan: TBD       Nephrology progress note 2/27  A/P:  1  Hypernatremia due to hypovolemia and lack of free water  2/25: Patient ordered 1x dose of D5W infusion at 2300 last night and is finishing up right now  She has decreased PO intake and appears to be failing in keeping up with her free water intake  2/26: Serum sodium is increasing, we will need to restart IV hydration with D5W to keep her out of hyernatremia  2/27: Labs this AM remain pending, PO intake about the same ---> poor  2  Chronic kidney disease stage 3   3  Chronic tubulointerstitial disease  4  Hypokalemia: will reorder supplement              2/25: Patient's K is low normal, will observe for today, reassess in the morning  The low normal value is likely due to lack of good nutritional support  2/26: I will order for 40 mEq of KCL IV today  5  Chronic diastolic CHF: appears compensated  6   Hypertension

## 2018-02-27 NOTE — PROGRESS NOTES
NG tube placed as ordered  Confirmed placement via ausculation by this nurse and Maira Forrest RN Supervisor

## 2018-02-27 NOTE — PROGRESS NOTES
[facility] Internal Medicine Progress Note  Patient: Elizabeth Ring 80 y o  female   MRN: [id number]  PCP: Rik Wilson DO  Unit/Bed#: 422-21 Encounter: [id number]  Date Of Visit: 02/27/18    Assessment:    Principal Problem:    Mucositis oral  Active Problems:    Essential hypertension    Gastroesophageal reflux disease without esophagitis    Coronary artery disease involving native coronary artery of native heart    H/O aortic valve replacement    Hyperlipidemia    Stage 3 chronic kidney disease    Chronic diastolic CHF (congestive heart failure) (HCC)    Dysphagia    Hypernatremia    Elevated platelet count (HCC)    Anemia    Dehydration    Hypoalbuminemia    Interstitial lung disease (HCC)    Pulmonary fibrosis (HCC)    Hypokalemia    Pulmonary nodule    Closed compression fracture of L1 lumbar vertebra (HCC)    Closed compression fracture of L2 lumbar vertebra (HCC)    Closed compression fracture of L4 lumbar vertebra (HCC)    Severe protein-calorie malnutrition (HCC)    BMI less than 19,adult      Plan:    · Anorexia/severe protein calorie malnutrition  · Case discussed with the patient's family via telephone  · Place NG tube  · Start tube feeds  · Continue with Remeron  · Mucositis  · Magic mouthwash as tolerated  · Hypernatremia  · Chronic anemia  · History of interstitial lung disease  VTE Pharmacologic Prophylaxis:   Pharmacologic: Heparin  Mechanical VTE Prophylaxis in Place: Yes    Discussions with Specialists or Other Care Team Provider/Patient/Family:  Plan of care discussed the patient's family via telephone, plan of care discussed with nursing staff, plan of care discussed today with Nephrology  Time Spent for Care: 45 minutes  More than 50% of total time spent on counseling and coordination of care as described above      Current Length of Stay: 8 day(s)    Current Patient Status: Inpatient     Code Status: Level 3 - DNAR and DNI      Subjective:   Patient offers no complaints at rest   She is pleasantly confused/demented  Objective:     Vitals:   Temp (24hrs), Av 8 °F (36 6 °C), Min:97 4 °F (36 3 °C), Max:98 1 °F (36 7 °C)    HR:  [] 115  Resp:  [17-18] 18  BP: (100-137)/(59-77) 100/59  SpO2:  [96 %-99 %] 96 %  Body mass index is 16 98 kg/m²  Input and Output Summary (last 24 hours): Intake/Output Summary (Last 24 hours) at 18 1542  Last data filed at 18 1247   Gross per 24 hour   Intake                0 ml   Output                0 ml   Net                0 ml       Physical Exam:     Physical Exam   Constitutional: No distress  Cachectic elderly female, no acute distress   HENT:   Head: Normocephalic and atraumatic  Oropharynx appears to be erythematous, less ulceration noted today   Pulmonary/Chest: Effort normal and breath sounds normal  No respiratory distress  She has no wheezes  Abdominal: Soft  Bowel sounds are normal  She exhibits no distension  There is no tenderness  Musculoskeletal: Normal range of motion  She exhibits no edema, tenderness or deformity  Skin: She is not diaphoretic  Nursing note and vitals reviewed  Additional Data:     Labs:      Results from last 7 days  Lab Units 18  0519   WBC Thousand/uL 6 39   HEMOGLOBIN g/dL 11 2*   HEMATOCRIT % 35 5   PLATELETS Thousands/uL 322   NEUTROS PCT % 54   LYMPHS PCT % 28   MONOS PCT % 12   EOS PCT % 6       Results from last 7 days  Lab Units 18  0519   SODIUM mmol/L 146*   POTASSIUM mmol/L 3 9   CHLORIDE mmol/L 109*   CO2 mmol/L 29   BUN mg/dL 8   CREATININE mg/dL 0 75   CALCIUM mg/dL 8 9   TOTAL PROTEIN g/dL 7 3   BILIRUBIN TOTAL mg/dL 0 80   ALK PHOS U/L 44*   ALT U/L 16   AST U/L 35   GLUCOSE RANDOM mg/dL 70       Results from last 7 days  Lab Units 18  0519   INR  1 28*       * I Have Reviewed All Lab Data Listed Above  * Additional Pertinent Lab Tests Reviewed:  [facility]      Recent Cultures (last 7 days): Last 24 Hours Medication List:     Current Facility-Administered Medications:  acetaminophen 650 mg Oral Q6H [address] Rangel Sanchez DO   al mag oxide-diphenhydramine-lidocaine viscous 10 mL Swish & Swallow TID AC Rangel Sanchez DO   dextrose 5 % and sodium chloride 0 9 % 75 mL/hr Intravenous Once Rangel Sanchez DO   dextrose 25 mL Intravenous PRN Trey Lowe DO   heparin (porcine) 5,000 Units Subcutaneous Q12H [address] Trey Lowe DO   menthol-zinc oxide  Topical BID Trey Lowe DO   mirtazapine 15 mg Oral HS Rangel Sanchez DO   ondansetron 4 mg Intravenous Q6H PRN Trey Lowe DO        Today, Patient Was Seen By: Rangel Sanchez DO

## 2018-02-27 NOTE — OCCUPATIONAL THERAPY NOTE
OT Note     02/27/18 1033   Restrictions/Precautions   Other Precautions Fall Risk   Therapeutic Exercise - ROM   UE-ROM Yes   ROM- Right Upper Extremities   R Shoulder PROM; Flexion; Extension   R Elbow PROM;Elbow flexion;Elbow extension   R Wrist PROM; Wrist flexion;Wrist extension   R Hand (PROM forearm pro/supination)   R Weight/Reps/Sets All movements completed 2 sets/10   ROM - Left Upper Extremities    L Shoulder PROM; Flexion; Extension   L Elbow PROM;Elbow flexion;Elbow extension   L Wrist PROM; Wrist flexion;Wrist extension   L Hand PROM  (Forearm pro/supination)   L Weight/Reps/Sets All movements completed 2 sets/10   Activity Tolerance   Activity Tolerance (Tolerates tx session)   Assessment   Assessment Presents supine, indicates agreement to participate  PROM completed BUEs thru all availabkle ranges 2 sets  10  No apparent signs or symptoms of discomfort  Call bell in reach     Recommendation   Recommendation (Continue OT services )

## 2018-02-27 NOTE — PROGRESS NOTES
Progress Note - Nephrology   Gisel Mckeon 80 y o  female MRN: [id number]  Unit/Bed#: 416-01 Encounter: [id number]    A/P:  1  Hypernatremia due to hypovolemia and lack of free water  2/25: Patient ordered 1x dose of D5W infusion at 2300 last night and is finishing up right now  She has decreased PO intake and appears to be failing in keeping up with her free water intake  2/26: Serum sodium is increasing, we will need to restart IV hydration with D5W to keep her out of hyernatremia  2/27: Labs this AM remain pending, PO intake about the same ---> poor  2  Chronic kidney disease stage 3   3  Chronic tubulointerstitial disease  4  Hypokalemia: will reorder supplement   2/25: Patient's K is low normal, will observe for today, reassess in the morning  The low normal value is likely due to lack of good nutritional support  2/26: I will order for 40 mEq of KCL IV today  5  Chronic diastolic CHF: appears compensated  6  Hypertension       Follow up reason for today's visit: Hypernatremia    Mucositis oral    Patient Active Problem List   Diagnosis    Essential hypertension    Gastroesophageal reflux disease without esophagitis    Coronary artery disease involving native coronary artery of native heart    H/O aortic valve replacement    Hyperlipidemia    Stage 3 chronic kidney disease    Chronic diastolic CHF (congestive heart failure) (HCC)    Dysphagia    Hypernatremia    Elevated platelet count (HCC)    Anemia    Dehydration    Hypoalbuminemia    Interstitial lung disease (HCC)    Pulmonary fibrosis (HCC)    Hypokalemia    Pulmonary nodule    Closed compression fracture of L1 lumbar vertebra (HCC)    Closed compression fracture of L2 lumbar vertebra (HCC)    Closed compression fracture of L4 lumbar vertebra (HCC)    Severe protein-calorie malnutrition (HCC)    BMI less than 19,adult    Mucositis oral         Subjective:    Patient without acute complaints      Objective: Vitals: Blood pressure 128/67, pulse 92, temperature 97 8 °F (36 6 °C), temperature source Temporal, resp  rate 17, height 5' 2" (1 575 m), weight 42 1 kg (92 lb 13 oz), SpO2 97 %  ,Body mass index is 16 98 kg/m²  Weight (last 2 days)     Date/Time   Weight    02/27/18 0539  42 1 (92 81)    02/26/18 0546  44 7 (98 55)    02/25/18 0600  44 (97)                Intake/Output Summary (Last 24 hours) at 02/27/18 0646  Last data filed at 02/26/18 2113   Gross per 24 hour   Intake                0 ml   Output                0 ml   Net                0 ml     No intake/output data recorded  Physical Exam: /67 (BP Location: Right leg)   Pulse 92   Temp 97 8 °F (36 6 °C) (Temporal)   Resp 17   Ht 5' 2" (1 575 m)   Wt 42 1 kg (92 lb 13 oz)   SpO2 97%   BMI 16 98 kg/m²     General Appearance:    Alert, cooperative, no distress   Head:    Normocephalic, without obvious abnormality, atraumatic   Eyes:    Conjunctiva/corneas clear   Ears:    Normal external ears   Nose:   Nares normal, septum midline, mucosa normal, no drainage    or sinus tenderness   Throat:   Lips, mucosa, and tongue normal; teeth and gums normal   Neck:   Supple   Back:     Symmetric, no curvature, ROM normal, no CVA tenderness   Lungs:     Clear to auscultation bilaterally, respirations unlabored   Chest wall:    No tenderness or deformity   Heart:    Regular rate and rhythm, S1 and S2 normal, no murmur, rub   or gallop   Abdomen:     Soft, non-tender, bowel sounds active   Extremities:   Extremities normal, atraumatic, no cyanosis or edema   Skin:   Skin color, texture, turgor normal, no rashes or lesions   Lymph nodes:   Cervical normal   Neurologic:   CNII-XII intact            Lab, Imaging and other studies: I have personally reviewed pertinent labs    CBC:   Lab Results   Component Value Date    WBC 6 39 02/27/2018    HGB 11 2 (L) 02/27/2018    HCT 35 5 02/27/2018    MCV 95 02/27/2018     02/27/2018    MCH 29 9 02/27/2018 MCHC 31 5 02/27/2018    RDW 14 6 02/27/2018    MPV 10 5 02/27/2018     CMP:   No results found for: NA, K, CL, CO2, ANIONGAP, BUN, CREATININE, GLUCOSE, CALCIUM, AST, ALT, ALKPHOS, PROT, ALBUMIN, BILITOT, EGFR      Results from last 7 days  Lab Units 02/26/18  0438 02/25/18  0521 02/24/18  0506   SODIUM mmol/L 144 144 141   POTASSIUM mmol/L 3 3* 3 6 3 5   CHLORIDE mmol/L 107 108 105   CO2 mmol/L 28 28 30   BUN mg/dL 9 10 9   CREATININE mg/dL 0 70 0 71 0 70   CALCIUM mg/dL 8 6 8 3 8 5   TOTAL PROTEIN g/dL 6 5 6 2* 6 3*   BILIRUBIN TOTAL mg/dL 0 80 0 70 0 80   ALK PHOS U/L 40* 37* 36*   ALT U/L 17 13 12   AST U/L 31 32 31   GLUCOSE RANDOM mg/dL 76 99 90         Phosphorus:   No results found for: PHOS  Magnesium:   No results found for: MG  Urinalysis: No results found for: COLORU, CLARITYU, SPECGRAV, PHUR, LEUKOCYTESUR, NITRITE, PROTEINUA, GLUCOSEU, KETONESU, BILIRUBINUR, BLOODU  Ionized Calcium: No results found for: CAION  Coagulation: No results found for: PT, INR, APTT  Troponin: No results found for: TROPONINI  ABG: No results found for: PHART, RMT6UTR, PO2ART, HHR0YSX, H0FQIOXH, BEART, SOURCE  Radiology review:     IMAGING  Procedure: Ct Abdomen Pelvis Wo Contrast    Result Date: 2/20/2018  Narrative: CT ABDOMEN AND PELVIS WITHOUT IV CONTRAST INDICATION:  Nausea, vomiting and diarrhea  COMPARISON: CT abdomen pelvis 3/23/2017 TECHNIQUE:  CT examination of the abdomen and pelvis was performed without intravenous contrast   Axial, sagittal, and coronal 2D reformatted images were created from the source data and submitted for interpretation  Radiation dose length product (DLP) for this visit:  243 88 mGy-cm   This examination, like all CT scans performed in the [facility], was performed utilizing techniques to minimize radiation dose exposure, including the use of iterative  reconstruction and automated exposure control  Enteric contrast was administered   FINDINGS: ABDOMEN LOWER CHEST:  There are bilateral fibrotic changes  Bibasilar dependent opacities likely represent atelectasis  3 mm left upper lobe nodule (series 2 image 6)  There are postsurgical changes of the heart status post aortic valve replacement  There are atherosclerotic changes of the aorta  There is enlargement of the main pulmonary artery  LIVER/BILIARY TREE:  Stable calcifications in the anterior right lobe of the liver  No evidence of liver mass or ductal dilation on this noncontrast evaluation  GALLBLADDER:  There are gallstone(s) within the gallbladder, without pericholecystic inflammatory changes  Mild gallbladder distention  SPLEEN:  Unremarkable  PANCREAS:  The pancreas is atrophic  No pancreatic mass  ADRENAL GLANDS:  Unremarkable  KIDNEYS/URETERS:  Unremarkable  No hydronephrosis  STOMACH AND BOWEL:  There is a small hiatal hernia  Evaluation of the stomach and bowel is limited given lack of oral and intravenous contrast material   No evident bowel obstruction  Mild stool retention throughout the colon  There is colonic diverticulosis without evidence of diverticulitis  APPENDIX:  No findings to suggest appendicitis  ABDOMINOPELVIC CAVITY:  No ascites or free intraperitoneal air  No lymphadenopathy  VESSELS:  Atherosclerotic changes are present  No evidence of aneurysm  PELVIS REPRODUCTIVE ORGANS:  Unremarkable for patient's age  URINARY BLADDER:  Unremarkable  ABDOMINAL WALL/INGUINAL REGIONS:  Unremarkable  OSSEOUS STRUCTURES:  There are compression deformities of the L1, L2 and L4 vertebral bodies  L1 vertebral body compression deformity is similar in appearance to 2/11/2018 exam   L2 and L4 compression deformities are age indeterminate  Impression: 1  Limited evaluation secondary to lack of oral and intravenous contrast   Diverticulosis without evidence of acute diverticulitis  2   Cholelithiasis and mild gallbladder wall distention    If there is focal right upper quadrant pain further evaluation with right upper quadrant ultrasound could be considered  3   Stable compression deformity of the L1 vertebral body  Age indeterminate compression deformities of L2 and L4  4   Fibrotic changes at the lung bases with patchy bibasilar airspace opacities likely representing atelectasis  5   3 mm left upper lobe pulmonary nodule  Based on current Fleischner Society 2017 Guidelines on incidental pulmonary nodule, no routine follow-up is needed if the patient is considered low risk for lung cancer  If the patient is considered high risk for lung cancer, 12 month follow-up non-contrast chest CT is recommended  Considerations related to the patient's age and/or comorbidities may be used to alter these recommendations  Workstation performed: [id number]     Procedure: Xr Chest 1 View Portable    Result Date: 2/19/2018  Narrative: CHEST INDICATION:  Dehydration  COMPARISON:  Chest CT and radiographs dated 2/11/2018  Radiographs dated 1/6/2018  VIEWS:  Frontal and lateral projections IMAGES:  1 FINDINGS: Normal lung volumes  Chronic, peripheral interstitial reticular markings  No superimposed acute consolidation  Blunting of the right costophrenic angle is chronic and likely due to fibrosis  No layering effusions  No pneumothorax  Heart size within normal limits  Prior aortic root repair  Aortic tortuosity without aneurysm  Pulmonary vasculature is largely obscured by interstitial process  No janny cephalization  Osteopenia  High riding humeri with remodeling of the acetabular undersurfaces--suggestive of chronic rotator cuff tears  No acute osseous findings demonstrated  Thoracic spine poorly evaluated due to underpenetration  Impression: Chronic interstitial fibrotic disease without acute findings or significant change from prior  Workstation performed: [id number]     Procedure: Ct Head Without Contrast    Result Date: 2/19/2018  Narrative: CT BRAIN - WITHOUT CONTRAST INDICATION: chest pain   History taken directly from the electronic ordering system  COMPARISON:  None  TECHNIQUE:  CT examination of the brain was performed  In addition to axial images, coronal reformatted images were created and submitted for interpretation  Radiation dose length product (DLP) for this visit:  1076 81 mGy-cm   This examination, like all CT scans performed in the [facility], was performed utilizing techniques to minimize radiation dose exposure, including the use of iterative reconstruction and automated exposure control  IMAGE QUALITY:  Diagnostic  FINDINGS:  PARENCHYMA:  Decreased attenuation is noted in the supratentorial white matter demonstrating an appearance most consistent with moderate microangiopathic change  No intracranial mass, mass effect or midline shift  No CT signs of acute infarction  There is no parenchymal hemorrhage  VENTRICLES AND EXTRA-AXIAL SPACES:  Age-appropriate volume loss is noted  No hydrocephalus  VISUALIZED ORBITS AND PARANASAL SINUSES:  Orbits appear normal   There is near complete opacification of the right maxillary sinus  No fluid levels are seen  CALVARIUM AND EXTRACRANIAL SOFT TISSUES:   Normal      Impression: No acute intracranial abnormality  Microangiopathic changes  Workstation performed: [id number]     Procedure: Us Gallbladder    Result Date: 2/20/2018  Narrative: RIGHT UPPER QUADRANT ULTRASOUND INDICATION:  Cholelithiasis, distended gallbladder  COMPARISON:  CT abdomen pelvis 2/20/2018 TECHNIQUE:   Real-time ultrasound of the right upper quadrant was performed with a curvilinear transducer with both volumetric sweeps and still imaging techniques  FINDINGS: PANCREAS:  Portions of the pancreas are obscured by bowel gas  Visualized portions of the pancreas are unremarkable  AORTA AND IVC:  Visualized portions are normal for patient age  LIVER: Size:  Within normal range  The liver measures 10 3 cm in the midclavicular line  Contour:  Surface contour is smooth   Parenchyma: Echogenicity and echotexture are within normal limits  No evidence of suspicious mass  Limited imaging of the main portal vein shows it to be patent and hepatopetal   BILIARY: The gallbladder is distended  No wall thickening or pericholecystic fluid  Single dependent mobile calculus without sludge  No sonographic Neff's sign  No intrahepatic biliary dilatation  CBD measures 4 5 mm  No choledocholithiasis  KIDNEY: Right kidney measures 8 6 x 4 3 cm  Within normal limits  ASCITES:   None  Impression:  Gallbladder distention and cholelithiasis without other evidence of acute cholecystitis   Workstation performed: [id number]       Current Facility-Administered Medications   Medication Dose Route Frequency    acetaminophen (TYLENOL) tablet 650 mg  650 mg Oral Q6H [address]    al mag oxide-diphenhydramine-lidocaine viscous (MAGIC MOUTHWASH) suspension 10 mL  10 mL Swish & Swallow TID AC    dextrose 50 % IV solution 25 mL  25 mL Intravenous PRN    heparin (porcine) subcutaneous injection 5,000 Units  5,000 Units Subcutaneous Q12H [address]    menthol-zinc oxide (CALMOSEPTINE) 0 44-20 6 % ointment   Topical BID    mirtazapine (REMERON) tablet 15 mg  15 mg Oral HS    ondansetron (ZOFRAN) injection 4 mg  4 mg Intravenous Q6H PRN     Medications Discontinued During This Encounter   Medication Reason    cefuroxime (CEFTIN) 500 mg tablet     sodium chloride 0 9 % infusion     aspirin chewable tablet 81 mg     atorvastatin (LIPITOR) tablet 10 mg     calcium carbonate (OYSTER SHELL,OSCAL) 500 mg tablet 1 tablet     diltiazem (CARDIZEM CD) 24 hr capsule 120 mg     multivitamin-minerals (CENTRUM) tablet 1 tablet     sodium chloride infusion 0 45 %     potassium chloride 10 % oral solution 40 mEq     aspirin chewable tablet 81 mg     atorvastatin (LIPITOR) tablet 10 mg     calcium carbonate-vitamin D (OSCAL-D) 500 mg-200 units per tablet 1 tablet     diltiazem (CARDIZEM CD) 24 hr capsule 120 mg     albuterol inhalation solution 2 5 mg     dextrose 5 % and sodium chloride 0 2 % infusion     dextrose 5 % and sodium chloride 0 45 % with KCl 20 mEq/L infusion     potassium chloride 20 mEq IVPB (premix)     dextrose 5 % infusion     dronabinol (MARINOL) capsule 2 5 mg     benzonatate (TESSALON PERLES) capsule 100 mg     calcium carbonate-vitamin D (OSCAL-D) 500 mg-200 units per tablet 1 tablet     pantoprazole (PROTONIX) EC tablet 40 mg     dronabinol (MARINOL) capsule 5 mg     docusate sodium (COLACE) capsule 100 mg     acetaminophen (TYLENOL) tablet 650 mg        Jes Hogan DO

## 2018-02-27 NOTE — CASE MANAGEMENT
Continued Stay Review    Date: 2-27-18      Vital Signs: /77 (BP Location: Left arm)   Pulse (!) 106   Temp 97 7 °F (36 5 °C) (Temporal)   Resp 18   Ht 5' 2" (1 575 m)   Wt 42 1 kg (92 lb 13 oz)   SpO2 96%   BMI 16 98 kg/m²     Medications:   Scheduled Meds:   Current Facility-Administered Medications:  acetaminophen 650 mg Oral Q6H [address]   al mag oxide-diphenhydramine-lidocaine viscous 10 mL Swish & Swallow TID AC   dextrose 25 mL Intravenous PRN   heparin (porcine) 5,000 Units Subcutaneous Q12H [address]   menthol-zinc oxide  Topical BID   mirtazapine 15 mg Oral HS   ondansetron 4 mg Intravenous Q6H PRN     Continuous Infusions:    PRN Meds: dextrose    ondansetron    Abnormal Labs/Diagnostic Results:   WBC 6 39 Thousand/uL   RBC 3 74 (L) Million/uL   Hemoglobin 11 2 (L) g/dL     Sodium 146 (H) mmol/L   Potassium 3 9 mmol/L   Chloride 109 (H) mmol/L             Age/Sex: 80 y o  female     Assessment/Plan:     Nephrology   A/P:  1  Hypernatremia due to hypovolemia and lack of free water  2/25: Patient ordered 1x dose of D5W infusion at 2300 last night and is finishing up right now  She has decreased PO intake and appears to be failing in keeping up with her free water intake  2/26: Serum sodium is increasing, we will need to restart IV hydration with D5W to keep her out of hyernatremia  2/27: Labs this AM remain pending, PO intake about the same ---> poor  2  Chronic kidney disease stage 3   3  Chronic tubulointerstitial disease  4  Hypokalemia: will reorder supplement              2/25: Patient's K is low normal, will observe for today, reassess in the morning  The low normal value is likely due to lack of good nutritional support  2/26: I will order for 40 mEq of KCL IV today  5  Chronic diastolic CHF: appears compensated  6   Hypertension       Discharge Plan:  [facility]

## 2018-02-28 NOTE — PROGRESS NOTES
Progress Note - Nephrology   Alisia Campos 80 y o  female MRN: [id number]  Unit/Bed#: 416-01 Encounter: [id number]    A/P:  1  Hypernatremia due to hypovolemia and lack of free water  2/25: Patient ordered 1x dose of D5W infusion at 2300 last night and is finishing up right now  She has decreased PO intake and appears to be failing in keeping up with her free water intake  2/26: Serum sodium is increasing, we will need to restart IV hydration with D5W to keep her out of hyernatremia  2/27: Labs this AM remain pending, PO intake about the same ---> poor   2/28: On 200 ml free water flush q6 with TF, follow serum sodium and adjust free water as indicated  2  Chronic kidney disease stage 3   3  Chronic tubulointerstitial disease  4  Hypokalemia: will reorder supplement   2/25: Patient's K is low normal, will observe for today, reassess in the morning  The low normal value is likely due to lack of good nutritional support  2/26: I will order for 40 mEq of KCL IV today  2/28: K low normal, continue to monitor/  5  Chronic diastolic CHF: appears compensated  6   Hypertension       Follow up reason for today's visit: Hypernatremia    Mucositis oral    Patient Active Problem List   Diagnosis    Essential hypertension    Gastroesophageal reflux disease without esophagitis    Coronary artery disease involving native coronary artery of native heart    H/O aortic valve replacement    Hyperlipidemia    Stage 3 chronic kidney disease    Chronic diastolic CHF (congestive heart failure) (HCC)    Dysphagia    Hypernatremia    Elevated platelet count (HCC)    Anemia    Dehydration    Hypoalbuminemia    Interstitial lung disease (HCC)    Pulmonary fibrosis (HCC)    Hypokalemia    Pulmonary nodule    Closed compression fracture of L1 lumbar vertebra (HCC)    Closed compression fracture of L2 lumbar vertebra (HCC)    Closed compression fracture of L4 lumbar vertebra (HCC)    Severe protein-calorie malnutrition ([facility] [address] )    BMI less than 19,adult    Mucositis oral         Subjective:    Patient without acute complaints  Objective:     Vitals: Blood pressure 121/76, pulse 93, temperature 99 °F (37 2 °C), temperature source Temporal, resp  rate 18, height 5' 2" (1 575 m), weight 42 3 kg (93 lb 4 1 oz), SpO2 100 %  ,Body mass index is 17 06 kg/m²  Weight (last 2 days)     Date/Time   Weight    02/28/18 0610  42 3 (93 25)    02/27/18 0539  42 1 (92 81)    02/26/18 0546  44 7 (98 55)                Intake/Output Summary (Last 24 hours) at 02/28/18 0820  Last data filed at 02/27/18 1247   Gross per 24 hour   Intake                0 ml   Output                0 ml   Net                0 ml     No intake/output data recorded  Physical Exam: /76 (BP Location: Right arm)   Pulse 93   Temp 99 °F (37 2 °C) (Temporal)   Resp 18   Ht 5' 2" (1 575 m)   Wt 42 3 kg (93 lb 4 1 oz)   SpO2 100%   BMI 17 06 kg/m²     General Appearance:    Alert, cooperative, no distress   Head:    Normocephalic, without obvious abnormality, atraumatic   Eyes:    Conjunctiva/corneas clear   Ears:    Normal external ears   Nose:   Nares normal, septum midline, mucosa normal, no drainage    or sinus tenderness   Throat:   Lips, mucosa, and tongue normal; teeth and gums normal   Neck:   Supple   Back:     Symmetric, no curvature, ROM normal, no CVA tenderness   Lungs:     Clear to auscultation bilaterally, respirations unlabored   Chest wall:    No tenderness or deformity   Heart:    Regular rate and rhythm, S1 and S2 normal, no murmur, rub   or gallop   Abdomen:     Soft, non-tender, bowel sounds active   Extremities:   Extremities normal, atraumatic, no cyanosis or edema   Skin:   Skin color, texture, turgor normal, no rashes or lesions   Lymph nodes:   Cervical normal   Neurologic:   CNII-XII intact            Lab, Imaging and other studies: I have personally reviewed pertinent labs    CBC:   Lab Results   Component Value Date    WBC 5 76 02/28/2018    HGB 10 6 (L) 02/28/2018    HCT 34 0 (L) 02/28/2018    MCV 96 02/28/2018     02/28/2018    MCH 29 9 02/28/2018    MCHC 31 2 (L) 02/28/2018    RDW 15 0 02/28/2018    MPV 9 7 02/28/2018     CMP:   Lab Results   Component Value Date     (H) 02/28/2018    K 3 6 02/28/2018     (H) 02/28/2018    CO2 27 02/28/2018    ANIONGAP 8 02/28/2018    BUN 14 02/28/2018    CREATININE 0 80 02/28/2018    GLUCOSE 109 02/28/2018    CALCIUM 8 7 02/28/2018    AST 27 02/28/2018    ALT 12 02/28/2018    ALKPHOS 40 (L) 02/28/2018    PROT 6 6 02/28/2018    BILITOT 0 70 02/28/2018    EGFR 63 02/28/2018         Results from last 7 days  Lab Units 02/28/18  0643 02/27/18  0519 02/26/18  0438   SODIUM mmol/L 146* 146* 144   POTASSIUM mmol/L 3 6 3 9 3 3*   CHLORIDE mmol/L 111* 109* 107   CO2 mmol/L 27 29 28   BUN mg/dL 14 8 9   CREATININE mg/dL 0 80 0 75 0 70   CALCIUM mg/dL 8 7 8 9 8 6   TOTAL PROTEIN g/dL 6 6 7 3 6 5   BILIRUBIN TOTAL mg/dL 0 70 0 80 0 80   ALK PHOS U/L 40* 44* 40*   ALT U/L 12 16 17   AST U/L 27 35 31   GLUCOSE RANDOM mg/dL 109 70 76         Phosphorus:   No results found for: PHOS  Magnesium:   Lab Results   Component Value Date    MG 2 1 02/28/2018     Urinalysis: No results found for: [place] Fraction, SPECGRAV, PHUR, LEUKOCYTESUR, NITRITE, PROTEINUA, GLUCOSEU, KETONESU, BILIRUBINUR, BLOODU  Ionized Calcium: No results found for: CAION  Coagulation:   Lab Results   Component Value Date    INR 1 32 (H) 02/28/2018     Troponin: No results found for: TROPONINI  ABG: No results found for: PHART, PLM7IDS, PO2ART, PXF2HCR, Q1ZVOPMD, BEART, SOURCE  Radiology review:     IMAGING  Procedure: Ct Abdomen Pelvis Wo Contrast    Result Date: 2/20/2018  Narrative: CT ABDOMEN AND PELVIS WITHOUT IV CONTRAST INDICATION:  Nausea, vomiting and diarrhea   COMPARISON: CT abdomen pelvis 3/23/2017 TECHNIQUE:  CT examination of the abdomen and pelvis was performed without intravenous contrast   Axial, sagittal, and coronal 2D reformatted images were created from the source data and submitted for interpretation  Radiation dose length product (DLP) for this visit:  243 88 mGy-cm   This examination, like all CT scans performed in the [facility], was performed utilizing techniques to minimize radiation dose exposure, including the use of iterative  reconstruction and automated exposure control  Enteric contrast was administered  FINDINGS: ABDOMEN LOWER CHEST:  There are bilateral fibrotic changes  Bibasilar dependent opacities likely represent atelectasis  3 mm left upper lobe nodule (series 2 image 6)  There are postsurgical changes of the heart status post aortic valve replacement  There are atherosclerotic changes of the aorta  There is enlargement of the main pulmonary artery  LIVER/BILIARY TREE:  Stable calcifications in the anterior right lobe of the liver  No evidence of liver mass or ductal dilation on this noncontrast evaluation  GALLBLADDER:  There are gallstone(s) within the gallbladder, without pericholecystic inflammatory changes  Mild gallbladder distention  SPLEEN:  Unremarkable  PANCREAS:  The pancreas is atrophic  No pancreatic mass  ADRENAL GLANDS:  Unremarkable  KIDNEYS/URETERS:  Unremarkable  No hydronephrosis  STOMACH AND BOWEL:  There is a small hiatal hernia  Evaluation of the stomach and bowel is limited given lack of oral and intravenous contrast material   No evident bowel obstruction  Mild stool retention throughout the colon  There is colonic diverticulosis without evidence of diverticulitis  APPENDIX:  No findings to suggest appendicitis  ABDOMINOPELVIC CAVITY:  No ascites or free intraperitoneal air  No lymphadenopathy  VESSELS:  Atherosclerotic changes are present  No evidence of aneurysm  PELVIS REPRODUCTIVE ORGANS:  Unremarkable for patient's age  URINARY BLADDER:  Unremarkable  ABDOMINAL WALL/INGUINAL REGIONS:  Unremarkable   OSSEOUS STRUCTURES:  There are compression deformities of the L1, L2 and L4 vertebral bodies  L1 vertebral body compression deformity is similar in appearance to 2/11/2018 exam   L2 and L4 compression deformities are age indeterminate  Impression: 1  Limited evaluation secondary to lack of oral and intravenous contrast   Diverticulosis without evidence of acute diverticulitis  2   Cholelithiasis and mild gallbladder wall distention  If there is focal right upper quadrant pain further evaluation with right upper quadrant ultrasound could be considered  3   Stable compression deformity of the L1 vertebral body  Age indeterminate compression deformities of L2 and L4  4   Fibrotic changes at the lung bases with patchy bibasilar airspace opacities likely representing atelectasis  5   3 mm left upper lobe pulmonary nodule  Based on current Fleischner Society 2017 Guidelines on incidental pulmonary nodule, no routine follow-up is needed if the patient is considered low risk for lung cancer  If the patient is considered high risk for lung cancer, 12 month follow-up non-contrast chest CT is recommended  Considerations related to the patient's age and/or comorbidities may be used to alter these recommendations  Workstation performed: [id number]     Procedure: Xr Chest 1 View Portable    Result Date: 2/19/2018  Narrative: CHEST INDICATION:  Dehydration  COMPARISON:  Chest CT and radiographs dated 2/11/2018  Radiographs dated 1/6/2018  VIEWS:  Frontal and lateral projections IMAGES:  1 FINDINGS: Normal lung volumes  Chronic, peripheral interstitial reticular markings  No superimposed acute consolidation  Blunting of the right costophrenic angle is chronic and likely due to fibrosis  No layering effusions  No pneumothorax  Heart size within normal limits  Prior aortic root repair  Aortic tortuosity without aneurysm  Pulmonary vasculature is largely obscured by interstitial process  No janny cephalization  Osteopenia    High riding humeri with remodeling of the acetabular undersurfaces--suggestive of chronic rotator cuff tears  No acute osseous findings demonstrated  Thoracic spine poorly evaluated due to underpenetration  Impression: Chronic interstitial fibrotic disease without acute findings or significant change from prior  Workstation performed: [id number]     Procedure: Ct Head Without Contrast    Result Date: 2/19/2018  Narrative: CT BRAIN - WITHOUT CONTRAST INDICATION: chest pain  History taken directly from the electronic ordering system  COMPARISON:  None  TECHNIQUE:  CT examination of the brain was performed  In addition to axial images, coronal reformatted images were created and submitted for interpretation  Radiation dose length product (DLP) for this visit:  1076 81 mGy-cm   This examination, like all CT scans performed in the [facility], was performed utilizing techniques to minimize radiation dose exposure, including the use of iterative reconstruction and automated exposure control  IMAGE QUALITY:  Diagnostic  FINDINGS:  PARENCHYMA:  Decreased attenuation is noted in the supratentorial white matter demonstrating an appearance most consistent with moderate microangiopathic change  No intracranial mass, mass effect or midline shift  No CT signs of acute infarction  There is no parenchymal hemorrhage  VENTRICLES AND EXTRA-AXIAL SPACES:  Age-appropriate volume loss is noted  No hydrocephalus  VISUALIZED ORBITS AND PARANASAL SINUSES:  Orbits appear normal   There is near complete opacification of the right maxillary sinus  No fluid levels are seen  CALVARIUM AND EXTRACRANIAL SOFT TISSUES:   Normal      Impression: No acute intracranial abnormality  Microangiopathic changes  Workstation performed: [id number]     Procedure: Us Gallbladder    Result Date: 2/20/2018  Narrative: RIGHT UPPER QUADRANT ULTRASOUND INDICATION:  Cholelithiasis, distended gallbladder   COMPARISON:  CT abdomen pelvis 2/20/2018 TECHNIQUE:   Real-time ultrasound of the right upper quadrant was performed with a curvilinear transducer with both volumetric sweeps and still imaging techniques  FINDINGS: PANCREAS:  Portions of the pancreas are obscured by bowel gas  Visualized portions of the pancreas are unremarkable  AORTA AND IVC:  Visualized portions are normal for patient age  LIVER: Size:  Within normal range  The liver measures 10 3 cm in the midclavicular line  Contour:  Surface contour is smooth  Parenchyma:  Echogenicity and echotexture are within normal limits  No evidence of suspicious mass  Limited imaging of the main portal vein shows it to be patent and hepatopetal   BILIARY: The gallbladder is distended  No wall thickening or pericholecystic fluid  Single dependent mobile calculus without sludge  No sonographic Neff's sign  No intrahepatic biliary dilatation  CBD measures 4 5 mm  No choledocholithiasis  KIDNEY: Right kidney measures 8 6 x 4 3 cm  Within normal limits  ASCITES:   None  Impression:  Gallbladder distention and cholelithiasis without other evidence of acute cholecystitis   Workstation performed: [id number]       Current Facility-Administered Medications   Medication Dose Route Frequency    acetaminophen (TYLENOL) tablet 650 mg  650 mg Oral Q6H [address]    al mag oxide-diphenhydramine-lidocaine viscous (MAGIC MOUTHWASH) suspension 10 mL  10 mL Swish & Swallow TID AC    dextrose 50 % IV solution 25 mL  25 mL Intravenous PRN    heparin (porcine) subcutaneous injection 5,000 Units  5,000 Units Subcutaneous Q12H [address]    menthol-zinc oxide (CALMOSEPTINE) 0 44-20 6 % ointment   Topical BID    mirtazapine (REMERON) tablet 15 mg  15 mg Oral HS    ondansetron (ZOFRAN) injection 4 mg  4 mg Intravenous Q6H PRN     Medications Discontinued During This Encounter   Medication Reason    cefuroxime (CEFTIN) 500 mg tablet     sodium chloride 0 9 % infusion     aspirin chewable tablet 81 mg     atorvastatin (LIPITOR) tablet 10 mg     calcium carbonate (OYSTER SHELL,OSCAL) 500 mg tablet 1 tablet     diltiazem (CARDIZEM CD) 24 hr capsule 120 mg     multivitamin-minerals (CENTRUM) tablet 1 tablet     sodium chloride infusion 0 45 %     potassium chloride 10 % oral solution 40 mEq     aspirin chewable tablet 81 mg     atorvastatin (LIPITOR) tablet 10 mg     calcium carbonate-vitamin D (OSCAL-D) 500 mg-200 units per tablet 1 tablet     diltiazem (CARDIZEM CD) 24 hr capsule 120 mg     albuterol inhalation solution 2 5 mg     dextrose 5 % and sodium chloride 0 2 % infusion     dextrose 5 % and sodium chloride 0 45 % with KCl 20 mEq/L infusion     potassium chloride 20 mEq IVPB (premix)     dextrose 5 % infusion     dronabinol (MARINOL) capsule 2 5 mg     benzonatate (TESSALON PERLES) capsule 100 mg     calcium carbonate-vitamin D (OSCAL-D) 500 mg-200 units per tablet 1 tablet     pantoprazole (PROTONIX) EC tablet 40 mg     dronabinol (MARINOL) capsule 5 mg     docusate sodium (COLACE) capsule 100 mg     acetaminophen (TYLENOL) tablet 650 mg        Tierney Alfonso, DO

## 2018-02-28 NOTE — PHYSICAL THERAPY NOTE
PT treatment Note      02/28/18 1112   Restrictions/Precautions   Other Precautions Bed Alarm; Chair Alarm;Multiple lines;Telemetry; Fall Risk   Subjective   Subjective c/o dry throat  refuses thickened liquids when offered  Bed Mobility   Additional Comments refused OOB   Endurance Deficit   Endurance Deficit Yes   Activity Tolerance   Activity Tolerance Patient limited by fatigue   Exercises   Heelslides Supine;20 reps;AAROM   Hip Abduction Supine;20 reps;AAROM   Hip Adduction Supine;20 reps;AAROM   Ankle Pumps Supine;20 reps;AAROM   Assessment   Prognosis Guarded   Problem List Decreased strength;Decreased endurance; Impaired balance;Decreased mobility   Assessment Performed therapeutic exerices with fair tolerance  fatigued easily  Pt  weak  Pt declined tx/ ambulation at this time and is in need of continued activity in PT to improve strength balance endurance and functional mobility  Plan   Treatment/Interventions Functional transfer training;LE strengthening/ROM; Therapeutic exercise; Endurance training;Bed mobility;Gait training   Progress Slow progress, decreased activity tolerance   Recommendation   Recommendation Long-term skilled PT;Long-term skilled nursing home placement   Pt  In bed  with call bell within reach, scd's connected and turned on and alarm on at end of PT session

## 2018-02-28 NOTE — PROGRESS NOTES
[facility] Internal Medicine Progress Note  Patient: Kat Parson 80 y o  female   MRN: [id number]  PCP: Amie Fairchild DO  Unit/Bed#: 005-52 Encounter: [id number]  Date Of Visit: 02/28/18    Assessment:    Principal Problem:    Mucositis oral  Active Problems:    Essential hypertension    Gastroesophageal reflux disease without esophagitis    Coronary artery disease involving native coronary artery of native heart    H/O aortic valve replacement    Hyperlipidemia    Stage 3 chronic kidney disease    Chronic diastolic CHF (congestive heart failure) (HCC)    Dysphagia    Hypernatremia    Elevated platelet count (HCC)    Anemia    Dehydration    Hypoalbuminemia    Interstitial lung disease (HCC)    Pulmonary fibrosis (HCC)    Hypokalemia    Pulmonary nodule    Closed compression fracture of L1 lumbar vertebra (HCC)    Closed compression fracture of L2 lumbar vertebra (HCC)    Closed compression fracture of L4 lumbar vertebra (HCC)    Severe protein-calorie malnutrition (HCC)    BMI less than 19,adult      Plan:    · Anorexia/severe protein calorie malnutrition  · Family was updated yesterday via telephone  · Maintain NG tube  · Patient tolerating tube feeds  · Continue with Remeron  · Mucositis  · Magic mouthwash   · Hypernatremia  · Repeat labs daily  · Chronic anemia  · History of interstitial lung disease  VTE Pharmacologic Prophylaxis:   Pharmacologic: Heparin  Mechanical VTE Prophylaxis in Place: Yes    Discussions with Specialists or Other Care Team Provider/Patient/Family:  Plan of care discussed the patient's family via telephone, plan of care discussed with nursing staff, plan of care discussed today with Nephrology  Time Spent for Care: 20 minutes  More than 50% of total time spent on counseling and coordination of care as described above      Current Length of Stay: 9 day(s)    Current Patient Status: Inpatient     Code Status: Level 3 - DNAR and DNI      Subjective:   No issues tolerating tube feeds  Patient with underlying dementia, currently restrained due to need of tube feeds  Objective:     Vitals:   Temp (24hrs), Av 8 °F (36 6 °C), Min:97 1 °F (36 2 °C), Max:99 °F (37 2 °C)    HR:  [] 93  Resp:  [18] 18  BP: (100-121)/(59-76) 121/76  SpO2:  [96 %-100 %] 100 %  Body mass index is 17 06 kg/m²  Input and Output Summary (last 24 hours): Intake/Output Summary (Last 24 hours) at 18 1453  Last data filed at 18 1300   Gross per 24 hour   Intake              100 ml   Output                0 ml   Net              100 ml       Physical Exam:     Physical Exam   Constitutional: No distress  Cachectic elderly female, no acute distress   HENT:   Head: Normocephalic and atraumatic  Oropharynx appears to be erythematous, less ulceration noted today   Pulmonary/Chest: Effort normal and breath sounds normal  No respiratory distress  She has no wheezes  Abdominal: Soft  Bowel sounds are normal  She exhibits no distension  There is no tenderness  Musculoskeletal: Normal range of motion  She exhibits no edema, tenderness or deformity  Skin: She is not diaphoretic  Nursing note and vitals reviewed  Additional Data:     Labs:      Results from last 7 days  Lab Units 18  0642   WBC Thousand/uL 5 76   HEMOGLOBIN g/dL 10 6*   HEMATOCRIT % 34 0*   PLATELETS Thousands/uL 273   NEUTROS PCT % 49   LYMPHS PCT % 32   MONOS PCT % 12   EOS PCT % 6       Results from last 7 days  Lab Units 18  0643   SODIUM mmol/L 146*   POTASSIUM mmol/L 3 6   CHLORIDE mmol/L 111*   CO2 mmol/L 27   BUN mg/dL 14   CREATININE mg/dL 0 80   CALCIUM mg/dL 8 7   TOTAL PROTEIN g/dL 6 6   BILIRUBIN TOTAL mg/dL 0 70   ALK PHOS U/L 40*   ALT U/L 12   AST U/L 27   GLUCOSE RANDOM mg/dL 109       Results from last 7 days  Lab Units 18  0643   INR  1 32*       * I Have Reviewed All Lab Data Listed Above  * Additional Pertinent Lab Tests Reviewed:  [address] Admission Reviewed      Recent Cultures (last 7 days):           Last 24 Hours Medication List:     Current Facility-Administered Medications:  acetaminophen 650 mg Oral Q6H [address] Mandy Mays DO   al mag oxide-diphenhydramine-lidocaine viscous 10 mL Swish & Swallow TID [facility] [place] Mandy Mays DO   dextrose 25 mL Intravenous PRN Mignon Rust,    heparin (porcine) 5,000 Units Subcutaneous Q12H [address] Mignon Rust DO   menthol-zinc oxide  Topical BID Mignon Rust DO   mirtazapine 15 mg Oral HS Mandy Mays DO   ondansetron 4 mg Intravenous Q6H PRN Mignon Rust,         Today, Patient Was Seen By: Mandy Mays DO

## 2018-02-28 NOTE — PLAN OF CARE
Problem: Prexisting or High Potential for Compromised Skin Integrity  Goal: Skin integrity is maintained or improved  INTERVENTIONS:  - Identify patients at risk for skin breakdown  - Assess and monitor skin integrity  - Assess and monitor nutrition and hydration status  - Monitor labs (i e  albumin)  - Assess for incontinence   - Turn and reposition patient  - Assist with mobility/ambulation  - Relieve pressure over bony prominences  - Avoid friction and shearing  - Provide appropriate hygiene as needed including keeping skin clean and dry  - Evaluate need for skin moisturizer/barrier cream  - Collaborate with interdisciplinary team (i e  Nutrition, Rehabilitation, etc )   - Patient/family teaching   Outcome: Progressing      Problem: Potential for Falls  Goal: Patient will remain free of falls  INTERVENTIONS:  - Assess patient frequently for physical needs  -  Identify cognitive and physical deficits and behaviors that affect risk of falls  -  [place] fall precautions as indicated by assessment   - Educate patient/family on patient safety including physical limitations  - Instruct patient to call for assistance with activity based on assessment  - Modify environment to reduce risk of injury  - Consider OT/PT consult to assist with strengthening/mobility    Outcome: Progressing      Problem: Nutrition/Hydration-ADULT  Goal: Nutrient/Hydration intake appropriate for improving, restoring or maintaining nutritional needs  Monitor and assess patient's nutrition/hydration status for malnutrition (ex- brittle hair, bruises, dry skin, pale skin and conjunctiva, muscle wasting, smooth red tongue, and disorientation)  Collaborate with interdisciplinary team and initiate plan and interventions as ordered  Monitor patient's weight and dietary intake as ordered or per policy  Utilize nutrition screening tool and intervene per policy   Determine patient's food preferences and provide high-protein, high-caloric foods as appropriate       INTERVENTIONS:  - Monitor oral intake, urinary output, labs, and treatment plans  - Assess nutrition and hydration status and recommend course of action  - Evaluate amount of meals eaten  - Assist patient with eating if necessary   - Allow adequate time for meals  - Recommend/ encourage appropriate diets, oral nutritional supplements, and vitamin/mineral supplements  - Order, calculate, and assess calorie counts as needed  - Recommend, monitor, and adjust tube feedings and TPN/PPN based on assessed needs  - Assess need for intravenous fluids  - Provide specific nutrition/hydration education as appropriate  - Include patient/family/caregiver in decisions related to nutrition   Outcome: Progressing      Problem: PAIN - ADULT  Goal: Verbalizes/displays adequate comfort level or baseline comfort level  Interventions:  - Encourage patient to monitor pain and request assistance  - Assess pain using appropriate pain scale  - Administer analgesics based on type and severity of pain and evaluate response  - Implement non-pharmacological measures as appropriate and evaluate response  - Consider cultural and social influences on pain and pain management  - Notify physician/advanced practitioner if interventions unsuccessful or patient reports new pain   Outcome: Progressing      Problem: INFECTION - ADULT  Goal: Absence or prevention of progression during hospitalization  INTERVENTIONS:  - Assess and monitor for signs and symptoms of infection  - Monitor lab/diagnostic results  - Monitor all insertion sites, i e  indwelling lines, tubes, and drains  - Monitor endotracheal (as able) and nasal secretions for changes in amount and color  - [place] appropriate cooling/warming therapies per order  - Administer medications as ordered  - Instruct and encourage patient and family to use good hand hygiene technique  - Identify and instruct in appropriate isolation precautions for identified infection/condition Outcome: Progressing      Problem: SAFETY ADULT  Goal: Maintain or return to baseline ADL function  INTERVENTIONS:  -  Assess patient's ability to carry out ADLs; assess patient's baseline for ADL function and identify physical deficits which impact ability to perform ADLs (bathing, care of mouth/teeth, toileting, grooming, dressing, etc )  - Assess/evaluate cause of self-care deficits   - Assess range of motion  - Assess patient's mobility; develop plan if impaired  - Assess patient's need for assistive devices and provide as appropriate  - Encourage maximum independence but intervene and supervise when necessary  ¯ Involve family in performance of ADLs  ¯ Assess for home care needs following discharge   ¯ Request OT consult to assist with ADL evaluation and planning for discharge  ¯ Provide patient education as appropriate   Outcome: Progressing    Goal: Maintain or return mobility status to optimal level  INTERVENTIONS:  - Assess patient's baseline mobility status (ambulation, transfers, stairs, etc )    - Identify cognitive and physical deficits and behaviors that affect mobility  - Identify mobility aids required to assist with transfers and/or ambulation (gait belt, sit-to-stand, lift, walker, cane, etc )  - [place] fall precautions as indicated by assessment  - Record patient progress and toleration of activity level on Mobility SBAR; progress patient to next Phase/Stage  - Instruct patient to call for assistance with activity based on assessment  - Request Rehabilitation consult to assist with strengthening/weightbearing, etc    Outcome: Progressing    Goal: Patient will remain free of falls  INTERVENTIONS:  - Assess patient frequently for physical needs  -  Identify cognitive and physical deficits and behaviors that affect risk of falls    -  [place] fall precautions as indicated by assessment   - Educate patient/family on patient safety including physical limitations  - Instruct patient to call for assistance with activity based on assessment  - Modify environment to reduce risk of injury  - Consider OT/PT consult to assist with strengthening/mobility    Outcome: Progressing      Problem: DISCHARGE PLANNING  Goal: Discharge to home or other facility with appropriate resources  INTERVENTIONS:  - Identify barriers to discharge w/patient and caregiver  - Arrange for needed discharge resources and transportation as appropriate  - Identify discharge learning needs (meds, wound care, etc )  - Arrange for interpretive services to assist at discharge as needed  - Refer to Case Management Department for coordinating discharge planning if the patient needs post-hospital services based on physician/advanced practitioner order or complex needs related to functional status, cognitive ability, or social support system   Outcome: Progressing      Problem: Knowledge Deficit  Goal: Patient/family/caregiver demonstrates understanding of disease process, treatment plan, medications, and discharge instructions  Complete learning assessment and assess knowledge base    Interventions:  - Provide teaching at level of understanding  - Provide teaching via preferred learning methods   Outcome: Progressing      Problem: DISCHARGE PLANNING - CARE MANAGEMENT  Goal: Discharge to post-acute care or home with appropriate resources  INTERVENTIONS:  - Conduct assessment to determine patient/family and health care team treatment goals, and need for post-acute services based on payer coverage, community resources, and patient preferences, and barriers to discharge  - Address psychosocial, clinical, and financial barriers to discharge as identified in assessment in conjunction with the patient/family and health care team  - Arrange appropriate level of post-acute services according to patient's   needs and preference and payer coverage in collaboration with the physician and health care team  - Communicate with and update the patient/family, physician, and health care team regarding progress on the discharge plan  - Arrange appropriate transportation to post-acute venues   Outcome: Progressing

## 2018-02-28 NOTE — PROGRESS NOTES
Enteral feeding of Jevity 1 2 stopped and NGT clamped  As per order to administer feeding for 20 hours  Feeding will be resumed at 2200

## 2018-02-28 NOTE — NUTRITION
02/27/18 1848   Assessment   Timepoint Reassess  (consult for EN recs)   Labs   List Completed Labs (na 146, , 114, 300, 82, alb 2 5  meds reviewed)   Feeding Route   PO With assist   Adequacy of Intake   Nutrition Modality EN;PO  (puree with HTL)   Intake Meals Refused   Estimated Calorie Intake Refused   Estimated Protein Intake  Refused   Estimated Fluid Intake Refused   Estimated calorie intake compared to estimated need patient has refused to eat all weekend, will not open mouth, EN to start   Nutrition Prognosis   Potential Guarded   PES Statement   Problem Continue previous diagnosis   PES Statement 2   Problem Continue previous diagnosis   Patient Nutrition Goals   Goal initiate enteral feed   Goal Status new goal made   Timeframe to complete goal by next f/u   Recommendations/Interventions   Summary Patient intake has been 0% all weekend  She won't open mouth  She is being treated for severe mucositis  NG tube placed for EN feedings  Recommendations provided  Interventions EN initiate  (EN feeding Jevity 1 2 at 10 ml hr x 20 hr, increase by 10 mL every 4 hrs as tolerated to goal rate of 65 mL x 20 hrs  Water auto flush 200 mL per shift  Feedings will provide 100% of energy/protein needs (1560 calories/72 gm protein)  )   Nutrition Recommendations Initiate EN/PN   Nutrition Complexity Risk   Nutrition review: 02/28/18  (check EN rate/tolerance)   Follow up date 03/02/18

## 2018-03-01 NOTE — PHYSICAL THERAPY NOTE
PT Treatment Note     03/01/18 1109   Pain Assessment   Pain Assessment No/denies pain   Pain Score No Pain   Cognition   Overall Cognitive Status Impaired   Arousal/Participation Arousable; Cooperative   Following Commands Follows one step commands inconsistently   Subjective   Subjective Pt agreeable to therapy on this date  Transfers   Additional Comments Deferred OOB per nursing  Exercises   Hip Flexion Supine;20 reps;AAROM; Bilateral   Hip Abduction Supine;20 reps;AAROM; Bilateral   Knee AROM Short Arc Quad Supine;20 reps;AAROM; Bilateral   Ankle Pumps Supine;20 reps;AROM; Bilateral   Assessment   Prognosis Fair   Problem List Decreased strength;Decreased endurance;Decreased mobility   Assessment Pt progress slow d/t recent decline in health status often refusing food/water  Pt would benefit from continued services to further improve pt outcome and quality of life when health improves  Pt positioned in bed for comfort w/ call bell in reach and SCDs applied and turned on at conclusion of therapy session

## 2018-03-01 NOTE — PLAN OF CARE
Problem: Prexisting or High Potential for Compromised Skin Integrity  Goal: Skin integrity is maintained or improved  INTERVENTIONS:  - Identify patients at risk for skin breakdown  - Assess and monitor skin integrity  - Assess and monitor nutrition and hydration status  - Monitor labs (i e  albumin)  - Assess for incontinence   - Turn and reposition patient  - Assist with mobility/ambulation  - Relieve pressure over bony prominences  - Avoid friction and shearing  - Provide appropriate hygiene as needed including keeping skin clean and dry  - Evaluate need for skin moisturizer/barrier cream  - Collaborate with interdisciplinary team (i e  Nutrition, Rehabilitation, etc )   - Patient/family teaching   Outcome: Progressing      Problem: Potential for Falls  Goal: Patient will remain free of falls  INTERVENTIONS:  - Assess patient frequently for physical needs  -  Identify cognitive and physical deficits and behaviors that affect risk of falls  -  [place] fall precautions as indicated by assessment   - Educate patient/family on patient safety including physical limitations  - Instruct patient to call for assistance with activity based on assessment  - Modify environment to reduce risk of injury  - Consider OT/PT consult to assist with strengthening/mobility    Outcome: Progressing      Problem: Nutrition/Hydration-ADULT  Goal: Nutrient/Hydration intake appropriate for improving, restoring or maintaining nutritional needs  Monitor and assess patient's nutrition/hydration status for malnutrition (ex- brittle hair, bruises, dry skin, pale skin and conjunctiva, muscle wasting, smooth red tongue, and disorientation)  Collaborate with interdisciplinary team and initiate plan and interventions as ordered  Monitor patient's weight and dietary intake as ordered or per policy  Utilize nutrition screening tool and intervene per policy   Determine patient's food preferences and provide high-protein, high-caloric foods as appropriate       INTERVENTIONS:  - Monitor oral intake, urinary output, labs, and treatment plans  - Assess nutrition and hydration status and recommend course of action  - Evaluate amount of meals eaten  - Assist patient with eating if necessary   - Allow adequate time for meals  - Recommend/ encourage appropriate diets, oral nutritional supplements, and vitamin/mineral supplements  - Order, calculate, and assess calorie counts as needed  - Recommend, monitor, and adjust tube feedings and TPN/PPN based on assessed needs  - Assess need for intravenous fluids  - Provide specific nutrition/hydration education as appropriate  - Include patient/family/caregiver in decisions related to nutrition   Outcome: Progressing      Problem: PAIN - ADULT  Goal: Verbalizes/displays adequate comfort level or baseline comfort level  Interventions:  - Encourage patient to monitor pain and request assistance  - Assess pain using appropriate pain scale  - Administer analgesics based on type and severity of pain and evaluate response  - Implement non-pharmacological measures as appropriate and evaluate response  - Consider cultural and social influences on pain and pain management  - Notify physician/advanced practitioner if interventions unsuccessful or patient reports new pain   Outcome: Progressing      Problem: INFECTION - ADULT  Goal: Absence or prevention of progression during hospitalization  INTERVENTIONS:  - Assess and monitor for signs and symptoms of infection  - Monitor lab/diagnostic results  - Monitor all insertion sites, i e  indwelling lines, tubes, and drains  - Monitor endotracheal (as able) and nasal secretions for changes in amount and color  - [place] appropriate cooling/warming therapies per order  - Administer medications as ordered  - Instruct and encourage patient and family to use good hand hygiene technique  - Identify and instruct in appropriate isolation precautions for identified infection/condition Outcome: Progressing      Problem: SAFETY ADULT  Goal: Maintain or return to baseline ADL function  INTERVENTIONS:  -  Assess patient's ability to carry out ADLs; assess patient's baseline for ADL function and identify physical deficits which impact ability to perform ADLs (bathing, care of mouth/teeth, toileting, grooming, dressing, etc )  - Assess/evaluate cause of self-care deficits   - Assess range of motion  - Assess patient's mobility; develop plan if impaired  - Assess patient's need for assistive devices and provide as appropriate  - Encourage maximum independence but intervene and supervise when necessary  ¯ Involve family in performance of ADLs  ¯ Assess for home care needs following discharge   ¯ Request OT consult to assist with ADL evaluation and planning for discharge  ¯ Provide patient education as appropriate   Outcome: Progressing    Goal: Maintain or return mobility status to optimal level  INTERVENTIONS:  - Assess patient's baseline mobility status (ambulation, transfers, stairs, etc )    - Identify cognitive and physical deficits and behaviors that affect mobility  - Identify mobility aids required to assist with transfers and/or ambulation (gait belt, sit-to-stand, lift, walker, cane, etc )  - [place] fall precautions as indicated by assessment  - Record patient progress and toleration of activity level on Mobility SBAR; progress patient to next Phase/Stage  - Instruct patient to call for assistance with activity based on assessment  - Request Rehabilitation consult to assist with strengthening/weightbearing, etc    Outcome: Progressing    Goal: Patient will remain free of falls  INTERVENTIONS:  - Assess patient frequently for physical needs  -  Identify cognitive and physical deficits and behaviors that affect risk of falls    -  [place] fall precautions as indicated by assessment   - Educate patient/family on patient safety including physical limitations  - Instruct patient to call for assistance with activity based on assessment  - Modify environment to reduce risk of injury  - Consider OT/PT consult to assist with strengthening/mobility    Outcome: Progressing      Problem: DISCHARGE PLANNING  Goal: Discharge to home or other facility with appropriate resources  INTERVENTIONS:  - Identify barriers to discharge w/patient and caregiver  - Arrange for needed discharge resources and transportation as appropriate  - Identify discharge learning needs (meds, wound care, etc )  - Arrange for interpretive services to assist at discharge as needed  - Refer to Case Management Department for coordinating discharge planning if the patient needs post-hospital services based on physician/advanced practitioner order or complex needs related to functional status, cognitive ability, or social support system   Outcome: Progressing      Problem: Knowledge Deficit  Goal: Patient/family/caregiver demonstrates understanding of disease process, treatment plan, medications, and discharge instructions  Complete learning assessment and assess knowledge base    Interventions:  - Provide teaching at level of understanding  - Provide teaching via preferred learning methods   Outcome: Progressing      Problem: DISCHARGE PLANNING - CARE MANAGEMENT  Goal: Discharge to post-acute care or home with appropriate resources  INTERVENTIONS:  - Conduct assessment to determine patient/family and health care team treatment goals, and need for post-acute services based on payer coverage, community resources, and patient preferences, and barriers to discharge  - Address psychosocial, clinical, and financial barriers to discharge as identified in assessment in conjunction with the patient/family and health care team  - Arrange appropriate level of post-acute services according to patient's   needs and preference and payer coverage in collaboration with the physician and health care team  - Communicate with and update the patient/family, physician, and health care team regarding progress on the discharge plan  - Arrange appropriate transportation to post-acute venues   Outcome: Progressing

## 2018-03-01 NOTE — PROGRESS NOTES
Progress Note - Nephrology   Jeanie Corbin 80 y o  female MRN: [id number]  Unit/Bed#: 416-01 Encounter: [id number]    A/P:  1  Hypernatremia due to hypovolemia and lack of free water  2/28: On 200 ml free water flush q6 with TF, follow serum sodium and adjust free water as indicated  2  Chronic kidney disease stage 3   3  Chronic tubulointerstitial disease  4  Hypokalemia: will reorder supplement   K low normal, continue to monitor  5  Chronic diastolic CHF: appears compensated  6  Hypertension   7  Oral mucositis ---> currently on feedings via NGT      Follow up reason for today's visit: Hypernatremia    Mucositis oral    Patient Active Problem List   Diagnosis    Essential hypertension    Gastroesophageal reflux disease without esophagitis    Coronary artery disease involving native coronary artery of native heart    H/O aortic valve replacement    Hyperlipidemia    Stage 3 chronic kidney disease    Chronic diastolic CHF (congestive heart failure) (HCC)    Dysphagia    Hypernatremia    Elevated platelet count (HCC)    Anemia    Dehydration    Hypoalbuminemia    Interstitial lung disease (HCC)    Pulmonary fibrosis (HCC)    Hypokalemia    Pulmonary nodule    Closed compression fracture of L1 lumbar vertebra (HCC)    Closed compression fracture of L2 lumbar vertebra (HCC)    Closed compression fracture of L4 lumbar vertebra (HCC)    Severe protein-calorie malnutrition (HCC)    BMI less than 19,adult    Mucositis oral         Subjective:    Patient without acute complaints  Objective:     Vitals: Blood pressure 117/60, pulse 92, temperature 98 8 °F (37 1 °C), temperature source Temporal, resp  rate 18, height 5' 2" (1 575 m), weight 42 4 kg (93 lb 7 6 oz), SpO2 98 %  ,Body mass index is 17 1 kg/m²      Weight (last 2 days)     Date/Time   Weight    03/01/18 0618  42 4 (93 48)    02/28/18 0610  42 3 (93 25)    02/27/18 0539  42 1 (92 81)                Intake/Output Summary (Last 24 hours) at 03/01/18 0902  Last data filed at 03/01/18 0834   Gross per 24 hour   Intake              862 ml   Output                0 ml   Net              862 ml     I/O last 3 completed shifts: In: 862 [NG/GT:862]  Out: -          Physical Exam: /60 (BP Location: Right arm)   Pulse 92   Temp 98 8 °F (37 1 °C) (Temporal)   Resp 18   Ht 5' 2" (1 575 m)   Wt 42 4 kg (93 lb 7 6 oz)   SpO2 98%   BMI 17 10 kg/m²     General Appearance:    Alert, cooperative, no distress   Head:    Normocephalic, without obvious abnormality, atraumatic   Eyes:    Conjunctiva/corneas clear   Ears:    Normal external ears   Nose:   Nares normal, septum midline, mucosa normal, no drainage    or sinus tenderness   Throat:   Lips, mucosa, and tongue normal; teeth and gums normal   Neck:   Supple   Back:     Symmetric, no curvature, ROM normal, no CVA tenderness   Lungs:     Clear to auscultation bilaterally, respirations unlabored   Chest wall:    No tenderness or deformity   Heart:    Regular rate and rhythm, S1 and S2 normal, no murmur, rub   or gallop   Abdomen:     Soft, non-tender, bowel sounds active   Extremities:   Extremities normal, atraumatic, no cyanosis or edema   Skin:   Skin color, texture, turgor normal, no rashes or lesions   Lymph nodes:   Cervical normal   Neurologic:   CNII-XII intact            Lab, Imaging and other studies: I have personally reviewed pertinent labs    CBC:   Lab Results   Component Value Date    WBC 5 98 03/01/2018    HGB 10 1 (L) 03/01/2018    HCT 32 2 (L) 03/01/2018    MCV 96 03/01/2018     03/01/2018    MCH 30 1 03/01/2018    MCHC 31 4 03/01/2018    RDW 15 0 03/01/2018    MPV 9 9 03/01/2018     CMP:   Lab Results   Component Value Date     03/01/2018    K 3 8 03/01/2018     (H) 03/01/2018    CO2 28 03/01/2018    ANIONGAP 8 03/01/2018    BUN 19 03/01/2018    CREATININE 0 79 03/01/2018    GLUCOSE 118 03/01/2018    CALCIUM 8 6 03/01/2018    AST 23 03/01/2018    ALT 12 03/01/2018    ALKPHOS 42 (L) 03/01/2018    PROT 6 8 03/01/2018    BILITOT 0 50 03/01/2018    EGFR 64 03/01/2018         Results from last 7 days  Lab Units 03/01/18  0512 02/28/18  0643 02/27/18  0519   SODIUM mmol/L 145 146* 146*   POTASSIUM mmol/L 3 8 3 6 3 9   CHLORIDE mmol/L 109* 111* 109*   CO2 mmol/L 28 27 29   BUN mg/dL 19 14 8   CREATININE mg/dL 0 79 0 80 0 75   CALCIUM mg/dL 8 6 8 7 8 9   TOTAL PROTEIN g/dL 6 8 6 6 7 3   BILIRUBIN TOTAL mg/dL 0 50 0 70 0 80   ALK PHOS U/L 42* 40* 44*   ALT U/L 12 12 16   AST U/L 23 27 35   GLUCOSE RANDOM mg/dL 118 109 70         Phosphorus:   No results found for: PHOS  Magnesium:   Lab Results   Component Value Date    MG 2 0 03/01/2018     Urinalysis: No results found for: [place], SPECGRAV, PHUR, LEUKOCYTESUR, NITRITE, PROTEINUA, GLUCOSEU, KETONESU, BILIRUBINUR, BLOODU  Ionized Calcium: No results found for: CAION  Coagulation:   Lab Results   Component Value Date    INR 1 12 03/01/2018     Troponin: No results found for: TROPONINI  ABG: No results found for: PHART, WPQ1FJL, PO2ART, ROV0GML, Q3ESVUQF, BEART, SOURCE  Radiology review:     IMAGING  Procedure: Ct Abdomen Pelvis Wo Contrast    Result Date: 2/20/2018  Narrative: CT ABDOMEN AND PELVIS WITHOUT IV CONTRAST INDICATION:  Nausea, vomiting and diarrhea  COMPARISON: CT abdomen pelvis 3/23/2017 TECHNIQUE:  CT examination of the abdomen and pelvis was performed without intravenous contrast   Axial, sagittal, and coronal 2D reformatted images were created from the source data and submitted for interpretation  Radiation dose length product (DLP) for this visit:  243 88 mGy-cm   This examination, like all CT scans performed in the [facility], was performed utilizing techniques to minimize radiation dose exposure, including the use of iterative  reconstruction and automated exposure control  Enteric contrast was administered   FINDINGS: ABDOMEN LOWER CHEST:  There are bilateral fibrotic changes  Bibasilar dependent opacities likely represent atelectasis  3 mm left upper lobe nodule (series 2 image 6)  There are postsurgical changes of the heart status post aortic valve replacement  There are atherosclerotic changes of the aorta  There is enlargement of the main pulmonary artery  LIVER/BILIARY TREE:  Stable calcifications in the anterior right lobe of the liver  No evidence of liver mass or ductal dilation on this noncontrast evaluation  GALLBLADDER:  There are gallstone(s) within the gallbladder, without pericholecystic inflammatory changes  Mild gallbladder distention  SPLEEN:  Unremarkable  PANCREAS:  The pancreas is atrophic  No pancreatic mass  ADRENAL GLANDS:  Unremarkable  KIDNEYS/URETERS:  Unremarkable  No hydronephrosis  STOMACH AND BOWEL:  There is a small hiatal hernia  Evaluation of the stomach and bowel is limited given lack of oral and intravenous contrast material   No evident bowel obstruction  Mild stool retention throughout the colon  There is colonic diverticulosis without evidence of diverticulitis  APPENDIX:  No findings to suggest appendicitis  ABDOMINOPELVIC CAVITY:  No ascites or free intraperitoneal air  No lymphadenopathy  VESSELS:  Atherosclerotic changes are present  No evidence of aneurysm  PELVIS REPRODUCTIVE ORGANS:  Unremarkable for patient's age  URINARY BLADDER:  Unremarkable  ABDOMINAL WALL/INGUINAL REGIONS:  Unremarkable  OSSEOUS STRUCTURES:  There are compression deformities of the L1, L2 and L4 vertebral bodies  L1 vertebral body compression deformity is similar in appearance to 2/11/2018 exam   L2 and L4 compression deformities are age indeterminate  Impression: 1  Limited evaluation secondary to lack of oral and intravenous contrast   Diverticulosis without evidence of acute diverticulitis  2   Cholelithiasis and mild gallbladder wall distention    If there is focal right upper quadrant pain further evaluation with right upper quadrant ultrasound could be considered  3   Stable compression deformity of the L1 vertebral body  Age indeterminate compression deformities of L2 and L4  4   Fibrotic changes at the lung bases with patchy bibasilar airspace opacities likely representing atelectasis  5   3 mm left upper lobe pulmonary nodule  Based on current Fleischner Society 2017 Guidelines on incidental pulmonary nodule, no routine follow-up is needed if the patient is considered low risk for lung cancer  If the patient is considered high risk for lung cancer, 12 month follow-up non-contrast chest CT is recommended  Considerations related to the patient's age and/or comorbidities may be used to alter these recommendations  Workstation performed: [id number]     Procedure: Xr Chest 1 View Portable    Result Date: 2/19/2018  Narrative: CHEST INDICATION:  Dehydration  COMPARISON:  Chest CT and radiographs dated 2/11/2018  Radiographs dated 1/6/2018  VIEWS:  Frontal and lateral projections IMAGES:  1 FINDINGS: Normal lung volumes  Chronic, peripheral interstitial reticular markings  No superimposed acute consolidation  Blunting of the right costophrenic angle is chronic and likely due to fibrosis  No layering effusions  No pneumothorax  Heart size within normal limits  Prior aortic root repair  Aortic tortuosity without aneurysm  Pulmonary vasculature is largely obscured by interstitial process  No janny cephalization  Osteopenia  High riding humeri with remodeling of the acetabular undersurfaces--suggestive of chronic rotator cuff tears  No acute osseous findings demonstrated  Thoracic spine poorly evaluated due to underpenetration  Impression: Chronic interstitial fibrotic disease without acute findings or significant change from prior  Workstation performed: [id number]     Procedure: Ct Head Without Contrast    Result Date: 2/19/2018  Narrative: CT BRAIN - WITHOUT CONTRAST INDICATION: chest pain   History taken directly from the electronic ordering system  COMPARISON:  None  TECHNIQUE:  CT examination of the brain was performed  In addition to axial images, coronal reformatted images were created and submitted for interpretation  Radiation dose length product (DLP) for this visit:  1076 81 mGy-cm   This examination, like all CT scans performed in the [facility], was performed utilizing techniques to minimize radiation dose exposure, including the use of iterative reconstruction and automated exposure control  IMAGE QUALITY:  Diagnostic  FINDINGS:  PARENCHYMA:  Decreased attenuation is noted in the supratentorial white matter demonstrating an appearance most consistent with moderate microangiopathic change  No intracranial mass, mass effect or midline shift  No CT signs of acute infarction  There is no parenchymal hemorrhage  VENTRICLES AND EXTRA-AXIAL SPACES:  Age-appropriate volume loss is noted  No hydrocephalus  VISUALIZED ORBITS AND PARANASAL SINUSES:  Orbits appear normal   There is near complete opacification of the right maxillary sinus  No fluid levels are seen  CALVARIUM AND EXTRACRANIAL SOFT TISSUES:   Normal      Impression: No acute intracranial abnormality  Microangiopathic changes  Workstation performed: [id number]     Procedure: Us Gallbladder    Result Date: 2/20/2018  Narrative: RIGHT UPPER QUADRANT ULTRASOUND INDICATION:  Cholelithiasis, distended gallbladder  COMPARISON:  CT abdomen pelvis 2/20/2018 TECHNIQUE:   Real-time ultrasound of the right upper quadrant was performed with a curvilinear transducer with both volumetric sweeps and still imaging techniques  FINDINGS: PANCREAS:  Portions of the pancreas are obscured by bowel gas  Visualized portions of the pancreas are unremarkable  AORTA AND IVC:  Visualized portions are normal for patient age  LIVER: Size:  Within normal range  The liver measures 10 3 cm in the midclavicular line  Contour:  Surface contour is smooth   Parenchyma: Echogenicity and echotexture are within normal limits  No evidence of suspicious mass  Limited imaging of the main portal vein shows it to be patent and hepatopetal   BILIARY: The gallbladder is distended  No wall thickening or pericholecystic fluid  Single dependent mobile calculus without sludge  No sonographic Neff's sign  No intrahepatic biliary dilatation  CBD measures 4 5 mm  No choledocholithiasis  KIDNEY: Right kidney measures 8 6 x 4 3 cm  Within normal limits  ASCITES:   None  Impression:  Gallbladder distention and cholelithiasis without other evidence of acute cholecystitis   Workstation performed: [id number]       Current Facility-Administered Medications   Medication Dose Route Frequency    acetaminophen (TYLENOL) oral suspension 650 mg  650 mg Per NG Tube Q6H    al mag oxide-diphenhydramine-lidocaine viscous (MAGIC MOUTHWASH) suspension 10 mL  10 mL Swish & Swallow TID AC    dextrose 50 % IV solution 25 mL  25 mL Intravenous PRN    heparin (porcine) subcutaneous injection 5,000 Units  5,000 Units Subcutaneous Q12H [address]    menthol-zinc oxide (CALMOSEPTINE) 0 44-20 6 % ointment   Topical BID    mirtazapine (REMERON) tablet 15 mg  15 mg Oral HS    ondansetron (ZOFRAN) injection 4 mg  4 mg Intravenous Q6H PRN     Medications Discontinued During This Encounter   Medication Reason    cefuroxime (CEFTIN) 500 mg tablet     sodium chloride 0 9 % infusion     aspirin chewable tablet 81 mg     atorvastatin (LIPITOR) tablet 10 mg     calcium carbonate (OYSTER SHELL,OSCAL) 500 mg tablet 1 tablet     diltiazem (CARDIZEM CD) 24 hr capsule 120 mg     multivitamin-minerals (CENTRUM) tablet 1 tablet     sodium chloride infusion 0 45 %     potassium chloride 10 % oral solution 40 mEq     aspirin chewable tablet 81 mg     atorvastatin (LIPITOR) tablet 10 mg     calcium carbonate-vitamin D (OSCAL-D) 500 mg-200 units per tablet 1 tablet     diltiazem (CARDIZEM CD) 24 hr capsule 120 mg     albuterol inhalation solution 2 5 mg     dextrose 5 % and sodium chloride 0 2 % infusion     dextrose 5 % and sodium chloride 0 45 % with KCl 20 mEq/L infusion     potassium chloride 20 mEq IVPB (premix)     dextrose 5 % infusion     dronabinol (MARINOL) capsule 2 5 mg     benzonatate (TESSALON PERLES) capsule 100 mg     calcium carbonate-vitamin D (OSCAL-D) 500 mg-200 units per tablet 1 tablet     pantoprazole (PROTONIX) EC tablet 40 mg     dronabinol (MARINOL) capsule 5 mg     docusate sodium (COLACE) capsule 100 mg     acetaminophen (TYLENOL) tablet 650 mg     acetaminophen (TYLENOL) tablet 650 mg Alternate therapy       Ruben Martinez, DO

## 2018-03-02 NOTE — PROGRESS NOTES
Progress Note - Nephrology   Leonarda Pollard 80 y o  female MRN: [id number]  Unit/Bed#: 416-01 Encounter: [id number]    A/P:  1  Hypernatremia due to hypovolemia and lack of free water  On 200 ml free water flush q6 with TF, follow serum sodium and adjust free water as indicated  2  Chronic kidney disease stage 3   3  Chronic tubulointerstitial disease  4  Hypokalemia: will reorder supplement   K low normal, continue to monitor  5  Chronic diastolic CHF: appears compensated  6  Hypertension   7  Oral mucositis ---> currently on feedings via NGT      Follow up reason for today's visit: Hypernatremia    Mucositis oral    Patient Active Problem List   Diagnosis    Essential hypertension    Gastroesophageal reflux disease without esophagitis    Coronary artery disease involving native coronary artery of native heart    H/O aortic valve replacement    Hyperlipidemia    Stage 3 chronic kidney disease    Chronic diastolic CHF (congestive heart failure) (HCC)    Dysphagia    Hypernatremia    Elevated platelet count (HCC)    Anemia    Dehydration    Hypoalbuminemia    Interstitial lung disease (HCC)    Pulmonary fibrosis (HCC)    Hypokalemia    Pulmonary nodule    Closed compression fracture of L1 lumbar vertebra (HCC)    Closed compression fracture of L2 lumbar vertebra (HCC)    Closed compression fracture of L4 lumbar vertebra (HCC)    Severe protein-calorie malnutrition (HCC)    BMI less than 19,adult    Mucositis oral         Subjective:    Patient without acute complaints  Objective:     Vitals: Blood pressure 115/56, pulse 83, temperature 98 8 °F (37 1 °C), temperature source Temporal, resp  rate 18, height 5' 2" (1 575 m), weight 43 5 kg (95 lb 14 4 oz), SpO2 100 %  ,Body mass index is 17 54 kg/m²      Weight (last 2 days)     Date/Time   Weight    03/02/18 0550  43 5 (95 9)    03/01/18 0618  42 4 (93 48)    02/28/18 0610  42 3 (93 25)                Intake/Output Summary (Last 24 hours) at 03/02/18 0841  Last data filed at 03/01/18 2210   Gross per 24 hour   Intake             2006 ml   Output                0 ml   Net             2006 ml     I/O last 3 completed shifts: In: 2006 [NG/GT:2006]  Out: -          Physical Exam: /56 (BP Location: Right arm)   Pulse 83   Temp 98 8 °F (37 1 °C) (Temporal)   Resp 18   Ht 5' 2" (1 575 m)   Wt 43 5 kg (95 lb 14 4 oz)   SpO2 100%   BMI 17 54 kg/m²     General Appearance:    Alert, cooperative, no distress   Head:    Normocephalic, without obvious abnormality, atraumatic   Eyes:    Conjunctiva/corneas clear   Ears:    Normal external ears   Nose:   Nares normal, septum midline, mucosa normal, no drainage    or sinus tenderness   Throat:   Lips, mucosa, and tongue normal; teeth and gums normal   Neck:   Supple   Back:     Symmetric, no curvature, ROM normal, no CVA tenderness   Lungs:     Clear to auscultation bilaterally, respirations unlabored   Chest wall:    No tenderness or deformity   Heart:    Regular rate and rhythm, S1 and S2 normal, no murmur, rub   or gallop   Abdomen:     Soft, non-tender, bowel sounds active   Extremities:   Extremities normal, atraumatic, no cyanosis or edema   Skin:   Skin color, texture, turgor normal, no rashes or lesions   Lymph nodes:   Cervical normal   Neurologic:   CNII-XII intact            Lab, Imaging and other studies: I have personally reviewed pertinent labs  CBC:   No results found for: WBC, HGB, HCT, MCV, PLT, ADJUSTEDWBC, MCH, MCHC, RDW, MPV, NRBC  CMP:   No results found for: NA, K, CL, CO2, ANIONGAP, BUN, CREATININE, GLUCOSE, CALCIUM, AST, ALT, ALKPHOS, PROT, ALBUMIN, BILITOT, EGFR          Results from last 7 days  Lab Units 03/01/18  0512 02/28/18  0643 02/27/18  0519   SODIUM mmol/L 145 146* 146*   POTASSIUM mmol/L 3 8 3 6 3 9   CHLORIDE mmol/L 109* 111* 109*   CO2 mmol/L 28 27 29   BUN mg/dL 19 14 8   CREATININE mg/dL 0 79 0 80 0 75   CALCIUM mg/dL 8 6 8 7 8 9   TOTAL PROTEIN g/dL 6 8 6 6 7 3   BILIRUBIN TOTAL mg/dL 0 50 0 70 0 80   ALK PHOS U/L 42* 40* 44*   ALT U/L 12 12 16   AST U/L 23 27 35   GLUCOSE RANDOM mg/dL 118 109 70         Phosphorus:   No results found for: PHOS  Magnesium:   No results found for: MG  Urinalysis: No results found for: COLORU, CLARITYU, SPECGRAV, PHUR, LEUKOCYTESUR, NITRITE, PROTEINUA, GLUCOSEU, KETONESU, BILIRUBINUR, BLOODU  Ionized Calcium: No results found for: CAION  Coagulation:   No results found for: PT, INR, APTT  Troponin: No results found for: TROPONINI  ABG: No results found for: PHART, DJJ7GIA, PO2ART, SNT3UGT, G2SLTEVU, BEART, SOURCE  Radiology review:     IMAGING  Procedure: Ct Abdomen Pelvis Wo Contrast    Result Date: 2/20/2018  Narrative: CT ABDOMEN AND PELVIS WITHOUT IV CONTRAST INDICATION:  Nausea, vomiting and diarrhea  COMPARISON: CT abdomen pelvis 3/23/2017 TECHNIQUE:  CT examination of the abdomen and pelvis was performed without intravenous contrast   Axial, sagittal, and coronal 2D reformatted images were created from the source data and submitted for interpretation  Radiation dose length product (DLP) for this visit:  243 88 mGy-cm   This examination, like all CT scans performed in the [facility], was performed utilizing techniques to minimize radiation dose exposure, including the use of iterative  reconstruction and automated exposure control  Enteric contrast was administered  FINDINGS: ABDOMEN LOWER CHEST:  There are bilateral fibrotic changes  Bibasilar dependent opacities likely represent atelectasis  3 mm left upper lobe nodule (series 2 image 6)  There are postsurgical changes of the heart status post aortic valve replacement  There are atherosclerotic changes of the aorta  There is enlargement of the main pulmonary artery  LIVER/BILIARY TREE:  Stable calcifications in the anterior right lobe of the liver  No evidence of liver mass or ductal dilation on this noncontrast evaluation   GALLBLADDER:  There are gallstone(s) within the gallbladder, without pericholecystic inflammatory changes  Mild gallbladder distention  SPLEEN:  Unremarkable  PANCREAS:  The pancreas is atrophic  No pancreatic mass  ADRENAL GLANDS:  Unremarkable  KIDNEYS/URETERS:  Unremarkable  No hydronephrosis  STOMACH AND BOWEL:  There is a small hiatal hernia  Evaluation of the stomach and bowel is limited given lack of oral and intravenous contrast material   No evident bowel obstruction  Mild stool retention throughout the colon  There is colonic diverticulosis without evidence of diverticulitis  APPENDIX:  No findings to suggest appendicitis  ABDOMINOPELVIC CAVITY:  No ascites or free intraperitoneal air  No lymphadenopathy  VESSELS:  Atherosclerotic changes are present  No evidence of aneurysm  PELVIS REPRODUCTIVE ORGANS:  Unremarkable for patient's age  URINARY BLADDER:  Unremarkable  ABDOMINAL WALL/INGUINAL REGIONS:  Unremarkable  OSSEOUS STRUCTURES:  There are compression deformities of the L1, L2 and L4 vertebral bodies  L1 vertebral body compression deformity is similar in appearance to 2/11/2018 exam   L2 and L4 compression deformities are age indeterminate  Impression: 1  Limited evaluation secondary to lack of oral and intravenous contrast   Diverticulosis without evidence of acute diverticulitis  2   Cholelithiasis and mild gallbladder wall distention  If there is focal right upper quadrant pain further evaluation with right upper quadrant ultrasound could be considered  3   Stable compression deformity of the L1 vertebral body  Age indeterminate compression deformities of L2 and L4  4   Fibrotic changes at the lung bases with patchy bibasilar airspace opacities likely representing atelectasis  5   3 mm left upper lobe pulmonary nodule  Based on current Fleischner Society 2017 Guidelines on incidental pulmonary nodule, no routine follow-up is needed if the patient is considered low risk for lung cancer    If the patient is considered high risk for lung cancer, 12 month follow-up non-contrast chest CT is recommended  Considerations related to the patient's age and/or comorbidities may be used to alter these recommendations  Workstation performed: [id number]     Procedure: Xr Chest 1 View Portable    Result Date: 2/19/2018  Narrative: CHEST INDICATION:  Dehydration  COMPARISON:  Chest CT and radiographs dated 2/11/2018  Radiographs dated 1/6/2018  VIEWS:  Frontal and lateral projections IMAGES:  1 FINDINGS: Normal lung volumes  Chronic, peripheral interstitial reticular markings  No superimposed acute consolidation  Blunting of the right costophrenic angle is chronic and likely due to fibrosis  No layering effusions  No pneumothorax  Heart size within normal limits  Prior aortic root repair  Aortic tortuosity without aneurysm  Pulmonary vasculature is largely obscured by interstitial process  No janny cephalization  Osteopenia  High riding humeri with remodeling of the acetabular undersurfaces--suggestive of chronic rotator cuff tears  No acute osseous findings demonstrated  Thoracic spine poorly evaluated due to underpenetration  Impression: Chronic interstitial fibrotic disease without acute findings or significant change from prior  Workstation performed: [id number]     Procedure: Ct Head Without Contrast    Result Date: 2/19/2018  Narrative: CT BRAIN - WITHOUT CONTRAST INDICATION: chest pain  History taken directly from the electronic ordering system  COMPARISON:  None  TECHNIQUE:  CT examination of the brain was performed  In addition to axial images, coronal reformatted images were created and submitted for interpretation  Radiation dose length product (DLP) for this visit:  1076 81 mGy-cm     This examination, like all CT scans performed in the [facility], was performed utilizing techniques to minimize radiation dose exposure, including the use of iterative reconstruction and automated exposure control  IMAGE QUALITY:  Diagnostic  FINDINGS:  PARENCHYMA:  Decreased attenuation is noted in the supratentorial white matter demonstrating an appearance most consistent with moderate microangiopathic change  No intracranial mass, mass effect or midline shift  No CT signs of acute infarction  There is no parenchymal hemorrhage  VENTRICLES AND EXTRA-AXIAL SPACES:  Age-appropriate volume loss is noted  No hydrocephalus  VISUALIZED ORBITS AND PARANASAL SINUSES:  Orbits appear normal   There is near complete opacification of the right maxillary sinus  No fluid levels are seen  CALVARIUM AND EXTRACRANIAL SOFT TISSUES:   Normal      Impression: No acute intracranial abnormality  Microangiopathic changes  Workstation performed: [id number]     Procedure: Us Gallbladder    Result Date: 2/20/2018  Narrative: RIGHT UPPER QUADRANT ULTRASOUND INDICATION:  Cholelithiasis, distended gallbladder  COMPARISON:  CT abdomen pelvis 2/20/2018 TECHNIQUE:   Real-time ultrasound of the right upper quadrant was performed with a curvilinear transducer with both volumetric sweeps and still imaging techniques  FINDINGS: PANCREAS:  Portions of the pancreas are obscured by bowel gas  Visualized portions of the pancreas are unremarkable  AORTA AND IVC:  Visualized portions are normal for patient age  LIVER: Size:  Within normal range  The liver measures 10 3 cm in the midclavicular line  Contour:  Surface contour is smooth  Parenchyma:  Echogenicity and echotexture are within normal limits  No evidence of suspicious mass  Limited imaging of the main portal vein shows it to be patent and hepatopetal   BILIARY: The gallbladder is distended  No wall thickening or pericholecystic fluid  Single dependent mobile calculus without sludge  No sonographic Neff's sign  No intrahepatic biliary dilatation  CBD measures 4 5 mm  No choledocholithiasis  KIDNEY: Right kidney measures 8 6 x 4 3 cm  Within normal limits  ASCITES:   None  Impression:  Gallbladder distention and cholelithiasis without other evidence of acute cholecystitis   Workstation performed: [id number]       Current Facility-Administered Medications   Medication Dose Route Frequency    acetaminophen (TYLENOL) oral suspension 650 mg  650 mg Per NG Tube Q6H    al mag oxide-diphenhydramine-lidocaine viscous (MAGIC MOUTHWASH) suspension 10 mL  10 mL Swish & Swallow TID AC    dextrose 50 % IV solution 25 mL  25 mL Intravenous PRN    heparin (porcine) subcutaneous injection 5,000 Units  5,000 Units Subcutaneous Q12H [address]    menthol-zinc oxide (CALMOSEPTINE) 0 44-20 6 % ointment   Topical BID    mirtazapine (REMERON) tablet 15 mg  15 mg Oral HS    ondansetron (ZOFRAN) injection 4 mg  4 mg Intravenous Q6H PRN     Medications Discontinued During This Encounter   Medication Reason    cefuroxime (CEFTIN) 500 mg tablet     sodium chloride 0 9 % infusion     aspirin chewable tablet 81 mg     atorvastatin (LIPITOR) tablet 10 mg     calcium carbonate (OYSTER SHELL,OSCAL) 500 mg tablet 1 tablet     diltiazem (CARDIZEM CD) 24 hr capsule 120 mg     multivitamin-minerals (CENTRUM) tablet 1 tablet     sodium chloride infusion 0 45 %     potassium chloride 10 % oral solution 40 mEq     aspirin chewable tablet 81 mg     atorvastatin (LIPITOR) tablet 10 mg     calcium carbonate-vitamin D (OSCAL-D) 500 mg-200 units per tablet 1 tablet     diltiazem (CARDIZEM CD) 24 hr capsule 120 mg     albuterol inhalation solution 2 5 mg     dextrose 5 % and sodium chloride 0 2 % infusion     dextrose 5 % and sodium chloride 0 45 % with KCl 20 mEq/L infusion     potassium chloride 20 mEq IVPB (premix)     dextrose 5 % infusion     dronabinol (MARINOL) capsule 2 5 mg     benzonatate (TESSALON PERLES) capsule 100 mg     calcium carbonate-vitamin D (OSCAL-D) 500 mg-200 units per tablet 1 tablet     pantoprazole (PROTONIX) EC tablet 40 mg     dronabinol (MARINOL) capsule 5 mg     docusate sodium (COLACE) capsule 100 mg     acetaminophen (TYLENOL) tablet 650 mg     acetaminophen (TYLENOL) tablet 650 mg Alternate therapy       Fermin Montes De Oca DO

## 2018-03-02 NOTE — PLAN OF CARE
Problem: Prexisting or High Potential for Compromised Skin Integrity  Goal: Skin integrity is maintained or improved  INTERVENTIONS:  - Identify patients at risk for skin breakdown  - Assess and monitor skin integrity  - Assess and monitor nutrition and hydration status  - Monitor labs (i e  albumin)  - Assess for incontinence   - Turn and reposition patient  - Assist with mobility/ambulation  - Relieve pressure over bony prominences  - Avoid friction and shearing  - Provide appropriate hygiene as needed including keeping skin clean and dry  - Evaluate need for skin moisturizer/barrier cream  - Collaborate with interdisciplinary team (i e  Nutrition, Rehabilitation, etc )   - Patient/family teaching   Outcome: Progressing      Problem: Potential for Falls  Goal: Patient will remain free of falls  INTERVENTIONS:  - Assess patient frequently for physical needs  -  Identify cognitive and physical deficits and behaviors that affect risk of falls  -  [place] fall precautions as indicated by assessment   - Educate patient/family on patient safety including physical limitations  - Instruct patient to call for assistance with activity based on assessment  - Modify environment to reduce risk of injury  - Consider OT/PT consult to assist with strengthening/mobility    Outcome: Progressing      Problem: Nutrition/Hydration-ADULT  Goal: Nutrient/Hydration intake appropriate for improving, restoring or maintaining nutritional needs  Monitor and assess patient's nutrition/hydration status for malnutrition (ex- brittle hair, bruises, dry skin, pale skin and conjunctiva, muscle wasting, smooth red tongue, and disorientation)  Collaborate with interdisciplinary team and initiate plan and interventions as ordered  Monitor patient's weight and dietary intake as ordered or per policy  Utilize nutrition screening tool and intervene per policy   Determine patient's food preferences and provide high-protein, high-caloric foods as appropriate       INTERVENTIONS:  - Monitor oral intake, urinary output, labs, and treatment plans  - Assess nutrition and hydration status and recommend course of action  - Evaluate amount of meals eaten  - Assist patient with eating if necessary   - Allow adequate time for meals  - Recommend/ encourage appropriate diets, oral nutritional supplements, and vitamin/mineral supplements  - Order, calculate, and assess calorie counts as needed  - Recommend, monitor, and adjust tube feedings and TPN/PPN based on assessed needs  - Assess need for intravenous fluids  - Provide specific nutrition/hydration education as appropriate  - Include patient/family/caregiver in decisions related to nutrition   Outcome: Progressing      Problem: PAIN - ADULT  Goal: Verbalizes/displays adequate comfort level or baseline comfort level  Interventions:  - Encourage patient to monitor pain and request assistance  - Assess pain using appropriate pain scale  - Administer analgesics based on type and severity of pain and evaluate response  - Implement non-pharmacological measures as appropriate and evaluate response  - Consider cultural and social influences on pain and pain management  - Notify physician/advanced practitioner if interventions unsuccessful or patient reports new pain   Outcome: Progressing      Problem: INFECTION - ADULT  Goal: Absence or prevention of progression during hospitalization  INTERVENTIONS:  - Assess and monitor for signs and symptoms of infection  - Monitor lab/diagnostic results  - Monitor all insertion sites, i e  indwelling lines, tubes, and drains  - Monitor endotracheal (as able) and nasal secretions for changes in amount and color  - [place] appropriate cooling/warming therapies per order  - Administer medications as ordered  - Instruct and encourage patient and family to use good hand hygiene technique  - Identify and instruct in appropriate isolation precautions for identified infection/condition Outcome: Progressing      Problem: SAFETY ADULT  Goal: Maintain or return to baseline ADL function  INTERVENTIONS:  -  Assess patient's ability to carry out ADLs; assess patient's baseline for ADL function and identify physical deficits which impact ability to perform ADLs (bathing, care of mouth/teeth, toileting, grooming, dressing, etc )  - Assess/evaluate cause of self-care deficits   - Assess range of motion  - Assess patient's mobility; develop plan if impaired  - Assess patient's need for assistive devices and provide as appropriate  - Encourage maximum independence but intervene and supervise when necessary  ¯ Involve family in performance of ADLs  ¯ Assess for home care needs following discharge   ¯ Request OT consult to assist with ADL evaluation and planning for discharge  ¯ Provide patient education as appropriate   Outcome: Progressing    Goal: Maintain or return mobility status to optimal level  INTERVENTIONS:  - Assess patient's baseline mobility status (ambulation, transfers, stairs, etc )    - Identify cognitive and physical deficits and behaviors that affect mobility  - Identify mobility aids required to assist with transfers and/or ambulation (gait belt, sit-to-stand, lift, walker, cane, etc )  - [place] fall precautions as indicated by assessment  - Record patient progress and toleration of activity level on Mobility SBAR; progress patient to next Phase/Stage  - Instruct patient to call for assistance with activity based on assessment  - Request Rehabilitation consult to assist with strengthening/weightbearing, etc    Outcome: Progressing    Goal: Patient will remain free of falls  INTERVENTIONS:  - Assess patient frequently for physical needs  -  Identify cognitive and physical deficits and behaviors that affect risk of falls    -  [place] fall precautions as indicated by assessment   - Educate patient/family on patient safety including physical limitations  - Instruct patient to call for assistance with activity based on assessment  - Modify environment to reduce risk of injury  - Consider OT/PT consult to assist with strengthening/mobility    Outcome: Progressing      Problem: DISCHARGE PLANNING  Goal: Discharge to home or other facility with appropriate resources  INTERVENTIONS:  - Identify barriers to discharge w/patient and caregiver  - Arrange for needed discharge resources and transportation as appropriate  - Identify discharge learning needs (meds, wound care, etc )  - Arrange for interpretive services to assist at discharge as needed  - Refer to Case Management Department for coordinating discharge planning if the patient needs post-hospital services based on physician/advanced practitioner order or complex needs related to functional status, cognitive ability, or social support system   Outcome: Progressing      Problem: Knowledge Deficit  Goal: Patient/family/caregiver demonstrates understanding of disease process, treatment plan, medications, and discharge instructions  Complete learning assessment and assess knowledge base    Interventions:  - Provide teaching at level of understanding  - Provide teaching via preferred learning methods   Outcome: Progressing      Problem: DISCHARGE PLANNING - CARE MANAGEMENT  Goal: Discharge to post-acute care or home with appropriate resources  INTERVENTIONS:  - Conduct assessment to determine patient/family and health care team treatment goals, and need for post-acute services based on payer coverage, community resources, and patient preferences, and barriers to discharge  - Address psychosocial, clinical, and financial barriers to discharge as identified in assessment in conjunction with the patient/family and health care team  - Arrange appropriate level of post-acute services according to patient's   needs and preference and payer coverage in collaboration with the physician and health care team  - Communicate with and update the patient/family, physician, and health care team regarding progress on the discharge plan  - Arrange appropriate transportation to post-acute venues   Outcome: Progressing

## 2018-03-02 NOTE — PLAN OF CARE
Problem: Nutrition/Hydration-ADULT  Goal: Nutrient/Hydration intake appropriate for improving, restoring or maintaining nutritional needs  Monitor and assess patient's nutrition/hydration status for malnutrition (ex- brittle hair, bruises, dry skin, pale skin and conjunctiva, muscle wasting, smooth red tongue, and disorientation)  Collaborate with interdisciplinary team and initiate plan and interventions as ordered  Monitor patient's weight and dietary intake as ordered or per policy  Utilize nutrition screening tool and intervene per policy  Determine patient's food preferences and provide high-protein, high-caloric foods as appropriate  INTERVENTIONS:  - Monitor oral intake, urinary output, labs, and treatment plans  - Assess nutrition and hydration status and recommend course of action  - Evaluate amount of meals eaten  - Assist patient with eating if necessary   - Allow adequate time for meals  - Recommend/ encourage appropriate diets, oral nutritional supplements, and vitamin/mineral supplements  - Order, calculate, and assess calorie counts as needed  - Recommend, monitor, and adjust tube feedings and TPN/PPN based on assessed needs  - Assess need for intravenous fluids  - Provide specific nutrition/hydration education as appropriate  - Include patient/family/caregiver in decisions related to nutrition   Outcome: Progressing  Pt tolerating tube feeding at goal rate, meeting nutritional needs with enteral feeding  Still refusing PO

## 2018-03-02 NOTE — PHYSICAL THERAPY NOTE
PT Treatment Note     03/02/18 1000   Pain Assessment   Pain Assessment No/denies pain   Cognition   Arousal/Participation Lethargic;Poorly responsive   Exercises   Hip Flexion Supine;20 reps;PROM; Bilateral   Hip Abduction Supine;20 reps;PROM; Bilateral   Hip Adduction Supine;20 reps;PROM; Bilateral   Ankle Pumps Supine;20 reps;PROM; Bilateral   Assessment   Prognosis Fair   Problem List Decreased strength;Decreased mobility; Decreased cognition   Assessment Pt progress limited d/t declining health status  Pt would benefit from continued PT to increase BLE strength to further maximize return to PLOF  Plan   Treatment/Interventions LE strengthening/ROM   Progress Slow progress, medical status limitations   Pt positioned in bed for comfort w/ call bell in reach and SCDs applied and turned on at conslusion of therapy session

## 2018-03-02 NOTE — OCCUPATIONAL THERAPY NOTE
OT Note     03/02/18 1007   Restrictions/Precautions   Other Precautions Multiple lines; Fall Risk;Restraints   Therapeutic Exercise - ROM   UE-ROM Yes   ROM- Right Upper Extremities   R Shoulder PROM; Flexion; Extension   R Elbow PROM;Elbow flexion;Elbow extension   R Wrist PROM; Wrist flexion;Wrist extension   R Hand PROM  (Forearm pro/supination)   R Weight/Reps/Sets 2 sets/10   ROM - Left Upper Extremities    L Shoulder PROM; Flexion; Extension   L Elbow PROM;Elbow flexion;Elbow extension   L Wrist PROM; Wrist extension;Wrist flexion   L Hand PROM  (Forearm pro/supination)   L Weight/Reps/Sets 2 sets/10   Activity Tolerance   Activity Tolerance (Appears to tolerate tx session)   Assessment   Assessment Presents supine, no verbalizations  Soft wrist restraints in place  Same released thru ROM, reapplied on completion  No apparent signs of discomfort thru ROM  Call bell in reach     Recommendation   Recommendation (Continue OT services )

## 2018-03-02 NOTE — PROGRESS NOTES
NG tube in place via ausculation, no residual upon check, secure device intact on nose with tape marker just below secure device  Jevity 1 2 tube feed started with new tubing at a rate of 65ml/hr with Q6 hour water flushes of 200ml programmed into kangaroo pump

## 2018-03-03 NOTE — PROGRESS NOTES
[facility] Internal Medicine Progress Note  Patient: Leonarda Pollard 80 y o  female   MRN: [id number]  PCP: Arcadio Corrales DO  Unit/Bed#: 797-15 Encounter: [id number]  Date Of Visit: 03/02/18    Assessment:    Principal Problem:    Mucositis oral  Active Problems:    Essential hypertension    Gastroesophageal reflux disease without esophagitis    Coronary artery disease involving native coronary artery of native heart    H/O aortic valve replacement    Hyperlipidemia    Stage 3 chronic kidney disease    Chronic diastolic CHF (congestive heart failure) (HCC)    Dysphagia    Hypernatremia    Elevated platelet count (HCC)    Anemia    Dehydration    Hypoalbuminemia    Interstitial lung disease (HCC)    Pulmonary fibrosis (HCC)    Hypokalemia    Pulmonary nodule    Closed compression fracture of L1 lumbar vertebra (HCC)    Closed compression fracture of L2 lumbar vertebra (HCC)    Closed compression fracture of L4 lumbar vertebra (HCC)    Severe protein-calorie malnutrition (HCC)    BMI less than 19,adult      Plan:    · Anorexia/severe protein calorie malnutrition  · Family was updated this week via telephone  · Maintain NG tube  · Patient tolerating tube feeds  · Continue with Remeron  · Mucositis  · Magic mouthwash   · Hypernatremia  · Follow up repeat labs tomorrow  · Chronic anemia  · History of interstitial lung disease  VTE Pharmacologic Prophylaxis:   Pharmacologic: Heparin  Mechanical VTE Prophylaxis in Place: Yes    Discussions with Specialists or Other Care Team Provider/Patient/Family:  Case discussed with Nursing  Time Spent for Care: 20 minutes  More than 50% of total time spent on counseling and coordination of care as described above      Current Length of Stay: 11 day(s)    Current Patient Status: Inpatient     Code Status: Level 3 - DNAR and DNI      Subjective:   No issues tolerating tube feeds  Patient with underlying dementia, currently restrained due to need of tube feeds     Objective:     Vitals:   Temp (24hrs), Av 2 °F (36 8 °C), Min:97 5 °F (36 4 °C), Max:98 8 °F (37 1 °C)    HR:  [82-90] 82  Resp:  [18] 18  BP: (102-145)/(56-76) 145/76  SpO2:  [93 %-100 %] 93 %  Body mass index is 17 54 kg/m²  Input and Output Summary (last 24 hours): Intake/Output Summary (Last 24 hours) at 18  Last data filed at 18 1801   Gross per 24 hour   Intake              470 ml   Output                0 ml   Net              470 ml       Physical Exam:     Physical Exam   Constitutional: No distress  Cachectic elderly female, no acute distress   HENT:   Head: Normocephalic and atraumatic  Oropharynx appears to be erythematous, less ulceration noted today   Pulmonary/Chest: Effort normal and breath sounds normal  No respiratory distress  She has no wheezes  Abdominal: Soft  Bowel sounds are normal  She exhibits no distension  There is no tenderness  Musculoskeletal: Normal range of motion  She exhibits no edema, tenderness or deformity  Skin: She is not diaphoretic  Nursing note and vitals reviewed  Additional Data:     Labs:      Results from last 7 days  Lab Units 18  0512   WBC Thousand/uL 5 98   HEMOGLOBIN g/dL 10 1*   HEMATOCRIT % 32 2*   PLATELETS Thousands/uL 281   NEUTROS PCT % 48   LYMPHS PCT % 33   MONOS PCT % 11   EOS PCT % 7*       Results from last 7 days  Lab Units 18  0512   SODIUM mmol/L 145   POTASSIUM mmol/L 3 8   CHLORIDE mmol/L 109*   CO2 mmol/L 28   BUN mg/dL 19   CREATININE mg/dL 0 79   CALCIUM mg/dL 8 6   TOTAL PROTEIN g/dL 6 8   BILIRUBIN TOTAL mg/dL 0 50   ALK PHOS U/L 42*   ALT U/L 12   AST U/L 23   GLUCOSE RANDOM mg/dL 118       Results from last 7 days  Lab Units 18  0512   INR  1 12       * I Have Reviewed All Lab Data Listed Above  * Additional Pertinent Lab Tests Reviewed:  All [place] Admission Reviewed      Recent Cultures (last 7 days):           Last 24 Hours Medication List: Current Facility-Administered Medications:  acetaminophen 650 mg Per NG Tube Q6H Rebel Oliveira DO   al mag oxide-diphenhydramine-lidocaine viscous 10 mL Swish & Swallow TID [facility] Rebel Oliveira DO   dextrose 25 mL Intravenous PRN Elliot Mclain DO   heparin (porcine) 5,000 Units Subcutaneous Q12H [address] Elliot Mclain, DO   menthol-zinc oxide  Topical BID Elliot Mclain,    mirtazapine 15 mg Oral HS Rebel Oliveira DO   ondansetron 4 mg Intravenous Q6H PRN Elliot Mclain DO        Today, Patient Was Seen By: Rebel Oliveira DO

## 2018-03-03 NOTE — PLAN OF CARE
Problem: Prexisting or High Potential for Compromised Skin Integrity  Goal: Skin integrity is maintained or improved  INTERVENTIONS:  - Identify patients at risk for skin breakdown  - Assess and monitor skin integrity  - Assess and monitor nutrition and hydration status  - Monitor labs (i e  albumin)  - Assess for incontinence   - Turn and reposition patient  - Assist with mobility/ambulation  - Relieve pressure over bony prominences  - Avoid friction and shearing  - Provide appropriate hygiene as needed including keeping skin clean and dry  - Evaluate need for skin moisturizer/barrier cream  - Collaborate with interdisciplinary team (i e  Nutrition, Rehabilitation, etc )   - Patient/family teaching   Outcome: Progressing      Problem: Potential for Falls  Goal: Patient will remain free of falls  INTERVENTIONS:  - Assess patient frequently for physical needs  -  Identify cognitive and physical deficits and behaviors that affect risk of falls  -  [place] fall precautions as indicated by assessment   - Educate patient/family on patient safety including physical limitations  - Instruct patient to call for assistance with activity based on assessment  - Modify environment to reduce risk of injury  - Consider OT/PT consult to assist with strengthening/mobility    Outcome: Progressing      Problem: Nutrition/Hydration-ADULT  Goal: Nutrient/Hydration intake appropriate for improving, restoring or maintaining nutritional needs  Monitor and assess patient's nutrition/hydration status for malnutrition (ex- brittle hair, bruises, dry skin, pale skin and conjunctiva, muscle wasting, smooth red tongue, and disorientation)  Collaborate with interdisciplinary team and initiate plan and interventions as ordered  Monitor patient's weight and dietary intake as ordered or per policy  Utilize nutrition screening tool and intervene per policy   Determine patient's food preferences and provide high-protein, high-caloric foods as appropriate       INTERVENTIONS:  - Monitor oral intake, urinary output, labs, and treatment plans  - Assess nutrition and hydration status and recommend course of action  - Evaluate amount of meals eaten  - Assist patient with eating if necessary   - Allow adequate time for meals  - Recommend/ encourage appropriate diets, oral nutritional supplements, and vitamin/mineral supplements  - Order, calculate, and assess calorie counts as needed  - Recommend, monitor, and adjust tube feedings and TPN/PPN based on assessed needs  - Assess need for intravenous fluids  - Provide specific nutrition/hydration education as appropriate  - Include patient/family/caregiver in decisions related to nutrition   Outcome: Not Progressing      Problem: PAIN - ADULT  Goal: Verbalizes/displays adequate comfort level or baseline comfort level  Interventions:  - Encourage patient to monitor pain and request assistance  - Assess pain using appropriate pain scale  - Administer analgesics based on type and severity of pain and evaluate response  - Implement non-pharmacological measures as appropriate and evaluate response  - Consider cultural and social influences on pain and pain management  - Notify physician/advanced practitioner if interventions unsuccessful or patient reports new pain   Outcome: Progressing      Problem: INFECTION - ADULT  Goal: Absence or prevention of progression during hospitalization  INTERVENTIONS:  - Assess and monitor for signs and symptoms of infection  - Monitor lab/diagnostic results  - Monitor all insertion sites, i e  indwelling lines, tubes, and drains  - Monitor endotracheal (as able) and nasal secretions for changes in amount and color  - [place] appropriate cooling/warming therapies per order  - Administer medications as ordered  - Instruct and encourage patient and family to use good hand hygiene technique  - Identify and instruct in appropriate isolation precautions for identified infection/condition Outcome: Progressing      Problem: SAFETY ADULT  Goal: Maintain or return to baseline ADL function  INTERVENTIONS:  -  Assess patient's ability to carry out ADLs; assess patient's baseline for ADL function and identify physical deficits which impact ability to perform ADLs (bathing, care of mouth/teeth, toileting, grooming, dressing, etc )  - Assess/evaluate cause of self-care deficits   - Assess range of motion  - Assess patient's mobility; develop plan if impaired  - Assess patient's need for assistive devices and provide as appropriate  - Encourage maximum independence but intervene and supervise when necessary  ¯ Involve family in performance of ADLs  ¯ Assess for home care needs following discharge   ¯ Request OT consult to assist with ADL evaluation and planning for discharge  ¯ Provide patient education as appropriate   Outcome: Not Progressing    Goal: Maintain or return mobility status to optimal level  INTERVENTIONS:  - Assess patient's baseline mobility status (ambulation, transfers, stairs, etc )    - Identify cognitive and physical deficits and behaviors that affect mobility  - Identify mobility aids required to assist with transfers and/or ambulation (gait belt, sit-to-stand, lift, walker, cane, etc )  - [place] fall precautions as indicated by assessment  - Record patient progress and toleration of activity level on Mobility SBAR; progress patient to next Phase/Stage  - Instruct patient to call for assistance with activity based on assessment  - Request Rehabilitation consult to assist with strengthening/weightbearing, etc    Outcome: Not Progressing    Goal: Patient will remain free of falls  INTERVENTIONS:  - Assess patient frequently for physical needs  -  Identify cognitive and physical deficits and behaviors that affect risk of falls    -  [place] fall precautions as indicated by assessment   - Educate patient/family on patient safety including physical limitations  - Instruct patient to call for assistance with activity based on assessment  - Modify environment to reduce risk of injury  - Consider OT/PT consult to assist with strengthening/mobility    Outcome: Progressing      Problem: DISCHARGE PLANNING  Goal: Discharge to home or other facility with appropriate resources  INTERVENTIONS:  - Identify barriers to discharge w/patient and caregiver  - Arrange for needed discharge resources and transportation as appropriate  - Identify discharge learning needs (meds, wound care, etc )  - Arrange for interpretive services to assist at discharge as needed  - Refer to Case Management Department for coordinating discharge planning if the patient needs post-hospital services based on physician/advanced practitioner order or complex needs related to functional status, cognitive ability, or social support system   Outcome: Progressing      Problem: Knowledge Deficit  Goal: Patient/family/caregiver demonstrates understanding of disease process, treatment plan, medications, and discharge instructions  Complete learning assessment and assess knowledge base    Interventions:  - Provide teaching at level of understanding  - Provide teaching via preferred learning methods   Outcome: Progressing      Problem: DISCHARGE PLANNING - CARE MANAGEMENT  Goal: Discharge to post-acute care or home with appropriate resources  INTERVENTIONS:  - Conduct assessment to determine patient/family and health care team treatment goals, and need for post-acute services based on payer coverage, community resources, and patient preferences, and barriers to discharge  - Address psychosocial, clinical, and financial barriers to discharge as identified in assessment in conjunction with the patient/family and health care team  - Arrange appropriate level of post-acute services according to patient's   needs and preference and payer coverage in collaboration with the physician and health care team  - Communicate with and update the patient/family, physician, and health care team regarding progress on the discharge plan  - Arrange appropriate transportation to post-acute venues   Outcome: Progressing

## 2018-03-03 NOTE — PROGRESS NOTES
Progress Note - Nephrology   Gonzales Harris 80 y o  female MRN: [id number]  Unit/Bed#: 416-01 Encounter: [id number]    A/P:  1  Hypernatremia due to hypovolemia and lack of free water  On 200 ml free water flush q6 with TF, follow serum sodium and adjust free water as indicated  3/3 Serum sodium dropping to 144 - continue free water  2  Chronic kidney disease stage 3   3  Chronic tubulointerstitial disease  4  Hypokalemia: will reorder supplement   K low normal, continue to monitor  5  Chronic diastolic CHF: appears compensated  6  Hypertension   7  Oral mucositis ---> currently on feedings via NGT      Follow up reason for today's visit: Hypernatremia    Mucositis oral    Patient Active Problem List   Diagnosis    Essential hypertension    Gastroesophageal reflux disease without esophagitis    Coronary artery disease involving native coronary artery of native heart    H/O aortic valve replacement    Hyperlipidemia    Stage 3 chronic kidney disease    Chronic diastolic CHF (congestive heart failure) (HCC)    Dysphagia    Hypernatremia    Elevated platelet count (HCC)    Anemia    Dehydration    Hypoalbuminemia    Interstitial lung disease (HCC)    Pulmonary fibrosis (HCC)    Hypokalemia    Pulmonary nodule    Closed compression fracture of L1 lumbar vertebra (HCC)    Closed compression fracture of L2 lumbar vertebra (HCC)    Closed compression fracture of L4 lumbar vertebra (HCC)    Severe protein-calorie malnutrition (HCC)    BMI less than 19,adult    Mucositis oral         Subjective:    Patient without acute complaints  Objective:     Vitals: Blood pressure 147/67, pulse 98, temperature 98 9 °F (37 2 °C), temperature source Temporal, resp  rate 20, height 5' 2" (1 575 m), weight 41 2 kg (90 lb 13 3 oz), SpO2 98 %  ,Body mass index is 16 61 kg/m²      Weight (last 2 days)     Date/Time   Weight    03/03/18 0600  41 2 (90 83)    Weight: bed at 03/03/18 0600    03/02/18 0550  43 5 (95 9)    03/01/18 0618  42 4 (93 48)                Intake/Output Summary (Last 24 hours) at 03/03/18 1145  Last data filed at 03/03/18 0829   Gross per 24 hour   Intake              916 ml   Output                0 ml   Net              916 ml     I/O last 3 completed shifts: In: 1206 [NG/GT:1206]  Out: -          Physical Exam: /67 (BP Location: Right arm)   Pulse 98   Temp 98 9 °F (37 2 °C) (Temporal)   Resp 20   Ht 5' 2" (1 575 m)   Wt 41 2 kg (90 lb 13 3 oz) Comment: bed  SpO2 98%   BMI 16 61 kg/m²     General Appearance:    Alert, cooperative, no distress   Head:    Normocephalic, without obvious abnormality, atraumatic   Eyes:    Conjunctiva/corneas clear   Ears:    Normal external ears   Nose:   Nares normal, septum midline, mucosa normal, no drainage    or sinus tenderness   Throat:   Lips, mucosa, and tongue normal; teeth and gums normal   Neck:   Supple   Back:     Symmetric, no curvature, ROM normal, no CVA tenderness   Lungs:     Clear to auscultation bilaterally, respirations unlabored   Chest wall:    No tenderness or deformity   Heart:    Regular rate and rhythm, S1 and S2 normal, no murmur, rub   or gallop   Abdomen:     Soft, non-tender, bowel sounds active   Extremities:   Extremities normal, atraumatic, no cyanosis or edema   Skin:   Skin color, texture, turgor normal, no rashes or lesions   Lymph nodes:   Cervical normal   Neurologic:   CNII-XII intact            Lab, Imaging and other studies: I have personally reviewed pertinent labs    CBC:   Lab Results   Component Value Date    WBC 7 70 03/03/2018    HGB 9 9 (L) 03/03/2018    HCT 31 7 (L) 03/03/2018    MCV 97 03/03/2018     03/03/2018    MCH 30 2 03/03/2018    MCHC 31 2 (L) 03/03/2018    RDW 15 4 (H) 03/03/2018    MPV 9 9 03/03/2018     CMP:   Lab Results   Component Value Date     03/03/2018    K 4 6 03/03/2018     03/03/2018    CO2 31 03/03/2018    ANIONGAP 6 03/03/2018    BUN 25 03/03/2018 CREATININE 0 79 03/03/2018    GLUCOSE 115 03/03/2018    CALCIUM 8 6 03/03/2018    AST 27 03/03/2018    ALT 13 03/03/2018    ALKPHOS 48 03/03/2018    PROT 6 6 03/03/2018    BILITOT 0 30 03/03/2018    EGFR 64 03/03/2018         Results from last 7 days  Lab Units 03/03/18  0431 03/01/18  0512 02/28/18  0643   SODIUM mmol/L 144 145 146*   POTASSIUM mmol/L 4 6 3 8 3 6   CHLORIDE mmol/L 107 109* 111*   CO2 mmol/L 31 28 27   BUN mg/dL 25 19 14   CREATININE mg/dL 0 79 0 79 0 80   CALCIUM mg/dL 8 6 8 6 8 7   TOTAL PROTEIN g/dL 6 6 6 8 6 6   BILIRUBIN TOTAL mg/dL 0 30 0 50 0 70   ALK PHOS U/L 48 42* 40*   ALT U/L 13 12 12   AST U/L 27 23 27   GLUCOSE RANDOM mg/dL 115 118 109         Phosphorus:   Lab Results   Component Value Date    PHOS 2 7 03/03/2018     Magnesium:   Lab Results   Component Value Date    MG 1 7 03/03/2018     Urinalysis: No results found for: Carrie Ink, SPECGRAV, PHUR, LEUKOCYTESUR, NITRITE, PROTEINUA, GLUCOSEU, KETONESU, BILIRUBINUR, BLOODU  Ionized Calcium: No results found for: CAION  Coagulation:   Lab Results   Component Value Date    INR 1 00 03/03/2018     Troponin: No results found for: TROPONINI  ABG: No results found for: PHART, AXV2MSW, PO2ART, JXP1HDR, U9VMTIOG, BEART, SOURCE  Radiology review:     IMAGING  Procedure: Ct Abdomen Pelvis Wo Contrast    Result Date: 2/20/2018  Narrative: CT ABDOMEN AND PELVIS WITHOUT IV CONTRAST INDICATION:  Nausea, vomiting and diarrhea  COMPARISON: CT abdomen pelvis 3/23/2017 TECHNIQUE:  CT examination of the abdomen and pelvis was performed without intravenous contrast   Axial, sagittal, and coronal 2D reformatted images were created from the source data and submitted for interpretation  Radiation dose length product (DLP) for this visit:  243 88 mGy-cm     This examination, like all CT scans performed in the [facility], was performed utilizing techniques to minimize radiation dose exposure, including the use of iterative reconstruction and automated exposure control  Enteric contrast was administered  FINDINGS: ABDOMEN LOWER CHEST:  There are bilateral fibrotic changes  Bibasilar dependent opacities likely represent atelectasis  3 mm left upper lobe nodule (series 2 image 6)  There are postsurgical changes of the heart status post aortic valve replacement  There are atherosclerotic changes of the aorta  There is enlargement of the main pulmonary artery  LIVER/BILIARY TREE:  Stable calcifications in the anterior right lobe of the liver  No evidence of liver mass or ductal dilation on this noncontrast evaluation  GALLBLADDER:  There are gallstone(s) within the gallbladder, without pericholecystic inflammatory changes  Mild gallbladder distention  SPLEEN:  Unremarkable  PANCREAS:  The pancreas is atrophic  No pancreatic mass  ADRENAL GLANDS:  Unremarkable  KIDNEYS/URETERS:  Unremarkable  No hydronephrosis  STOMACH AND BOWEL:  There is a small hiatal hernia  Evaluation of the stomach and bowel is limited given lack of oral and intravenous contrast material   No evident bowel obstruction  Mild stool retention throughout the colon  There is colonic diverticulosis without evidence of diverticulitis  APPENDIX:  No findings to suggest appendicitis  ABDOMINOPELVIC CAVITY:  No ascites or free intraperitoneal air  No lymphadenopathy  VESSELS:  Atherosclerotic changes are present  No evidence of aneurysm  PELVIS REPRODUCTIVE ORGANS:  Unremarkable for patient's age  URINARY BLADDER:  Unremarkable  ABDOMINAL WALL/INGUINAL REGIONS:  Unremarkable  OSSEOUS STRUCTURES:  There are compression deformities of the L1, L2 and L4 vertebral bodies  L1 vertebral body compression deformity is similar in appearance to 2/11/2018 exam   L2 and L4 compression deformities are age indeterminate  Impression: 1  Limited evaluation secondary to lack of oral and intravenous contrast   Diverticulosis without evidence of acute diverticulitis   2  Cholelithiasis and mild gallbladder wall distention  If there is focal right upper quadrant pain further evaluation with right upper quadrant ultrasound could be considered  3   Stable compression deformity of the L1 vertebral body  Age indeterminate compression deformities of L2 and L4  4   Fibrotic changes at the lung bases with patchy bibasilar airspace opacities likely representing atelectasis  5   3 mm left upper lobe pulmonary nodule  Based on current Fleischner Society 2017 Guidelines on incidental pulmonary nodule, no routine follow-up is needed if the patient is considered low risk for lung cancer  If the patient is considered high risk for lung cancer, 12 month follow-up non-contrast chest CT is recommended  Considerations related to the patient's age and/or comorbidities may be used to alter these recommendations  Workstation performed: [id number]     Procedure: Xr Chest 1 View Portable    Result Date: 2/19/2018  Narrative: CHEST INDICATION:  Dehydration  COMPARISON:  Chest CT and radiographs dated 2/11/2018  Radiographs dated 1/6/2018  VIEWS:  Frontal and lateral projections IMAGES:  1 FINDINGS: Normal lung volumes  Chronic, peripheral interstitial reticular markings  No superimposed acute consolidation  Blunting of the right costophrenic angle is chronic and likely due to fibrosis  No layering effusions  No pneumothorax  Heart size within normal limits  Prior aortic root repair  Aortic tortuosity without aneurysm  Pulmonary vasculature is largely obscured by interstitial process  No janny cephalization  Osteopenia  High riding humeri with remodeling of the acetabular undersurfaces--suggestive of chronic rotator cuff tears  No acute osseous findings demonstrated  Thoracic spine poorly evaluated due to underpenetration  Impression: Chronic interstitial fibrotic disease without acute findings or significant change from prior   Workstation performed: [id number]     Procedure: Ct Head Without Contrast    Result Date: 2/19/2018  Narrative: CT BRAIN - WITHOUT CONTRAST INDICATION: chest pain  History taken directly from the electronic ordering system  COMPARISON:  None  TECHNIQUE:  CT examination of the brain was performed  In addition to axial images, coronal reformatted images were created and submitted for interpretation  Radiation dose length product (DLP) for this visit:  1076 81 mGy-cm   This examination, like all CT scans performed in the [facility], was performed utilizing techniques to minimize radiation dose exposure, including the use of iterative reconstruction and automated exposure control  IMAGE QUALITY:  Diagnostic  FINDINGS:  PARENCHYMA:  Decreased attenuation is noted in the supratentorial white matter demonstrating an appearance most consistent with moderate microangiopathic change  No intracranial mass, mass effect or midline shift  No CT signs of acute infarction  There is no parenchymal hemorrhage  VENTRICLES AND EXTRA-AXIAL SPACES:  Age-appropriate volume loss is noted  No hydrocephalus  VISUALIZED ORBITS AND PARANASAL SINUSES:  Orbits appear normal   There is near complete opacification of the right maxillary sinus  No fluid levels are seen  CALVARIUM AND EXTRACRANIAL SOFT TISSUES:   Normal      Impression: No acute intracranial abnormality  Microangiopathic changes  Workstation performed: [id number]     Procedure: Us Gallbladder    Result Date: 2/20/2018  Narrative: RIGHT UPPER QUADRANT ULTRASOUND INDICATION:  Cholelithiasis, distended gallbladder  COMPARISON:  CT abdomen pelvis 2/20/2018 TECHNIQUE:   Real-time ultrasound of the right upper quadrant was performed with a curvilinear transducer with both volumetric sweeps and still imaging techniques  FINDINGS: PANCREAS:  Portions of the pancreas are obscured by bowel gas  Visualized portions of the pancreas are unremarkable  AORTA AND IVC:  Visualized portions are normal for patient age   LIVER: Size:  Within normal range  The liver measures 10 3 cm in the midclavicular line  Contour:  Surface contour is smooth  Parenchyma:  Echogenicity and echotexture are within normal limits  No evidence of suspicious mass  Limited imaging of the main portal vein shows it to be patent and hepatopetal   BILIARY: The gallbladder is distended  No wall thickening or pericholecystic fluid  Single dependent mobile calculus without sludge  No sonographic Neff's sign  No intrahepatic biliary dilatation  CBD measures 4 5 mm  No choledocholithiasis  KIDNEY: Right kidney measures 8 6 x 4 3 cm  Within normal limits  ASCITES:   None  Impression:  Gallbladder distention and cholelithiasis without other evidence of acute cholecystitis   Workstation performed: [id number]       Current Facility-Administered Medications   Medication Dose Route Frequency    acetaminophen (TYLENOL) oral suspension 650 mg  650 mg Per NG Tube Q6H    al mag oxide-diphenhydramine-lidocaine viscous (MAGIC MOUTHWASH) suspension 10 mL  10 mL Swish & Swallow TID AC    dextrose 50 % IV solution 25 mL  25 mL Intravenous PRN    heparin (porcine) subcutaneous injection 5,000 Units  5,000 Units Subcutaneous Q12H [address]    menthol-zinc oxide (CALMOSEPTINE) 0 44-20 6 % ointment   Topical BID    mirtazapine (REMERON) tablet 15 mg  15 mg Oral HS    ondansetron (ZOFRAN) injection 4 mg  4 mg Intravenous Q6H PRN     Medications Discontinued During This Encounter   Medication Reason    cefuroxime (CEFTIN) 500 mg tablet     sodium chloride 0 9 % infusion     aspirin chewable tablet 81 mg     atorvastatin (LIPITOR) tablet 10 mg     calcium carbonate (OYSTER SHELL,OSCAL) 500 mg tablet 1 tablet     diltiazem (CARDIZEM CD) 24 hr capsule 120 mg     multivitamin-minerals (CENTRUM) tablet 1 tablet     sodium chloride infusion 0 45 %     potassium chloride 10 % oral solution 40 mEq     aspirin chewable tablet 81 mg     atorvastatin (LIPITOR) tablet 10 mg     calcium carbonate-vitamin D (OSCAL-D) 500 mg-200 units per tablet 1 tablet     diltiazem (CARDIZEM CD) 24 hr capsule 120 mg     albuterol inhalation solution 2 5 mg     dextrose 5 % and sodium chloride 0 2 % infusion     dextrose 5 % and sodium chloride 0 45 % with KCl 20 mEq/L infusion     potassium chloride 20 mEq IVPB (premix)     dextrose 5 % infusion     dronabinol (MARINOL) capsule 2 5 mg     benzonatate (TESSALON PERLES) capsule 100 mg     calcium carbonate-vitamin D (OSCAL-D) 500 mg-200 units per tablet 1 tablet     pantoprazole (PROTONIX) EC tablet 40 mg     dronabinol (MARINOL) capsule 5 mg     docusate sodium (COLACE) capsule 100 mg     acetaminophen (TYLENOL) tablet 650 mg     acetaminophen (TYLENOL) tablet 650 mg Alternate therapy       Lenin Trujillo MD

## 2018-03-03 NOTE — CASE MANAGEMENT
Continued Stay Review    Date: 3/3/2018    Vital Signs: /67 (BP Location: Left arm)   Pulse 101   Temp 98 7 °F (37 1 °C) (Temporal)   Resp 18   Ht 5' 2" (1 575 m)   Wt 41 2 kg (90 lb 13 3 oz) Comment: bed  SpO2 90%   BMI 16 61 kg/m²     Medications:   Scheduled Meds:   Current Facility-Administered Medications:  acetaminophen 650 mg Per NG Tube Q6H Neomia Laud, DO   al mag oxide-diphenhydramine-lidocaine viscous 10 mL Swish & Swallow TID [facility] Neomia Laud, DO   dextrose 25 mL Intravenous PRN [employer], DO   heparin (porcine) 5,000 Units Subcutaneous Q12H [address] [employer], DO   menthol-zinc oxide  Topical BID [employer], DO   mirtazapine 15 mg Oral HS Jasper Joey, DO   ondansetron 4 mg Intravenous Q6H PRN [employer], DO     Continuous Infusions:    PRN Meds: not used: dextrose    ondansetron    Abnormal Labs/Diagnostic Results:  hgb 9 9, hct 31 7  Albumin 2 1      Age/Sex: 80 y o  female     Assessment/Plan: Severe protein-calorie malnutrition/Dysphagia/BMI=16 61 (BMI less than 19 in an adult):  Continue the current tube feedings via the NG tube  Check a prealbumin level  Continue to trial PO intake      2)  Hypernatremia secondary to hypovolemia and lack of free water intake: The hypernatremia has resolved  Continue free water flushes with 200 ml every 6 hours  Continue to monitor the sodium level       3)  Oral mucositis:  Continue magic mouthwash      4)  Interstitial lung disease/Pulmonary fibrosis:  Continue the respiratory protocol      I had a lengthy discussion with the patient's son, Figueroa Gerard, outside of the patient's room  We will continue the tube feedings for now via the NG tube  Discharge Plan: To be determined  Referral to skilled unit at [facility]

## 2018-03-03 NOTE — PROGRESS NOTES
Progress Note - Sam Stovall 80 y o  female MRN: [id number]    Unit/Bed#: 262-96 Encounter: [id number]      Assessment:  Patient Active Problem List   Diagnosis    Essential hypertension    Gastroesophageal reflux disease without esophagitis    Coronary artery disease involving native coronary artery of native heart    H/O aortic valve replacement    Hyperlipidemia    Stage 3 chronic kidney disease    Chronic diastolic CHF (congestive heart failure) (HCC)    Dysphagia    Hypernatremia    Elevated platelet count (HCC)    Anemia    Dehydration    Hypoalbuminemia    Interstitial lung disease (HCC)    Pulmonary fibrosis (HCC)    Hypokalemia    Pulmonary nodule    Closed compression fracture of L1 lumbar vertebra (HCC)    Closed compression fracture of L2 lumbar vertebra (HCC)    Closed compression fracture of L4 lumbar vertebra (HCC)    Severe protein-calorie malnutrition (HCC)    BMI less than 19,adult    Mucositis oral         Plan:  1)  Severe protein-calorie malnutrition/Dysphagia/BMI=16 61 (BMI less than 19 in an adult):  Continue the current tube feedings via the NG tube  Check a prealbumin level  Continue to trial PO intake  2)  Hypernatremia secondary to hypovolemia and lack of free water intake: The hypernatremia has resolved  Continue free water flushes with 200 ml every 6 hours  Continue to monitor the sodium level  3)  Oral mucositis:  Continue magic mouthwash  4)  Interstitial lung disease/Pulmonary fibrosis:  Continue the respiratory protocol  I had a lengthy discussion with the patient's son, Torsten Wood, outside of the patient's room  We will continue the tube feedings for now via the NG tube  Subjective:   Patient seen and examined  The patient is confused  She offers no specific complaints at this time  Objective:     Vitals: Blood pressure 147/67, pulse 98, temperature 98 9 °F (37 2 °C), temperature source Temporal, resp   rate 20, height 5' 2" (1 575 m), weight 41 2 kg (90 lb 13 3 oz), SpO2 98 %  ,Body mass index is 16 61 kg/m²    Weight (last 2 days)     Date/Time   Weight    03/03/18 0600  41 2 (90 83)    Weight: bed at 03/03/18 0600    03/02/18 0550  43 5 (95 9)    03/01/18 0618  42 4 (93 48)              Intake/Output Summary (Last 24 hours) at 03/03/18 1334  Last data filed at 03/03/18 0829   Gross per 24 hour   Intake              916 ml   Output                0 ml   Net              916 ml       Physical Exam: General:  NAD, confused, awake  HEENT:  NC/AT, mucous membranes dry, NG tube in place  Neck:  Supple, No JVP elevation  CV:  + S1, + S2, RRR  Pulm:  Lung fields are CTA bilaterally  Abd:  Soft, Non-tender, Non-distended  Ext:  No clubbing/cyanosis/edema  Skin:  No rashes    Scheduled Meds:  Current Facility-Administered Medications:  acetaminophen 650 mg Per NG Tube Q6H Eual Paci, DO   al mag oxide-diphenhydramine-lidocaine viscous 10 mL Swish & Swallow TID AC Eual Paci, DO   dextrose 25 mL Intravenous PRN Hollace Spurr, DO   heparin (porcine) 5,000 Units Subcutaneous Q12H [address] Hollace Spurr, DO   menthol-zinc oxide  Topical BID Hollace Spurr, DO   mirtazapine 15 mg Oral HS Jasper Cook, DO   ondansetron 4 mg Intravenous Q6H PRN Hollace Spurr, DO     Continuous Infusions:   PRN Meds: dextrose    ondansetron      Invasive Devices     Peripheral Intravenous Line            Peripheral IV 03/03/18 Right Forearm less than 1 day          Drain            NG/OG/Enteral Tube Nasogastric 12 Fr Right nares 3 days                  Results from last 7 days  Lab Units 03/03/18  0431 03/01/18  0512 02/28/18  0642   WBC Thousand/uL 7 70 5 98 5 76   HEMOGLOBIN g/dL 9 9* 10 1* 10 6*   HEMATOCRIT % 31 7* 32 2* 34 0*   PLATELETS Thousands/uL 246 281 273   NEUTROS PCT % 66 48 49   MONOS PCT % 8 11 12         Results from last 7 days  Lab Units 03/03/18  0431 03/01/18  0512 02/28/18  0643   SODIUM mmol/L 144 145 146*   POTASSIUM mmol/L 4 6 3 8 3 6   CHLORIDE mmol/L 107 109* 111*   CO2 mmol/L 31 28 27   BUN mg/dL 25 19 14   CREATININE mg/dL 0 79 0 79 0 80   CALCIUM mg/dL 8 6 8 6 8 7   TOTAL PROTEIN g/dL 6 6 6 8 6 6   BILIRUBIN TOTAL mg/dL 0 30 0 50 0 70   ALK PHOS U/L 48 42* 40*   ALT U/L 13 12 12   AST U/L 27 23 27   GLUCOSE RANDOM mg/dL 115 118 109       Lab Results   Component Value Date    CKTOTAL 66 02/20/2018    TROPONINI <0 02 02/19/2018       Lab Results   Component Value Date    INR 1 00 03/03/2018    INR 1 12 03/01/2018    INR 1 32 (H) 02/28/2018    PROTIME 13 1 03/03/2018    PROTIME 14 3 03/01/2018    PROTIME 16 3 (H) 02/28/2018                 Lab, Imaging and other studies: I have personally reviewed pertinent reports      VTE Pharmacologic Prophylaxis: Heparin 5000 Units SQ every 12 hours dosed based on the patient's age and weight  VTE Mechanical Prophylaxis: sequential compression device

## 2018-03-04 NOTE — PROGRESS NOTES
Progress Note - Nephrology   Karron Doctor 80 y o  female MRN: [id number]  Unit/Bed#: 416-01 Encounter: [id number]    A/P:  1  Hypernatremia due to hypovolemia and lack of free water  On 200 ml free water flush q6 with TF, follow serum sodium and adjust free water as indicated  3/3 Serum sodium dropping to 144 - continue free water              3/4 Serum sodium within normal range  2  Chronic kidney disease stage 3   3  Chronic tubulointerstitial disease  4  Hypokalemia: will reorder supplement   K low normal, continue to monitor  5  Chronic diastolic CHF: appears compensated  6  Hypertension   7  Oral mucositis ---> currently on feedings via NGT      Follow up reason for today's visit: Hypernatremia    Mucositis oral    Patient Active Problem List   Diagnosis    Essential hypertension    Gastroesophageal reflux disease without esophagitis    Coronary artery disease involving native coronary artery of native heart    H/O aortic valve replacement    Hyperlipidemia    Stage 3 chronic kidney disease    Chronic diastolic CHF (congestive heart failure) (HCC)    Dysphagia    Hypernatremia    Elevated platelet count (HCC)    Anemia    Dehydration    Hypoalbuminemia    Interstitial lung disease (HCC)    Pulmonary fibrosis (HCC)    Hypokalemia    Pulmonary nodule    Closed compression fracture of L1 lumbar vertebra (HCC)    Closed compression fracture of L2 lumbar vertebra (HCC)    Closed compression fracture of L4 lumbar vertebra (HCC)    Severe protein-calorie malnutrition (HCC)    BMI less than 19,adult    Mucositis oral         Subjective:    Patient without acute complaints  Objective:     Vitals: Blood pressure 119/64, pulse 92, temperature 98 8 °F (37 1 °C), temperature source Temporal, resp  rate 18, height 5' 2" (1 575 m), weight 43 9 kg (96 lb 12 5 oz), SpO2 92 %  ,Body mass index is 17 7 kg/m²      Weight (last 2 days)     Date/Time   Weight    03/04/18 0500  43 9 (96 78)    03/03/18 0600  41 2 (90 83)    Weight: bed at 03/03/18 0600    03/02/18 0550  43 5 (95 9)                Intake/Output Summary (Last 24 hours) at 03/04/18 0933  Last data filed at 03/04/18 0830   Gross per 24 hour   Intake              600 ml   Output                0 ml   Net              600 ml     I/O last 3 completed shifts: In: 1336 [NG/GT:1336]  Out: -          Physical Exam: /64 (BP Location: Left arm)   Pulse 92   Temp 98 8 °F (37 1 °C) (Temporal)   Resp 18   Ht 5' 2" (1 575 m)   Wt 43 9 kg (96 lb 12 5 oz)   SpO2 92%   BMI 17 70 kg/m²     General Appearance:    Alert, cooperative, no distress   Head:    Normocephalic, without obvious abnormality, atraumatic   Eyes:    Conjunctiva/corneas clear   Ears:    Normal external ears   Nose:   Nares normal, septum midline, mucosa normal, no drainage    or sinus tenderness   Throat:   Lips, mucosa, and tongue normal; teeth and gums normal   Neck:   Supple   Back:     Symmetric, no curvature, ROM normal, no CVA tenderness   Lungs:     Clear to auscultation bilaterally, respirations unlabored   Chest wall:    No tenderness or deformity   Heart:    Regular rate and rhythm, S1 and S2 normal, no murmur, rub   or gallop   Abdomen:     Soft, non-tender, bowel sounds active   Extremities:   Extremities normal, atraumatic, no cyanosis or edema   Skin:   Skin color, texture, turgor normal, no rashes or lesions   Lymph nodes:   Cervical normal   Neurologic:   CNII-XII intact            Lab, Imaging and other studies: I have personally reviewed pertinent labs    CBC:   Lab Results   Component Value Date    WBC 7 06 03/04/2018    HGB 10 0 (L) 03/04/2018    HCT 32 3 (L) 03/04/2018    MCV 96 03/04/2018     03/04/2018    MCH 29 7 03/04/2018    MCHC 31 0 (L) 03/04/2018    RDW 15 4 (H) 03/04/2018    MPV 10 1 03/04/2018     CMP:   Lab Results   Component Value Date     03/04/2018    K 4 6 03/04/2018     03/04/2018    CO2 28 03/04/2018 ANIONGAP 6 03/04/2018    BUN 26 (H) 03/04/2018    CREATININE 0 81 03/04/2018    GLUCOSE 122 03/04/2018    CALCIUM 8 6 03/04/2018    AST 23 03/04/2018    ALT 13 03/04/2018    ALKPHOS 50 03/04/2018    PROT 6 7 03/04/2018    BILITOT 0 40 03/04/2018    EGFR 62 03/04/2018         Results from last 7 days  Lab Units 03/04/18  0639 03/03/18  0431 03/01/18  0512   SODIUM mmol/L 139 144 145   POTASSIUM mmol/L 4 6 4 6 3 8   CHLORIDE mmol/L 105 107 109*   CO2 mmol/L 28 31 28   BUN mg/dL 26* 25 19   CREATININE mg/dL 0 81 0 79 0 79   CALCIUM mg/dL 8 6 8 6 8 6   TOTAL PROTEIN g/dL 6 7 6 6 6 8   BILIRUBIN TOTAL mg/dL 0 40 0 30 0 50   ALK PHOS U/L 50 48 42*   ALT U/L 13 13 12   AST U/L 23 27 23   GLUCOSE RANDOM mg/dL 122 115 118         Phosphorus:   Lab Results   Component Value Date    PHOS 3 7 03/04/2018     Magnesium:   Lab Results   Component Value Date    MG 2 4 03/04/2018     Urinalysis: No results found for: Johnna Stain, SPECGRAV, PHUR, LEUKOCYTESUR, NITRITE, PROTEINUA, GLUCOSEU, KETONESU, BILIRUBINUR, BLOODU  Ionized Calcium: No results found for: CAION  Coagulation:   No results found for: PT, INR, APTT  Troponin: No results found for: TROPONINI  ABG: No results found for: PHART, UCX0ZHI, PO2ART, WIA4QSW, Y9WOTEPP, BEART, SOURCE  Radiology review:     IMAGING  Procedure: Ct Abdomen Pelvis Wo Contrast    Result Date: 2/20/2018  Narrative: CT ABDOMEN AND PELVIS WITHOUT IV CONTRAST INDICATION:  Nausea, vomiting and diarrhea  COMPARISON: CT abdomen pelvis 3/23/2017 TECHNIQUE:  CT examination of the abdomen and pelvis was performed without intravenous contrast   Axial, sagittal, and coronal 2D reformatted images were created from the source data and submitted for interpretation  Radiation dose length product (DLP) for this visit:  243 88 mGy-cm     This examination, like all CT scans performed in the [facility], was performed utilizing techniques to minimize radiation dose exposure, including the use of iterative  reconstruction and automated exposure control  Enteric contrast was administered  FINDINGS: ABDOMEN LOWER CHEST:  There are bilateral fibrotic changes  Bibasilar dependent opacities likely represent atelectasis  3 mm left upper lobe nodule (series 2 image 6)  There are postsurgical changes of the heart status post aortic valve replacement  There are atherosclerotic changes of the aorta  There is enlargement of the main pulmonary artery  LIVER/BILIARY TREE:  Stable calcifications in the anterior right lobe of the liver  No evidence of liver mass or ductal dilation on this noncontrast evaluation  GALLBLADDER:  There are gallstone(s) within the gallbladder, without pericholecystic inflammatory changes  Mild gallbladder distention  SPLEEN:  Unremarkable  PANCREAS:  The pancreas is atrophic  No pancreatic mass  ADRENAL GLANDS:  Unremarkable  KIDNEYS/URETERS:  Unremarkable  No hydronephrosis  STOMACH AND BOWEL:  There is a small hiatal hernia  Evaluation of the stomach and bowel is limited given lack of oral and intravenous contrast material   No evident bowel obstruction  Mild stool retention throughout the colon  There is colonic diverticulosis without evidence of diverticulitis  APPENDIX:  No findings to suggest appendicitis  ABDOMINOPELVIC CAVITY:  No ascites or free intraperitoneal air  No lymphadenopathy  VESSELS:  Atherosclerotic changes are present  No evidence of aneurysm  PELVIS REPRODUCTIVE ORGANS:  Unremarkable for patient's age  URINARY BLADDER:  Unremarkable  ABDOMINAL WALL/INGUINAL REGIONS:  Unremarkable  OSSEOUS STRUCTURES:  There are compression deformities of the L1, L2 and L4 vertebral bodies  L1 vertebral body compression deformity is similar in appearance to 2/11/2018 exam   L2 and L4 compression deformities are age indeterminate  Impression: 1  Limited evaluation secondary to lack of oral and intravenous contrast   Diverticulosis without evidence of acute diverticulitis  2   Cholelithiasis and mild gallbladder wall distention  If there is focal right upper quadrant pain further evaluation with right upper quadrant ultrasound could be considered  3   Stable compression deformity of the L1 vertebral body  Age indeterminate compression deformities of L2 and L4  4   Fibrotic changes at the lung bases with patchy bibasilar airspace opacities likely representing atelectasis  5   3 mm left upper lobe pulmonary nodule  Based on current Fleischner Society 2017 Guidelines on incidental pulmonary nodule, no routine follow-up is needed if the patient is considered low risk for lung cancer  If the patient is considered high risk for lung cancer, 12 month follow-up non-contrast chest CT is recommended  Considerations related to the patient's age and/or comorbidities may be used to alter these recommendations  Workstation performed: [id number]     Procedure: Xr Chest 1 View Portable    Result Date: 2/19/2018  Narrative: CHEST INDICATION:  Dehydration  COMPARISON:  Chest CT and radiographs dated 2/11/2018  Radiographs dated 1/6/2018  VIEWS:  Frontal and lateral projections IMAGES:  1 FINDINGS: Normal lung volumes  Chronic, peripheral interstitial reticular markings  No superimposed acute consolidation  Blunting of the right costophrenic angle is chronic and likely due to fibrosis  No layering effusions  No pneumothorax  Heart size within normal limits  Prior aortic root repair  Aortic tortuosity without aneurysm  Pulmonary vasculature is largely obscured by interstitial process  No janny cephalization  Osteopenia  High riding humeri with remodeling of the acetabular undersurfaces--suggestive of chronic rotator cuff tears  No acute osseous findings demonstrated  Thoracic spine poorly evaluated due to underpenetration  Impression: Chronic interstitial fibrotic disease without acute findings or significant change from prior   Workstation performed: [id number]     Procedure: Ct Head Without Contrast    Result Date: 2/19/2018  Narrative: CT BRAIN - WITHOUT CONTRAST INDICATION: chest pain  History taken directly from the electronic ordering system  COMPARISON:  None  TECHNIQUE:  CT examination of the brain was performed  In addition to axial images, coronal reformatted images were created and submitted for interpretation  Radiation dose length product (DLP) for this visit:  1076 81 mGy-cm   This examination, like all CT scans performed in the [facility], was performed utilizing techniques to minimize radiation dose exposure, including the use of iterative reconstruction and automated exposure control  IMAGE QUALITY:  Diagnostic  FINDINGS:  PARENCHYMA:  Decreased attenuation is noted in the supratentorial white matter demonstrating an appearance most consistent with moderate microangiopathic change  No intracranial mass, mass effect or midline shift  No CT signs of acute infarction  There is no parenchymal hemorrhage  VENTRICLES AND EXTRA-AXIAL SPACES:  Age-appropriate volume loss is noted  No hydrocephalus  VISUALIZED ORBITS AND PARANASAL SINUSES:  Orbits appear normal   There is near complete opacification of the right maxillary sinus  No fluid levels are seen  CALVARIUM AND EXTRACRANIAL SOFT TISSUES:   Normal      Impression: No acute intracranial abnormality  Microangiopathic changes  Workstation performed: [id number]     Procedure: Us Gallbladder    Result Date: 2/20/2018  Narrative: RIGHT UPPER QUADRANT ULTRASOUND INDICATION:  Cholelithiasis, distended gallbladder  COMPARISON:  CT abdomen pelvis 2/20/2018 TECHNIQUE:   Real-time ultrasound of the right upper quadrant was performed with a curvilinear transducer with both volumetric sweeps and still imaging techniques  FINDINGS: PANCREAS:  Portions of the pancreas are obscured by bowel gas  Visualized portions of the pancreas are unremarkable  AORTA AND IVC:  Visualized portions are normal for patient age   LIVER: Size:  Within normal range  The liver measures 10 3 cm in the midclavicular line  Contour:  Surface contour is smooth  Parenchyma:  Echogenicity and echotexture are within normal limits  No evidence of suspicious mass  Limited imaging of the main portal vein shows it to be patent and hepatopetal   BILIARY: The gallbladder is distended  No wall thickening or pericholecystic fluid  Single dependent mobile calculus without sludge  No sonographic Neff's sign  No intrahepatic biliary dilatation  CBD measures 4 5 mm  No choledocholithiasis  KIDNEY: Right kidney measures 8 6 x 4 3 cm  Within normal limits  ASCITES:   None  Impression:  Gallbladder distention and cholelithiasis without other evidence of acute cholecystitis   Workstation performed: [id number]       Current Facility-Administered Medications   Medication Dose Route Frequency    acetaminophen (TYLENOL) oral suspension 650 mg  650 mg Per NG Tube Q6H    al mag oxide-diphenhydramine-lidocaine viscous (MAGIC MOUTHWASH) suspension 10 mL  10 mL Swish & Swallow TID AC    dextrose 50 % IV solution 25 mL  25 mL Intravenous PRN    heparin (porcine) subcutaneous injection 5,000 Units  5,000 Units Subcutaneous Q12H [address]    menthol-zinc oxide (CALMOSEPTINE) 0 44-20 6 % ointment   Topical BID    mirtazapine (REMERON) tablet 15 mg  15 mg Oral HS    ondansetron (ZOFRAN) injection 4 mg  4 mg Intravenous Q6H PRN     Medications Discontinued During This Encounter   Medication Reason    cefuroxime (CEFTIN) 500 mg tablet     sodium chloride 0 9 % infusion     aspirin chewable tablet 81 mg     atorvastatin (LIPITOR) tablet 10 mg     calcium carbonate (OYSTER SHELL,OSCAL) 500 mg tablet 1 tablet     diltiazem (CARDIZEM CD) 24 hr capsule 120 mg     multivitamin-minerals (CENTRUM) tablet 1 tablet     sodium chloride infusion 0 45 %     potassium chloride 10 % oral solution 40 mEq     aspirin chewable tablet 81 mg     atorvastatin (LIPITOR) tablet 10 mg     calcium carbonate-vitamin D (OSCAL-D) 500 mg-200 units per tablet 1 tablet     diltiazem (CARDIZEM CD) 24 hr capsule 120 mg     albuterol inhalation solution 2 5 mg     dextrose 5 % and sodium chloride 0 2 % infusion     dextrose 5 % and sodium chloride 0 45 % with KCl 20 mEq/L infusion     potassium chloride 20 mEq IVPB (premix)     dextrose 5 % infusion     dronabinol (MARINOL) capsule 2 5 mg     benzonatate (TESSALON PERLES) capsule 100 mg     calcium carbonate-vitamin D (OSCAL-D) 500 mg-200 units per tablet 1 tablet     pantoprazole (PROTONIX) EC tablet 40 mg     dronabinol (MARINOL) capsule 5 mg     docusate sodium (COLACE) capsule 100 mg     acetaminophen (TYLENOL) tablet 650 mg     acetaminophen (TYLENOL) tablet 650 mg Alternate therapy       Becky Shepard MD

## 2018-03-04 NOTE — PROGRESS NOTES
Progress Note - Gisel Mckeon 80 y o  female MRN: [id number]    Unit/Bed#: 099-52 Encounter: [id number]      Assessment:  Patient Active Problem List   Diagnosis    Essential hypertension    Gastroesophageal reflux disease without esophagitis    Coronary artery disease involving native coronary artery of native heart    H/O aortic valve replacement    Hyperlipidemia    Stage 3 chronic kidney disease    Chronic diastolic CHF (congestive heart failure) (HCC)    Dysphagia    Hypernatremia    Elevated platelet count (HCC)    Anemia    Dehydration    Hypoalbuminemia    Interstitial lung disease (HCC)    Pulmonary fibrosis (HCC)    Hypokalemia    Pulmonary nodule    Closed compression fracture of L1 lumbar vertebra (HCC)    Closed compression fracture of L2 lumbar vertebra (HCC)    Closed compression fracture of L4 lumbar vertebra (HCC)    Severe protein-calorie malnutrition (HCC)    BMI less than 19,adult    Mucositis oral         Plan:  1)  Severe protein-calorie malnutrition/Dysphagia/BMI=17 70 (BMI less than 19 in an adult):  Continue the current tube feedings via the NG tube  Check a prealbumin level  Continue to trial PO intake  2)  Hypernatremia secondary to hypovolemia and lack of free water intake: The hypernatremia has resolved  Continue free water flushes with 200 ml every 6 hours  Continue to monitor the sodium level  3)  Oral mucositis:  Continue magic mouthwash  4)  Interstitial lung disease/Pulmonary fibrosis:  Continue the respiratory protocol  I had a lengthy discussion with the patient's other son, Miranda Pérez, outside of the patient's room  We will continue the tube feedings for now via the NG tube  The goal is to transition the patient to [address] [place] [address] [address]th floor [facility] for short-term rehabilitation this week  Subjective:   Patient seen and examined  The patient offers no specific complaints    She is tolerating the tube feedings at this time  She remains confused  Objective:     Vitals: Blood pressure 119/64, pulse 92, temperature 98 8 °F (37 1 °C), temperature source Temporal, resp  rate 18, height 5' 2" (1 575 m), weight 43 9 kg (96 lb 12 5 oz), SpO2 92 %  ,Body mass index is 17 7 kg/m²    Weight (last 2 days)     Date/Time   Weight    03/04/18 0500  43 9 (96 78)    03/03/18 0600  41 2 (90 83)    Weight: bed at 03/03/18 0600    03/02/18 0550  43 5 (95 9)              Intake/Output Summary (Last 24 hours) at 03/04/18 1257  Last data filed at 03/04/18 0830   Gross per 24 hour   Intake              600 ml   Output                0 ml   Net              600 ml       Physical Exam: General:  NAD, confused, awake, follows some commands  HEENT:  NC/AT, mucous membranes dry, NG tube in place  Neck:  Supple, No JVP elevation  CV:  + S1, + S2, RRR  Pulm:  Lung fields are CTA bilaterally, No wheezing  Abd:  Soft, Non-tender, Non-distended  Ext:  No clubbing/cyanosis/edema  Skin:  No rashes    Scheduled Meds:    Current Facility-Administered Medications:  acetaminophen 650 mg Per NG Tube Q6H Hdez Plants, DO   al mag oxide-diphenhydramine-lidocaine viscous 10 mL Swish & Swallow TID AC Hdez Plants, DO   dextrose 25 mL Intravenous PRN [employer], DO   heparin (porcine) 5,000 Units Subcutaneous Q12H [address] [employer], DO   menthol-zinc oxide  Topical BID [employer], DO   mirtazapine 15 mg Oral HS Jasper Cook, DO   ondansetron 4 mg Intravenous Q6H PRN [employer], DO     Continuous Infusions:   PRN Meds: dextrose    ondansetron      Invasive Devices     Peripheral Intravenous Line            Peripheral IV 03/03/18 Right Forearm less than 1 day          Drain            NG/OG/Enteral Tube Nasogastric 12 Fr Right nares 4 days                  Results from last 7 days  Lab Units 03/04/18  0639 03/03/18  0431 03/01/18  0512   WBC Thousand/uL 7 06 7 70 5 98   HEMOGLOBIN g/dL 10 0* 9 9* 10 1*   HEMATOCRIT % 32 3* 31 7* 32 2*   PLATELETS Thousands/uL 220 246 281   NEUTROS PCT % 63 66 48   MONOS PCT % 9 8 11         Results from last 7 days  Lab Units 03/04/18  0639 03/03/18  0431 03/01/18  0512   SODIUM mmol/L 139 144 145   POTASSIUM mmol/L 4 6 4 6 3 8   CHLORIDE mmol/L 105 107 109*   CO2 mmol/L 28 31 28   BUN mg/dL 26* 25 19   CREATININE mg/dL 0 81 0 79 0 79   CALCIUM mg/dL 8 6 8 6 8 6   TOTAL PROTEIN g/dL 6 7 6 6 6 8   BILIRUBIN TOTAL mg/dL 0 40 0 30 0 50   ALK PHOS U/L 50 48 42*   ALT U/L 13 13 12   AST U/L 23 27 23   GLUCOSE RANDOM mg/dL 122 115 118       Lab Results   Component Value Date    CKTOTAL 66 02/20/2018    TROPONINI <0 02 02/19/2018       Lab Results   Component Value Date    INR 1 00 03/03/2018    INR 1 12 03/01/2018    INR 1 32 (H) 02/28/2018    PROTIME 13 1 03/03/2018    PROTIME 14 3 03/01/2018    PROTIME 16 3 (H) 02/28/2018                 Lab, Imaging and other studies: I have personally reviewed pertinent reports      VTE Pharmacologic Prophylaxis: Heparin 5000 Units SQ every 12 hours dosed based on the patient's age and weight  VTE Mechanical Prophylaxis: sequential compression device

## 2018-03-04 NOTE — SOCIAL WORK
The patient's son requested that I talk to him I did speak with jayesh in l;[facility] about str cost and hospice  He wants to speak to Dr Herman Eldridge prior to making the decision on what he wants to do He has the phone # for the cm and He is aware that he can call at any time

## 2018-03-04 NOTE — PLAN OF CARE
Problem: Prexisting or High Potential for Compromised Skin Integrity  Goal: Skin integrity is maintained or improved  INTERVENTIONS:  - Identify patients at risk for skin breakdown  - Assess and monitor skin integrity  - Assess and monitor nutrition and hydration status  - Monitor labs (i e  albumin)  - Assess for incontinence   - Turn and reposition patient  - Assist with mobility/ambulation  - Relieve pressure over bony prominences  - Avoid friction and shearing  - Provide appropriate hygiene as needed including keeping skin clean and dry  - Evaluate need for skin moisturizer/barrier cream  - Collaborate with interdisciplinary team (i e  Nutrition, Rehabilitation, etc )   - Patient/family teaching   Outcome: Progressing      Problem: Potential for Falls  Goal: Patient will remain free of falls  INTERVENTIONS:  - Assess patient frequently for physical needs  -  Identify cognitive and physical deficits and behaviors that affect risk of falls  -  [place] fall precautions as indicated by assessment   - Educate patient/family on patient safety including physical limitations  - Instruct patient to call for assistance with activity based on assessment  - Modify environment to reduce risk of injury  - Consider OT/PT consult to assist with strengthening/mobility    Outcome: Progressing      Problem: Nutrition/Hydration-ADULT  Goal: Nutrient/Hydration intake appropriate for improving, restoring or maintaining nutritional needs  Monitor and assess patient's nutrition/hydration status for malnutrition (ex- brittle hair, bruises, dry skin, pale skin and conjunctiva, muscle wasting, smooth red tongue, and disorientation)  Collaborate with interdisciplinary team and initiate plan and interventions as ordered  Monitor patient's weight and dietary intake as ordered or per policy  Utilize nutrition screening tool and intervene per policy   Determine patient's food preferences and provide high-protein, high-caloric foods as appropriate       INTERVENTIONS:  - Monitor oral intake, urinary output, labs, and treatment plans  - Assess nutrition and hydration status and recommend course of action  - Evaluate amount of meals eaten  - Assist patient with eating if necessary   - Allow adequate time for meals  - Recommend/ encourage appropriate diets, oral nutritional supplements, and vitamin/mineral supplements  - Order, calculate, and assess calorie counts as needed  - Recommend, monitor, and adjust tube feedings and TPN/PPN based on assessed needs  - Assess need for intravenous fluids  - Provide specific nutrition/hydration education as appropriate  - Include patient/family/caregiver in decisions related to nutrition   Outcome: Progressing      Problem: PAIN - ADULT  Goal: Verbalizes/displays adequate comfort level or baseline comfort level  Interventions:  - Encourage patient to monitor pain and request assistance  - Assess pain using appropriate pain scale  - Administer analgesics based on type and severity of pain and evaluate response  - Implement non-pharmacological measures as appropriate and evaluate response  - Consider cultural and social influences on pain and pain management  - Notify physician/advanced practitioner if interventions unsuccessful or patient reports new pain   Outcome: Progressing      Problem: INFECTION - ADULT  Goal: Absence or prevention of progression during hospitalization  INTERVENTIONS:  - Assess and monitor for signs and symptoms of infection  - Monitor lab/diagnostic results  - Monitor all insertion sites, i e  indwelling lines, tubes, and drains  - Monitor endotracheal (as able) and nasal secretions for changes in amount and color  - [place] appropriate cooling/warming therapies per order  - Administer medications as ordered  - Instruct and encourage patient and family to use good hand hygiene technique  - Identify and instruct in appropriate isolation precautions for identified infection/condition Outcome: Progressing      Problem: SAFETY ADULT  Goal: Maintain or return to baseline ADL function  INTERVENTIONS:  -  Assess patient's ability to carry out ADLs; assess patient's baseline for ADL function and identify physical deficits which impact ability to perform ADLs (bathing, care of mouth/teeth, toileting, grooming, dressing, etc )  - Assess/evaluate cause of self-care deficits   - Assess range of motion  - Assess patient's mobility; develop plan if impaired  - Assess patient's need for assistive devices and provide as appropriate  - Encourage maximum independence but intervene and supervise when necessary  ¯ Involve family in performance of ADLs  ¯ Assess for home care needs following discharge   ¯ Request OT consult to assist with ADL evaluation and planning for discharge  ¯ Provide patient education as appropriate   Outcome: Not Progressing    Goal: Maintain or return mobility status to optimal level  INTERVENTIONS:  - Assess patient's baseline mobility status (ambulation, transfers, stairs, etc )    - Identify cognitive and physical deficits and behaviors that affect mobility  - Identify mobility aids required to assist with transfers and/or ambulation (gait belt, sit-to-stand, lift, walker, cane, etc )  - [place] fall precautions as indicated by assessment  - Record patient progress and toleration of activity level on Mobility SBAR; progress patient to next Phase/Stage  - Instruct patient to call for assistance with activity based on assessment  - Request Rehabilitation consult to assist with strengthening/weightbearing, etc    Outcome: Not Progressing    Goal: Patient will remain free of falls  INTERVENTIONS:  - Assess patient frequently for physical needs  -  Identify cognitive and physical deficits and behaviors that affect risk of falls    -  [place] fall precautions as indicated by assessment   - Educate patient/family on patient safety including physical limitations  - Instruct patient to call for assistance with activity based on assessment  - Modify environment to reduce risk of injury  - Consider OT/PT consult to assist with strengthening/mobility    Outcome: Progressing      Problem: DISCHARGE PLANNING  Goal: Discharge to home or other facility with appropriate resources  INTERVENTIONS:  - Identify barriers to discharge w/patient and caregiver  - Arrange for needed discharge resources and transportation as appropriate  - Identify discharge learning needs (meds, wound care, etc )  - Arrange for interpretive services to assist at discharge as needed  - Refer to Case Management Department for coordinating discharge planning if the patient needs post-hospital services based on physician/advanced practitioner order or complex needs related to functional status, cognitive ability, or social support system   Outcome: Progressing      Problem: Knowledge Deficit  Goal: Patient/family/caregiver demonstrates understanding of disease process, treatment plan, medications, and discharge instructions  Complete learning assessment and assess knowledge base    Interventions:  - Provide teaching at level of understanding  - Provide teaching via preferred learning methods   Outcome: Progressing      Problem: DISCHARGE PLANNING - CARE MANAGEMENT  Goal: Discharge to post-acute care or home with appropriate resources  INTERVENTIONS:  - Conduct assessment to determine patient/family and health care team treatment goals, and need for post-acute services based on payer coverage, community resources, and patient preferences, and barriers to discharge  - Address psychosocial, clinical, and financial barriers to discharge as identified in assessment in conjunction with the patient/family and health care team  - Arrange appropriate level of post-acute services according to patient's   needs and preference and payer coverage in collaboration with the physician and health care team  - Communicate with and update the patient/family, physician, and health care team regarding progress on the discharge plan  - Arrange appropriate transportation to post-acute venues   Outcome: Progressing

## 2018-03-05 NOTE — PHYSICAL THERAPY NOTE
PT treatment note      03/05/18 1126   Restrictions/Precautions   Other Precautions (Bed Alarm;Multiple lines;Telemetry; Fall Risk;O2)   Subjective   Subjective Pt  agreeble to therapeutic exercises  Declined OOB  Endurance Deficit   Endurance Deficit Yes   Activity Tolerance   Activity Tolerance Patient limited by fatigue   Exercises   Heelslides Supine;20 reps;AAROM;AROM   Hip Abduction Supine;20 reps;AAROM;AROM; Bilateral   Hip Adduction Supine;20 reps;AAROM;AROM; Bilateral   Knee AROM Short Arc Quad Supine;20 reps;AAROM;AROM; Bilateral   Ankle Pumps Supine;20 reps;AAROM;AROM; Bilateral   Assessment   Prognosis Fair   Problem List Decreased strength;Decreased endurance; Impaired balance;Decreased mobility   Assessment Performed therapeutic exercises as above  More alert and increased particpation with TE  Declined OOB  Pt would benefit from continued PT to increase BLE strength to further maximize return to PLOF  Plan   Treatment/Interventions Functional transfer training;LE strengthening/ROM; Therapeutic exercise; Endurance training;Bed mobility;Gait training   Progress Slow progress, decreased activity tolerance   Recommendation   Recommendation Long-term skilled nursing home placement;Long-term skilled PT   Pt  In bed  with call bell within reach, scd's connected and turned on and alarm on  And soft wrist restraints in place at end of PT session

## 2018-03-05 NOTE — PROGRESS NOTES
Progress Note - Nephrology   Richard Edmond 80 y o  female MRN: [id number]  Unit/Bed#: 416-01 Encounter: [id number]    A/P:  1  Hypernatremia due to hypovolemia and lack of free water  On 200 ml free water flush q6 with TF, follow serum sodium and adjust free water as indicated  3/3 Serum sodium dropping to 144 - continue free water              3/4 Serum sodium within normal range               3/5: mildly prerenal but appears stable  Continue free water  Will sign off but be available prn  2  Chronic kidney disease stage 3   3  Chronic tubulointerstitial disease  4  Hypokalemia: will reorder supplement   K low normal, continue to monitor  5  Chronic diastolic CHF: appears compensated  6  Hypertension   7  Oral mucositis ---> currently on feedings via NGT      Follow up reason for today's visit: Hypernatremia    Severe protein-calorie malnutrition ([facility] [address] )    Patient Active Problem List   Diagnosis    Essential hypertension    Gastroesophageal reflux disease without esophagitis    Coronary artery disease involving native coronary artery of native heart    H/O aortic valve replacement    Hyperlipidemia    Stage 3 chronic kidney disease    Chronic diastolic CHF (congestive heart failure) (HCC)    Dysphagia    Hypernatremia    Elevated platelet count (HCC)    Anemia    Dehydration    Hypoalbuminemia    Interstitial lung disease (HCC)    Pulmonary fibrosis (HCC)    Hypokalemia    Pulmonary nodule    Closed compression fracture of L1 lumbar vertebra (HCC)    Closed compression fracture of L2 lumbar vertebra (HCC)    Closed compression fracture of L4 lumbar vertebra (HCC)    Severe protein-calorie malnutrition (HCC)    BMI less than 19,adult    Mucositis oral         Subjective:    Patient without acute complaints  Objective:     Vitals: Blood pressure 116/61, pulse 98, temperature 97 9 °F (36 6 °C), temperature source Temporal, resp   rate 18, height 5' 2" (1 575 m), weight 43 9 kg (96 lb 12 5 oz), SpO2 97 %  ,Body mass index is 17 7 kg/m²  Weight (last 2 days)     Date/Time   Weight    03/05/18 0650  43 9 (96 78)    03/04/18 0500  43 9 (96 78)    03/03/18 0600  41 2 (90 83)    Weight: bed at 03/03/18 0600                Intake/Output Summary (Last 24 hours) at 03/05/18 1003  Last data filed at 03/05/18 0839   Gross per 24 hour   Intake              200 ml   Output                0 ml   Net              200 ml     I/O last 3 completed shifts: In: 800 [NG/GT:800]  Out: -          Physical Exam: /61   Pulse 98   Temp 97 9 °F (36 6 °C) (Temporal)   Resp 18   Ht 5' 2" (1 575 m)   Wt 43 9 kg (96 lb 12 5 oz)   SpO2 97%   BMI 17 70 kg/m²     General Appearance:    Alert, cooperative, no distress   Head:    Normocephalic, without obvious abnormality, atraumatic   Eyes:    Conjunctiva/corneas clear   Ears:    Normal external ears   Nose:   Nares normal, septum midline, mucosa normal, no drainage    or sinus tenderness   Throat:   Lips, mucosa, and tongue normal; teeth and gums normal   Neck:   Supple   Back:     Symmetric, no curvature, ROM normal, no CVA tenderness   Lungs:     Clear to auscultation bilaterally, respirations unlabored   Chest wall:    No tenderness or deformity   Heart:    Regular rate and rhythm, S1 and S2 normal, no murmur, rub   or gallop   Abdomen:     Soft, non-tender, bowel sounds active   Extremities:   Extremities normal, atraumatic, no cyanosis or edema   Skin:   Skin color, texture, turgor normal, no rashes or lesions   Lymph nodes:   Cervical normal   Neurologic:   CNII-XII intact            Lab, Imaging and other studies: I have personally reviewed pertinent labs    CBC:   No results found for: WBC, HGB, HCT, MCV, PLT, ADJUSTEDWBC, MCH, MCHC, RDW, MPV, NRBC  CMP:   Lab Results   Component Value Date     03/05/2018    K 4 4 03/05/2018     03/05/2018    CO2 28 03/05/2018    ANIONGAP 7 03/05/2018    BUN 30 (H) 03/05/2018    CREATININE 0 75 03/05/2018    GLUCOSE 126 03/05/2018    CALCIUM 8 6 03/05/2018    EGFR 68 03/05/2018         Results from last 7 days  Lab Units 03/05/18  0648 03/04/18  0639 03/03/18  0431 03/01/18  0512   SODIUM mmol/L 137 139 144 145   POTASSIUM mmol/L 4 4 4 6 4 6 3 8   CHLORIDE mmol/L 102 105 107 109*   CO2 mmol/L 28 28 31 28   BUN mg/dL 30* 26* 25 19   CREATININE mg/dL 0 75 0 81 0 79 0 79   CALCIUM mg/dL 8 6 8 6 8 6 8 6   TOTAL PROTEIN g/dL  --  6 7 6 6 6 8   BILIRUBIN TOTAL mg/dL  --  0 40 0 30 0 50   ALK PHOS U/L  --  50 48 42*   ALT U/L  --  13 13 12   AST U/L  --  23 27 23   GLUCOSE RANDOM mg/dL 126 122 115 118         Phosphorus:   No results found for: PHOS  Magnesium:   Lab Results   Component Value Date    MG 2 2 03/05/2018     Urinalysis: No results found for: COLORU, CLARITYU, SPECGRAV, PHUR, LEUKOCYTESUR, NITRITE, PROTEINUA, GLUCOSEU, KETONESU, BILIRUBINUR, BLOODU  Ionized Calcium: No results found for: CAION  Coagulation:   No results found for: PT, INR, APTT  Troponin: No results found for: TROPONINI  ABG: No results found for: PHART, HBI6AGO, PO2ART, ZPL8LRX, D0KDFXGL, BEART, SOURCE  Radiology review:     IMAGING  Procedure: Ct Abdomen Pelvis Wo Contrast    Result Date: 2/20/2018  Narrative: CT ABDOMEN AND PELVIS WITHOUT IV CONTRAST INDICATION:  Nausea, vomiting and diarrhea  COMPARISON: CT abdomen pelvis 3/23/2017 TECHNIQUE:  CT examination of the abdomen and pelvis was performed without intravenous contrast   Axial, sagittal, and coronal 2D reformatted images were created from the source data and submitted for interpretation  Radiation dose length product (DLP) for this visit:  243 88 mGy-cm   This examination, like all CT scans performed in the [facility], was performed utilizing techniques to minimize radiation dose exposure, including the use of iterative  reconstruction and automated exposure control  Enteric contrast was administered   FINDINGS: ABDOMEN LOWER CHEST:  There are bilateral fibrotic changes  Bibasilar dependent opacities likely represent atelectasis  3 mm left upper lobe nodule (series 2 image 6)  There are postsurgical changes of the heart status post aortic valve replacement  There are atherosclerotic changes of the aorta  There is enlargement of the main pulmonary artery  LIVER/BILIARY TREE:  Stable calcifications in the anterior right lobe of the liver  No evidence of liver mass or ductal dilation on this noncontrast evaluation  GALLBLADDER:  There are gallstone(s) within the gallbladder, without pericholecystic inflammatory changes  Mild gallbladder distention  SPLEEN:  Unremarkable  PANCREAS:  The pancreas is atrophic  No pancreatic mass  ADRENAL GLANDS:  Unremarkable  KIDNEYS/URETERS:  Unremarkable  No hydronephrosis  STOMACH AND BOWEL:  There is a small hiatal hernia  Evaluation of the stomach and bowel is limited given lack of oral and intravenous contrast material   No evident bowel obstruction  Mild stool retention throughout the colon  There is colonic diverticulosis without evidence of diverticulitis  APPENDIX:  No findings to suggest appendicitis  ABDOMINOPELVIC CAVITY:  No ascites or free intraperitoneal air  No lymphadenopathy  VESSELS:  Atherosclerotic changes are present  No evidence of aneurysm  PELVIS REPRODUCTIVE ORGANS:  Unremarkable for patient's age  URINARY BLADDER:  Unremarkable  ABDOMINAL WALL/INGUINAL REGIONS:  Unremarkable  OSSEOUS STRUCTURES:  There are compression deformities of the L1, L2 and L4 vertebral bodies  L1 vertebral body compression deformity is similar in appearance to 2/11/2018 exam   L2 and L4 compression deformities are age indeterminate  Impression: 1  Limited evaluation secondary to lack of oral and intravenous contrast   Diverticulosis without evidence of acute diverticulitis  2   Cholelithiasis and mild gallbladder wall distention    If there is focal right upper quadrant pain further evaluation with right upper quadrant ultrasound could be considered  3   Stable compression deformity of the L1 vertebral body  Age indeterminate compression deformities of L2 and L4  4   Fibrotic changes at the lung bases with patchy bibasilar airspace opacities likely representing atelectasis  5   3 mm left upper lobe pulmonary nodule  Based on current Fleischner Society 2017 Guidelines on incidental pulmonary nodule, no routine follow-up is needed if the patient is considered low risk for lung cancer  If the patient is considered high risk for lung cancer, 12 month follow-up non-contrast chest CT is recommended  Considerations related to the patient's age and/or comorbidities may be used to alter these recommendations  Workstation performed: [id number]     Procedure: Xr Chest 1 View Portable    Result Date: 2/19/2018  Narrative: CHEST INDICATION:  Dehydration  COMPARISON:  Chest CT and radiographs dated 2/11/2018  Radiographs dated 1/6/2018  VIEWS:  Frontal and lateral projections IMAGES:  1 FINDINGS: Normal lung volumes  Chronic, peripheral interstitial reticular markings  No superimposed acute consolidation  Blunting of the right costophrenic angle is chronic and likely due to fibrosis  No layering effusions  No pneumothorax  Heart size within normal limits  Prior aortic root repair  Aortic tortuosity without aneurysm  Pulmonary vasculature is largely obscured by interstitial process  No janny cephalization  Osteopenia  [place] humeri with remodeling of the acetabular undersurfaces--suggestive of chronic rotator cuff tears  No acute osseous findings demonstrated  Thoracic spine poorly evaluated due to underpenetration  Impression: Chronic interstitial fibrotic disease without acute findings or significant change from prior  Workstation performed: [id number]     Procedure: Ct Head Without Contrast    Result Date: 2/19/2018  Narrative: CT BRAIN - WITHOUT CONTRAST INDICATION: chest pain   History taken directly from the electronic ordering system  COMPARISON:  None  TECHNIQUE:  CT examination of the brain was performed  In addition to axial images, coronal reformatted images were created and submitted for interpretation  Radiation dose length product (DLP) for this visit:  1076 81 mGy-cm   This examination, like all CT scans performed in the [facility], was performed utilizing techniques to minimize radiation dose exposure, including the use of iterative reconstruction and automated exposure control  IMAGE QUALITY:  Diagnostic  FINDINGS:  PARENCHYMA:  Decreased attenuation is noted in the supratentorial white matter demonstrating an appearance most consistent with moderate microangiopathic change  No intracranial mass, mass effect or midline shift  No CT signs of acute infarction  There is no parenchymal hemorrhage  VENTRICLES AND EXTRA-AXIAL SPACES:  Age-appropriate volume loss is noted  No hydrocephalus  VISUALIZED ORBITS AND PARANASAL SINUSES:  Orbits appear normal   There is near complete opacification of the right maxillary sinus  No fluid levels are seen  CALVARIUM AND EXTRACRANIAL SOFT TISSUES:   Normal      Impression: No acute intracranial abnormality  Microangiopathic changes  Workstation performed: [id number]     Procedure: Us Gallbladder    Result Date: 2/20/2018  Narrative: RIGHT UPPER QUADRANT ULTRASOUND INDICATION:  Cholelithiasis, distended gallbladder  COMPARISON:  CT abdomen pelvis 2/20/2018 TECHNIQUE:   Real-time ultrasound of the right upper quadrant was performed with a curvilinear transducer with both volumetric sweeps and still imaging techniques  FINDINGS: PANCREAS:  Portions of the pancreas are obscured by bowel gas  Visualized portions of the pancreas are unremarkable  AORTA AND IVC:  Visualized portions are normal for patient age  LIVER: Size:  Within normal range  The liver measures 10 3 cm in the midclavicular line  Contour:  Surface contour is smooth   Parenchyma: Echogenicity and echotexture are within normal limits  No evidence of suspicious mass  Limited imaging of the main portal vein shows it to be patent and hepatopetal   BILIARY: The gallbladder is distended  No wall thickening or pericholecystic fluid  Single dependent mobile calculus without sludge  No sonographic Neff's sign  No intrahepatic biliary dilatation  CBD measures 4 5 mm  No choledocholithiasis  KIDNEY: Right kidney measures 8 6 x 4 3 cm  Within normal limits  ASCITES:   None  Impression:  Gallbladder distention and cholelithiasis without other evidence of acute cholecystitis   Workstation performed: [id number]       Current Facility-Administered Medications   Medication Dose Route Frequency    acetaminophen (TYLENOL) oral suspension 650 mg  650 mg Per NG Tube Q6H    al mag oxide-diphenhydramine-lidocaine viscous (MAGIC MOUTHWASH) suspension 10 mL  10 mL Swish & Swallow TID AC    dextrose 50 % IV solution 25 mL  25 mL Intravenous PRN    heparin (porcine) subcutaneous injection 5,000 Units  5,000 Units Subcutaneous Q12H [address]    menthol-zinc oxide (CALMOSEPTINE) 0 44-20 6 % ointment   Topical BID    mirtazapine (REMERON) tablet 15 mg  15 mg Oral HS    ondansetron (ZOFRAN) injection 4 mg  4 mg Intravenous Q6H PRN     Medications Discontinued During This Encounter   Medication Reason    cefuroxime (CEFTIN) 500 mg tablet     sodium chloride 0 9 % infusion     aspirin chewable tablet 81 mg     atorvastatin (LIPITOR) tablet 10 mg     calcium carbonate (OYSTER SHELL,OSCAL) 500 mg tablet 1 tablet     diltiazem (CARDIZEM CD) 24 hr capsule 120 mg     multivitamin-minerals (CENTRUM) tablet 1 tablet     sodium chloride infusion 0 45 %     potassium chloride 10 % oral solution 40 mEq     aspirin chewable tablet 81 mg     atorvastatin (LIPITOR) tablet 10 mg     calcium carbonate-vitamin D (OSCAL-D) 500 mg-200 units per tablet 1 tablet     diltiazem (CARDIZEM CD) 24 hr capsule 120 mg     albuterol inhalation solution 2 5 mg     dextrose 5 % and sodium chloride 0 2 % infusion     dextrose 5 % and sodium chloride 0 45 % with KCl 20 mEq/L infusion     potassium chloride 20 mEq IVPB (premix)     dextrose 5 % infusion     dronabinol (MARINOL) capsule 2 5 mg     benzonatate (TESSALON PERLES) capsule 100 mg     calcium carbonate-vitamin D (OSCAL-D) 500 mg-200 units per tablet 1 tablet     pantoprazole (PROTONIX) EC tablet 40 mg     dronabinol (MARINOL) capsule 5 mg     docusate sodium (COLACE) capsule 100 mg     acetaminophen (TYLENOL) tablet 650 mg     acetaminophen (TYLENOL) tablet 650 mg Alternate therapy       Rola Almodovar MD

## 2018-03-05 NOTE — PROGRESS NOTES
Progress Note - Gisel Mkceon 80 y o  female MRN: [id number]    Unit/Bed#: 914-69 Encounter: [id number]      Assessment:  Patient Active Problem List   Diagnosis    Essential hypertension    Gastroesophageal reflux disease without esophagitis    Coronary artery disease involving native coronary artery of native heart    H/O aortic valve replacement    Hyperlipidemia    Stage 3 chronic kidney disease    Chronic diastolic CHF (congestive heart failure) (HCC)    Dysphagia    Hypernatremia    Elevated platelet count (HCC)    Anemia    Dehydration    Hypoalbuminemia    Interstitial lung disease (HCC)    Pulmonary fibrosis (HCC)    Hypokalemia    Pulmonary nodule    Closed compression fracture of L1 lumbar vertebra (HCC)    Closed compression fracture of L2 lumbar vertebra (HCC)    Closed compression fracture of L4 lumbar vertebra (HCC)    Severe protein-calorie malnutrition (HCC)    BMI less than 19,adult    Mucositis oral         Plan:  1)  Severe protein-calorie malnutrition/Dysphagia/BMI=17 70 (BMI less than 19 in an adult):  Continue the current tube feedings via the NG tube  Continue to trial PO intake  2)  Hypernatremia secondary to hypovolemia and lack of free water intake: The hypernatremia has resolved  Continue free water flushes with 200 ml every 6 hours  Continue to monitor the sodium level  3)  Oral mucositis:  Continue magic mouthwash  4)  Interstitial lung disease/Pulmonary fibrosis:  Continue the respiratory protocol  We will discontinue the upper extremity restraints today, 3/5/18  I had a lengthy discussion with the patient's other son, Miranda Pérez, on 3/4/18  We will continue the tube feedings for now via the NG tube  The goal is to transition the patient to [address] [place] [address] 5th floor [facility] for short-term rehabilitation this week  Subjective:   Patient seen and examined  The patient offers no specific complaints    The patient is tolerating the tube feedings through the NG tube  Objective:     Vitals: Blood pressure 115/67, pulse 101, temperature 98 2 °F (36 8 °C), temperature source Temporal, resp  rate 16, height 5' 2" (1 575 m), weight 43 9 kg (96 lb 12 5 oz), SpO2 97 %  ,Body mass index is 17 7 kg/m²    Weight (last 2 days)     Date/Time   Weight    03/05/18 0650  43 9 (96 78)    03/04/18 0500  43 9 (96 78)    03/03/18 0600  41 2 (90 83)    Weight: bed at 03/03/18 0600              Intake/Output Summary (Last 24 hours) at 03/05/18 1553  Last data filed at 03/05/18 1310   Gross per 24 hour   Intake              400 ml   Output                0 ml   Net              400 ml       Physical Exam: General:  NAD, confused, awake, follows some commands  HEENT:  NC/AT, mucous membranes dry, NG tube in place  Neck:  Supple, No JVP elevation  CV:  + S1, + S2, Tachycardic, Regular rhythm  Pulm:  Lung fields are CTA bilaterally, No wheezing, No crackles  Abd:  Soft, Non-tender, Non-distended  Ext:  No clubbing/cyanosis/edema  Skin:  No rashes    Scheduled Meds:    Current Facility-Administered Medications:  acetaminophen 650 mg Per NG Tube Q6H Cecil Dus, DO   al mag oxide-diphenhydramine-lidocaine viscous 10 mL Swish & Swallow TID AC Cecil Dus, DO   dextrose 25 mL Intravenous PRN Misha Rodriguez DO   heparin (porcine) 5,000 Units Subcutaneous Q12H [facility] Misha Rodriguez DO   menthol-zinc oxide  Topical BID Misha Rodriguez DO   mirtazapine 15 mg Oral HS Jasper Cook DO   ondansetron 4 mg Intravenous Q6H PRN Misha Rodriguez DO     Continuous Infusions:   PRN Meds: dextrose    ondansetron      Invasive Devices     Peripheral Intravenous Line            Peripheral IV 03/03/18 Right Forearm 2 days          Drain            NG/OG/Enteral Tube Nasogastric 12 Fr Right nares 5 days                  Results from last 7 days  Lab Units 03/04/18  0639 03/03/18  0431 03/01/18  0512   WBC Thousand/uL 7 06 7 70 5 98   HEMOGLOBIN g/dL 10 0* 9 9* 10 1*   HEMATOCRIT % 32 3* 31 7* 32 2*   PLATELETS Thousands/uL 220 246 281   NEUTROS PCT % 63 66 48   MONOS PCT % 9 8 11         Results from last 7 days  Lab Units 03/05/18  0648 03/04/18  0639 03/03/18  0431 03/01/18  0512   SODIUM mmol/L 137 139 144 145   POTASSIUM mmol/L 4 4 4 6 4 6 3 8   CHLORIDE mmol/L 102 105 107 109*   CO2 mmol/L 28 28 31 28   BUN mg/dL 30* 26* 25 19   CREATININE mg/dL 0 75 0 81 0 79 0 79   CALCIUM mg/dL 8 6 8 6 8 6 8 6   TOTAL PROTEIN g/dL  --  6 7 6 6 6 8   BILIRUBIN TOTAL mg/dL  --  0 40 0 30 0 50   ALK PHOS U/L  --  50 48 42*   ALT U/L  --  13 13 12   AST U/L  --  23 27 23   GLUCOSE RANDOM mg/dL 126 122 115 118       Lab Results   Component Value Date    CKTOTAL 66 02/20/2018    TROPONINI <0 02 02/19/2018       Lab Results   Component Value Date    INR 1 00 03/03/2018    INR 1 12 03/01/2018    INR 1 32 (H) 02/28/2018    PROTIME 13 1 03/03/2018    PROTIME 14 3 03/01/2018    PROTIME 16 3 (H) 02/28/2018                 Lab, Imaging and other studies: I have personally reviewed pertinent reports      VTE Pharmacologic Prophylaxis: Heparin 5000 Units SQ every 12 hours dosed based on the patient's age and weight  VTE Mechanical Prophylaxis: sequential compression device

## 2018-03-06 NOTE — PROGRESS NOTES
Progress Note - Sim Fairly 80 y o  female MRN: [id number]    Unit/Bed#: 146-46 Encounter: [id number]      Assessment:  Patient Active Problem List   Diagnosis    Essential hypertension    Gastroesophageal reflux disease without esophagitis    Coronary artery disease involving native coronary artery of native heart    H/O aortic valve replacement    Hyperlipidemia    Stage 3 chronic kidney disease    Chronic diastolic CHF (congestive heart failure) (HCC)    Dysphagia    Hypernatremia    Elevated platelet count (HCC)    Anemia    Dehydration    Hypoalbuminemia    Interstitial lung disease (HCC)    Pulmonary fibrosis (HCC)    Hypokalemia    Pulmonary nodule    Closed compression fracture of L1 lumbar vertebra (HCC)    Closed compression fracture of L2 lumbar vertebra (HCC)    Closed compression fracture of L4 lumbar vertebra (HCC)    Severe protein-calorie malnutrition (HCC)    BMI less than 19,adult    Mucositis oral         Plan:  1)  Severe protein-calorie malnutrition/Dysphagia/BMI=18 10 (BMI less than 19 in an adult):  Continue the current tube feedings via the NG tube  Continue to trial PO intake  2)  Hypernatremia secondary to hypovolemia and lack of free water intake: The hypernatremia has resolved  Continue free water flushes with 200 ml every 6 hours  Continue to monitor the sodium level  3)  Oral mucositis:  Continue magic mouthwash  4)  Interstitial lung disease/Pulmonary fibrosis:  Continue the respiratory protocol  I had a lengthy discussion with the patient's other son, Socorro Woods, on 3/4/18  We will continue the tube feedings for now via the NG tube  The goal is to transition the patient to [address] [place] [address] 5th floor SNF for short-term rehabilitation this week  Subjective:   Patient seen and examined  The patient is doing well  She is tolerating the tube feedings via the NG tube      Objective:     Vitals: Blood pressure 161/67, pulse 65, temperature 100 1 °F (37 8 °C), temperature source Temporal, resp  rate 20, height 5' 2" (1 575 m), weight 44 9 kg (98 lb 15 8 oz), SpO2 98 %  ,Body mass index is 18 1 kg/m²    Weight (last 2 days)     Date/Time   Weight    03/06/18 0600  44 9 (98 99)    Weight: bed at 03/06/18 0600    03/05/18 0650  43 9 (96 78)    03/04/18 0500  43 9 (96 78)              Intake/Output Summary (Last 24 hours) at 03/06/18 1153  Last data filed at 03/06/18 5698   Gross per 24 hour   Intake              909 ml   Output                0 ml   Net              909 ml       Physical Exam: General:  NAD, confused, awake, follows some commands  HEENT:  NC/AT, mucous membranes dry, NG tube in place  Neck:  Supple, No JVP elevation  CV:  + S1, + S2, Regular rate, Regular rhythm  Pulm:  Lung fields are CTA bilaterally, No wheezing, No crackles, No rhonchi  Abd:  Soft, Non-tender, Non-distended  Ext:  No clubbing/cyanosis/edema  Skin:  No rashes    Scheduled Meds:    Current Facility-Administered Medications:  acetaminophen 650 mg Per NG Tube Q6H Cathlean Duet, DO   al mag oxide-diphenhydramine-lidocaine viscous 10 mL Swish & Swallow TID AC Cathlean Duet, DO   dextrose 25 mL Intravenous PRN [employer], DO   heparin (porcine) 5,000 Units Subcutaneous Q12H [address] [employer], DO   menthol-zinc oxide  Topical BID [employer], DO   mirtazapine 15 mg Oral HS Jasper Cook, DO   ondansetron 4 mg Intravenous Q6H PRN [employer], DO     Continuous Infusions:   PRN Meds: dextrose    ondansetron      Invasive Devices     Peripheral Intravenous Line            Peripheral IV 03/03/18 Right Forearm 2 days          Drain            NG/OG/Enteral Tube Nasogastric 12 Fr Right nares 6 days                  Results from last 7 days  Lab Units 03/06/18  0658 03/04/18  0639 03/03/18  0431 03/01/18  0512   WBC Thousand/uL 8 13 7 06 7 70 5 98   HEMOGLOBIN g/dL 10 7* 10 0* 9 9* 10 1*   HEMATOCRIT % 33 8* 32 3* 31 7* 32 2*   PLATELETS Thousands/uL 272 220 246 281   NEUTROS PCT %  --  63 66 48   MONOS PCT %  --  9 8 11   MONO PCT MAN % 2*  --   --   --          Results from last 7 days  Lab Units 03/06/18  0658 03/05/18  0648 03/04/18  0639 03/03/18  0431   SODIUM mmol/L 136 137 139 144   POTASSIUM mmol/L 4 6 4 4 4 6 4 6   CHLORIDE mmol/L 101 102 105 107   CO2 mmol/L 27 28 28 31   BUN mg/dL 30* 30* 26* 25   CREATININE mg/dL 0 76 0 75 0 81 0 79   CALCIUM mg/dL 9 2 8 6 8 6 8 6   TOTAL PROTEIN g/dL 7 8  --  6 7 6 6   BILIRUBIN TOTAL mg/dL 0 30  --  0 40 0 30   ALK PHOS U/L 64  --  50 48   ALT U/L 9*  --  13 13   AST U/L 24  --  23 27   GLUCOSE RANDOM mg/dL 126 126 122 115       Lab Results   Component Value Date    CKTOTAL 66 02/20/2018    TROPONINI <0 02 02/19/2018       Lab Results   Component Value Date    INR 1 00 03/03/2018    INR 1 12 03/01/2018    INR 1 32 (H) 02/28/2018    PROTIME 13 1 03/03/2018    PROTIME 14 3 03/01/2018    PROTIME 16 3 (H) 02/28/2018                 Lab, Imaging and other studies: I have personally reviewed pertinent reports      VTE Pharmacologic Prophylaxis: Heparin 5000 Units SQ every 12 hours dosed based on the patient's age and weight  VTE Mechanical Prophylaxis: sequential compression device

## 2018-03-06 NOTE — SOCIAL WORK
Pt was accepted on the 5th floor for tomorrow as long as they can get family to sign admission paperwork either by email or fax will be sent  Bryce Hollis  at SNF will be contacting pt's son/POA:Florian

## 2018-03-06 NOTE — OCCUPATIONAL THERAPY NOTE
OT Note     03/06/18 1013   Restrictions/Precautions   Other Precautions Chair Alarm; Bed Alarm;O2;Fall Risk;Multiple lines   ADL   Grooming Assistance 1  Total Assistance   Grooming Deficit (Combing hair)   Bed Mobility   Supine to Sit 2  Maximal assistance   Additional items Assist x 1;LE management; Increased time required   Transfers   Sit to Stand (Refer PT note)   Activity Tolerance   Activity Tolerance Treatment limited secondary to medical complications (Comment)   Assessment   Assessment Presents supine, encouraged OOB to chair, resistive to same  Txfred sit to supine and to EOB with max A  Encouraged attempt hold side rail to maintain balance without success  Requires min A t to maintain sitting balance  TOlerates EOB thru hair comb and skin care  Refer PT note for details txfr EOB to recliner  Call bell in reach  SCDs applied and turned on  Chair alarm activated     Recommendation   Recommendation (Continue OT services )

## 2018-03-06 NOTE — PHYSICAL THERAPY NOTE
PT treatment note      03/06/18 1030   Restrictions/Precautions   Other Precautions (Chair Alarm; Bed Alarm;O2;Fall Risk;Multiple lines)   Subjective   Subjective Agreeable to therapy and OOB after encouragement  Bed Mobility   Additional Comments see OT note for bed mobility   Transfers   Sit to Stand 2  Maximal assistance   Additional items Assist x 2;Verbal cues   Stand to Sit 2  Maximal assistance   Additional items Assist x 2;Verbal cues   Stand pivot 2  Maximal assistance   Additional items Assist x 2;Verbal cues   Additional Comments unable to stand with RW  Perfomed SPT OOB to chair   Ambulation/Elevation   Distance (unable to ambulate)   Balance   Static Sitting Poor   Dynamic Sitting Poor   Static Standing Poor   Dynamic Standing Poor   Endurance Deficit   Endurance Deficit Yes   Activity Tolerance   Activity Tolerance Patient limited by fatigue   Assessment   Prognosis Guarded   Problem List Decreased strength;Decreased endurance; Impaired balance;Decreased mobility   Assessment Performs tx OOB to chair at (max) x 2  level of function  Needs encouragement to participate  Presents with decreased strength balance endurance and functional mobility  Pt unable to ambulate at this time and is in need of continued activity in PT to improve strength balance endurance and functional mobility  Plan   Treatment/Interventions Functional transfer training;LE strengthening/ROM; Therapeutic exercise; Endurance training;Bed mobility;Gait training   Progress Slow progress, decreased activity tolerance   Recommendation   Recommendation Long-term skilled PT; Short-term skilled PT   Pt  OOB with call bell within reach, scd's connected and turned on and alarm on at end of PT session

## 2018-03-07 NOTE — OCCUPATIONAL THERAPY NOTE
OccupationalTherapy Progress Note     Patient Name: Cornelia MURRAY Date: 3/7/2018  Problem List  Patient Active Problem List   Diagnosis    Essential hypertension    Gastroesophageal reflux disease without esophagitis    Coronary artery disease involving native coronary artery of native heart    H/O aortic valve replacement    Hyperlipidemia    Stage 3 chronic kidney disease    Chronic diastolic CHF (congestive heart failure) (HCC)    Dysphagia    Hypernatremia    Elevated platelet count (HCC)    Anemia    Dehydration    Hypoalbuminemia    Nausea and vomiting    Interstitial lung disease (HCC)    Pulmonary fibrosis (HCC)    Hypokalemia    Pulmonary nodule    Closed compression fracture of L1 lumbar vertebra (HCC)    Closed compression fracture of L2 lumbar vertebra (HCC)    Closed compression fracture of L4 lumbar vertebra (HCC)    Severe protein-calorie malnutrition (HCC)    BMI less than 19,adult    Mucositis oral               03/07/18 1200   Restrictions/Precautions   Other Precautions Bed Alarm; Fall Risk;Multiple lines;O2   Therapeutic Exercise - ROM   UE-ROM Yes   Therapeutic Excerise-Strength   UE Strength Yes     Pt cooperative and agreeable to complete OT this date including TE  AAROM completed in all planes with increased verbal cueing and increased breaks in between sets  Increased direction required to explain exercises that were to be completed  Pt presents with increased shakes in bilateral hands this date, affecting ability to complete exercises  Pf finished session with all needs in reach, lines in tact, SCDs powered on and applied, and extra blankets given to blanket for being cold

## 2018-03-07 NOTE — PROGRESS NOTES
Progress Note - Mini Moe 80 y o  female MRN: [id number]    Unit/Bed#: 942-31 Encounter: [id number]      Assessment:  Patient Active Problem List   Diagnosis    Essential hypertension    Gastroesophageal reflux disease without esophagitis    Coronary artery disease involving native coronary artery of native heart    H/O aortic valve replacement    Hyperlipidemia    Stage 3 chronic kidney disease    Chronic diastolic CHF (congestive heart failure) (HCC)    Dysphagia    Hypernatremia    Elevated platelet count (HCC)    Anemia    Dehydration    Hypoalbuminemia    Nausea and vomiting    Interstitial lung disease (HCC)    Pulmonary fibrosis (HCC)    Hypokalemia    Pulmonary nodule    Closed compression fracture of L1 lumbar vertebra (HCC)    Closed compression fracture of L2 lumbar vertebra (HCC)    Closed compression fracture of L4 lumbar vertebra (HCC)    Severe protein-calorie malnutrition (HCC)    BMI less than 19,adult    Mucositis oral         Plan:  1)  Severe protein-calorie malnutrition/Dysphagia/BMI=17 74 (BMI less than 19 in an adult)/Nausea and vomiting:  Continue the current tube feedings via the NG tube  Continue to trial PO intake  Check a portable abdominal xray with the nausea and vomiting  PRN IV Zofran  The NG tube placed on 2/27/18  The NG tube can be maintained for a maximum of 4 weeks  2)  Hypernatremia secondary to hypovolemia and lack of free water intake: The hypernatremia has resolved  Continue free water flushes with 200 ml every 6 hours  Continue to monitor the sodium level  3)  Oral mucositis:  Continue magic mouthwash  4)  Interstitial lung disease/Pulmonary fibrosis:  Continue the respiratory protocol  I updated the patient's son, Shawn Vidal, on the telephone today, 3/7/18  The patient is not competent to make her own medical decisions at this time      If the patient's nausea and vomiting resolves, she will be discharged to [facility]-5th floor SNF for short-term rehabilitation on 3/8/18  Subjective:   Patient seen and examined  The patient had an episode of nausea and vomiting today, 3/7/18  Objective:     Vitals: Blood pressure 114/55, pulse (!) 110, temperature 98 2 °F (36 8 °C), temperature source Temporal, resp  rate 22, height 5' 2" (1 575 m), weight 44 kg (97 lb), SpO2 97 %  ,Body mass index is 17 74 kg/m²    Weight (last 2 days)     Date/Time   Weight    03/07/18 0600  44 (97)    03/06/18 0600  44 9 (98 99)    Weight: bed at 03/06/18 0600    03/05/18 0650  43 9 (96 78)              Intake/Output Summary (Last 24 hours) at 03/07/18 1216  Last data filed at 03/06/18 1848   Gross per 24 hour   Intake                0 ml   Output                0 ml   Net                0 ml       Physical Exam: General:  NAD, confused, awake, follows some commands, oriented to only self  HEENT:  NC/AT, mucous membranes dry, NG tube in place  Neck:  Supple, No JVP elevation  CV:  + S1, + S2, Regular rate, Regular rhythm  Pulm:  Lung fields are CTA bilaterally, No wheezing, No crackles, No rhonchi  Abd:  Soft, Non-tender, Mild distension  Ext:  No clubbing/cyanosis/edema  Skin:  No rashes    Scheduled Meds:    Current Facility-Administered Medications:  acetaminophen 650 mg Per NG Tube Q6H Kali Peralta,    al mag oxide-diphenhydramine-lidocaine viscous 10 mL Swish & Swallow TID AC Kali Peralta DO   dextrose 25 mL Intravenous PRN [employer], DO   heparin (porcine) 5,000 Units Subcutaneous Q12H [address] [employer], DO   menthol-zinc oxide  Topical BID [employer], DO   mirtazapine 15 mg Oral HS Jasper Cook, DO   ondansetron 4 mg Intravenous Q6H PRN [employer], DO     Continuous Infusions:   PRN Meds: dextrose    ondansetron      Invasive Devices     Drain            NG/OG/Enteral Tube Nasogastric 12 Fr Right nares 7 days                  Results from last 7 days  Lab Units 03/07/18  1174 03/06/18  9860 03/04/18  0639 03/03/18  0431   WBC Thousand/uL 7 43 8 13 7 06 7 70   HEMOGLOBIN g/dL 9 9* 10 7* 10 0* 9 9*   HEMATOCRIT % 31 2* 33 8* 32 3* 31 7*   PLATELETS Thousands/uL 296 272 220 246   NEUTROS PCT % 69  --  63 66   MONOS PCT % 7  --  9 8   MONO PCT MAN %  --  2*  --   --          Results from last 7 days  Lab Units 03/07/18  0624 03/06/18  0658 03/05/18  0648 03/04/18  0639 03/03/18  0431   SODIUM mmol/L 139 136 137 139 144   POTASSIUM mmol/L 4 1 4 6 4 4 4 6 4 6   CHLORIDE mmol/L 103 101 102 105 107   CO2 mmol/L 31 27 28 28 31   BUN mg/dL 31* 30* 30* 26* 25   CREATININE mg/dL 0 81 0 76 0 75 0 81 0 79   CALCIUM mg/dL 9 0 9 2 8 6 8 6 8 6   TOTAL PROTEIN g/dL  --  7 8  --  6 7 6 6   BILIRUBIN TOTAL mg/dL  --  0 30  --  0 40 0 30   ALK PHOS U/L  --  64  --  50 48   ALT U/L  --  9*  --  13 13   AST U/L  --  24  --  23 27   GLUCOSE RANDOM mg/dL 175* 126 126 122 115       Lab Results   Component Value Date    CKTOTAL 66 02/20/2018    TROPONINI <0 02 02/19/2018       Lab Results   Component Value Date    INR 1 00 03/03/2018    INR 1 12 03/01/2018    INR 1 32 (H) 02/28/2018    PROTIME 13 1 03/03/2018    PROTIME 14 3 03/01/2018    PROTIME 16 3 (H) 02/28/2018                 Lab, Imaging and other studies: I have personally reviewed pertinent reports      VTE Pharmacologic Prophylaxis: Heparin 5000 Units SQ every 12 hours dosed based on the patient's age and weight  VTE Mechanical Prophylaxis: sequential compression device

## 2018-03-07 NOTE — PROGRESS NOTES
Patient had emesis of undetermined amount, yellow in color  Tube feeding stopped at this time and Dr Alma Bird made aware  New order for stat xray of abdomen and prn IV Zofran given   Pt had less than 5cc residual

## 2018-03-07 NOTE — CASE MANAGEMENT
Continued Stay Review    Date: 3/7/18    Vital Signs: /55 (BP Location: Left arm)   Pulse (!) 110   Temp 98 2 °F (36 8 °C) (Temporal)   Resp 22   Ht 5' 2" (1 575 m)   Wt 44 kg (97 lb)   SpO2 97%   BMI 17 74 kg/m²     Medications:   Scheduled Meds:   Current Facility-Administered Medications:  acetaminophen 650 mg Per NG Tube Q6H Alex Beltran, DO   al mag oxide-diphenhydramine-lidocaine viscous 10 mL Swish & Swallow TID [facility] Alex Beltran, DO   dextrose 25 mL Intravenous PRN Adrian Naples, DO   heparin (porcine) 5,000 Units Subcutaneous Q12H [facility], DO   menthol-zinc oxide  Topical BID [facility], DO   mirtazapine 15 mg Oral HS Jasper Joey, DO   ondansetron 4 mg Intravenous Q6H PRN Adrian Naples, DO     Continuous Infusions:    PRN Meds: dextrose    ondansetron    Abnormal Labs/Diagnostic Results:   BUN 31 GLUC 175 HGB 9 9   ABD XRAY=NG tube tip in distal antrum (or duodenal bulb)  Age/Sex: 80 y o  female     Assessment/Plan:   1)  Severe protein-calorie malnutrition/Dysphagia/BMI=17 74 (BMI less than 19 in an adult)/Nausea and vomiting:  Continue the current tube feedings via the NG tube  Continue to trial PO intake  Check a portable abdominal xray with the nausea and vomiting  PRN IV Zofran  The NG tube placed on 2/27/18  The NG tube can be maintained for a maximum of 4 weeks     2)  Hypernatremia secondary to hypovolemia and lack of free water intake: The hypernatremia has resolved  Continue free water flushes with 200 ml every 6 hours  Continue to monitor the sodium level       3)  Oral mucositis:  Continue magic mouthwash      4)  Interstitial lung disease/Pulmonary fibrosis:  Continue the respiratory protocol      I updated the patient's son, Paris Burks, on the telephone today, 3/7/18      The patient is not competent to make her own medical decisions at this time      Discharge Plan:   If the patient's nausea and vomiting resolves, she will be discharged to [facility]  [address] [facility] [address] SNF for short-term rehabilitation on 3/8/18

## 2018-03-07 NOTE — PROGRESS NOTES
Tube feeding started Jevity 1 2 per order with new tubing and set-up  NG tube placement confirmed by ausculation and marking on tube, secure device intact  HOB at 30 degrees

## 2018-03-07 NOTE — PHYSICAL THERAPY NOTE
PT treatment note      03/07/18 0849   Restrictions/Precautions   Other Precautions Chair Alarm; Bed Alarm;Multiple lines; Fall Risk;O2;Telemetry   Bed Mobility   Supine to Sit 2  Maximal assistance   Additional items Assist x 2;HOB elevated; Increased time required;Verbal cues;LE management   Transfers   Stand pivot 2  Maximal assistance   Additional items Assist x 2;Verbal cues   Additional Comments unable to stand with RW  Balance   Static Sitting Poor   Dynamic Sitting Poor   Static Standing Poor   Dynamic Standing Poor   Endurance Deficit   Endurance Deficit Yes   Activity Tolerance   Activity Tolerance Patient limited by fatigue   Assessment   Prognosis Guarded   Problem List Decreased strength;Decreased endurance; Impaired balance;Decreased mobility   Assessment Performs tx OOB to chair at (max) x 2 level of function  Needs encouragement to participate  Presents with decreased strength balance endurance and functional mobility  Pt unable to ambulate at this time and is in need of continued activity in PT to improve strength balance endurance and functional mobility  Plan   Treatment/Interventions Functional transfer training;LE strengthening/ROM; Therapeutic exercise; Endurance training;Bed mobility;Gait training   Progress Slow progress, decreased activity tolerance   Recommendation   Recommendation Long-term skilled PT   Pt  OOB with call bell within reach, scd's connected and turned on and alarm on at end of PT session

## 2018-03-08 PROBLEM — E87.2 LACTIC ACIDOSIS: Status: ACTIVE | Noted: 2018-01-01

## 2018-03-08 PROBLEM — E83.42 HYPOMAGNESEMIA: Status: ACTIVE | Noted: 2018-01-01

## 2018-03-08 PROBLEM — E83.39 HYPOPHOSPHATEMIA: Status: ACTIVE | Noted: 2018-01-01

## 2018-03-08 NOTE — NURSING NOTE
Pt discharged to [address] [place] [address] rehab  Transported via [facility] with PCA accompanying

## 2018-03-08 NOTE — DISCHARGE SUMMARY
Discharge Summary - Cornelia Hawthorne 80 y o  female MRN: [id number]    Unit/Bed#: 298-91 Encounter: [id number]    Admission Date: 2/19/2018   Discharge Date: 3/8/2018     Admitting Diagnosis: Dehydration [E86 0]  Acute hypernatremia [E87 0]  Hypernatremia [E87 0]  Flu-like symptoms [R68 89]    HPI:  The patient is a 80year-old female who presented to the ED with the complaints of nausea and vomiting  The patient had a recent hospitalization from 2/11/18 through 2/14/18 for Influenza B  Over the last few days, she has been experiencing nausea and vomiting with a decreased PO intake  She was found to have severe hypernatremia on laboratory testing completed in the ED today, 2/19/18  No chest pain  No shortness of breath  She complains of generalized weakness  Procedures Performed:   Orders Placed This Encounter   Procedures    ED ECG Documentation Only       Hospital Course/Significant Findings, Care, Treatment and Services Provided:  1)  Severe protein-calorie malnutrition/Dysphagia/BMI=18 06 (BMI less than 19 in an adult)/Nausea and vomiting:  Secondary to lack of adequate PO intake, an NG tube was placed, and tube feedings were initiated  The patient will be on tube feedings with Jevity 1 2 sheila with a goal rate of 65 ml/hr for 20 hours/day  She will be on free water flushes of 200 ml every 6 hours  Continue to trial PO intake at short-term rehabilitation with a dysphagia pureed consistency and honey thick liquids  The NG tube placed on 2/27/18  The NG tube can be maintained for a maximum of 4 weeks  She will need outpatient follow-up with Gastroenterology     2)  Hypernatremia secondary to hypovolemia and lack of free water intake: The hypernatremia resolved during the hospitalization  The patient was initiated on tube feedings via an NG tube with free water flushes  Upon discharge, continue free water flushes with 200 ml every 6 hours    Continue to monitor the sodium level at short-term rehabilitation     3)  Oral mucositis:  Continue magic mouthwash PRN     4)  Interstitial lung disease/Pulmonary fibrosis/Pulmonary nodule: The patient was placed on the respiratory protocol  She will need outpatient surveillance imaging of the pulmonary nodule  5)  Lactic acidosis:  The lactic acidosis was treated with NSS IV fluids  Her lactic acid level will be followed at short-term rehabilitation  The patient does not have the mental capacity to make her own medical decisions at this time  I had multiple discussions with both of the patient's sons, Kris Ann and Devon Mortimer, throughout the course of the hospitalization  At this time, we will trial the patient on tube feedings via the NG tube for a maximum of 4 weeks with the starting date of 2/27/18 when the NG tube was first placed  The patient will be followed by speech therapy and by the dietitian/nutritionist at [address] [place] [address]  The patient's nutritional status will be closely monitored as well as her caloric intake  The patient was evaluated by PT and OT  Short-term rehabilitation placement was recommended upon discharge  The patient was discharged to [address] [place] [address] for short-term rehabilitation      Discharge Diagnosis:  Patient Active Problem List   Diagnosis    Essential hypertension    Gastroesophageal reflux disease without esophagitis    Coronary artery disease involving native coronary artery of native heart    H/O aortic valve replacement    Hyperlipidemia    Stage 3 chronic kidney disease    Chronic diastolic CHF (congestive heart failure) (HCC)    Dysphagia    Hypernatremia    Elevated platelet count (HCC)    Anemia    Dehydration    Hypoalbuminemia    Nausea and vomiting    Interstitial lung disease (HCC)    Pulmonary fibrosis (HCC)    Hypokalemia    Pulmonary nodule    Closed compression fracture of L1 lumbar vertebra ([address] )    Closed compression fracture of L2 lumbar vertebra (HCC)    Closed compression fracture of L4 lumbar vertebra (HCC)    Severe protein-calorie malnutrition (HCC)    BMI less than 19,adult    Mucositis oral    Lactic acidosis       Results from last 7 days  Lab Units 03/08/18  0630 03/07/18  0624 03/06/18  0658   WBC Thousand/uL 9 06 7 43 8 13   HEMOGLOBIN g/dL 9 8* 9 9* 10 7*   HEMATOCRIT % 31 5* 31 2* 33 8*   PLATELETS Thousands/uL 317 296 272         Results from last 7 days  Lab Units 03/08/18  0610 03/07/18  0624 03/06/18  0658   SODIUM mmol/L 140 139 136   POTASSIUM mmol/L 4 5 4 1 4 6   CHLORIDE mmol/L 103 103 101   CO2 mmol/L 30 31 27   BUN mg/dL 38* 31* 30*   CREATININE mg/dL 0 79 0 81 0 76   GLUCOSE RANDOM mg/dL 123 175* 126   CALCIUM mg/dL 9 1 9 0 9 2       Results from last 7 days  Lab Units 03/08/18  0610 03/06/18  0658 03/04/18  0639   ALK PHOS U/L 67 64 50   BILIRUBIN TOTAL mg/dL 0 30 0 30 0 40   TOTAL PROTEIN g/dL 7 5 7 8 6 7   ALT U/L 12 9* 13   AST U/L 24 24 23     Portable chest xray (2/19/18):  CHEST      INDICATION:  Dehydration      COMPARISON:  Chest CT and radiographs dated 2/11/2018  Radiographs dated 1/6/2018      VIEWS:  Frontal and lateral projections     IMAGES:  1     FINDINGS:     Normal lung volumes  Chronic, peripheral interstitial reticular markings  No superimposed acute consolidation  Blunting of the right costophrenic angle is chronic and likely due to fibrosis  No layering effusions  No pneumothorax      Heart size within normal limits  Prior aortic root repair  Aortic tortuosity without aneurysm  Pulmonary vasculature is largely obscured by interstitial process  No janny cephalization      Osteopenia  High riding humeri with remodeling of the acetabular undersurfaces--suggestive of chronic rotator cuff tears  No acute osseous findings demonstrated    Thoracic spine poorly evaluated due to underpenetration      IMPRESSION:  Chronic interstitial fibrotic disease without acute findings or significant change from prior  CT scan of the head without (2/19/18):  CT BRAIN - WITHOUT CONTRAST     INDICATION: chest pain  History taken directly from the electronic ordering system      COMPARISON:  None      TECHNIQUE:  CT examination of the brain was performed  In addition to axial images, coronal reformatted images were created and submitted for interpretation        Radiation dose length product (DLP) for this visit:  1076 81 mGy-cm   This examination, like all CT scans performed in the [facility], was performed utilizing techniques to minimize radiation dose exposure, including the use of   iterative reconstruction and automated exposure control        IMAGE QUALITY:  Diagnostic      FINDINGS:     PARENCHYMA:  Decreased attenuation is noted in the supratentorial white matter demonstrating an appearance most consistent with moderate microangiopathic change      No intracranial mass, mass effect or midline shift  No CT signs of acute infarction  There is no parenchymal hemorrhage      VENTRICLES AND EXTRA-AXIAL SPACES:  Age-appropriate volume loss is noted  No hydrocephalus      VISUALIZED ORBITS AND PARANASAL SINUSES:  Orbits appear normal   There is near complete opacification of the right maxillary sinus  No fluid levels are seen      CALVARIUM AND EXTRACRANIAL SOFT TISSUES:   Normal      IMPRESSION:     No acute intracranial abnormality  Microangiopathic changes  CT scan of the abdomen/pelvis (2/20/18):  CT ABDOMEN AND PELVIS WITHOUT IV CONTRAST     INDICATION:  Nausea, vomiting and diarrhea      COMPARISON: CT abdomen pelvis 3/23/2017     TECHNIQUE:  CT examination of the abdomen and pelvis was performed without intravenous contrast   Axial, sagittal, and coronal 2D reformatted images were created from the source data and submitted for interpretation       Radiation dose length product (DLP) for this visit:  243 88 mGy-cm     This examination, like all CT scans performed in the [facility], was performed utilizing techniques to minimize radiation dose exposure, including the use of iterative   reconstruction and automated exposure control       Enteric contrast was administered       FINDINGS:     ABDOMEN     LOWER CHEST:  There are bilateral fibrotic changes  Bibasilar dependent opacities likely represent atelectasis  3 mm left upper lobe nodule (series 2 image 6)  There are postsurgical changes of the heart status post aortic valve replacement  There   are atherosclerotic changes of the aorta  There is enlargement of the main pulmonary artery      LIVER/BILIARY TREE:  Stable calcifications in the anterior right lobe of the liver  No evidence of liver mass or ductal dilation on this noncontrast evaluation      GALLBLADDER:  There are gallstone(s) within the gallbladder, without pericholecystic inflammatory changes  Mild gallbladder distention      SPLEEN:  Unremarkable      PANCREAS:  The pancreas is atrophic  No pancreatic mass      ADRENAL GLANDS:  Unremarkable      KIDNEYS/URETERS:  Unremarkable  No hydronephrosis      STOMACH AND BOWEL:  There is a small hiatal hernia  Evaluation of the stomach and bowel is limited given lack of oral and intravenous contrast material   No evident bowel obstruction  Mild stool retention throughout the colon  There is colonic   diverticulosis without evidence of diverticulitis      APPENDIX:  No findings to suggest appendicitis      ABDOMINOPELVIC CAVITY:  No ascites or free intraperitoneal air  No lymphadenopathy      VESSELS:  Atherosclerotic changes are present  No evidence of aneurysm      PELVIS     REPRODUCTIVE ORGANS:  Unremarkable for patient's age      URINARY BLADDER:  Unremarkable      ABDOMINAL WALL/INGUINAL REGIONS:  Unremarkable      OSSEOUS STRUCTURES:  There are compression deformities of the L1, L2 and L4 vertebral bodies    L1 vertebral body compression deformity is similar in appearance to 2/11/2018 exam   L2 and L4 compression deformities are age indeterminate      IMPRESSION:  1  Limited evaluation secondary to lack of oral and intravenous contrast   Diverticulosis without evidence of acute diverticulitis  2   Cholelithiasis and mild gallbladder wall distention  If there is focal right upper quadrant pain further evaluation with right upper quadrant ultrasound could be considered  3   Stable compression deformity of the L1 vertebral body  Age indeterminate compression deformities of L2 and L4   4   Fibrotic changes at the lung bases with patchy bibasilar airspace opacities likely representing atelectasis  5   3 mm left upper lobe pulmonary nodule  Based on current Fleischner Society 2017 Guidelines on incidental pulmonary nodule, no routine follow-up is needed if the patient is considered low risk for lung cancer  If the patient is considered high risk   for lung cancer, 12 month follow-up non-contrast chest CT is recommended  Considerations related to the patient's age and/or comorbidities may be used to alter these recommendations  Gallbladder ultrasound (2/20/18):  RIGHT UPPER QUADRANT ULTRASOUND     INDICATION:  Cholelithiasis, distended gallbladder      COMPARISON:  CT abdomen pelvis 2/20/2018     TECHNIQUE:   Real-time ultrasound of the right upper quadrant was performed with a curvilinear transducer with both volumetric sweeps and still imaging techniques      FINDINGS:     PANCREAS:  Portions of the pancreas are obscured by bowel gas  Visualized portions of the pancreas are unremarkable       AORTA AND IVC:  Visualized portions are normal for patient age      LIVER:  Size:  Within normal range  The liver measures 10 3 cm in the midclavicular line  Contour:  Surface contour is smooth  Parenchyma:  Echogenicity and echotexture are within normal limits  No evidence of suspicious mass    Limited imaging of the main portal vein shows it to be patent and hepatopetal      BILIARY:  The gallbladder is distended  No wall thickening or pericholecystic fluid  Single dependent mobile calculus without sludge  No sonographic Neff's sign  No intrahepatic biliary dilatation  CBD measures 4 5 mm  No choledocholithiasis      KIDNEY:   Right kidney measures 8 6 x 4 3 cm  Within normal limits      ASCITES:   None      IMPRESSION:  Gallbladder distention and cholelithiasis without other evidence of acute cholecystitis  Portable chest xray (2/23/18):  CHEST      INDICATION: shortness of breath     COMPARISON:  Chest x-ray from 2/19/2018     EXAM PERFORMED/VIEWS:  XR CHEST PORTABLE     IMAGES:  2     FINDINGS:     There is stable cardiomegaly  There has been prior cardiac valve replacement      Chronic diffuse interstitial prominence is again noted suggestive of underlying interstitial lung disease  Stable mild blunting of the right costophrenic angle  Right hemidiaphragm is elevated      Visualized osseous structures appear within normal limits for the patient's age      IMPRESSION:     Chronic interstitial lung disease similar to the prior exam   Difficult to exclude underlying interstitial edema or interstitial infiltrate  Portable abdominal xray (3/7/18):  ABDOMEN portable     INDICATION:  NG tube placement  Nausea     COMPARISON:  None     VIEWS:  AP supine  Images: 1     FINDINGS: Nasogastric tube tip in distal gastric antrum or proximal duodenum      There is a nonobstructive bowel gas pattern      No discernible free air on this supine study  Upright or left lateral decubitus imaging is more sensitive to detect subtle free air in the appropriate setting      No pathologic calcifications or soft tissue masses       Diffuse reticular markings throughout both lungs  Submaximal inspiration    Transvenous aortic valve replacement prosthesis noted      Visualized osseous structures are unremarkable for the patient's age      IMPRESSION:     NG tube tip in distal antrum (or duodenal bulb)  Procedure Component Value - Date/Time   MRSA culture [[id number]] (Normal) Collected: 02/19/18 1837   Lab Status: Final result Specimen: Nares from Nose Updated: 02/21/18 1302    MRSA Culture Only No Methicillin Resistant Staphlyococcus aureus (MRSA) isolated   Urine culture [[id number]] Collected: 02/19/18 1754   Lab Status: Final result Specimen: Urine from Urine, Clean Catch Updated: 02/20/18 1806    Urine Culture No Growth <1000 cfu/mL   Blood culture #1 [[id number]] Collected: 02/19/18 1345   Lab Status: Final result Specimen: Blood from Hand, Right Updated: 02/24/18 2201    Blood Culture No Growth After 5 Days  Blood culture #2 [[id number]] Collected: 02/19/18 1345   Lab Status: Final result Specimen: Blood from Hand, Right Updated: 02/24/18 2201    Blood Culture No Growth After 5 Days     Influenza A/B and RSV by PCR (indicated for patients >2 mo of age) [[id number]] (Normal) Collected: 02/19/18 1345   Lab Status: Final result Specimen: Nasopharyngeal from Nasopharyngeal Swab Updated: 02/20/18 0741    INFLU A PCR None Detected    INFLU B PCR None Detected    RSV PCR None Detected     EKG (2/19/18):  ECG 12 lead   Order: [id number]   Status:  Final result   Visible to patient:  No (Inaccessible in [address])   Next appt:  03/16/2018 at 01:30 PM in Internal Medicine Addison Leigh DO)    Ref Range & Units 2/19/18 1422   Ventricular Rate BPM 99    Atrial Rate BPM 99    TX Interval ms 146    QRSD Interval ms 102    QT Interval ms 364    QTC Interval ms 467    P Axis degrees 31    QRS Axis degrees -33    T Wave Axis degrees 166    Narrative     Normal sinus rhythm  Possible Left atrial enlargement  Left axis deviation  Left ventricular hypertrophy with repolarization abnormality , consider anterolateral ischemia  When compared with ECG of 11-FEB-2018 11:19,  ST now depressed in Anterior leads  T wave inversion now evident in Anterior leads  Confirmed by [facility] - DONTE ORTIZ ([id number]) on 2/20/2018 8:20:03 AM      Specimen Collected: 02/19/18 14:22   Last Resulted: 02/20/18 08:20                          Condition at Discharge: stable     Discharge instructions/Information to patient and family:   See after visit summary for information provided to patient and family  Provisions for Follow-Up Care:  See after visit summary for information related to follow-up care and any pertinent home health orders  Disposition: Skilled nursing facility at [address] [place] [address] 5th floor SNF for short-term rehabilitation    Planned Readmission: No    Discharge Statement   I spent 60 minutes discharging the patient  This time was spent on the day of discharge  I had direct contact with the patient on the day of discharge  Additional documentation is required if more than 30 minutes were spent on discharge  Discharge Medications:  See after visit summary for reconciled discharge medications provided to patient and family

## 2018-03-08 NOTE — SPEECH THERAPY NOTE
Speech Language/Pathology  Speech/Language Pathology  Assessment    Patient Name: Diane Rashid  AOURC'E Date: 3/8/2018     Problem List  Patient Active Problem List   Diagnosis    Essential hypertension    Gastroesophageal reflux disease without esophagitis    Coronary artery disease involving native coronary artery of native heart    H/O aortic valve replacement    Hyperlipidemia    Stage 3 chronic kidney disease    Chronic diastolic CHF (congestive heart failure) (HCC)    Dysphagia    Hypernatremia    Elevated platelet count (HCC)    Anemia    Dehydration    Hypoalbuminemia    Nausea and vomiting    Interstitial lung disease (HCC)    Pulmonary fibrosis (HCC)    Hypokalemia    Pulmonary nodule    Closed compression fracture of L1 lumbar vertebra (HCC)    Closed compression fracture of L2 lumbar vertebra (HCC)    Closed compression fracture of L4 lumbar vertebra (HCC)    Severe protein-calorie malnutrition (HCC)    BMI less than 19,adult    Mucositis oral    Lactic acidosis     Past Medical History  Past Medical History:   Diagnosis Date    Angina pectoris ([address] )     Arthritis     CAD (coronary artery disease)     Gait abnormality     GERD (gastroesophageal reflux disease)     Hypertension     Lyme disease     Osteoporosis     Vertigo      Past Surgical History  Past Surgical History:   Procedure Laterality Date    AORTIC VALVE REPLACEMENT      APPENDECTOMY      EYE SURGERY  08/19/1996    cataract bilateral        03/08/18 1140   Swallow Information   Current Risks for Dysphagia & Aspiration Mental status change;General debilitation   Current Symptoms/Concerns Decreased oral intake;HX Dysphagia; Wet voice   Current Diet Dysphagia pureed; Honey thick liquids   Baseline Diet Regular; Thin liquids  (Patient on modified diet since admission )   Baseline Assessment   Behavior/Cognition Lethargic;Requires cueing; Alert; Interactive;Confused  (partial cooperation)   Speech/Language Status Verbal and follows one step directions  Mild confusion   Patient Positioning Upright in bed   Swallow Mechanism Exam   Labial Symmetry WFL   Labial Strength Reduced   Labial ROM WFL   Labial Sensation Reduced   Facial Symmetry WFL   Facial Strength Reduced   Facial ROM Mild   Facial Sensation WFL   Lingual Symmetry WFL   Lingual Strength Moderate reduced   Lingual ROM Moderate reduced   Lingual Sensation Reduced   Velum Reduced sustained elevation   Gag Reduced   Mandible Impaired   Dentition Adequate   Volitional Cough Wet   Consistencies Assessed and Performance   Materials Admnistered Honey thick liquid  (ice chip)   Materials Adminstered Comment Limited of 1/2 tsp honey thick  Cold stimulation   Oral Stage Moderate impaired; With limited texture; With limited amount   Oral Stage Comment Patient has reduced bolus formation and control with reduced AP transfer with trial of honey thick (1/2 tsp)   Phargngeal Stage Significant impaired;Significant aspiration risk   Pharyngeal Stage Comment Patient attempts swallow initiation; but is not able to complete process  No laryngeal elevation   Reduced gag reflex and no swallow initiation with cold stimulation  Swallow Mechanics Aspiration risk; Severe delayed  (No swallow reflex completion)   Strategies and Efficacy Cold stimulation is without effect  Patient trials with patient verbalizing "I can't swallow"  Summary   Swallow Summary Patient is for re-evaluation for dysphagia after nursing could not elicit a swallow reflex when attempting to give her meds this morning  Patient has been seen this admission, and with moderate oral and moderate pharyngeal dysphagia  Patient was on Puree diet and honey thick liquids with very limited oral intake, stating she is not hungry  Patient now is alert and accepts limited trials for evaluation  Her oral motor strength and ROM are moderately reduced; as is her sensory with cold stim   She has reduced gag reflex and delayed swallow initiation with cold stim; but is unable to complete a swallow  Cold stim, ice chip (requested by patient) was without success of swallow and sensory pressure with spoon and 1/2 tsp honey thick flavored liquid was trialed and patient was with reduced AP transfer  She was trying to swallow, and unable  She spit out  honey thick liquids  Wet voice quality after, and weak cough due to suspected premature leakage  Patient is high aspiration risk without completion of swallow reflex  No oral feedings are recommended  Recommendations   Risk for Aspiration Present   Recommendations NPO;Consider alternative nutrition   Diet Liquid Recommendation No liquids   Recommended Form of Meds Feeding tube/IV   Further Evaluations Gastroenterology; Dietician   Results Reviewed with RN;PT/Family/Caregiver

## 2018-03-08 NOTE — PLAN OF CARE
Problem: DISCHARGE PLANNING - CARE MANAGEMENT  Goal: Discharge to post-acute care or home with appropriate resources  INTERVENTIONS:  - Conduct assessment to determine patient/family and health care team treatment goals, and need for post-acute services based on payer coverage, community resources, and patient preferences, and barriers to discharge  - Address psychosocial, clinical, and financial barriers to discharge as identified in assessment in conjunction with the patient/family and health care team  - Arrange appropriate level of post-acute services according to patient's   needs and preference and payer coverage in collaboration with the physician and health care team  - Communicate with and update the patient/family, physician, and health care team regarding progress on the discharge plan  - Arrange appropriate transportation to post-acute venues   Outcome: Completed Date Met: 03/08/18  -dc to the 5th floor

## 2021-02-02 NOTE — PLAN OF CARE
Problem: Nutrition/Hydration-ADULT  Goal: Nutrient/Hydration intake appropriate for improving, restoring or maintaining nutritional needs  Monitor and assess patient's nutrition/hydration status for malnutrition (ex- brittle hair, bruises, dry skin, pale skin and conjunctiva, muscle wasting, smooth red tongue, and disorientation)  Collaborate with interdisciplinary team and initiate plan and interventions as ordered  Monitor patient's weight and dietary intake as ordered or per policy  Utilize nutrition screening tool and intervene per policy  Determine patient's food preferences and provide high-protein, high-caloric foods as appropriate       INTERVENTIONS:  - Monitor oral intake, urinary output, labs, and treatment plans  - Assess nutrition and hydration status and recommend course of action  - Evaluate amount of meals eaten  - Assist patient with eating if necessary   - Allow adequate time for meals  - Recommend/ encourage appropriate diets, oral nutritional supplements, and vitamin/mineral supplements  - Order, calculate, and assess calorie counts as needed  - Recommend, monitor, and adjust tube feedings and TPN/PPN based on assessed needs  - Assess need for intravenous fluids  - Provide specific nutrition/hydration education as appropriate  - Include patient/family/caregiver in decisions related to nutrition   Outcome: Not Progressing You can access the FollowMyHealth Patient Portal offered by [facility] by registering at the following website: [email or web address] By joining [facility]’s FollowMyHealth portal, you will also be able to view your health information using other applications (apps) compatible with our system.